# Patient Record
Sex: MALE | Race: WHITE | NOT HISPANIC OR LATINO | Employment: FULL TIME | URBAN - METROPOLITAN AREA
[De-identification: names, ages, dates, MRNs, and addresses within clinical notes are randomized per-mention and may not be internally consistent; named-entity substitution may affect disease eponyms.]

---

## 2017-02-01 ENCOUNTER — OFFICE VISIT (OUTPATIENT)
Dept: FAMILY MEDICINE | Facility: CLINIC | Age: 52
End: 2017-02-01
Payer: COMMERCIAL

## 2017-02-01 VITALS
SYSTOLIC BLOOD PRESSURE: 132 MMHG | HEART RATE: 77 BPM | BODY MASS INDEX: 32.58 KG/M2 | DIASTOLIC BLOOD PRESSURE: 72 MMHG | HEIGHT: 68 IN | TEMPERATURE: 98.8 F | WEIGHT: 215 LBS | OXYGEN SATURATION: 98 %

## 2017-02-01 DIAGNOSIS — R73.03 PREDIABETES: ICD-10-CM

## 2017-02-01 DIAGNOSIS — J45.20 MILD INTERMITTENT ASTHMA WITHOUT COMPLICATION: ICD-10-CM

## 2017-02-01 DIAGNOSIS — I10 HYPERTENSION GOAL BP (BLOOD PRESSURE) < 140/90: ICD-10-CM

## 2017-02-01 DIAGNOSIS — E78.5 HYPERLIPIDEMIA LDL GOAL <70: ICD-10-CM

## 2017-02-01 DIAGNOSIS — Z12.11 SCREEN FOR COLON CANCER: ICD-10-CM

## 2017-02-01 DIAGNOSIS — Z80.42 FAMILY HISTORY OF PROSTATE CANCER: ICD-10-CM

## 2017-02-01 DIAGNOSIS — Z23 NEED FOR PROPHYLACTIC VACCINATION AND INOCULATION AGAINST INFLUENZA: ICD-10-CM

## 2017-02-01 DIAGNOSIS — L98.9 CHANGING SKIN LESION: ICD-10-CM

## 2017-02-01 DIAGNOSIS — N20.0 NEPHROLITHIASIS: ICD-10-CM

## 2017-02-01 DIAGNOSIS — Z82.49 FAMILY HISTORY OF CORONARY ARTERY DISEASE: ICD-10-CM

## 2017-02-01 DIAGNOSIS — Z91.09 ENVIRONMENTAL ALLERGIES: ICD-10-CM

## 2017-02-01 DIAGNOSIS — K22.70 BARRETT'S ESOPHAGUS WITHOUT DYSPLASIA: ICD-10-CM

## 2017-02-01 DIAGNOSIS — G47.9 SLEEP DISORDER: ICD-10-CM

## 2017-02-01 DIAGNOSIS — Z12.5 SCREENING FOR PROSTATE CANCER: ICD-10-CM

## 2017-02-01 DIAGNOSIS — I25.810 CORONARY ARTERY DISEASE INVOLVING CORONARY BYPASS GRAFT OF NATIVE HEART WITHOUT ANGINA PECTORIS: ICD-10-CM

## 2017-02-01 DIAGNOSIS — Z51.81 MEDICATION MONITORING ENCOUNTER: ICD-10-CM

## 2017-02-01 DIAGNOSIS — Z11.59 NEED FOR HEPATITIS C SCREENING TEST: ICD-10-CM

## 2017-02-01 DIAGNOSIS — G89.12 POST-THORACOTOMY PAIN SYNDROME: ICD-10-CM

## 2017-02-01 DIAGNOSIS — F41.9 ANXIETY: ICD-10-CM

## 2017-02-01 DIAGNOSIS — Z00.00 ENCOUNTER FOR ROUTINE ADULT HEALTH EXAMINATION WITHOUT ABNORMAL FINDINGS: Primary | ICD-10-CM

## 2017-02-01 DIAGNOSIS — K21.9 GASTROESOPHAGEAL REFLUX DISEASE WITHOUT ESOPHAGITIS: ICD-10-CM

## 2017-02-01 PROCEDURE — 99396 PREV VISIT EST AGE 40-64: CPT | Performed by: FAMILY MEDICINE

## 2017-02-01 RX ORDER — FENOFIBRATE 160 MG/1
160 TABLET ORAL DAILY
Qty: 90 TABLET | Refills: 3 | Status: SHIPPED | OUTPATIENT
Start: 2017-02-01 | End: 2018-01-30

## 2017-02-01 RX ORDER — AZELASTINE 1 MG/ML
1-2 SPRAY, METERED NASAL 2 TIMES DAILY
Qty: 3 BOTTLE | Refills: 3 | Status: SHIPPED | OUTPATIENT
Start: 2017-02-01 | End: 2018-01-30

## 2017-02-01 RX ORDER — CARVEDILOL 3.12 MG/1
3.12 TABLET ORAL 2 TIMES DAILY WITH MEALS
Qty: 180 TABLET | Refills: 3 | Status: SHIPPED | OUTPATIENT
Start: 2017-02-01 | End: 2018-01-30

## 2017-02-01 RX ORDER — ALBUTEROL SULFATE 90 UG/1
2 AEROSOL, METERED RESPIRATORY (INHALATION) EVERY 4 HOURS PRN
Qty: 3 INHALER | Refills: 3 | Status: SHIPPED | OUTPATIENT
Start: 2017-02-01 | End: 2018-01-30

## 2017-02-01 RX ORDER — TAMSULOSIN HYDROCHLORIDE 0.4 MG/1
0.4 CAPSULE ORAL DAILY
Qty: 30 CAPSULE | Refills: 0 | Status: CANCELLED | OUTPATIENT
Start: 2017-02-01

## 2017-02-01 RX ORDER — CITALOPRAM HYDROBROMIDE 20 MG/1
20 TABLET ORAL DAILY
Qty: 90 TABLET | Refills: 3 | Status: SHIPPED | OUTPATIENT
Start: 2017-02-01 | End: 2018-01-30

## 2017-02-01 RX ORDER — CETIRIZINE HYDROCHLORIDE 10 MG/1
10 TABLET ORAL EVERY EVENING
Qty: 30 TABLET | Refills: 1 | Status: CANCELLED | OUTPATIENT
Start: 2017-02-01

## 2017-02-01 RX ORDER — ATORVASTATIN CALCIUM 80 MG/1
80 TABLET, FILM COATED ORAL DAILY
Qty: 90 TABLET | Refills: 3 | Status: SHIPPED | OUTPATIENT
Start: 2017-02-01 | End: 2018-01-30

## 2017-02-01 RX ORDER — FLUTICASONE PROPIONATE 50 MCG
1-2 SPRAY, SUSPENSION (ML) NASAL DAILY
Qty: 48 G | Refills: 3 | Status: SHIPPED | OUTPATIENT
Start: 2017-02-01 | End: 2018-01-30

## 2017-02-01 RX ORDER — LANSOPRAZOLE 30 MG/1
CAPSULE, DELAYED RELEASE ORAL
Qty: 90 CAPSULE | Refills: 3 | Status: CANCELLED | OUTPATIENT
Start: 2017-02-01

## 2017-02-01 RX ORDER — ZOLPIDEM TARTRATE 5 MG/1
5 TABLET ORAL
Qty: 30 TABLET | Refills: 0 | Status: SHIPPED | OUTPATIENT
Start: 2017-02-01 | End: 2020-03-02

## 2017-02-01 NOTE — PROGRESS NOTES
SUBJECTIVE:     CC: Mian Lozano is an 51 year old male who presents for preventative health visit.     Mian presented to the clinic for a preventative health visit.  He is taking his medications regularly with no side effects.  He has had some pain under his left armpit in the rib area.   His heartburn is well controlled on medication.  He denies having sleep apnea, excessive snoring, daytime drowsiness, chest pain and shortness of breath.       Healthy Habits:    Do you get at least three servings of calcium containing foods daily (dairy, green leafy vegetables, etc.)? no, taking calcium and/or vitamin D supplement: no    Amount of exercise or daily activities, outside of work: 3 hour(s) per week    Problems taking medications regularly No    Medication side effects: No    Have you had an eye exam in the past two years? yes    Do you see a dentist twice per year? yes    Do you have sleep apnea, excessive snoring or daytime drowsiness?no      CAD/LipidsHypertension Follow-up - no current issues      Outpatient blood pressures are not being checked.    Low Salt Diet: not monitoring salt    BP Readings from Last 3 Encounters:   02/01/17 132/72   03/17/16 122/82   11/25/15 132/82        Recent Labs   Lab Test  11/02/16   0725  01/27/16   0709  07/08/15   0757  09/03/14   0745   CHOL  114  140  146  159   HDL  28*  32*  37*  34*   LDL  64  78  79  72   TRIG  111  148  148  267*   CHOLHDLRATIO   --    --   3.9  4.7     Prediabetes    GLUCOSE   Date Value Ref Range Status   11/02/2016 111* 70 - 99 mg/dL Final     A1C      5.5   1/27/2016  A1C      5.5   7/8/2015  A1C      5.4   9/3/2014  A1C      5.2   3/14/2013  A1C      5.1   11/15/2011    Anxiety - stable    Sleep - Stable    Asthma  - Stable    GERD/Sullivan's - stable      Today's PHQ-2 Score:   PHQ-2 ( 1999 Pfizer) 2/1/2017 3/17/2016   Q1: Little interest or pleasure in doing things 0 0   Q2: Feeling down, depressed or hopeless 0 0   PHQ-2 Score 0 0   Little  interest or pleasure in doing things - -   Feeling down, depressed or hopeless - -   PHQ-2 Score - -       Abuse: Current or Past(Physical, Sexual or Emotional)- No  Do you feel safe in your environment - Yes    Social History   Substance Use Topics     Smoking status: Never Smoker      Smokeless tobacco: Never Used     Alcohol Use: 3.6 oz/week      Comment: 6 drinks per week     The patient does not drink >3 drinks per day nor >7 drinks per week.    Last PSA:   PSA   Date Value Ref Range Status   01/27/2016 0.71 0 - 4 ug/L Final       Recent Labs   Lab Test  11/02/16   0725  01/27/16   0709  07/08/15   0757  09/03/14   0745   CHOL  114  140  146  159   HDL  28*  32*  37*  34*   LDL  64  78  79  72   TRIG  111  148  148  267*   CHOLHDLRATIO   --    --   3.9  4.7   NHDL  86  108   --    --        Reviewed orders with patient. Reviewed health maintenance and updated orders accordingly - Yes    All Histories reviewed and updated in [facility].    Health Maintenance     Colonoscopy:  10 years last 8/15 Due 8/25   FIT:  Yearly Not on file-Ordered               PSA: Yearly Not on file-Ordered    DEXA:  N/A    Health Maintenance Due   Topic Date Due     HEPATITIS C SCREENING  02/14/1983     MICROALBUMIN Q1 YEAR( NO INBASKET)  01/27/2017       Current Problem List    Patient Active Problem List   Diagnosis     Other atopic dermatitis and related conditions     Mild intermittent asthma     Family history of coronary artery disease     Disease of lung     Family history of prostate cancer     Prediabetes     Health Care Home     GERD (gastroesophageal reflux disease)     Sullivan's esophagus     Anxiety     Hypertension goal BP (blood pressure) < 140/90     Ulnar neuropathy     Post-thoracotomy pain syndrome     Nephrolithiasis     Coronary artery disease     Hyperlipidemia LDL goal <70     Environmental allergies       Past Medical History    Past Medical History   Diagnosis Date     Seasonal allergies 1980     Allergic rhinitis  Hayfever     Other atopic dermatitis and related conditions 1980     Mild intermittent asthma 7/03     Hypertension goal BP (blood pressure) < 140/90 4/08     FAM HX-CARDIOVAS DIS NEC      dad at 45, cabg, stent     Coronary artery disease 4/08     cabg x 5     Other diseases of lung, not elsewhere classified 4/08     dr steel - benign bx and ct - granuloma - no further w/u needed     Prediabetes 8/08     Family history of prostate cancer      GERD (gastroesophageal reflux disease) 3/11     Hyperlipidemia LDL goal <70 2006     Fowler's esophagus 7/11     Anxiety      Post-thoracotomy pain syndrome 4/11     dr hoover     Nephrolithiasis 6/13, 7/16     calcium oxalate     History of blood transfusion      Environmental allergies      seasonal, hay fever       Past Surgical History    Past Surgical History   Procedure Laterality Date     Surgical history of -   1980     lt patella fx     Surgical history of -   1985     lt thumb neuroma excised     Hc vasectomy unilat/bilat w postop semen  1995     Vasectomy     C stress test - regular (fl)  9/06, 4/11     negative stress thallium - [facility]SD     C cabg, artery-vein, five  4/08     FSD - lung bx benign     Surgical history of -   11/09     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 gastritis, fowler's - due 3 yr     Esophagoscopy, gastroscopy, duodenoscopy (egd), combined  5/10/2013     COMBINED ESOPHAGOSCOPY, GASTROSCOPY, DUODENOSCOPY (EGD), BIOPSY SINGLE OR MULTIPLE;  ESOPHAGOSCOPY, GASTROSCOPY, DUODENOSCOPY (EGD)  with biopsy;  Surgeon: Angus Reynolds MD;  Location: [facility]     Herniorrhaphy umbilical N/A 11/13/2014     Dr Barron     Colonoscopy N/A 8/7/2015     normal - due 10 yrs       Current Medications    Current Outpatient Prescriptions   Medication Sig Dispense Refill     albuterol (ALBUTEROL) 108 (90 BASE) MCG/ACT Inhaler Inhale 2 puffs into the lungs every 4 hours as  needed 3 Inhaler 3     atorvastatin (LIPITOR) 80 MG tablet Take 1 tablet (80 mg) by mouth daily 90 tablet 3     azelastine (ASTELIN) 0.1 % spray Spray 1-2 sprays into both nostrils 2 times daily 3 Bottle 3     carvedilol (COREG) 3.125 MG tablet Take 1 tablet (3.125 mg) by mouth 2 times daily (with meals) 180 tablet 3     citalopram (CELEXA) 20 MG tablet Take 1 tablet (20 mg) by mouth daily 90 tablet 3     fenofibrate 160 MG tablet Take 1 tablet (160 mg) by mouth daily 90 tablet 3     fluticasone (FLONASE) 50 MCG/ACT spray Spray 1-2 sprays into both nostrils daily 48 g 3     zolpidem (AMBIEN) 5 MG tablet Take 1 tablet (5 mg) by mouth nightly as needed for sleep 30 tablet 0     tamsulosin (FLOMAX) 0.4 MG 24 hr capsule Take 1 capsule (0.4 mg) by mouth daily 30 capsule 0     LANsoprazole (PREVACID) 30 MG capsule TAKE ONE CAPSULE BY MOUTH DAILY. TAKE 30 TO 60 MINUTES BEFORE A MEAL. 90 capsule 3     cetirizine (ZYRTEC) 10 MG tablet Take 1 tablet by mouth every evening. 30 tablet 1     Omega-3 Fatty Acids (OMEGA 3 PO) Take  by mouth.       ASPIRIN 81 MG OR TABS ONE DAILY 100 3       Allergies    Allergies   Allergen Reactions     Seasonal Allergies        Immunizations    Immunization History   Administered Date(s) Administered     Influenza (IIV3) 11/26/2012, 11/12/2014, 10/01/2016     Influenza Intranasal Vaccine 4 valent 10/01/2015     Influenza Vaccine IM 3yrs+ 4 Valent IIV4 10/20/2015     TDAP (BOOSTRIX AGES 10-64) 11/25/2015     Tdap (Adacel,Boostrix) 04/28/2006       Family History    Family History   Problem Relation Age of Onset     HEART DISEASE Father      C.A.SKIP. Father 38     Prostate Cancer Father 69     Neurologic Disorder Sister      CVA/anuerysm at age 19     Hypertension Paternal Grandfather      LAUROADONTA. Paternal Grandfather        Social History    Social History     Social History     Marital Status:      Spouse Name: Imelda     Number of Children: 0     Years of Education: 14     Occupational  "History      Unemployed     Social History Main Topics     Smoking status: Never Smoker      Smokeless tobacco: Never Used     Alcohol Use: 3.6 oz/week      Comment: 6 drinks per week     Drug Use: No     Sexual Activity:     Partners: Female     Birth Control/ Protection: None     Other Topics Concern     Caffeine Concern Yes     1 can qd     Exercise Yes     occas     Seat Belt Yes     Parent/Sibling W/ Cabg, Mi Or Angioplasty Before 65f 55m? Yes     Social History Narrative     ROS:  Constitutional, HEENT, cardiovascular, pulmonary, GI, , musculoskeletal, neuro, skin, endocrine and psych systems are negative, except as in HPI or otherwise noted       Problem list, Medication list, Allergies, and Medical/Social/Surgical histories reviewed in [facility] and updated as appropriate.  OBJECTIVE:     /72 mmHg  Pulse 77  Temp(Src) 98.8  F (37.1  C) (Oral)  Ht 5' 8\" (1.727 m)  Wt 215 lb (97.523 kg)  BMI 32.70 kg/m2  SpO2 98%  EXAM:  GENERAL: healthy, alert and no distress Obese   EYES: Eyes grossly normal to inspection, PERRL and conjunctivae and sclerae normal  HENT: ear canals and TM's normal, nose and mouth without ulcers or lesions  NECK: no adenopathy, no asymmetry, masses, or scars and thyroid normal to palpation  RESP: lungs clear to auscultation - no rales, rhonchi or wheezes  CV: regular rate and rhythm, normal S1 S2, no S3 or S4, no murmur, click or rub, no peripheral edema and peripheral pulses strong  ABDOMEN: soft, nontender, no hepatosplenomegaly, no masses and bowel sounds normal  MS: no gross musculoskeletal defects noted, no edema  SKIN: no suspicious lesions or rashes 3mm rough tan lesion to posterior of shoulder.     NEURO: Normal strength and tone, mentation intact and speech normal  PSYCH: mentation appears normal, affect normal/bright  Examination:  : testicles normal without atrophy or masses, no hernias, penis normal without urethral discharge  RECTAL: normal sphincter tone, no rectal " masses, prostate smooth, without masses    ASSESSMENT/PLAN:         ICD-10-CM    1. Encounter for routine adult health examination without abnormal findings Z00.00 Comprehensive metabolic panel     Lipid panel reflex to direct LDL     CK total     CBC with platelets     *UA reflex to Microscopic     Albumin Random Urine Quantitative     TSH with free T4 reflex     Prostate spec antigen screen     Hemoglobin A1c     Hepatitis C Screen Reflex to HCV RNA Quant and Genotype     Fecal colorectal cancer screen (FIT)   2. Coronary artery disease involving coronary bypass graft of native heart without angina pectoris I25.810 Comprehensive metabolic panel     Lipid panel reflex to direct LDL     *UA reflex to Microscopic     Albumin Random Urine Quantitative     CARDIOLOGY EVAL ADULT REFERRAL   3. Hypertension goal BP (blood pressure) < 140/90 I10 Comprehensive metabolic panel     CBC with platelets     *UA reflex to Microscopic     Albumin Random Urine Quantitative     carvedilol (COREG) 3.125 MG tablet     CARDIOLOGY EVAL ADULT REFERRAL   4. Hyperlipidemia LDL goal <70 E78.5 Comprehensive metabolic panel     Lipid panel reflex to direct LDL     CK total     atorvastatin (LIPITOR) 80 MG tablet     fenofibrate 160 MG tablet     CARDIOLOGY EVAL ADULT REFERRAL   5. Post-thoracotomy pain syndrome G89.12    6. Prediabetes R73.03 Comprehensive metabolic panel     *UA reflex to Microscopic     Albumin Random Urine Quantitative     Hemoglobin A1c   7. Anxiety F41.9 TSH with free T4 reflex     citalopram (CELEXA) 20 MG tablet   8. Sleep disorder G47.9 zolpidem (AMBIEN) 5 MG tablet   9. Mild intermittent asthma without complication J45.20 albuterol (ALBUTEROL) 108 (90 BASE) MCG/ACT Inhaler     azelastine (ASTELIN) 0.1 % spray     fluticasone (FLONASE) 50 MCG/ACT spray   10. Sullivan's esophagus without dysplasia K22.70 CBC with platelets     GASTROENTEROLOGY ADULT REF PROCEDURE ONLY   11. Gastroesophageal reflux disease without  esophagitis K21.9 CBC with platelets     GASTROENTEROLOGY ADULT REF PROCEDURE ONLY   12. Nephrolithiasis N20.0 *UA reflex to Microscopic   13. Environmental allergies Z91.09 albuterol (ALBUTEROL) 108 (90 BASE) MCG/ACT Inhaler     azelastine (ASTELIN) 0.1 % spray     fluticasone (FLONASE) 50 MCG/ACT spray   14. Changing skin lesion L98.9    15. Family history of coronary artery disease Z82.49 Lipid panel reflex to direct LDL   16. Family history of prostate cancer Z80.42 Prostate spec antigen screen   17. Screening for prostate cancer Z12.5 Prostate spec antigen screen   18. Screen for colon cancer Z12.11 Fecal colorectal cancer screen (FIT)   19. Medication monitoring encounter Z51.81 Comprehensive metabolic panel     Lipid panel reflex to direct LDL     CK total     CBC with platelets     *UA reflex to Microscopic     Albumin Random Urine Quantitative     TSH with free T4 reflex     Hemoglobin A1c   20. Need for hepatitis C screening test Z11.59 Hepatitis C Screen Reflex to HCV RNA Quant and Genotype   21. Need for prophylactic vaccination and inoculation against influenza Z23        COUNSELING:  Reviewed preventive health counseling, as reflected in patient instructions     Discussed treatment/modality options, including risk and benefits, he desires advised aspirin 81 mg po daily, advised diet, exercise, and weight loss, diabetic education, further diagnostic(s), further health care maintenance, further imaging, medication change(s), medication refill(s), referral(s) and observation. All diagnosis above reviewed and noted above, otherwise stable.  See [facility] orders for further details.  Follow up as needed.    Ordered endoscopy, stress test, refilled/changed meds, fasting labs soon, shave bx soon       Health Maintenance Due   Topic Date Due     HEPATITIS C SCREENING  02/14/1983     MICROALBUMIN Q1 YEAR( NO INBASKET)  01/27/2017        reports that he has never smoked. He has never used smokeless  "tobacco.    Estimated body mass index is 32.7 kg/(m^2) as calculated from the following:    Height as of this encounter: 5' 8\" (1.727 m).    Weight as of this encounter: 215 lb (97.523 kg).   Weight management plan: Discussed healthy diet and exercise guidelines and patient will follow up in 12 months in clinic to re-evaluate.    Counseling Resources:  ATP IV Guidelines  Pooled Cohorts Equation Calculator  FRAX Risk Assessment  ICSI Preventive Guidelines  Dietary Guidelines for Americans, 2010  USDA's MyPlate  ASA Prophylaxis  Lung CA Screening    This document serves as a record of the services and decisions personally performed and made by Fady Adams MD [facility]. It was created on their behalf by John Jensen, a trained medical scribe. The creation of this document is based the provider's statements to the medical scribe.  John Jensen February 1, 2017 4:51 PM             Fady Adams MD, FAAFP    [address]  [phone] Fax    "

## 2017-02-01 NOTE — NURSING NOTE
"Chief Complaint   Patient presents with     Physical       Initial /72 mmHg  Pulse 77  Temp(Src) 98.8  F (37.1  C) (Oral)  Ht 5' 8\" (1.727 m)  Wt 215 lb (97.523 kg)  BMI 32.70 kg/m2  SpO2 98% Estimated body mass index is 32.7 kg/(m^2) as calculated from the following:    Height as of this encounter: 5' 8\" (1.727 m).    Weight as of this encounter: 215 lb (97.523 kg)..  BP completed using cuff size: sara Rodriguez MA  "

## 2017-02-01 NOTE — PATIENT INSTRUCTIONS
2/1/17    Fasting labs-Come in to get done  Med-Refilled-Use as directed   Stress test Ordered   Endoscopy-Ordered       Preventive Health Recommendations  Male Ages 50   64    Yearly exam:             See your health care provider every year in order to  o   Review health changes.   o   Discuss preventive care.    o   Review your medicines if your doctor has prescribed any.     Have a cholesterol test every 5 years, or more frequently if you are at risk for high cholesterol/heart disease.     Have a diabetes test (fasting glucose) every three years. If you are at risk for diabetes, you should have this test more often.     Have a colonoscopy at age 50, or have a yearly FIT test (stool test). These exams will check for colon cancer.      Talk with your health care provider about whether or not a prostate cancer screening test (PSA) is right for you.    You should be tested each year for STDs (sexually transmitted diseases), if you re at risk.     Shots: Get a flu shot each year. Get a tetanus shot every 10 years.     Nutrition:    Eat at least 5 servings of fruits and vegetables daily.     Eat whole-grain bread, whole-wheat pasta and brown rice instead of white grains and rice.     Talk to your provider about Calcium and Vitamin D.     Lifestyle    Exercise for at least 150 minutes a week (30 minutes a day, 5 days a week). This will help you control your weight and prevent disease.     Limit alcohol to one drink per day.     No smoking.     Wear sunscreen to prevent skin cancer.     See your dentist every six months for an exam and cleaning.     See your eye doctor every 1 to 2 years.        [facility]                        To reach your care team during and after hours:   [phone]  To reach our pharmacy:        [phone]    Clinic Hours                        Our clinic hours are:    Monday   7:30 am to 7:00 pm                  Tuesday through Friday 7:30 am to 5:00 pm                              Saturday   8:00 am to 12:00 pm      Sunday   Closed      Pharmacy Hours                        Our pharmacy hours are:    Monday   8:30 am to 7:00 pm       Tuesday to Friday  8:30 am to 6:00 pm                       Saturday    9:00 am to 1:00 pm              Sunday    Closed              There is also information available at our web site:  [email or web address]    If your provider ordered any lab tests and you do not receive the results within 10 business days, please call the clinic.    If you need a medication refill please contact your pharmacy.  Please allow 2-3 business days for your refill to be completed.    Our clinic offers telephone visits and e visits.  Please ask one of your team members to explain more.      Use [employer] (secure email communication and access to your chart) to send your primary care provider a message or make an appointment. Ask someone on your Team how to sign up for [employer].  Immunizations                      Immunization History   Administered Date(s) Administered     Influenza (IIV3) 11/26/2012, 11/12/2014, 10/01/2016     Influenza Intranasal Vaccine 4 valent 10/01/2015     Influenza Vaccine IM 3yrs+ 4 Valent IIV4 10/20/2015     TDAP (BOOSTRIX AGES 10-64) 11/25/2015     Tdap (Adacel,Boostrix) 04/28/2006        Health Maintenance                         Health Maintenance Due   Topic Date Due     Hepatitis C Screening  02/14/1983     Asthma Control Test - every 6 months  05/25/2016     Flu Vaccine - yearly  09/01/2016     Asthma Action Plan - yearly  11/25/2016     Microalbumin Lab - yearly  01/27/2017

## 2017-02-01 NOTE — MR AVS SNAPSHOT
After Visit Summary   2/1/2017    Main Lozano    MRN: [id number]           Patient Information     Date Of Birth          1965        Visit Information        Provider Department      2/1/2017 4:00 PM Fady Adams MD [facility] [place]        Today's Diagnoses     Encounter for routine adult health examination without abnormal findings    -  1     Coronary artery disease involving coronary bypass graft of native heart without angina pectoris         Hypertension goal BP (blood pressure) < 140/90         Hyperlipidemia LDL goal <70         Post-thoracotomy pain syndrome         Prediabetes         Anxiety         Sleep disorder         Mild intermittent asthma without complication         Sullivan's esophagus without dysplasia         Gastroesophageal reflux disease without esophagitis         Nephrolithiasis         Environmental allergies         Changing skin lesion         Family history of coronary artery disease         Family history of prostate cancer         Screening for prostate cancer         Screen for colon cancer         Medication monitoring encounter         Need for hepatitis C screening test         Need for prophylactic vaccination and inoculation against influenza           Care Instructions    2/1/17    Fasting labs-Come in to get done  Med-Refilled-Use as directed   Stress test Ordered   Endoscopy-Ordered       Preventive Health Recommendations  Male Ages 50 - 64    Yearly exam:             See your health care provider every year in order to  o   Review health changes.   o   Discuss preventive care.    o   Review your medicines if your doctor has prescribed any.     Have a cholesterol test every 5 years, or more frequently if you are at risk for high cholesterol/heart disease.     Have a diabetes test (fasting glucose) every three years. If you are at risk for diabetes, you should have this test more often.     Have a colonoscopy at age 50, or have a yearly FIT  test (stool test). These exams will check for colon cancer.      Talk with your health care provider about whether or not a prostate cancer screening test (PSA) is right for you.    You should be tested each year for STDs (sexually transmitted diseases), if you re at risk.     Shots: Get a flu shot each year. Get a tetanus shot every 10 years.     Nutrition:    Eat at least 5 servings of fruits and vegetables daily.     Eat whole-grain bread, whole-wheat pasta and brown rice instead of white grains and rice.     Talk to your provider about Calcium and Vitamin D.     Lifestyle    Exercise for at least 150 minutes a week (30 minutes a day, 5 days a week). This will help you control your weight and prevent disease.     Limit alcohol to one drink per day.     No smoking.     Wear sunscreen to prevent skin cancer.     See your dentist every six months for an exam and cleaning.     See your eye doctor every 1 to 2 years.        [facility]                        To reach your care team during and after hours:   [phone]  To reach our pharmacy:        [phone]    Clinic Hours                        Our clinic hours are:    Monday   7:30 am to 7:00 pm                  Tuesday through Friday 7:30 am to 5:00 pm                             Saturday   8:00 am to 12:00 pm      Sunday   Closed      Pharmacy Hours                        Our pharmacy hours are:    Monday   8:30 am to 7:00 pm       Tuesday to Friday  8:30 am to 6:00 pm                       Saturday    9:00 am to 1:00 pm              Sunday    Closed              There is also information available at our web site:  [email or web address]    If your provider ordered any lab tests and you do not receive the results within 10 business days, please call the clinic.    If you need a medication refill please contact your pharmacy.  Please allow 2-3 business days for your refill to be completed.    Our clinic offers telephone visits and e visits.   Please ask one of your team members to explain more.      Use [employer] (secure email communication and access to your chart) to send your primary care provider a message or make an appointment. Ask someone on your Team how to sign up for [employer].  Immunizations                      Immunization History   Administered Date(s) Administered     Influenza (IIV3) 11/26/2012, 11/12/2014, 10/01/2016     Influenza Intranasal Vaccine 4 valent 10/01/2015     Influenza Vaccine IM 3yrs+ 4 Valent IIV4 10/20/2015     TDAP (BOOSTRIX AGES 10-64) 11/25/2015     Tdap (Adacel,Boostrix) 04/28/2006        Health Maintenance                         Health Maintenance Due   Topic Date Due     Hepatitis C Screening  02/14/1983     Asthma Control Test - every 6 months  05/25/2016     Flu Vaccine - yearly  09/01/2016     Asthma Action Plan - yearly  11/25/2016     Microalbumin Lab - yearly  01/27/2017               Follow-ups after your visit        Additional Services     CARDIOLOGY EVAL ADULT REFERRAL       Your provider has referred you to:  [facility]: [facility] [phone]   [email or web address]    Please be aware that coverage of these services is subject to the terms and limitations of your health insurance plan.  Call member services at your health plan with any benefit or coverage questions.      Type of Referral:  Cardiology Follow Up    Timeframe requested:  Within 1 month, prn    Please bring the following to your appointment:  >>   Any x-rays, CTs or MRIs which have been performed.  Contact the facility where they were done to arrange for  prior to your scheduled appointment.    >>   List of current medications  >>   This referral request   >>   Any documents/labs given to you for this referral            GASTROENTEROLOGY ADULT REF PROCEDURE ONLY       Last Lab Result: CREATININE (mg/dL)       Date                     Value                  07/20/2016               1.54*            ----------  Body mass index is 32.7 kg/(m^2).      Patient will be contacted to schedule procedure.     Please be aware that coverage of these services is subject to the terms and limitations of your health insurance plan.  Call member services at your health plan with any benefit or coverage questions.  Any procedures must be performed at a [facility] facility OR coordinated by your clinic's referral office.    Please bring the following with you to your appointment:    (1) Any X-Rays, CTs or MRIs which have been performed.  Contact the facility where they were done to arrange for  prior to your scheduled appointment.    (2) List of current medications   (3) This referral request   (4) Any documents/labs given to you for this referral                  Future tests that were ordered for you today     Open Future Orders        Priority Expected Expires Ordered    Comprehensive metabolic panel Routine 3/3/2017 4/1/2017 2/1/2017    Lipid panel reflex to direct LDL Routine 3/3/2017 4/1/2017 2/1/2017    CK total Routine 3/3/2017 4/1/2017 2/1/2017    CBC with platelets Routine 3/3/2017 4/1/2017 2/1/2017    *UA reflex to Microscopic Routine 3/3/2017 4/1/2017 2/1/2017    Albumin Random Urine Quantitative Routine  4/1/2017 2/1/2017    TSH with free T4 reflex Routine 3/3/2017 4/1/2017 2/1/2017    Prostate spec antigen screen Routine 3/3/2017 4/1/2017 2/1/2017    Hemoglobin A1c Routine 3/3/2017 4/1/2017 2/1/2017    Hepatitis C Screen Reflex to HCV RNA Quant and Genotype Routine  4/1/2017 2/1/2017    Fecal colorectal cancer screen (FIT) Routine 2/22/2017 4/1/2017 2/1/2017            Who to contact     If you have questions or need follow up information about today's clinic visit or your schedule please contact [facility] directly at [phone].  Normal or non-critical lab and imaging results will be communicated to you by MyChart, letter or phone within 4  "business days after the clinic has received the results. If you do not hear from us within 7 days, please contact the clinic through [employer] or phone. If you have a critical or abnormal lab result, we will notify you by phone as soon as possible.  Submit refill requests through [employer] or call your pharmacy and they will forward the refill request to us. Please allow 3 business days for your refill to be completed.          Additional Information About Your Visit        [employer] Information     [employer] gives you secure access to your electronic health record. If you see a primary care provider, you can also send messages to your care team and make appointments. If you have questions, please call your primary care clinic.  If you do not have a primary care provider, please call [phone] and they will assist you.        Care EveryWhere ID     This is your Care EveryWhere ID. This could be used by other organizations to access your [place] medical records  [id number]        Your Vitals Were     Pulse Temperature Height BMI (Body Mass Index) Pulse Oximetry       77 98.8  F (37.1  C) (Oral) 5' 8\" (1.727 m) 32.70 kg/m2 98%        Blood Pressure from Last 3 Encounters:   02/01/17 132/72   03/17/16 122/82   11/25/15 132/82    Weight from Last 3 Encounters:   02/01/17 215 lb (97.523 kg)   03/17/16 206 lb 9.6 oz (93.713 kg)   11/25/15 203 lb (92.08 kg)              We Performed the Following     Asthma Action Plan   (Please complete E-AAP by signing order and opening link in order details)     CARDIOLOGY EVAL ADULT REFERRAL     GASTROENTEROLOGY ADULT REF PROCEDURE ONLY          Today's Medication Changes          These changes are accurate as of: 2/1/17  5:12 PM.  If you have any questions, ask your nurse or doctor.               These medicines have changed or have updated prescriptions.        Dose/Directions    atorvastatin 80 MG tablet   Commonly known as:  LIPITOR   This may have changed:  See the new instructions. "   Used for:  Hyperlipidemia LDL goal <70   Changed by:  Fady Adams MD        Dose:  80 mg   Take 1 tablet (80 mg) by mouth daily   Quantity:  90 tablet   Refills:  3       carvedilol 3.125 MG tablet   Commonly known as:  COREG   This may have changed:  See the new instructions.   Used for:  Hypertension goal BP (blood pressure) < 140/90   Changed by:  Fady Adams MD        Dose:  3.125 mg   Take 1 tablet (3.125 mg) by mouth 2 times daily (with meals)   Quantity:  180 tablet   Refills:  3       citalopram 20 MG tablet   Commonly known as:  celeXA   This may have changed:  See the new instructions.   Used for:  Anxiety   Changed by:  Fady Adams MD        Dose:  20 mg   Take 1 tablet (20 mg) by mouth daily   Quantity:  90 tablet   Refills:  3       fenofibrate 160 MG tablet   This may have changed:  See the new instructions.   Used for:  Hyperlipidemia LDL goal <70   Changed by:  Fady Adams MD        Dose:  160 mg   Take 1 tablet (160 mg) by mouth daily   Quantity:  90 tablet   Refills:  3            Where to get your medicines      These medications were sent to [facility] [address] [place] [address] AT [facility] [address] [place] [address], [facility] [address]    Hours:  24-hours Phone:  [phone]    - albuterol 108 (90 BASE) MCG/ACT Inhaler  - atorvastatin 80 MG tablet  - azelastine 0.1 % spray  - carvedilol 3.125 MG tablet  - citalopram 20 MG tablet  - fenofibrate 160 MG tablet  - fluticasone 50 MCG/ACT spray      Some of these will need a paper prescription and others can be bought over the counter.  Ask your nurse if you have questions.     Bring a paper prescription for each of these medications    - zolpidem 5 MG tablet             Primary Care Provider Office Phone # Fax #    Fady Adams [phone]       [address] [address]        Thank you!     Thank you for choosing [facility]   for your care. Our goal is always to provide you with excellent care. Hearing back from our patients is one way we can continue to improve our services. Please take a few minutes to complete the written survey that you may receive in the mail after your visit with us. Thank you!             Your Updated Medication List - Protect others around you: Learn how to safely use, store and throw away your medicines at www.disposemymeds.org.          This list is accurate as of: 2/1/17  5:12 PM.  Always use your most recent med list.                   Brand Name Dispense Instructions for use    albuterol 108 (90 BASE) MCG/ACT Inhaler    albuterol    3 Inhaler    Inhale 2 puffs into the lungs every 4 hours as needed       aspirin 81 MG tablet     100    ONE DAILY       atorvastatin 80 MG tablet    LIPITOR    90 tablet    Take 1 tablet (80 mg) by mouth daily       azelastine 0.1 % spray    ASTELIN    3 Bottle    Spray 1-2 sprays into both nostrils 2 times daily       carvedilol 3.125 MG tablet    COREG    180 tablet    Take 1 tablet (3.125 mg) by mouth 2 times daily (with meals)       cetirizine 10 MG tablet    zyrTEC    30 tablet    Take 1 tablet by mouth every evening.       citalopram 20 MG tablet    celeXA    90 tablet    Take 1 tablet (20 mg) by mouth daily       fenofibrate 160 MG tablet     90 tablet    Take 1 tablet (160 mg) by mouth daily       fluticasone 50 MCG/ACT spray    FLONASE    48 g    Spray 1-2 sprays into both nostrils daily       LANsoprazole 30 MG CR capsule    PREVACID    90 capsule    TAKE ONE CAPSULE BY MOUTH DAILY. TAKE 30 TO 60 MINUTES BEFORE A MEAL.       OMEGA 3 PO      Take  by mouth.       tamsulosin 0.4 MG capsule    FLOMAX    30 capsule    Take 1 capsule (0.4 mg) by mouth daily       zolpidem 5 MG tablet    AMBIEN    30 tablet    Take 1 tablet (5 mg) by mouth nightly as needed for sleep

## 2017-02-02 ASSESSMENT — ASTHMA QUESTIONNAIRES: ACT_TOTALSCORE: 25

## 2017-02-14 DIAGNOSIS — Z82.49 FAMILY HISTORY OF CORONARY ARTERY DISEASE: ICD-10-CM

## 2017-02-14 DIAGNOSIS — K21.9 GASTROESOPHAGEAL REFLUX DISEASE WITHOUT ESOPHAGITIS: ICD-10-CM

## 2017-02-14 DIAGNOSIS — N20.0 NEPHROLITHIASIS: ICD-10-CM

## 2017-02-14 DIAGNOSIS — Z51.81 MEDICATION MONITORING ENCOUNTER: ICD-10-CM

## 2017-02-14 DIAGNOSIS — I25.810 CORONARY ARTERY DISEASE INVOLVING CORONARY BYPASS GRAFT OF NATIVE HEART WITHOUT ANGINA PECTORIS: ICD-10-CM

## 2017-02-14 DIAGNOSIS — E78.5 HYPERLIPIDEMIA LDL GOAL <70: ICD-10-CM

## 2017-02-14 DIAGNOSIS — R73.03 PREDIABETES: ICD-10-CM

## 2017-02-14 DIAGNOSIS — K22.70 BARRETT'S ESOPHAGUS WITHOUT DYSPLASIA: ICD-10-CM

## 2017-02-14 DIAGNOSIS — Z80.42 FAMILY HISTORY OF PROSTATE CANCER: ICD-10-CM

## 2017-02-14 DIAGNOSIS — F41.9 ANXIETY: ICD-10-CM

## 2017-02-14 DIAGNOSIS — Z00.00 ENCOUNTER FOR ROUTINE ADULT HEALTH EXAMINATION WITHOUT ABNORMAL FINDINGS: ICD-10-CM

## 2017-02-14 DIAGNOSIS — Z11.59 NEED FOR HEPATITIS C SCREENING TEST: ICD-10-CM

## 2017-02-14 DIAGNOSIS — I10 HYPERTENSION GOAL BP (BLOOD PRESSURE) < 140/90: ICD-10-CM

## 2017-02-14 DIAGNOSIS — Z12.5 SCREENING FOR PROSTATE CANCER: ICD-10-CM

## 2017-02-14 LAB
ALBUMIN SERPL-MCNC: 3.5 G/DL (ref 3.4–5)
ALBUMIN UR-MCNC: NEGATIVE MG/DL
ALP SERPL-CCNC: 63 U/L (ref 40–150)
ALT SERPL W P-5'-P-CCNC: 36 U/L (ref 0–70)
ANION GAP SERPL CALCULATED.3IONS-SCNC: 4 MMOL/L (ref 3–14)
APPEARANCE UR: CLEAR
AST SERPL W P-5'-P-CCNC: 19 U/L (ref 0–45)
BILIRUB SERPL-MCNC: 0.5 MG/DL (ref 0.2–1.3)
BILIRUB UR QL STRIP: NEGATIVE
BUN SERPL-MCNC: 13 MG/DL (ref 7–30)
CALCIUM SERPL-MCNC: 8.7 MG/DL (ref 8.5–10.1)
CHLORIDE SERPL-SCNC: 108 MMOL/L (ref 94–109)
CHOLEST SERPL-MCNC: 141 MG/DL
CK SERPL-CCNC: 57 U/L (ref 30–300)
CO2 SERPL-SCNC: 27 MMOL/L (ref 20–32)
COLOR UR AUTO: YELLOW
CREAT SERPL-MCNC: 0.96 MG/DL (ref 0.66–1.25)
CREAT UR-MCNC: 71 MG/DL
ERYTHROCYTE [DISTWIDTH] IN BLOOD BY AUTOMATED COUNT: 13 % (ref 10–15)
GFR SERPL CREATININE-BSD FRML MDRD: 83 ML/MIN/1.7M2
GLUCOSE SERPL-MCNC: 104 MG/DL (ref 70–99)
GLUCOSE UR STRIP-MCNC: NEGATIVE MG/DL
HBA1C MFR BLD: 5.6 % (ref 4.3–6)
HCT VFR BLD AUTO: 39.5 % (ref 40–53)
HCV AB SERPL QL IA: NORMAL
HDLC SERPL-MCNC: 44 MG/DL
HGB BLD-MCNC: 13.5 G/DL (ref 13.3–17.7)
HGB UR QL STRIP: NEGATIVE
KETONES UR STRIP-MCNC: NEGATIVE MG/DL
LDLC SERPL CALC-MCNC: 64 MG/DL
LEUKOCYTE ESTERASE UR QL STRIP: NEGATIVE
MCH RBC QN AUTO: 30 PG (ref 26.5–33)
MCHC RBC AUTO-ENTMCNC: 34.2 G/DL (ref 31.5–36.5)
MCV RBC AUTO: 88 FL (ref 78–100)
MICROALBUMIN UR-MCNC: <5 MG/L
MICROALBUMIN/CREAT UR: NORMAL MG/G CR (ref 0–17)
NITRATE UR QL: NEGATIVE
NONHDLC SERPL-MCNC: 97 MG/DL
PH UR STRIP: 7 PH (ref 5–7)
PLATELET # BLD AUTO: 220 10E9/L (ref 150–450)
POTASSIUM SERPL-SCNC: 4.1 MMOL/L (ref 3.4–5.3)
PROT SERPL-MCNC: 6.4 G/DL (ref 6.8–8.8)
PSA SERPL-ACNC: 0.77 UG/L (ref 0–4)
RBC # BLD AUTO: 4.5 10E12/L (ref 4.4–5.9)
SODIUM SERPL-SCNC: 139 MMOL/L (ref 133–144)
SP GR UR STRIP: 1.01 (ref 1–1.03)
TRIGL SERPL-MCNC: 163 MG/DL
TSH SERPL DL<=0.005 MIU/L-ACNC: 1.91 MU/L (ref 0.4–4)
URN SPEC COLLECT METH UR: ABNORMAL
UROBILINOGEN UR STRIP-ACNC: 2 EU/DL (ref 0.2–1)
WBC # BLD AUTO: 5 10E9/L (ref 4–11)

## 2017-02-14 PROCEDURE — 85027 COMPLETE CBC AUTOMATED: CPT | Performed by: FAMILY MEDICINE

## 2017-02-14 PROCEDURE — 81003 URINALYSIS AUTO W/O SCOPE: CPT | Performed by: FAMILY MEDICINE

## 2017-02-14 PROCEDURE — 36415 COLL VENOUS BLD VENIPUNCTURE: CPT | Performed by: FAMILY MEDICINE

## 2017-02-14 PROCEDURE — 84443 ASSAY THYROID STIM HORMONE: CPT | Performed by: FAMILY MEDICINE

## 2017-02-14 PROCEDURE — 80053 COMPREHEN METABOLIC PANEL: CPT | Performed by: FAMILY MEDICINE

## 2017-02-14 PROCEDURE — G0103 PSA SCREENING: HCPCS | Performed by: FAMILY MEDICINE

## 2017-02-14 PROCEDURE — 82550 ASSAY OF CK (CPK): CPT | Performed by: FAMILY MEDICINE

## 2017-02-14 PROCEDURE — 80061 LIPID PANEL: CPT | Performed by: FAMILY MEDICINE

## 2017-02-14 PROCEDURE — 82043 UR ALBUMIN QUANTITATIVE: CPT | Performed by: FAMILY MEDICINE

## 2017-02-14 PROCEDURE — 83036 HEMOGLOBIN GLYCOSYLATED A1C: CPT | Performed by: FAMILY MEDICINE

## 2017-02-14 PROCEDURE — 86803 HEPATITIS C AB TEST: CPT | Performed by: FAMILY MEDICINE

## 2017-03-07 ENCOUNTER — OFFICE VISIT (OUTPATIENT)
Dept: FAMILY MEDICINE | Facility: CLINIC | Age: 52
End: 2017-03-07
Payer: COMMERCIAL

## 2017-03-07 VITALS
BODY MASS INDEX: 32.43 KG/M2 | TEMPERATURE: 98.5 F | HEIGHT: 68 IN | DIASTOLIC BLOOD PRESSURE: 74 MMHG | SYSTOLIC BLOOD PRESSURE: 120 MMHG | WEIGHT: 214 LBS | OXYGEN SATURATION: 97 % | HEART RATE: 89 BPM

## 2017-03-07 DIAGNOSIS — M54.6 ACUTE MIDLINE THORACIC BACK PAIN: Primary | ICD-10-CM

## 2017-03-07 PROCEDURE — 99213 OFFICE O/P EST LOW 20 MIN: CPT | Performed by: FAMILY MEDICINE

## 2017-03-07 RX ORDER — NAPROXEN SODIUM 220 MG
440 TABLET ORAL 2 TIMES DAILY WITH MEALS
Qty: 20 TABLET | Refills: 0 | COMMUNITY
Start: 2017-03-07 | End: 2017-03-12

## 2017-03-07 RX ORDER — CYCLOBENZAPRINE HCL 5 MG
5-10 TABLET ORAL 3 TIMES DAILY PRN
Qty: 20 TABLET | Refills: 0 | Status: SHIPPED | OUTPATIENT
Start: 2017-03-07 | End: 2017-03-22

## 2017-03-07 NOTE — PROGRESS NOTES
"  SUBJECTIVE:                                                    Mian Lozano is a 52 year old male who presents to clinic today for the following health issues:    Back Pain      Duration: ***        Specific cause: {.:988962::\"none\"}    Description:   Location of pain: {.:373056}  Character of pain: {.:396783}  Pain radiation:{.:353927::\"none\"}  New numbness or weakness in legs, not attributed to pain:  { :436756}    Intensity: {.:330708::\"Currently ***/10\"}    History:   Pain interferes with job: {.:240561::\"***\"}  History of back problems: {.:053009::\"no prior back problems\"}  Any previous MRI or X-rays: {.:784164::\"None\"}  Sees a specialist for back pain:  { :683163::\"No\"}  Therapies tried without relief: {.:554217}    Alleviating factors:   Improved by: {.:765051}      Precipitating factors:  Worsened by: {.:935720::\"Nothing\"}    Functional and Psychosocial Screen (Julian STarT Back):      {.:054723}    {Rooming staff to stop here}   Accompanying Signs & Symptoms:  Risk of Fracture:  {.:541775::\"None\"}  Risk of Cauda Equina:  {.:714771::\"None\"}  Risk of Infection:  {.:822640::\"None\"}  Risk of Cancer:  {.:004713::\"None\"}  Risk of Ankylosing Spondylitis:  Onset at age <35, male, AND morning back stiffness. { :906354}    {If yes to any of the last 5 items or sudden/progressive weakness, consider imaging and/or surgical referral, if not already done}    {Julian < 3, begin with self management, can add <dot> pilbp in patient instructions; OK to order PT or Chiro if patient prefers    Julian > 3, referral to PT or Chiro recommended}    {When medically safe, First line: medication for acute LBP:  Acetaminophen     Second line: NSAIDS, muscle relaxants, and consider gabapentin for radiculopathy; opioids are usually not needed}         Problem list and histories reviewed & adjusted, as indicated.  Additional history: as documented    ROS:  Constitutional, HEENT, cardiovascular, pulmonary, GI, , musculoskeletal, neuro, " "skin, endocrine and psych systems are negative, except as otherwise noted.    This document serves as a record of the services and decisions personally performed and made by Joaquim La MD. It was created on his behalf by Ana Lynn, a trained medical scribe. The creation of this document is based on the provider's statements to the medical scribe.  Ana Lynn 1:43 PM 3/7/2017  OBJECTIVE:                                                    There were no vitals taken for this visit. There is no height or weight on file to calculate BMI.   GENERAL: healthy, alert, well nourished, well hydrated, no distress  HENT: ear canals- normal; TMs- normal; Nose- normal; Mouth- no ulcers, no lesions  NECK: no tenderness, no adenopathy, no asymmetry, no masses, no stiffness; thyroid- normal to palpation  RESP: lungs clear to auscultation - no rales, no rhonchi, no wheezes  CV: regular rates and rhythm, normal S1 S2, no S3 or S4 and no murmur, no click or rub -  ABDOMEN: soft, no tenderness, no  hepatosplenomegaly, no masses, normal bowel sounds  MS: extremities- no gross deformities noted, no edema  SKIN: no suspicious lesions, no rashes    Diagnostic test results:  {DIAGNOSTIC TEST RESULTS:526412::\"none \"}     ASSESSMENT/PLAN:         There are no diagnoses linked to this encounter.    Risks, benefits and alternatives of treatments discussed. Plan agreed on.      Followup:***    Will call, return to clinic, or go to ED if worsening or symptoms not improving as discussed.    See patient instructions.     {Quality Requirements:913385}    The patient has no Health Maintenance topics of status Overdue, Due On, or Due Soon    Health maintenance reviewed/updated? Yes    The information in this document, created by a scribe for me, accurately reflects the services I personally performed and the decisions made by me. I have reviewed and approved this document for accuracy.      Jah La MD     "

## 2017-03-07 NOTE — MR AVS SNAPSHOT
After Visit Summary   3/7/2017    Mian Lozano    MRN: [id number]           Patient Information     Date Of Birth          1965        Visit Information        Provider Department      3/7/2017 4:00 PM Joaquim La MD [facility]        Today's Diagnoses     Acute midline thoracic back pain    -  1       Follow-ups after your visit        Your next 10 appointments already scheduled     Mar 22, 2017  4:00 PM CDT   MyChart Skin Evaluation with Fady Adams MD, RV PROC RM 1   [facility] ([facility])    [address] [place] [id number] [phone]              Who to contact     If you have questions or need follow up information about today's clinic visit or your schedule please contact [facility] directly at [phone].  Normal or non-critical lab and imaging results will be communicated to you by MyChart, letter or phone within 4 business days after the clinic has received the results. If you do not hear from us within 7 days, please contact the clinic through MyChart or phone. If you have a critical or abnormal lab result, we will notify you by phone as soon as possible.  Submit refill requests through [employer] or call your pharmacy and they will forward the refill request to us. Please allow 3 business days for your refill to be completed.          Additional Information About Your Visit        MyChart Information     [employer] gives you secure access to your electronic health record. If you see a primary care provider, you can also send messages to your care team and make appointments. If you have questions, please call your primary care clinic.  If you do not have a primary care provider, please call [phone] and they will assist you.        Care EveryWhere ID     This is your Care EveryWhere ID. This could be used by other organizations to access your [place] medical records  [id number]       "  Your Vitals Were     Pulse Temperature Height Pulse Oximetry BMI (Body Mass Index)       89 98.5  F (36.9  C) (Oral) 5' 8\" (1.727 m) 97% 32.54 kg/m2        Blood Pressure from Last 3 Encounters:   03/07/17 120/74   02/01/17 132/72   03/17/16 122/82    Weight from Last 3 Encounters:   03/07/17 214 lb (97.1 kg)   02/01/17 215 lb (97.5 kg)   03/17/16 206 lb 9.6 oz (93.7 kg)              Today, you had the following     No orders found for display         Today's Medication Changes          These changes are accurate as of: 3/7/17  4:25 PM.  If you have any questions, ask your nurse or doctor.               Start taking these medicines.        Dose/Directions    cyclobenzaprine 5 MG tablet   Commonly known as:  FLEXERIL   Used for:  Acute midline thoracic back pain   Started by:  Joaquim La MD        Dose:  5-10 mg   Take 1-2 tablets (5-10 mg) by mouth 3 times daily as needed for muscle spasms   Quantity:  20 tablet   Refills:  0       naproxen sodium 220 MG tablet   Commonly known as:  ALEVE   Used for:  Acute midline thoracic back pain   Started by:  Joaquim La MD        Dose:  440 mg   Take 2 tablets (440 mg) by mouth 2 times daily (with meals) for 5 days   Quantity:  20 tablet   Refills:  0            Where to get your medicines      These medications were sent to [facility] [address] [place] [address] AT [facility] [address] [address]    Hours:  24-hours Phone:  [phone]     cyclobenzaprine 5 MG tablet         Some of these will need a paper prescription and others can be bought over the counter.  Ask your nurse if you have questions.     You don't need a prescription for these medications     naproxen sodium 220 MG tablet                Primary Care Provider Office Phone # Fax #    Fady Adams [phone]       [address] [address]        Thank you!     Thank you " for choosing [facility]  for your care. Our goal is always to provide you with excellent care. Hearing back from our patients is one way we can continue to improve our services. Please take a few minutes to complete the written survey that you may receive in the mail after your visit with us. Thank you!             Your Updated Medication List - Protect others around you: Learn how to safely use, store and throw away your medicines at www.disposemymeds.org.          This list is accurate as of: 3/7/17  4:25 PM.  Always use your most recent med list.                   Brand Name Dispense Instructions for use    albuterol 108 (90 BASE) MCG/ACT Inhaler    albuterol    3 Inhaler    Inhale 2 puffs into the lungs every 4 hours as needed       aspirin 81 MG tablet     100    ONE DAILY       atorvastatin 80 MG tablet    LIPITOR    90 tablet    Take 1 tablet (80 mg) by mouth daily       azelastine 0.1 % spray    ASTELIN    3 Bottle    Spray 1-2 sprays into both nostrils 2 times daily       carvedilol 3.125 MG tablet    COREG    180 tablet    Take 1 tablet (3.125 mg) by mouth 2 times daily (with meals)       cetirizine 10 MG tablet    zyrTEC    30 tablet    Take 1 tablet by mouth every evening.       citalopram 20 MG tablet    celeXA    90 tablet    Take 1 tablet (20 mg) by mouth daily       cyclobenzaprine 5 MG tablet    FLEXERIL    20 tablet    Take 1-2 tablets (5-10 mg) by mouth 3 times daily as needed for muscle spasms       fenofibrate 160 MG tablet     90 tablet    Take 1 tablet (160 mg) by mouth daily       fluticasone 50 MCG/ACT spray    FLONASE    48 g    Spray 1-2 sprays into both nostrils daily       LANsoprazole 30 MG CR capsule    PREVACID    90 capsule    TAKE ONE CAPSULE BY MOUTH DAILY. TAKE 30 TO 60 MINUTES BEFORE A MEAL.       naproxen sodium 220 MG tablet    ALEVE    20 tablet    Take 2 tablets (440 mg) by mouth 2 times daily (with meals) for 5 days       OMEGA 3 PO      Take  by mouth.        tamsulosin 0.4 MG capsule    FLOMAX    30 capsule    Take 1 capsule (0.4 mg) by mouth daily       zolpidem 5 MG tablet    AMBIEN    30 tablet    Take 1 tablet (5 mg) by mouth nightly as needed for sleep

## 2017-03-07 NOTE — PROGRESS NOTES
"  SUBJECTIVE:                                                    Mian Lozano is a 52 year old male who presents to clinic today for the following health issues:    Back Pain     Onset: 3-4 weeks    Description:   Location: left lower back - started under ribs  Character: tight    Intensity: mild, moderate    Progression of Symptoms: worse    Accompanying Signs & Symptoms:  Other symptoms:   Spasms on left side of back which radiate to the whole back  Difficulty Sleeping: YES  Numbness: no  Incontinent : no  Fever: no  Chills: no   History:   Previous similar pain: no   Kidney Stones: YES      Precipitating factors:   Trauma or overuse: no     Alleviating factors:  Improved by: rest/inactivity       Therapies Tried and outcome: ice helps some- advil on and off      Problem list and histories reviewed & adjusted, as indicated.  Additional history: as documented      ROS:  Constitutional, HEENT, cardiovascular, pulmonary, GI, , musculoskeletal, neuro, skin, endocrine and psych systems are negative, except as otherwise noted.    This document serves as a record of the services and decisions personally performed and made by Joaquim La MD. It was created on his behalf by Ana Lynn, a trained medical scribe. The creation of this document is based on the provider's statements to the medical scribe.  Ana Lynn 4:13 PM 3/7/2017  OBJECTIVE:                                                    /74  Pulse 89  Temp 98.5  F (36.9  C) (Oral)  Ht 1.727 m (5' 8\")  Wt 97.1 kg (214 lb)  SpO2 97%  BMI 32.54 kg/m2 Body mass index is 32.54 kg/(m^2).   GENERAL: healthy, alert, well nourished, well hydrated, no distress  HENT: ear canals- normal; TMs- normal; Nose- normal; Mouth- no ulcers, no lesions  NECK: normal ROM, no tenderness, no adenopathy, no asymmetry, no masses, no stiffness; thyroid- normal to palpation  RESP: lungs clear to auscultation - no rales, no rhonchi, no wheezes  CV: regular rates and " rhythm, normal S1 S2, no S3 or S4 and no murmur, no click or rub -  ABDOMEN: soft, no tenderness, no  hepatosplenomegaly, no masses, normal bowel sounds  MS: extremities- no gross deformities noted, no edema  SKIN: no suspicious lesions, no rashes  BACK: upper lumbar and lower thoracic pain left greater than right, pain with right straight leg raises, no pain with left straight leg raise, otherwise no CVA tenderness, no paralumbar tenderness    Diagnostic test results:  none      ASSESSMENT/PLAN:       Mian was seen today for back pain.    Diagnoses and all orders for this visit:    Acute midline thoracic back pain - stretching exercises, massage, apply heat to the affected area, naproxen and flexeril as needed for pain  -     cyclobenzaprine (FLEXERIL) 5 MG tablet; Take 1-2 tablets (5-10 mg) by mouth 3 times daily as needed for muscle spasms  -     naproxen sodium (ALEVE) 220 MG tablet; Take 2 tablets (440 mg) by mouth 2 times daily (with meals) for 5 days        Risks, benefits and alternatives of treatments discussed. Plan agreed on.      Followup: As needed    Will call, return to clinic, or go to ED if worsening or symptoms not improving as discussed.    See patient instructions.     The patient has no Health Maintenance topics of status Overdue, Due On, or Due Soon    Health maintenance reviewed/updated? Yes     The information in this document, created by a scribe for me, accurately reflects the services I personally performed and the decisions made by me. I have reviewed and approved this document for accuracy.      Jah La MD

## 2017-03-07 NOTE — NURSING NOTE
"Chief Complaint   Patient presents with     Back Pain       Initial /74  Pulse 89  Temp 98.5  F (36.9  C) (Oral)  Ht 5' 8\" (1.727 m)  Wt 214 lb (97.1 kg)  SpO2 97%  BMI 32.54 kg/m2 Estimated body mass index is 32.54 kg/(m^2) as calculated from the following:    Height as of this encounter: 5' 8\" (1.727 m).    Weight as of this encounter: 214 lb (97.1 kg).  Medication Reconciliation: complete  "

## 2017-03-22 ENCOUNTER — OFFICE VISIT (OUTPATIENT)
Dept: FAMILY MEDICINE | Facility: CLINIC | Age: 52
End: 2017-03-22
Payer: COMMERCIAL

## 2017-03-22 VITALS
OXYGEN SATURATION: 98 % | DIASTOLIC BLOOD PRESSURE: 80 MMHG | WEIGHT: 217 LBS | HEIGHT: 68 IN | SYSTOLIC BLOOD PRESSURE: 126 MMHG | TEMPERATURE: 98.2 F | HEART RATE: 90 BPM | BODY MASS INDEX: 32.89 KG/M2

## 2017-03-22 DIAGNOSIS — I10 HYPERTENSION GOAL BP (BLOOD PRESSURE) < 140/90: ICD-10-CM

## 2017-03-22 DIAGNOSIS — R73.03 PREDIABETES: ICD-10-CM

## 2017-03-22 DIAGNOSIS — L98.9 CHANGING SKIN LESION: Primary | ICD-10-CM

## 2017-03-22 DIAGNOSIS — M54.6 ACUTE MIDLINE THORACIC BACK PAIN: ICD-10-CM

## 2017-03-22 DIAGNOSIS — Z51.81 MEDICATION MONITORING ENCOUNTER: ICD-10-CM

## 2017-03-22 DIAGNOSIS — E78.5 HYPERLIPIDEMIA LDL GOAL <70: ICD-10-CM

## 2017-03-22 DIAGNOSIS — I25.810 CORONARY ARTERY DISEASE INVOLVING CORONARY BYPASS GRAFT OF NATIVE HEART WITHOUT ANGINA PECTORIS: ICD-10-CM

## 2017-03-22 PROCEDURE — 88305 TISSUE EXAM BY PATHOLOGIST: CPT | Performed by: FAMILY MEDICINE

## 2017-03-22 PROCEDURE — 99214 OFFICE O/P EST MOD 30 MIN: CPT | Mod: 25 | Performed by: FAMILY MEDICINE

## 2017-03-22 PROCEDURE — 11300 SHAVE SKIN LESION 0.5 CM/<: CPT | Performed by: FAMILY MEDICINE

## 2017-03-22 RX ORDER — NAPROXEN 500 MG/1
500 TABLET ORAL 2 TIMES DAILY PRN
Qty: 30 TABLET | Refills: 1 | Status: SHIPPED | OUTPATIENT
Start: 2017-03-22 | End: 2018-01-30

## 2017-03-22 RX ORDER — CYCLOBENZAPRINE HCL 5 MG
5-10 TABLET ORAL 3 TIMES DAILY PRN
Qty: 20 TABLET | Refills: 0 | Status: SHIPPED | OUTPATIENT
Start: 2017-03-22 | End: 2018-01-30

## 2017-03-22 NOTE — LETTER
"               [facility]  [address] [place] [address]   [place] [address]                  [phone]   March 27, 2017    Mian Muhammad  [address] [place] [address]      Dear Mian,    Here is a summary of your recent test results:      Staff:  Send a letter     Dear Mian,     Your recent results have been reviewed.     They showed:     Pathology returned benign.     FINAL DIAGNOSIS:   Skin, back of right shoulder, shave biopsy.   - Benign verrucous keratosis.       We advise:     Continue current cares.   Skin care consult, as discussed, for skin check     For additional lab test information, [email or web address] is an excellent reference.            Your test results are enclosed.      Please contact me if you have any questions.    In addition, here is a list of due or overdue Health Maintenance reminders.    There are no preventive care reminders to display for this patient.    Please call us at [phone] (or use [email or web address]) to address the above recommendations.            Thank you very much for trusting [facility]..     Healthy regards,        Fady Adams M.D.        Results for orders placed or performed in visit on 03/22/17   Surgical pathology exam   Result Value Ref Range    Copath Report       Patient Name: MIAN MUHAMMAD  MR#: [id number]  Specimen #: [id number]  Collected: 3/22/2017  Received: 3/23/2017  Reported: 3/24/2017 09:53  Ordering Phy(s): FADY ADAMS    For improved result formatting, select 'View Enhanced Report Format'  under Linked Documents section.    SPECIMEN(S):  Skin, back of right shoulder    FINAL DIAGNOSIS:  Skin, back of right shoulder, shave biopsy.  - Benign verrucous keratosis.    Electronically signed out by:    Duran Lion M.D.    CLINICAL HISTORY:  None provided.    GROSS:  The specimen is received in formalin labeled with the patient's name,  identifying information and \"back of right shoulder\".  It consists of a  0.7 x 0.7 " cm tan skin shave biopsy.  The margin is inked black.  The  skin surface is remarkable for a 0.4 cm white bosselated, slightly  raised lesion.  The specimen is trisected and submitted entirely in 1  block. (Dictated by: Nguyễn Bragg 3/23/2017 01:13 PM)    MICROSCOPIC:  Microscopic evaluation performed.    CPT Codes:  A:  47712-AR9    TESTING LAB LOCATION:  [facility] [address] [place]  [id number] [phone]    COLLECTION SITE:  Client[facility]  [facility]

## 2017-03-22 NOTE — MR AVS SNAPSHOT
After Visit Summary   3/22/2017    Mian Lozano    MRN: [id number]           Patient Information     Date Of Birth          1965        Visit Information        Provider Department      3/22/2017 4:00 PM Fady Adams MD; RV PROC RM [facility]        Today's Diagnoses     Changing skin lesion    -  1    Acute midline thoracic back pain           Follow-ups after your visit        Additional Services     SKIN CARE REFERRAL       Your provider has referred you to: [facility]: [facility] [place] [phone]   [email or web address]     Please be aware that coverage of these services is subject to the terms and limitations of your health insurance plan.  Please check with your insurance prior to the appointment to ensure appropriate coverage for any services considered cosmetic in nature or not medically necessary.    Please bring the following with you to your appointment:    (1) Any X-Rays, CTs or MRIs which have been performed.  Contact the facility where they were done to arrange for  prior to your scheduled appointment.  Any new CT, MRI or other procedures ordered by your specialist must be performed at a [facility] facility or coordinated by your clinic's referral office.  (2) List of current medications  (3) This referral request   (4) Any documents/labs given to you for this referral                  Who to contact     If you have questions or need follow up information about today's clinic visit or your schedule please contact [facility] directly at [phone].  Normal or non-critical lab and imaging results will be communicated to you by MyChart, letter or phone within 4 business days after the clinic has received the results. If you do not hear from us within 7 days, please contact the clinic through MyChart or phone. If you have a critical or abnormal lab result, we will notify you by phone as  "soon as possible.  Submit refill requests through [employer] [facility] or call your pharmacy and they will forward the refill request to us. Please allow 3 business days for your refill to be completed.          Additional Information About Your Visit        [facility] Information     [employer] [facility] gives you secure access to your electronic health record. If you see a primary care provider, you can also send messages to your care team and make appointments. If you have questions, please call your primary care clinic.  If you do not have a primary care provider, please call [phone] and they will assist you.        Care EveryWhere ID     This is your Care EveryWhere ID. This could be used by other organizations to access your [facility] medical records  [id number]        Your Vitals Were     Pulse Temperature Height Pulse Oximetry BMI (Body Mass Index)       90 98.2  F (36.8  C) (Oral) 5' 8\" (1.727 m) 98% 32.99 kg/m2        Blood Pressure from Last 3 Encounters:   03/22/17 126/80   03/07/17 120/74   02/01/17 132/72    Weight from Last 3 Encounters:   03/22/17 217 lb (98.4 kg)   03/07/17 214 lb (97.1 kg)   02/01/17 215 lb (97.5 kg)              We Performed the Following     BIOPSY SKIN/SUBQ/MUC MEM, SINGLE LESION     SKIN CARE REFERRAL          Today's Medication Changes          These changes are accurate as of: 3/22/17  4:55 PM.  If you have any questions, ask your nurse or doctor.               Start taking these medicines.        Dose/Directions    naproxen 500 MG tablet   Commonly known as:  NAPROSYN   Used for:  Acute midline thoracic back pain   Started by:  Fady Adams MD        Dose:  500 mg   Take 1 tablet (500 mg) by mouth 2 times daily as needed for moderate pain   Quantity:  30 tablet   Refills:  1            Where to get your medicines      These medications were sent to [facility] [address] [address] AT [facility] [address] [address], [facility] [address]    Hours:  " 24-hours Phone:  [phone]     cyclobenzaprine 5 MG tablet    naproxen 500 MG tablet                Primary Care Provider Office Phone # Fax #    Fady Adams [phone]       [facility] [address] [facility] [address]        Thank you!     Thank you for choosing [facility]  for your care. Our goal is always to provide you with excellent care. Hearing back from our patients is one way we can continue to improve our services. Please take a few minutes to complete the written survey that you may receive in the mail after your visit with us. Thank you!             Your Updated Medication List - Protect others around you: Learn how to safely use, store and throw away your medicines at www.disposemymeds.org.          This list is accurate as of: 3/22/17  4:55 PM.  Always use your most recent med list.                   Brand Name Dispense Instructions for use    albuterol 108 (90 BASE) MCG/ACT Inhaler    albuterol    3 Inhaler    Inhale 2 puffs into the lungs every 4 hours as needed       aspirin 81 MG tablet     100    ONE DAILY       atorvastatin 80 MG tablet    LIPITOR    90 tablet    Take 1 tablet (80 mg) by mouth daily       azelastine 0.1 % spray    ASTELIN    3 Bottle    Spray 1-2 sprays into both nostrils 2 times daily       carvedilol 3.125 MG tablet    COREG    180 tablet    Take 1 tablet (3.125 mg) by mouth 2 times daily (with meals)       cetirizine 10 MG tablet    zyrTEC    30 tablet    Take 1 tablet by mouth every evening.       citalopram 20 MG tablet    celeXA    90 tablet    Take 1 tablet (20 mg) by mouth daily       cyclobenzaprine 5 MG tablet    FLEXERIL    20 tablet    Take 1-2 tablets (5-10 mg) by mouth 3 times daily as needed for muscle spasms       fenofibrate 160 MG tablet     90 tablet    Take 1 tablet (160 mg) by mouth daily       fluticasone 50 MCG/ACT spray    FLONASE    48 g    Spray 1-2 sprays into both nostrils daily        LANsoprazole 30 MG CR capsule    PREVACID    90 capsule    TAKE ONE CAPSULE BY MOUTH DAILY. TAKE 30 TO 60 MINUTES BEFORE A MEAL.       naproxen 500 MG tablet    NAPROSYN    30 tablet    Take 1 tablet (500 mg) by mouth 2 times daily as needed for moderate pain       OMEGA 3 PO      Take  by mouth.       tamsulosin 0.4 MG capsule    FLOMAX    30 capsule    Take 1 capsule (0.4 mg) by mouth daily       zolpidem 5 MG tablet    AMBIEN    30 tablet    Take 1 tablet (5 mg) by mouth nightly as needed for sleep

## 2017-03-22 NOTE — NURSING NOTE
"Chief Complaint   Patient presents with     Lesion Removal       Initial /80  Pulse 90  Temp 98.2  F (36.8  C) (Oral)  Ht 5' 8\" (1.727 m)  Wt 217 lb (98.4 kg)  SpO2 98%  BMI 32.99 kg/m2 Estimated body mass index is 32.99 kg/(m^2) as calculated from the following:    Height as of this encounter: 5' 8\" (1.727 m).    Weight as of this encounter: 217 lb (98.4 kg)..  BP completed using cuff size: sara Rodriguez MA  "

## 2017-03-22 NOTE — PROGRESS NOTES
SUBJECTIVE:                                                    Mian Lozano is a 52 year old male who presents to clinic today for the following health issues:    Mole on back he'd like removed - right upper posterior shoulder    Recent visit 3/07 slowly improving, midline thoracic back pain, desires muscle relaxer refill, declines PT for now.    CAD/Htn/Lipids/Prediabetes - no issues - recent labs and cpx    Recent Labs   Lab Test  02/14/17   0719  11/02/16   0725   07/08/15   0757  09/03/14   0745   CHOL  141  114   < >  146  159   HDL  44  28*   < >  37*  34*   LDL  64  64   < >  79  72   TRIG  163*  111   < >  148  267*   CHOLHDLRATIO   --    --    --   3.9  4.7    < > = values in this interval not displayed.     Lab Results   Component Value Date    A1C 5.6 02/14/2017    A1C 5.5 01/27/2016    A1C 5.5 07/08/2015    A1C 5.4 09/03/2014    A1C 5.2 03/14/2013     BP Readings from Last 3 Encounters:   03/22/17 126/80   03/07/17 120/74   02/01/17 132/72     Wt Readings from Last 4 Encounters:   03/22/17 217 lb (98.4 kg)   03/07/17 214 lb (97.1 kg)   02/01/17 215 lb (97.5 kg)   03/17/16 206 lb 9.6 oz (93.7 kg)     Problem list and histories reviewed & adjusted, as indicated.  Additional history: as documented    Reviewed and updated as needed this visit by clinical staff  Tobacco  Allergies  Meds  Med Hx  Surg Hx  Fam Hx  Soc Hx      Reviewed and updated as needed this visit by Provider       Health Maintenance    There are no preventive care reminders to display for this patient.    Current Problem List    Patient Active Problem List   Diagnosis     Other atopic dermatitis and related conditions     Mild intermittent asthma     Family history of coronary artery disease     Family history of prostate cancer     Prediabetes     Health Care Home     GERD (gastroesophageal reflux disease)     Sullivan's esophagus     Anxiety     Hypertension goal BP (blood pressure) < 140/90     Ulnar neuropathy      Post-thoracotomy pain syndrome     Nephrolithiasis     Coronary artery disease     Hyperlipidemia LDL goal <70     Environmental allergies       Past Medical History    Past Medical History:   Diagnosis Date     Anxiety      Fowler's esophagus 7/11     Coronary artery disease 4/08    cabg x 5     Environmental allergies     seasonal, hay fever     FAM HX-CARDIOVAS DIS NEC     dad at 45, cabg, stent     Family history of prostate cancer      GERD (gastroesophageal reflux disease) 3/11     History of blood transfusion      Hyperlipidemia LDL goal <70 2006     Hypertension goal BP (blood pressure) < 140/90 4/08     Mild intermittent asthma 7/03     Nephrolithiasis 6/13, 7/16    calcium oxalate     Other atopic dermatitis and related conditions 1980     Other diseases of lung, not elsewhere classified 4/08    dr steel - benign bx and ct - granuloma - no further w/u needed     Post-thoracotomy pain syndrome 4/11    dr hoover     Prediabetes 8/08     Seasonal allergies 1980    Allergic rhinitis Hayfever       Past Surgical History    Past Surgical History:   Procedure Laterality Date     C CABG, ARTERY-VEIN, FIVE  4/08    FSD - lung bx benign     C STRESS TEST - REGULAR (FL)  9/06, 4/11    negative stress thallium - FSD     COLONOSCOPY N/A 8/7/2015    normal - due 10 yrs     ESOPHAGOSCOPY, GASTROSCOPY, DUODENOSCOPY (EGD), COMBINED  6/11, 5/13    [facility] - ? 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       Current Medications    Current Outpatient Prescriptions   Medication Sig Dispense Refill     cyclobenzaprine (FLEXERIL) 5 MG tablet Take 1-2 tablets (5-10 mg) by mouth 3 times daily as needed for muscle spasms 20 tablet 0     naproxen (NAPROSYN) 500 MG tablet Take 1 tablet (500 mg) by mouth 2 times daily as needed for moderate pain 30 tablet 1     albuterol (ALBUTEROL) 108 (90 BASE) MCG/ACT Inhaler Inhale 2 puffs into the lungs every 4 hours as needed 3 Inhaler 3     atorvastatin (LIPITOR) 80 MG tablet Take 1 tablet (80 mg) by mouth daily 90 tablet 3     azelastine (ASTELIN) 0.1 % spray Spray 1-2 sprays into both nostrils 2 times daily 3 Bottle 3     carvedilol (COREG) 3.125 MG tablet Take 1 tablet (3.125 mg) by mouth 2 times daily (with meals) 180 tablet 3     citalopram (CELEXA) 20 MG tablet Take 1 tablet (20 mg) by mouth daily 90 tablet 3     fenofibrate 160 MG tablet Take 1 tablet (160 mg) by mouth daily 90 tablet 3     fluticasone (FLONASE) 50 MCG/ACT spray Spray 1-2 sprays into both nostrils daily 48 g 3     zolpidem (AMBIEN) 5 MG tablet Take 1 tablet (5 mg) by mouth nightly as needed for sleep 30 tablet 0     tamsulosin (FLOMAX) 0.4 MG 24 hr capsule Take 1 capsule (0.4 mg) by mouth daily 30 capsule 0     LANsoprazole (PREVACID) 30 MG capsule TAKE ONE CAPSULE BY MOUTH DAILY. TAKE 30 TO 60 MINUTES BEFORE A MEAL. 90 capsule 3     cetirizine (ZYRTEC) 10 MG tablet Take 1 tablet by mouth every evening. 30 tablet 1     Omega-3 Fatty Acids (OMEGA 3 PO) Take  by mouth.       ASPIRIN 81 MG OR TABS ONE DAILY 100 3       Allergies    Allergies   Allergen Reactions     Seasonal Allergies        Immunizations    Immunization History   Administered Date(s) Administered     Influenza (IIV3) 11/26/2012, 11/12/2014, 10/01/2016     Influenza Intranasal Vaccine 4 valent 10/01/2015     Influenza Vaccine IM 3yrs+ 4 Valent IIV4 10/20/2015     TDAP Vaccine (Boostrix) 11/25/2015  "    Tdap (Adacel,Boostrix) 04/28/2006       Family History    Family History   Problem Relation Age of Onset     Prostate Cancer Father 69     Coronary Artery Disease Father 38     Hypertension Paternal Grandfather      C.A.D. Paternal Grandfather      Neurologic Disorder Sister      CVA/anuerysm at age 19       Social History    Social History     Social History     Marital status:      Spouse name: Imelda     Number of children: 0     Years of education: 14     Occupational History      Unemployed     Social History Main Topics     Smoking status: Never Smoker     Smokeless tobacco: Never Used     Alcohol use 3.6 oz/week      Comment: 6 drinks per week     Drug use: No     Sexual activity: Yes     Partners: Female     Birth control/ protection: None     Other Topics Concern     Caffeine Concern Yes     1 can qd     Exercise Yes     occas     Seat Belt Yes     Parent/Sibling W/ Cabg, Mi Or Angioplasty Before 65f 55m? Yes     Social History Narrative       All above reviewed and updated, all stable unless otherwise noted    Recent labs reviewed    ROS:  C: NEGATIVE for fever, chills, change in weight  I: NEGATIVE for worrisome rashes, moles or lesions  E: NEGATIVE for vision changes or irritation  E/M: NEGATIVE for ear, mouth and throat problems  R: NEGATIVE for significant cough or SOB  B: NEGATIVE for masses, tenderness or discharge  CV: NEGATIVE for chest pain, palpitations or peripheral edema  GI: NEGATIVE for nausea, abdominal pain, heartburn, or change in bowel habits  : NEGATIVE for frequency, dysuria, or hematuria  M: NEGATIVE for significant arthralgias or myalgia  N: NEGATIVE for weakness, dizziness or paresthesias  E: NEGATIVE for temperature intolerance, skin/hair changes  H: NEGATIVE for bleeding problems  P: NEGATIVE for changes in mood or affect    OBJECTIVE:                                                    /80  Pulse 90  Temp 98.2  F (36.8  C) (Oral)  Ht 5' 8\" (1.727 m)  Wt 217 " lb (98.4 kg)  SpO2 98%  BMI 32.99 kg/m2  Body mass index is 32.99 kg/(m^2).  GENERAL: healthy, alert and no distress  EYES: Eyes grossly normal to inspection, extraocular movements - intact, and PERRL  RESP: lungs clear to auscultation - no rales, no rhonchi, no wheezes  CV: regular rates and rhythm, normal S1 S2, no S3 or S4 and no murmur, no click or rub -  MS: extremities- no gross deformities noted, no edema  SKIN: right upper posterior shoulder 3-4 rough raised flesh colored lesion, multiple multi colored brown lesions on trunk ranging from 2-5 mm  NEURO: strength and tone- normal, sensory exam- grossly normal, mentation- intact, speech- normal, reflexes- symmetric  PSYCH: Alert and oriented times 3; speech- coherent , normal rate and volume; able to articulate logical thoughts, able to abstract reason, no tangential thoughts, no hallucinations or delusions, affect- normal  LYMPHATICS: ant. cervical- normal, post. cervical- normal, axillary- normal, supraclavicular- normal, inguinal- normal    DIAGNOSTICS/PROCEDURES:                                                      Options/risks/benefits reviewed    Desires shave, consent signed, hibiclens, 1% lidocaine with epi, shave, surg path pending, aluminum chloride for hemostasis, wound cares reviewed.    Recent labs reviewed     ASSESSMENT/PLAN:                                                        ICD-10-CM    1. Changing skin lesion L98.9 SKIN CARE REFERRAL     BIOPSY SKIN/SUBQ/MUC MEM, SINGLE LESION     Surgical pathology exam   2. Acute midline thoracic back pain M54.6 cyclobenzaprine (FLEXERIL) 5 MG tablet     naproxen (NAPROSYN) 500 MG tablet   3. Coronary artery disease involving coronary bypass graft of native heart without angina pectoris I25.810    4. Prediabetes R73.03    5. Hypertension goal BP (blood pressure) < 140/90 I10    6. Hyperlipidemia LDL goal <70 E78.5    7. Medication monitoring encounter Z51.81        Discussed treatment/modality  options, including risk and benefits, he desires shave bx, surg path pending, wound cares reviewed, skin care consult, med refills. All diagnosis above reviewed and noted above, otherwise stable.  See [facility] orders for further details.  Follow up as needed.    There are no preventive care reminders to display for this patient.    See Patient Instructions           Fady Adams MD [address]  [address] [phone] Fax

## 2017-03-24 LAB — COPATH REPORT: NORMAL

## 2017-04-24 ENCOUNTER — OFFICE VISIT (OUTPATIENT)
Dept: FAMILY MEDICINE | Facility: CLINIC | Age: 52
End: 2017-04-24
Payer: COMMERCIAL

## 2017-04-24 VITALS
TEMPERATURE: 98.6 F | BODY MASS INDEX: 32.58 KG/M2 | SYSTOLIC BLOOD PRESSURE: 130 MMHG | DIASTOLIC BLOOD PRESSURE: 76 MMHG | WEIGHT: 215 LBS | HEIGHT: 68 IN | OXYGEN SATURATION: 96 % | HEART RATE: 92 BPM

## 2017-04-24 DIAGNOSIS — J45.20 MILD INTERMITTENT ASTHMA WITHOUT COMPLICATION: ICD-10-CM

## 2017-04-24 DIAGNOSIS — J06.9 UPPER RESPIRATORY TRACT INFECTION, UNSPECIFIED TYPE: Primary | ICD-10-CM

## 2017-04-24 PROCEDURE — 99213 OFFICE O/P EST LOW 20 MIN: CPT | Performed by: FAMILY MEDICINE

## 2017-04-24 RX ORDER — PREDNISONE 20 MG/1
TABLET ORAL
Qty: 14 TABLET | Refills: 0 | Status: SHIPPED | OUTPATIENT
Start: 2017-04-24 | End: 2018-01-30

## 2017-04-24 NOTE — MR AVS SNAPSHOT
"              After Visit Summary   4/24/2017    Mian Lozano    MRN: [id number]           Patient Information     Date Of Birth          1965        Visit Information        Provider Department      4/24/2017 2:00 PM Joaquim La MD [facility]        Today's Diagnoses     Upper respiratory tract infection, unspecified type    -  1    Mild intermittent asthma without complication           Follow-ups after your visit        Who to contact     If you have questions or need follow up information about today's clinic visit or your schedule please contact [facility] directly at [phone].  Normal or non-critical lab and imaging results will be communicated to you by [employer], letter or phone within 4 business days after the clinic has received the results. If you do not hear from us within 7 days, please contact the clinic through [employer] or phone. If you have a critical or abnormal lab result, we will notify you by phone as soon as possible.  Submit refill requests through [employer] or call your pharmacy and they will forward the refill request to us. Please allow 3 business days for your refill to be completed.          Additional Information About Your Visit        MyChart Information     [employer] gives you secure access to your electronic health record. If you see a primary care provider, you can also send messages to your care team and make appointments. If you have questions, please call your primary care clinic.  If you do not have a primary care provider, please call [phone] and they will assist you.        Care EveryWhere ID     This is your Care EveryWhere ID. This could be used by other organizations to access your [facility] medical records  [id number]        Your Vitals Were     Pulse Temperature Height Pulse Oximetry BMI (Body Mass Index)       92 98.6  F (37  C) (Oral) 5' 8\" (1.727 m) 96% 32.69 kg/m2        Blood Pressure from Last 3 Encounters:   04/24/17 " 130/76   03/22/17 126/80   03/07/17 120/74    Weight from Last 3 Encounters:   04/24/17 215 lb (97.5 kg)   03/22/17 217 lb (98.4 kg)   03/07/17 214 lb (97.1 kg)              Today, you had the following     No orders found for display         Today's Medication Changes          These changes are accurate as of: 4/24/17  2:28 PM.  If you have any questions, ask your nurse or doctor.               Start taking these medicines.        Dose/Directions    predniSONE 20 MG tablet   Commonly known as:  DELTASONE   Used for:  Mild intermittent asthma without complication   Started by:  Joaquim La MD        Take 2 tabs (40 mg) orally daily for 4 days, then Take 1 tab (20 mg) orally daily for 4 days, then Take 1/2 tab (10 mg) orally for 4 days   Quantity:  14 tablet   Refills:  0            Where to get your medicines      These medications were sent to [facility] [address] [place] [address] AT [facility] [address] [address]    Hours:  24-hours Phone:  [phone]     predniSONE 20 MG tablet                Primary Care Provider Office Phone # Fax #    Fady Adams [phone]       [address] [address]        Thank you!     Thank you for choosing [facility]  for your care. Our goal is always to provide you with excellent care. Hearing back from our patients is one way we can continue to improve our services. Please take a few minutes to complete the written survey that you may receive in the mail after your visit with us. Thank you!             Your Updated Medication List - Protect others around you: Learn how to safely use, store and throw away your medicines at www.disposemymeds.org.          This list is accurate as of: 4/24/17  2:28 PM.  Always use your most recent med list.                   Brand Name Dispense Instructions for use    albuterol 108 (90 BASE) MCG/ACT  Inhaler    albuterol    3 Inhaler    Inhale 2 puffs into the lungs every 4 hours as needed       aspirin 81 MG tablet     100    ONE DAILY       atorvastatin 80 MG tablet    LIPITOR    90 tablet    Take 1 tablet (80 mg) by mouth daily       azelastine 0.1 % spray    ASTELIN    3 Bottle    Spray 1-2 sprays into both nostrils 2 times daily       carvedilol 3.125 MG tablet    COREG    180 tablet    Take 1 tablet (3.125 mg) by mouth 2 times daily (with meals)       cetirizine 10 MG tablet    zyrTEC    30 tablet    Take 1 tablet by mouth every evening.       citalopram 20 MG tablet    celeXA    90 tablet    Take 1 tablet (20 mg) by mouth daily       cyclobenzaprine 5 MG tablet    FLEXERIL    20 tablet    Take 1-2 tablets (5-10 mg) by mouth 3 times daily as needed for muscle spasms       fenofibrate 160 MG tablet     90 tablet    Take 1 tablet (160 mg) by mouth daily       fluticasone 50 MCG/ACT spray    FLONASE    48 g    Spray 1-2 sprays into both nostrils daily       LANsoprazole 30 MG CR capsule    PREVACID    90 capsule    TAKE ONE CAPSULE BY MOUTH DAILY. TAKE 30 TO 60 MINUTES BEFORE A MEAL.       naproxen 500 MG tablet    NAPROSYN    30 tablet    Take 1 tablet (500 mg) by mouth 2 times daily as needed for moderate pain       OMEGA 3 PO      Take  by mouth.       predniSONE 20 MG tablet    DELTASONE    14 tablet    Take 2 tabs (40 mg) orally daily for 4 days, then Take 1 tab (20 mg) orally daily for 4 days, then Take 1/2 tab (10 mg) orally for 4 days       tamsulosin 0.4 MG capsule    FLOMAX    30 capsule    Take 1 capsule (0.4 mg) by mouth daily       zolpidem 5 MG tablet    AMBIEN    30 tablet    Take 1 tablet (5 mg) by mouth nightly as needed for sleep

## 2017-04-24 NOTE — PROGRESS NOTES
"  SUBJECTIVE:                                                    Mian Lozano is a 52 year old male who presents to clinic today for the following health issues:    Acute Illness   Acute illness concerns: Cough, fever   Onset: 8 days    Fever: YES    Chills/Sweats: YES- sweats    Headache (location?): YES    Sinus Pressure:YES    Conjunctivitis:  no    Ear Pain: no    Rhinorrhea: YES    Congestion: YES- chest    Sore Throat: from coughing    Progression of symptoms: Same, more tired    Losing voice: YES    Heartburn: no    Asthma Hx: YES   Cough: YES-productive of yellow sputum    Wheeze: no    Decreased Appetite: no    Nausea: no    Vomiting: no    Diarrhea:  no    Dysuria/Freq.: no    Fatigue/Achiness: YES    Sick/Strep Exposure: no     Therapies Tried and outcome: cough med DM    Back: Improving, still occasional spasms    Problem list and histories reviewed & adjusted, as indicated.  Additional history: as documented    ROS:  Constitutional, HEENT, cardiovascular, pulmonary, GI, , musculoskeletal, neuro, skin, endocrine and psych systems are negative, except as otherwise noted.    This document serves as a record of the services and decisions personally performed and made by Joaquim La MD. It was created on his behalf by Floridalma Lester, a trained medical scribe. The creation of this document is based the provider's statements to the medical scribe.  Floridalma Lester   OBJECTIVE:                                                    /76 (BP Location: Right arm, Patient Position: Chair, Cuff Size: Adult Large)  Pulse 92  Temp 98.6  F (37  C) (Oral)  Ht 1.727 m (5' 8\")  Wt 97.5 kg (215 lb)  SpO2 96%  BMI 32.69 kg/m2 Body mass index is 32.69 kg/(m^2).   GENERAL: healthy, alert, well nourished, well hydrated, no distress  EYES: Eyes grossly normal to inspection, extraocular movements - intact  HENT: ear canals- normal; TMs- normal; Nose- normal; Mouth- no ulcers, no lesions  NECK: no tenderness, no " "adenopathy, no asymmetry, no masses, no stiffness; thyroid- normal to palpation  RESP: slight wheezing bilaterally, otherwise, lungs clear to auscultation - no rales, no rhonchi  CV: regular rates and rhythm, normal S1 S2, no S3 or S4 and no murmur, no click or rub -  ABDOMEN: soft, no tenderness, no  hepatosplenomegaly, no masses, normal bowel sounds  MS: extremities- no gross deformities noted, no edema  PSYCH: Alert and oriented times 3; speech- coherent , normal rate and volume; able to articulate logical thoughts, able to abstract reason, no tangential thoughts, no hallucinations or delusions, affect- normal  Diagnostic test results:  No results found for this or any previous visit (from the past 24 hour(s)).     ASSESSMENT/PLAN:         Mian was seen today for cough and fever.    Diagnoses and all orders for this visit:    Upper respiratory tract infection, unspecified type: Advised to push fluids and rest to help alleviate symptoms.     Mild intermittent asthma without complication: Worsened due to current illness. See above  -     predniSONE (DELTASONE) 20 MG tablet; Take 2 tabs (40 mg) orally daily for 4 days, then Take 1 tab (20 mg) orally daily for 4 days, then Take 1/2 tab (10 mg) orally for 4 days    Risks, benefits and alternatives of treatments discussed. Plan agreed on.      Followup: prn    Will call, return to clinic, or go to ED if worsening or symptoms not improving as discussed.    See patient instructions.     BMI:   Estimated body mass index is 32.69 kg/(m^2) as calculated from the following:    Height as of this encounter: 1.727 m (5' 8\").    Weight as of this encounter: 97.5 kg (215 lb).   Weight management plan: Discussed healthy diet and exercise guidelines and patient will follow up in 6 months in clinic to re-evaluate.      The patient has no Health Maintenance topics of status Overdue, Due On, or Due Soon    Health maintenance reviewed/updated? Yes    The information in this document, " created by the medical scribe for me, accurately reflects the services I personally performed and the decisions made by me. I have reviewed and approved this document for accuracy prior to leaving the patient care area.  Joaquim La MD April 24, 2017 2:21 PM        Jah La MD

## 2017-04-24 NOTE — NURSING NOTE
"Chief Complaint   Patient presents with     Cough     Fever       Initial /76 (BP Location: Right arm, Patient Position: Chair, Cuff Size: Adult Large)  Pulse 92  Temp 98.6  F (37  C) (Oral)  Ht 5' 8\" (1.727 m)  Wt 215 lb (97.5 kg)  SpO2 96%  BMI 32.69 kg/m2 Estimated body mass index is 32.69 kg/(m^2) as calculated from the following:    Height as of this encounter: 5' 8\" (1.727 m).    Weight as of this encounter: 215 lb (97.5 kg).  Medication Reconciliation: complete  "

## 2017-04-28 ENCOUNTER — TELEPHONE (OUTPATIENT)
Dept: FAMILY MEDICINE | Facility: CLINIC | Age: 52
End: 2017-04-28

## 2017-04-28 DIAGNOSIS — R50.9 FEVER, UNSPECIFIED: ICD-10-CM

## 2017-04-28 DIAGNOSIS — R05.9 COUGH: Primary | ICD-10-CM

## 2017-04-28 DIAGNOSIS — J34.89 RHINORRHEA: ICD-10-CM

## 2017-04-28 RX ORDER — AZITHROMYCIN 250 MG/1
TABLET, FILM COATED ORAL
Qty: 6 TABLET | Refills: 0 | Status: SHIPPED | OUTPATIENT
Start: 2017-04-28 | End: 2018-01-30

## 2017-04-28 NOTE — TELEPHONE ENCOUNTER
Pt is still coughing up thick and yellow phlegm, lost his voice, running a fever, PND and rhinorrhea. Denies wheezing or SOB.     Pt has been drinking fluids, using humidity, allergy flonase med, inhaler and took the predisone.       OK to order zpack per RL.    Emperatriz Smith RN

## 2017-04-28 NOTE — TELEPHONE ENCOUNTER
Reason for Call:  Other prescription    Detailed comments: Pt was seen on Monday and was told to call back if he did not get better and we could call in abx to [facility] on [address].  Pt still has sxs and is running a fever.  Please advise    Phone Number Patient can be reached at: Other phone number:  [phone]    Best Time:     Can we leave a detailed message on this number? YES    Call taken on 4/28/2017 at 7:55 AM by Jayde Lacy

## 2017-05-26 ENCOUNTER — VIRTUAL VISIT (OUTPATIENT)
Dept: FAMILY MEDICINE | Facility: OTHER | Age: 52
End: 2017-05-26

## 2017-05-26 NOTE — PROGRESS NOTES
"Date:   Clinician: Joel Wegener  Clinician NPI: [id number]  Patient: Mian Lozano  Patient : 1965  Patient Address: [address] [place] [id number]  Patient Phone: [phone]  Visit Protocol: Tick bites  Patient Summary:  Mian is a 52 year old ( : 1965 ) male who initiated a Visit for evaluation of a Deer tick bite/Lyme disease prevention. When asked the question \"Please sign me up to receive news, health information and promotions. \", Mian responded \"No\".    Mian uploaded images of the affected area of his skin.   Mian was bitten by a tick {is tick attached} he believes the tick was attached for more than 36 hours and was removed less than 3 days ago. Location where tick bite occurred as reported by the patient (free-text): [place], [place].  Mian is not sure if the tick looked like an image of a blacklegged deer tick.   The patient claims     The area around the bite is swollen    The area around the bite is red     Mian denies:     New joint pain, redness, or swelling    Having a rash in the shape of a target    The area around the bite is warm to the touch    Having a fever     He weighs approximately 210 lbs. Mian does not smoke or use smokeless tobacco.  MEDICATIONS:  Antacid, carvedilol (Coreg), atorvastatin (Lipitor), and citalopram (Celexa)  , ALLERGIES:   NKDA    Clinician Response:  Dear Mian,  Based on the information you provided, you likely have a Deer tick bite and possible Lyme disease exposure.   I am prescribing Doxycycline. Take two tablets by mouth one time.   If the skin around the bite becomes red or irritated, apply an antibiotic ointment, such as bacitracin. Stop using the ointment if your skin begins to itch or a rash develops. The ointment may cause a skin reaction for some people.   If area of the bite increases in redness, or if you develop a fever, go to a clinic for evaluation for possible infection.  Finally, as your provider, I need you " to know that becoming tobacco-free is the most important thing you can do to protect your current and future health.   Diagnosis: Deer tick bite/possible Lyme exposure - eligible for prophylaxis  Diagnosis ICD: A69.20  Prescription: doxycycline Hyclate (Vibramycin) 100mg oral tablet 2 tablets, 1 days supply. Take two tablets by mouth one time. Refills: 0, Refill as needed: no, Allow substitutions: yes  Pharmacy: [facility] [id number] - [phone] - [address] [facility] [place] [address]

## 2017-07-28 ENCOUNTER — HOSPITAL ENCOUNTER (OUTPATIENT)
Facility: CLINIC | Age: 52
Discharge: HOME | End: 2017-07-28
Attending: INTERNAL MEDICINE | Admitting: INTERNAL MEDICINE
Payer: COMMERCIAL

## 2017-07-28 VITALS
HEART RATE: 78 BPM | RESPIRATION RATE: 14 BRPM | DIASTOLIC BLOOD PRESSURE: 78 MMHG | SYSTOLIC BLOOD PRESSURE: 133 MMHG | OXYGEN SATURATION: 97 %

## 2017-07-28 LAB — UPPER GI ENDOSCOPY: NORMAL

## 2017-07-28 PROCEDURE — 43239 EGD BIOPSY SINGLE/MULTIPLE: CPT | Performed by: INTERNAL MEDICINE

## 2017-07-28 PROCEDURE — 25000128 H RX IP 250 OP 636: Performed by: INTERNAL MEDICINE

## 2017-07-28 PROCEDURE — 25000125 ZZHC RX 250: Performed by: INTERNAL MEDICINE

## 2017-07-28 PROCEDURE — 40000104 ZZH STATISTIC MODERATE SEDATION < 10 MIN: Performed by: INTERNAL MEDICINE

## 2017-07-28 PROCEDURE — 88305 TISSUE EXAM BY PATHOLOGIST: CPT | Mod: 26 | Performed by: INTERNAL MEDICINE

## 2017-07-28 PROCEDURE — 88305 TISSUE EXAM BY PATHOLOGIST: CPT | Performed by: INTERNAL MEDICINE

## 2017-07-28 RX ORDER — ONDANSETRON 4 MG/1
4 TABLET, ORALLY DISINTEGRATING ORAL EVERY 6 HOURS PRN
Status: DISCONTINUED | OUTPATIENT
Start: 2017-07-28 | End: 2017-07-28 | Stop reason: HOSPADM

## 2017-07-28 RX ORDER — NALOXONE HYDROCHLORIDE 0.4 MG/ML
.1-.4 INJECTION, SOLUTION INTRAMUSCULAR; INTRAVENOUS; SUBCUTANEOUS
Status: DISCONTINUED | OUTPATIENT
Start: 2017-07-28 | End: 2017-07-28 | Stop reason: HOSPADM

## 2017-07-28 RX ORDER — LIDOCAINE 40 MG/G
CREAM TOPICAL
Status: DISCONTINUED | OUTPATIENT
Start: 2017-07-28 | End: 2017-07-28 | Stop reason: HOSPADM

## 2017-07-28 RX ORDER — FENTANYL CITRATE 50 UG/ML
INJECTION, SOLUTION INTRAMUSCULAR; INTRAVENOUS PRN
Status: DISCONTINUED | OUTPATIENT
Start: 2017-07-28 | End: 2017-07-28 | Stop reason: HOSPADM

## 2017-07-28 RX ORDER — FLUMAZENIL 0.1 MG/ML
0.2 INJECTION, SOLUTION INTRAVENOUS
Status: DISCONTINUED | OUTPATIENT
Start: 2017-07-28 | End: 2017-07-28 | Stop reason: HOSPADM

## 2017-07-28 RX ORDER — ONDANSETRON 2 MG/ML
4 INJECTION INTRAMUSCULAR; INTRAVENOUS
Status: DISCONTINUED | OUTPATIENT
Start: 2017-07-28 | End: 2017-07-28 | Stop reason: HOSPADM

## 2017-07-28 RX ORDER — ONDANSETRON 2 MG/ML
4 INJECTION INTRAMUSCULAR; INTRAVENOUS EVERY 6 HOURS PRN
Status: DISCONTINUED | OUTPATIENT
Start: 2017-07-28 | End: 2017-07-28 | Stop reason: HOSPADM

## 2017-07-28 NOTE — IP AVS SNAPSHOT
MRN:[id number]                      After Visit Summary   7/28/2017    Mian Lozano    MRN: [id number]           Thank you!     Thank you for choosing [facility] for your care. Our goal is always to provide you with excellent care. Hearing back from our patients is one way we can continue to improve our services. Please take a few minutes to complete the written survey that you may receive in the mail after you visit. If you would like to speak to someone directly about your visit please contact Patient Relations at [phone]. Thank you!          Patient Information     Date Of Birth          1965        About your hospital stay     You were admitted on:  July 28, 2017 You last received care in the:  [facility]    You were discharged on:  July 28, 2017       Who to Call     For medical emergencies, please call 131.  For non-urgent questions about your medical care, please call your primary care provider or clinic, [phone]  For questions related to your surgery, please call your surgery clinic        Attending Provider     Provider Specialty    Angus Reynolds MD Gastroenterology       Primary Care Provider Office Phone # Fax #    Fady Adams [phone]      Pending Results     No orders found from 7/26/2017 to 7/29/2017.            Admission Information     Date & Time Provider Department Dept. Phone    7/28/2017 Angus Reynolds MD [facility] [phone]      Your Vitals Were     Blood Pressure Respirations Pulse Oximetry             133/78 22 93%         MyChart Information     [facility] gives you secure access to your electronic health record. If you see a primary care provider, you can also send messages to your care team and make appointments. If you have questions, please call your primary care clinic.  If you do not have a primary care provider, please call [phone] and they will assist you.        Care  EveryWhere ID     This is your Care EveryWhere ID. This could be used by other organizations to access your [place] medical records  [id number]        Equal Access to Services     MICHELLE VILLALOBOS : Emilee Russell, lydia marrero, lizata kacarlo salinas, cristhian becker lamonicaawa phillip. So [facility] [phone].    ATENCIÓN: Si habla español, tiene a hall disposición servicios gratuitos de asistencia lingüística. Llame al [phone].    We comply with applicable federal civil rights laws and [place] laws. We do not discriminate on the basis of race, color, national origin, age, disability sex, sexual orientation or gender identity.               Review of your medicines      UNREVIEWED medicines. Ask your doctor about these medicines        Dose / Directions    azithromycin 250 MG tablet   Commonly known as:  ZITHROMAX   Used for:  Cough, Rhinorrhea, Fever, unspecified        Two tablets first day, then one tablet daily for four days.   Quantity:  6 tablet   Refills:  0       naproxen 500 MG tablet   Commonly known as:  NAPROSYN   Used for:  Acute midline thoracic back pain        Dose:  500 mg   Take 1 tablet (500 mg) by mouth 2 times daily as needed for moderate pain   Quantity:  30 tablet   Refills:  1       predniSONE 20 MG tablet   Commonly known as:  DELTASONE   Used for:  Mild intermittent asthma without complication        Take 2 tabs (40 mg) orally daily for 4 days, then Take 1 tab (20 mg) orally daily for 4 days, then Take 1/2 tab (10 mg) orally for 4 days   Quantity:  14 tablet   Refills:  0       tamsulosin 0.4 MG capsule   Commonly known as:  FLOMAX   Used for:  Kidney stone        Dose:  0.4 mg   Take 1 capsule (0.4 mg) by mouth daily   Quantity:  30 capsule   Refills:  0         CONTINUE these medicines which have NOT CHANGED        Dose / Directions    albuterol 108 (90 BASE) MCG/ACT Inhaler   Commonly known as:  albuterol   Used for:  Mild intermittent asthma without  complication, Environmental allergies        Dose:  2 puff   Inhale 2 puffs into the lungs every 4 hours as needed   Quantity:  3 Inhaler   Refills:  3       aspirin 81 MG tablet        ONE DAILY   Quantity:  100   Refills:  3       atorvastatin 80 MG tablet   Commonly known as:  LIPITOR   Used for:  Hyperlipidemia LDL goal <70        Dose:  80 mg   Take 1 tablet (80 mg) by mouth daily   Quantity:  90 tablet   Refills:  3       azelastine 0.1 % spray   Commonly known as:  ASTELIN   Used for:  Mild intermittent asthma without complication, Environmental allergies        Dose:  1-2 spray   Spray 1-2 sprays into both nostrils 2 times daily   Quantity:  3 Bottle   Refills:  3       carvedilol 3.125 MG tablet   Commonly known as:  COREG   Used for:  Hypertension goal BP (blood pressure) < 140/90        Dose:  3.125 mg   Take 1 tablet (3.125 mg) by mouth 2 times daily (with meals)   Quantity:  180 tablet   Refills:  3       cetirizine 10 MG tablet   Commonly known as:  zyrTEC        Dose:  10 mg   Take 1 tablet by mouth every evening.   Quantity:  30 tablet   Refills:  1       citalopram 20 MG tablet   Commonly known as:  celeXA   Used for:  Anxiety        Dose:  20 mg   Take 1 tablet (20 mg) by mouth daily   Quantity:  90 tablet   Refills:  3       cyclobenzaprine 5 MG tablet   Commonly known as:  FLEXERIL   Used for:  Acute midline thoracic back pain        Dose:  5-10 mg   Take 1-2 tablets (5-10 mg) by mouth 3 times daily as needed for muscle spasms   Quantity:  20 tablet   Refills:  0       fenofibrate 160 MG tablet   Used for:  Hyperlipidemia LDL goal <70        Dose:  160 mg   Take 1 tablet (160 mg) by mouth daily   Quantity:  90 tablet   Refills:  3       fluticasone 50 MCG/ACT spray   Commonly known as:  FLONASE   Used for:  Mild intermittent asthma without complication, Environmental allergies        Dose:  1-2 spray   Spray 1-2 sprays into both nostrils daily   Quantity:  48 g   Refills:  3       LANsoprazole 30  MG CR capsule   Commonly known as:  PREVACID   Used for:  Gastroesophageal reflux disease without esophagitis        TAKE ONE CAPSULE BY MOUTH DAILY. TAKE 30 TO 60 MINUTES BEFORE A MEAL.   Quantity:  90 capsule   Refills:  3       OMEGA 3 PO        Take  by mouth.   Refills:  0       zolpidem 5 MG tablet   Commonly known as:  AMBIEN   Used for:  Sleep disorder        Dose:  5 mg   Take 1 tablet (5 mg) by mouth nightly as needed for sleep   Quantity:  30 tablet   Refills:  0                Protect others around you: Learn how to safely use, store and throw away your medicines at www.disposemymeds.org.             Medication List: This is a list of all your medications and when to take them. Check marks below indicate your daily home schedule. Keep this list as a reference.      Medications           Morning Afternoon Evening Bedtime As Needed    albuterol 108 (90 BASE) MCG/ACT Inhaler   Commonly known as:  albuterol   Inhale 2 puffs into the lungs every 4 hours as needed                                aspirin 81 MG tablet   ONE DAILY                                atorvastatin 80 MG tablet   Commonly known as:  LIPITOR   Take 1 tablet (80 mg) by mouth daily                                azelastine 0.1 % spray   Commonly known as:  ASTELIN   Spray 1-2 sprays into both nostrils 2 times daily                                azithromycin 250 MG tablet   Commonly known as:  ZITHROMAX   Two tablets first day, then one tablet daily for four days.                                carvedilol 3.125 MG tablet   Commonly known as:  COREG   Take 1 tablet (3.125 mg) by mouth 2 times daily (with meals)                                cetirizine 10 MG tablet   Commonly known as:  zyrTEC   Take 1 tablet by mouth every evening.                                citalopram 20 MG tablet   Commonly known as:  celeXA   Take 1 tablet (20 mg) by mouth daily                                cyclobenzaprine 5 MG tablet   Commonly known as:  FLEXERIL    Take 1-2 tablets (5-10 mg) by mouth 3 times daily as needed for muscle spasms                                fenofibrate 160 MG tablet   Take 1 tablet (160 mg) by mouth daily                                fluticasone 50 MCG/ACT spray   Commonly known as:  FLONASE   Spray 1-2 sprays into both nostrils daily                                LANsoprazole 30 MG CR capsule   Commonly known as:  PREVACID   TAKE ONE CAPSULE BY MOUTH DAILY. TAKE 30 TO 60 MINUTES BEFORE A MEAL.                                naproxen 500 MG tablet   Commonly known as:  NAPROSYN   Take 1 tablet (500 mg) by mouth 2 times daily as needed for moderate pain                                OMEGA 3 PO   Take  by mouth.                                predniSONE 20 MG tablet   Commonly known as:  DELTASONE   Take 2 tabs (40 mg) orally daily for 4 days, then Take 1 tab (20 mg) orally daily for 4 days, then Take 1/2 tab (10 mg) orally for 4 days                                tamsulosin 0.4 MG capsule   Commonly known as:  FLOMAX   Take 1 capsule (0.4 mg) by mouth daily                                zolpidem 5 MG tablet   Commonly known as:  AMBIEN   Take 1 tablet (5 mg) by mouth nightly as needed for sleep                                          More Information        What Is Sullivan Esophagus?          You have Sullivan esophagus. This means that there have been changes to the lining of the esophagus near the stomach. The changes may have been caused by the acid reflux that happens with GERD (gastroesophageal reflux disease). The changed lining is not cancerous, but may increase your chances of developing cancer later on.      When you have GERD  The esophagus is the tube that carries food and liquid from the mouth to the stomach. Your lower esophageal sphincter (LES) is a one-way valve at the top of the stomach. It keeps food and stomach acid from flowing backward. If the LES is weakened, food and stomach acid flow back (reflux) into your  esophagus. If this happens often, the condition is called GERD.  Changes in the lining  The stomach is kept safe from its own acid by a special lining. The esophagus isn t meant to contact stomach acid. With GERD, acid flows back into the esophagus often. This damages the esophagus. In response to the damage, new tissue forms that is not normal. This is Sullivan esophagus. The new tissue may keep changing. This is why it is more likely to become cancer in the future.  Preventing further damage  Your healthcare provider may suggest regular tests to keep track of changes in the esophagus. This usually includes an endoscopy, when a flexible lighted scope is placed through the mouth into the esophagus. Biopsies (tissue samples) can be taken of the abnormal areas. You are usually sedated with an IV medicine for comfort. He or she may also suggest ways for you to control GERD. This includes lifestyle changes, medicine, or even surgery. This should help keep your Sullivan esophagus from getting worse.  Symptoms of GERD  Symptoms include the following:    Heartburn    Sour-tasting fluid backing up into your mouth    Frequent burping or belching    Symptoms that get worse after you eat, bend over, or lie down    Coughing repeatedly to clear your throat    Hoarseness   Date Last Reviewed: 6/1/2016 2000-2017 [employer]. [address] [place] [address], [place] [address]. All rights reserved. This information is not intended as a substitute for professional medical care. Always follow your healthcare professional's instructions.                Understanding Radiofrequency Ablation (RFA) for Sullivan s Esophagus    The esophagus is the tube that joins the mouth to the stomach. In people with GERD (gastroesophageal reflux disease), stomach acid flows back into the esophagus. Over time, stomach acid can hurt the esophagus. Sullivan s esophagus is a condition where cells in the esophagus lining change to a different type of  cell. The changed cells may protect the esophagus from stomach acid. But these cells are more likely than normal lining cells to become cancerous.  Radiofrequency ablation (RFA) is a procedure that destroys the changed cells. This can help prevent or treat cancer of the esophagus.  Why RFA for Sullivan s esophagus is done  This procedure removes the changed lining from the esophagus. It is most often advised for people who show precancerous changes in the lining and very early cancer. RFA destroys the abnormal lining. Normal lining then grows back to replace it.  How RFA for Sullivan s esophagus is done  RFA is most often done on an outpatient basis. That means you go home the same day. During the procedure:    You are given medicine (anesthesia) to prevent pain and make you sleep during the procedure.    The doctor puts a thin, lighted tube (endoscope) into your mouth. He or she passes the tube down your throat into the esophagus. The tube sends live video from inside the esophagus to a computer screen.    A wire with a tiny electrode attached is put through the tube. The doctor moves it down to the area of changed cells. Energy is sent into the electrode. This heats up the esophagus lining, destroying it.    When the procedure is done, the doctor removes the wire and tube.  Risks of RFA for Sullivan s esophagus  These include:    Chest pain    Sore throat or trouble swallowing    Narrowing of the esophagus (stricture)    Irritation of or hole (perforation) in the esophagus    Problems related to the anesthesia  Date Last Reviewed: 5/1/2016 2000-2017 [employer]. [address] [place] [address], [place] [address]. All rights reserved. This information is not intended as a substitute for professional medical care. Always follow your healthcare professional's instructions.

## 2017-07-28 NOTE — H&P
Pre-Endoscopy History and Physical     Mian Lozano MRN# [id number]   YOB: 1965 Age: 52 year old     Date of Procedure: 7/28/2017  Primary care provider: Fady Adams  Type of Endoscopy: Gastroscopy with possible biopsy, possible dilation  Reason for Procedure: Sullivan's  Type of Anesthesia Anticipated: Conscious Sedation    HPI:    Mian is a 52 year old male who will be undergoing the above procedure.      A history and physical has been performed. The patient's medications and allergies have been reviewed. The risks and benefits of the procedure and the sedation options and risks were discussed with the patient.  All questions were answered and informed consent was obtained.      He denies a personal or family history of anesthesia complications or bleeding disorders.     Patient Active Problem List   Diagnosis     Other atopic dermatitis and related conditions     Mild intermittent asthma     Family history of coronary artery disease     Family history of prostate cancer     Prediabetes     Health Care Home     GERD (gastroesophageal reflux disease)     Sullivan's esophagus     Anxiety     Hypertension goal BP (blood pressure) < 140/90     Ulnar neuropathy     Post-thoracotomy pain syndrome     Nephrolithiasis     Coronary artery disease     Hyperlipidemia LDL goal <70     Environmental allergies        Past Medical History:   Diagnosis Date     Anxiety      Sullivan's esophagus 7/11     Coronary artery disease 4/08    cabg x 5     Environmental allergies     seasonal, hay fever     FAM HX-CARDIOVAS DIS NEC     dad at 45, cabg, stent     Family history of prostate cancer      GERD (gastroesophageal reflux disease) 3/11     History of blood transfusion      Hyperlipidemia LDL goal <70 2006     Hypertension goal BP (blood pressure) < 140/90 4/08     Mild intermittent asthma 7/03     Nephrolithiasis 6/13, 7/16    calcium oxalate     Other atopic dermatitis and related conditions 1980     Other  diseases of lung, not elsewhere classified 4/08    dr steel - benign bx and ct - granuloma - no further w/u needed     Post-thoracotomy pain syndrome 4/11    dr hoover     Prediabetes 8/08     Seasonal allergies 1980    Allergic rhinitis Hayfever        Past Surgical History:   Procedure Laterality Date     C CABG, ARTERY-VEIN, FIVE  4/08    [facility]SD - lung bx benign     C STRESS TEST - REGULAR (FL)  9/06, 4/11    negative stress thallium - [facility]     COLONOSCOPY N/A 8/7/2015    normal - due 10 yrs     ESOPHAGOSCOPY, GASTROSCOPY, DUODENOSCOPY (EGD), COMBINED  6/11, 5/13    [facility] - ? 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 Paternal Grandfather      Neurologic Disorder Sister      CVA/anuerysm at age 19     Colon Cancer No family hx of        Prior to Admission medications    Medication Sig Start Date End Date Taking? Authorizing Provider   atorvastatin (LIPITOR) 80 MG tablet Take 1 tablet (80 mg) by mouth daily 2/1/17  Yes Fady Adams MD   azelastine (ASTELIN) 0.1 % spray Spray 1-2 sprays into  both nostrils 2 times daily 2/1/17  Yes Fady Adams MD   carvedilol (COREG) 3.125 MG tablet Take 1 tablet (3.125 mg) by mouth 2 times daily (with meals) 2/1/17  Yes Fady Adams MD   citalopram (CELEXA) 20 MG tablet Take 1 tablet (20 mg) by mouth daily 2/1/17  Yes Fady Adams MD   fenofibrate 160 MG tablet Take 1 tablet (160 mg) by mouth daily 2/1/17  Yes Fady Adams MD   fluticasone (FLONASE) 50 MCG/ACT spray Spray 1-2 sprays into both nostrils daily 2/1/17  Yes Fady Adams MD   LANsoprazole (PREVACID) 30 MG capsule TAKE ONE CAPSULE BY MOUTH DAILY. TAKE 30 TO 60 MINUTES BEFORE A MEAL. 11/25/15  Yes Fady Adams MD   cetirizine (ZYRTEC) 10 MG tablet Take 1 tablet by mouth every evening. 1/19/12  Yes Fady Adams MD   ASPIRIN 81 MG OR TABS ONE DAILY 4/10/09  Yes Tayo Valenzuela PA-C   azithromycin (ZITHROMAX) 250 MG tablet Two tablets first day, then one tablet daily for four days. 4/28/17   Joaquim La MD   predniSONE (DELTASONE) 20 MG tablet Take 2 tabs (40 mg) orally daily for 4 days, then Take 1 tab (20 mg) orally daily for 4 days, then Take 1/2 tab (10 mg) orally for 4 days 4/24/17   Joaquim La MD   cyclobenzaprine (FLEXERIL) 5 MG tablet Take 1-2 tablets (5-10 mg) by mouth 3 times daily as needed for muscle spasms 3/22/17   Fady Adams MD   naproxen (NAPROSYN) 500 MG tablet Take 1 tablet (500 mg) by mouth 2 times daily as needed for moderate pain 3/22/17   Fady Adams MD   albuterol (ALBUTEROL) 108 (90 BASE) MCG/ACT Inhaler Inhale 2 puffs into the lungs every 4 hours as needed 2/1/17   Fady Adams MD   zolpidem (AMBIEN) 5 MG tablet Take 1 tablet (5 mg) by mouth nightly as needed for sleep 2/1/17   Fady Adams MD   tamsulosin (FLOMAX) 0.4 MG 24 hr capsule Take 1 capsule (0.4 mg) by mouth daily 8/1/16   Fady Adams MD   Omega-3 Fatty Acids (OMEGA 3 PO) Take  by mouth.    Reported, Patient       Allergies   Allergen Reactions     Seasonal Allergies         REVIEW OF SYSTEMS:   5 point  "ROS negative except as noted above in HPI, including Gen., Resp., CV, GI &  system review.    PHYSICAL EXAM:   There were no vitals taken for this visit. Estimated body mass index is 32.69 kg/(m^2) as calculated from the following:    Height as of 4/24/17: 1.727 m (5' 8\").    Weight as of 4/24/17: 97.5 kg (215 lb).   GENERAL APPEARANCE: alert, and oriented  MENTAL STATUS: alert  AIRWAY EXAM: Mallampatti Class I (visualization of the soft palate, fauces, uvula, anterior and posterior pillars)  RESP: lungs clear to auscultation - no rales, rhonchi or wheezes  CV: regular rates and rhythm  DIAGNOSTICS:    Not indicated    IMPRESSION   ASA Class 1 - Healthy patient, no medical problems    PLAN:   Plan for Gastroscopy with possible biopsy, possible dilation. We discussed the risks, benefits and alternatives and the patient wished to proceed.    The above has been forwarded to the consulting provider.      Signed Electronically by: Angus Reynolds  July 28, 2017          "

## 2017-07-31 LAB — COPATH REPORT: NORMAL

## 2018-01-30 ENCOUNTER — OFFICE VISIT (OUTPATIENT)
Dept: FAMILY MEDICINE | Facility: CLINIC | Age: 53
End: 2018-01-30
Payer: COMMERCIAL

## 2018-01-30 VITALS
WEIGHT: 213 LBS | TEMPERATURE: 98 F | OXYGEN SATURATION: 96 % | DIASTOLIC BLOOD PRESSURE: 86 MMHG | HEART RATE: 87 BPM | HEIGHT: 68 IN | BODY MASS INDEX: 32.28 KG/M2 | SYSTOLIC BLOOD PRESSURE: 136 MMHG

## 2018-01-30 DIAGNOSIS — Z91.09 ENVIRONMENTAL ALLERGIES: ICD-10-CM

## 2018-01-30 DIAGNOSIS — I10 HYPERTENSION GOAL BP (BLOOD PRESSURE) < 140/90: ICD-10-CM

## 2018-01-30 DIAGNOSIS — K21.9 GASTROESOPHAGEAL REFLUX DISEASE WITHOUT ESOPHAGITIS: ICD-10-CM

## 2018-01-30 DIAGNOSIS — M25.562 ACUTE PAIN OF LEFT KNEE: ICD-10-CM

## 2018-01-30 DIAGNOSIS — I25.810 CORONARY ARTERY DISEASE INVOLVING CORONARY BYPASS GRAFT OF NATIVE HEART WITHOUT ANGINA PECTORIS: ICD-10-CM

## 2018-01-30 DIAGNOSIS — Z12.11 SCREEN FOR COLON CANCER: ICD-10-CM

## 2018-01-30 DIAGNOSIS — Z12.5 SCREENING FOR PROSTATE CANCER: ICD-10-CM

## 2018-01-30 DIAGNOSIS — Z51.81 MEDICATION MONITORING ENCOUNTER: ICD-10-CM

## 2018-01-30 DIAGNOSIS — B35.6 TINEA CRURIS: Primary | ICD-10-CM

## 2018-01-30 DIAGNOSIS — F41.9 ANXIETY: ICD-10-CM

## 2018-01-30 DIAGNOSIS — J45.20 MILD INTERMITTENT ASTHMA WITHOUT COMPLICATION: ICD-10-CM

## 2018-01-30 DIAGNOSIS — G47.9 SLEEP DISORDER: ICD-10-CM

## 2018-01-30 DIAGNOSIS — Z23 NEED FOR PROPHYLACTIC VACCINATION AND INOCULATION AGAINST INFLUENZA: ICD-10-CM

## 2018-01-30 DIAGNOSIS — R73.03 PREDIABETES: ICD-10-CM

## 2018-01-30 DIAGNOSIS — E78.5 HYPERLIPIDEMIA LDL GOAL <70: ICD-10-CM

## 2018-01-30 LAB
ALBUMIN SERPL-MCNC: 3.4 G/DL (ref 3.4–5)
ALBUMIN UR-MCNC: NEGATIVE MG/DL
ALP SERPL-CCNC: 67 U/L (ref 40–150)
ALT SERPL W P-5'-P-CCNC: 28 U/L (ref 0–70)
ANION GAP SERPL CALCULATED.3IONS-SCNC: 6 MMOL/L (ref 3–14)
APPEARANCE UR: CLEAR
AST SERPL W P-5'-P-CCNC: 20 U/L (ref 0–45)
BILIRUB SERPL-MCNC: 0.5 MG/DL (ref 0.2–1.3)
BILIRUB UR QL STRIP: NEGATIVE
BUN SERPL-MCNC: 13 MG/DL (ref 7–30)
CALCIUM SERPL-MCNC: 9.1 MG/DL (ref 8.5–10.1)
CHLORIDE SERPL-SCNC: 107 MMOL/L (ref 94–109)
CHOLEST SERPL-MCNC: 126 MG/DL
CK SERPL-CCNC: 49 U/L (ref 30–300)
CO2 SERPL-SCNC: 26 MMOL/L (ref 20–32)
COLOR UR AUTO: YELLOW
CREAT SERPL-MCNC: 1.01 MG/DL (ref 0.66–1.25)
CREAT UR-MCNC: 191 MG/DL
ERYTHROCYTE [DISTWIDTH] IN BLOOD BY AUTOMATED COUNT: 12.4 % (ref 10–15)
GFR SERPL CREATININE-BSD FRML MDRD: 77 ML/MIN/1.7M2
GLUCOSE SERPL-MCNC: 108 MG/DL (ref 70–99)
GLUCOSE UR STRIP-MCNC: NEGATIVE MG/DL
HBA1C MFR BLD: 5.4 % (ref 4.3–6)
HCT VFR BLD AUTO: 38.9 % (ref 40–53)
HDLC SERPL-MCNC: 33 MG/DL
HGB BLD-MCNC: 13.3 G/DL (ref 13.3–17.7)
HGB UR QL STRIP: NEGATIVE
KETONES UR STRIP-MCNC: NEGATIVE MG/DL
LDLC SERPL CALC-MCNC: 68 MG/DL
LEUKOCYTE ESTERASE UR QL STRIP: NEGATIVE
MCH RBC QN AUTO: 29.8 PG (ref 26.5–33)
MCHC RBC AUTO-ENTMCNC: 34.2 G/DL (ref 31.5–36.5)
MCV RBC AUTO: 87 FL (ref 78–100)
MICROALBUMIN UR-MCNC: 6 MG/L
MICROALBUMIN/CREAT UR: 2.93 MG/G CR (ref 0–17)
NITRATE UR QL: NEGATIVE
NONHDLC SERPL-MCNC: 93 MG/DL
PH UR STRIP: 6 PH (ref 5–7)
PLATELET # BLD AUTO: 286 10E9/L (ref 150–450)
POTASSIUM SERPL-SCNC: 4.3 MMOL/L (ref 3.4–5.3)
PROT SERPL-MCNC: 6.6 G/DL (ref 6.8–8.8)
PSA SERPL-ACNC: 0.75 UG/L (ref 0–4)
RBC # BLD AUTO: 4.47 10E12/L (ref 4.4–5.9)
SODIUM SERPL-SCNC: 139 MMOL/L (ref 133–144)
SOURCE: NORMAL
SP GR UR STRIP: 1.02 (ref 1–1.03)
TRIGL SERPL-MCNC: 127 MG/DL
TSH SERPL DL<=0.005 MIU/L-ACNC: 2.22 MU/L (ref 0.4–4)
UROBILINOGEN UR STRIP-ACNC: 1 EU/DL (ref 0.2–1)
WBC # BLD AUTO: 6 10E9/L (ref 4–11)

## 2018-01-30 PROCEDURE — 80053 COMPREHEN METABOLIC PANEL: CPT | Performed by: FAMILY MEDICINE

## 2018-01-30 PROCEDURE — 85027 COMPLETE CBC AUTOMATED: CPT | Performed by: FAMILY MEDICINE

## 2018-01-30 PROCEDURE — G0103 PSA SCREENING: HCPCS | Performed by: FAMILY MEDICINE

## 2018-01-30 PROCEDURE — 83036 HEMOGLOBIN GLYCOSYLATED A1C: CPT | Performed by: FAMILY MEDICINE

## 2018-01-30 PROCEDURE — 90686 IIV4 VACC NO PRSV 0.5 ML IM: CPT | Performed by: FAMILY MEDICINE

## 2018-01-30 PROCEDURE — 80061 LIPID PANEL: CPT | Performed by: FAMILY MEDICINE

## 2018-01-30 PROCEDURE — 84443 ASSAY THYROID STIM HORMONE: CPT | Performed by: FAMILY MEDICINE

## 2018-01-30 PROCEDURE — 81003 URINALYSIS AUTO W/O SCOPE: CPT | Performed by: FAMILY MEDICINE

## 2018-01-30 PROCEDURE — 90471 IMMUNIZATION ADMIN: CPT | Performed by: FAMILY MEDICINE

## 2018-01-30 PROCEDURE — 36415 COLL VENOUS BLD VENIPUNCTURE: CPT | Performed by: FAMILY MEDICINE

## 2018-01-30 PROCEDURE — 82550 ASSAY OF CK (CPK): CPT | Performed by: FAMILY MEDICINE

## 2018-01-30 PROCEDURE — 82043 UR ALBUMIN QUANTITATIVE: CPT | Performed by: FAMILY MEDICINE

## 2018-01-30 PROCEDURE — 99214 OFFICE O/P EST MOD 30 MIN: CPT | Mod: 25 | Performed by: FAMILY MEDICINE

## 2018-01-30 RX ORDER — TRIAMCINOLONE ACETONIDE 5 MG/G
CREAM TOPICAL
Qty: 90 G | Refills: 1 | Status: SHIPPED | OUTPATIENT
Start: 2018-01-30 | End: 2019-06-05

## 2018-01-30 RX ORDER — ATORVASTATIN CALCIUM 80 MG/1
80 TABLET, FILM COATED ORAL DAILY
Qty: 90 TABLET | Refills: 3 | Status: SHIPPED | OUTPATIENT
Start: 2018-01-30 | End: 2019-01-31

## 2018-01-30 RX ORDER — AZELASTINE 1 MG/ML
1-2 SPRAY, METERED NASAL 2 TIMES DAILY
Qty: 3 BOTTLE | Refills: 3 | Status: SHIPPED | OUTPATIENT
Start: 2018-01-30 | End: 2019-06-05

## 2018-01-30 RX ORDER — FENOFIBRATE 160 MG/1
160 TABLET ORAL DAILY
Qty: 90 TABLET | Refills: 3 | Status: SHIPPED | OUTPATIENT
Start: 2018-01-30 | End: 2018-05-11

## 2018-01-30 RX ORDER — FLUTICASONE PROPIONATE 50 MCG
1-2 SPRAY, SUSPENSION (ML) NASAL DAILY
Qty: 48 G | Refills: 3 | Status: SHIPPED | OUTPATIENT
Start: 2018-01-30 | End: 2019-06-05

## 2018-01-30 RX ORDER — CITALOPRAM HYDROBROMIDE 20 MG/1
20 TABLET ORAL DAILY
Qty: 90 TABLET | Refills: 3 | Status: SHIPPED | OUTPATIENT
Start: 2018-01-30 | End: 2018-05-11

## 2018-01-30 RX ORDER — CARVEDILOL 3.12 MG/1
3.12 TABLET ORAL 2 TIMES DAILY WITH MEALS
Qty: 180 TABLET | Refills: 3 | Status: SHIPPED | OUTPATIENT
Start: 2018-01-30 | End: 2019-02-20

## 2018-01-30 RX ORDER — ALBUTEROL SULFATE 90 UG/1
2 AEROSOL, METERED RESPIRATORY (INHALATION) EVERY 4 HOURS PRN
Qty: 3 INHALER | Refills: 3 | Status: SHIPPED | OUTPATIENT
Start: 2018-01-30 | End: 2019-06-05

## 2018-01-30 RX ORDER — PRENATAL VIT 91/IRON/FOLIC/DHA 28-975-200
COMBINATION PACKAGE (EA) ORAL 2 TIMES DAILY
Qty: 84 G | Refills: 1 | Status: SHIPPED | OUTPATIENT
Start: 2018-01-30 | End: 2019-06-05

## 2018-01-30 NOTE — PROGRESS NOTES
"   SUBJECTIVE:   Mian Lozano is a 52 year old male who presents to clinic today for the following health issues:    Rash  Onset: many months    Description:   Location: groin, penis and underarms  Character: red  Itching (Pruritis): YES    Progression of Symptoms:  same    Accompanying Signs & Symptoms:  Fever: no   Body aches or joint pain: no   Sore throat symptoms: YES- 1 week  Recent cold symptoms: no     History:   Previous similar rash: YES- as teenager has jock itch    Precipitating factors:   Exposure to similar rash: no   New exposures: None   Recent travel: no     Alleviating factors:  none    Therapies Tried and outcome: jock itch ointment - OTC, lotrimin dry spray    Patient states that he has a \"bad case of jock itch\". He has tried an OTC clotrimizole, swabbing with alcohol up to three times a day and lotrimin cream, is unable to keep it dry because of how much it is weeping. The rash is on both his penis, anus and in his armpits. He describes it as having weeping cracks, being very painful, it is red and sometimes scaly. The weeping is clear, and occassionally bleeds. Denies fevers and chills, but he has had a sore throat since last Thursday. Denies any sinus or ear symptoms or cough, but he \"always has a phlegm problem\" in his throat. Denies any allergies. He denies any excessive sweating, but does not some SOB when he exercises. He hasn't been back to see the cardiologist in sometime, his old cardiologist retired, his last stress test was some time ago. No one around him has been sick recently either.     He had a recent injury where he strained the muscle in the back of his left knee a few weeks ago while snowmobiling. He would like some exercises to help him stretch it out. He has tried icing and stretching as best he can, but it is still sore.     He is fasting currently so he can have his annual labs done today.     CAD/Prediabetes/Htn/Lipids    Recent Labs   Lab Test  02/14/17   0719  " 11/02/16   0725   07/08/15   0757  09/03/14   0745   CHOL  141  114   < >  146  159   HDL  44  28*   < >  37*  34*   LDL  64  64   < >  79  72   TRIG  163*  111   < >  148  267*   CHOLHDLRATIO   --    --    --   3.9  4.7    < > = values in this interval not displayed.     Creatinine   Date Value Ref Range Status   02/14/2017 0.96 0.66 - 1.25 mg/dL Final     Glucose   Date Value Ref Range Status   02/14/2017 104 (H) 70 - 99 mg/dL Final     Lab Results   Component Value Date    A1C 5.4 01/30/2018    A1C 5.6 02/14/2017    A1C 5.5 01/27/2016    A1C 5.5 07/08/2015    A1C 5.4 09/03/2014     BP Readings from Last 3 Encounters:   01/30/18 136/86   07/28/17 133/78   04/24/17 130/76     Asthma/Allergies - stable, ACT = 25    Problem list and histories reviewed & adjusted, as indicated.  Additional history: as documented    Wt Readings from Last 4 Encounters:   01/30/18 213 lb (96.6 kg)   04/24/17 215 lb (97.5 kg)   03/22/17 217 lb (98.4 kg)   03/07/17 214 lb (97.1 kg)       Health Maintenance    Health Maintenance Due   Topic Date Due     ASTHMA CONTROL TEST Q6 MOS  08/01/2017     INFLUENZA VACCINE (SYSTEM ASSIGNED)  09/01/2017     ASTHMA ACTION PLAN Q1 YR  02/01/2018     WELLNESS VISIT Q1 YR  02/01/2018     BMP Q1 YR  02/14/2018     LIPID MONITORING Q1 YEAR  02/14/2018     MICROALBUMIN Q1 YEAR  02/14/2018       Current Problem List    Patient Active Problem List   Diagnosis     Other atopic dermatitis and related conditions     Mild intermittent asthma     Family history of coronary artery disease     Family history of prostate cancer     Prediabetes     Health Care Home     GERD (gastroesophageal reflux disease)     Sullivan's esophagus     Anxiety     Hypertension goal BP (blood pressure) < 140/90     Ulnar neuropathy     Post-thoracotomy pain syndrome     Nephrolithiasis     Coronary artery disease     Hyperlipidemia LDL goal <70     Environmental allergies       Past Medical History    Past Medical History:   Diagnosis  Date     Anxiety      Fowler's esophagus 7/11     Coronary artery disease 4/08    cabg x 5     Environmental allergies     seasonal, hay fever     FAM HX-CARDIOVAS DIS NEC     dad at 45, cabg, stent     Family history of prostate cancer      GERD (gastroesophageal reflux disease) 3/11     History of blood transfusion      Hyperlipidemia LDL goal <70 2006     Hypertension goal BP (blood pressure) < 140/90 4/08     Mild intermittent asthma 7/03     Nephrolithiasis 6/13, 7/16    calcium oxalate     Other atopic dermatitis and related conditions 1980     Other diseases of lung, not elsewhere classified 4/08    dr steel - benign bx and ct - granuloma - no further w/u needed     Post-thoracotomy pain syndrome 4/11    dr hoover     Prediabetes 8/08     Seasonal allergies 1980    Allergic rhinitis Hayfever       Past Surgical History    Past Surgical History:   Procedure Laterality Date     C CABG, ARTERY-VEIN, FIVE  4/08    FSD - lung bx benign     C STRESS TEST - REGULAR ([place])  9/06, 4/11    negative stress thallium - FSD     COLONOSCOPY N/A 8/7/2015    normal - due 10 yrs     ESOPHAGOSCOPY, GASTROSCOPY, DUODENOSCOPY (EGD), COMBINED  6/11, 5/13    [facility] - ? gastritis, fowler's - due 3 yr     ESOPHAGOSCOPY, GASTROSCOPY, DUODENOSCOPY (EGD), COMBINED  5/10/2013    COMBINED ESOPHAGOSCOPY, GASTROSCOPY, DUODENOSCOPY (EGD), BIOPSY SINGLE OR MULTIPLE;  ESOPHAGOSCOPY, GASTROSCOPY, DUODENOSCOPY (EGD)  with biopsy;  Surgeon: Angus Reynolds MD;  Location:  GI     ESOPHAGOSCOPY, GASTROSCOPY, DUODENOSCOPY (EGD), COMBINED N/A 7/28/2017    Fowler's - recheck 3 yrs     HC VASECTOMY UNILAT/BILAT W POSTOP SEMEN  1995    Vasectomy     HERNIORRHAPHY UMBILICAL N/A 11/13/2014    Dr Barron     SURGICAL HISTORY OF -   1980    lt patella fx     SURGICAL HISTORY OF -   1985    lt thumb neuroma excised     SURGICAL HISTORY OF -   11/09    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 Clotrimazole) 84 g 1     triamcinolone (KENALOG) 0.5 % cream Apply sparingly to affected area three times daily. 90 g 1     atorvastatin (LIPITOR) 80 MG tablet Take 1 tablet (80 mg) by mouth daily 90 tablet 3     carvedilol (COREG) 3.125 MG tablet Take 1 tablet (3.125 mg) by mouth 2 times daily (with meals) 180 tablet 3     citalopram (CELEXA) 20 MG tablet Take 1 tablet (20 mg) by mouth daily 90 tablet 3     fenofibrate 160 MG tablet Take 1 tablet (160 mg) by mouth daily 90 tablet 3     fluticasone (FLONASE) 50 MCG/ACT spray Spray 1-2 sprays into both nostrils daily 48 g 3     azelastine (ASTELIN) 0.1 % spray Spray 1-2 sprays into both nostrils 2 times daily 3 Bottle 3     albuterol (PROAIR HFA) 108 (90 BASE) MCG/ACT Inhaler Inhale 2 puffs into the lungs every 4 hours as needed 3 Inhaler 3     zolpidem (AMBIEN) 5 MG tablet Take 1 tablet (5 mg) by mouth nightly as needed for sleep 30 tablet 0     LANsoprazole (PREVACID) 30 MG capsule TAKE ONE CAPSULE BY MOUTH DAILY. TAKE 30 TO 60 MINUTES BEFORE A MEAL. 90 capsule 3     cetirizine (ZYRTEC) 10 MG tablet Take 1 tablet by mouth every evening. 30 tablet 1     ASPIRIN 81 MG OR TABS ONE DAILY 100 3     [DISCONTINUED] albuterol (ALBUTEROL) 108 (90 BASE) MCG/ACT Inhaler Inhale 2 puffs into the lungs every 4 hours as needed 3 Inhaler 3     [DISCONTINUED] atorvastatin (LIPITOR) 80 MG tablet Take 1 tablet (80 mg) by mouth daily 90 tablet 3     [DISCONTINUED] carvedilol (COREG) 3.125 MG tablet Take 1 tablet (3.125 mg) by mouth 2 times daily (with meals) 180 tablet 3     [DISCONTINUED] citalopram (CELEXA) 20 MG tablet Take 1 tablet (20 mg) by mouth daily 90 tablet 3     [DISCONTINUED] fenofibrate 160 MG tablet Take 1 tablet (160 mg) by mouth daily 90 tablet 3  "      Allergies    Allergies   Allergen Reactions     Seasonal Allergies        Immunizations    Immunization History   Administered Date(s) Administered     Influenza (IIV3) PF 11/26/2012, 11/12/2014, 10/01/2016     Influenza Intranasal Vaccine 4 valent 10/01/2015     Influenza Vaccine IM 3yrs+ 4 Valent IIV4 10/20/2015, 01/30/2018     TDAP Vaccine (Boostrix) 11/25/2015     Tdap (Adacel,Boostrix) 04/28/2006       Family History    Family History   Problem Relation Age of Onset     Prostate Cancer Father 69     Coronary Artery Disease Father 38     Hypertension Father      Hypertension Paternal Grandfather      C.A.D. Paternal Grandfather      Neurologic Disorder Sister      CVA/anuerysm at age 19     Colon Cancer No family hx of        Social History    Social History     Social History     Marital status:      Spouse name: Imelda     Number of children: 0     Years of education: 14     Occupational History      Unemployed     Social History Main Topics     Smoking status: Never Smoker     Smokeless tobacco: Never Used     Alcohol use 3.6 oz/week      Comment: 6 drinks per week     Drug use: No     Sexual activity: Yes     Partners: Female     Birth control/ protection: Male Surgical     Other Topics Concern     Caffeine Concern Yes     1 can qd     Exercise Yes     occas     Seat Belt Yes     Parent/Sibling W/ Cabg, Mi Or Angioplasty Before 65f 55m? Yes     Social History Narrative       All above reviewed and updated, all stable unless otherwise noted    Recent labs reviewed    ROS:  Constitutional, HEENT, cardiovascular, pulmonary, GI, , musculoskeletal, neuro, skin, endocrine and psych systems are negative, except as in HPI or otherwise noted       OBJECTIVE:                                                    /86  Pulse 87  Temp 98  F (36.7  C) (Oral)  Ht 5' 8\" (1.727 m)  Wt 213 lb (96.6 kg)  SpO2 96%  BMI 32.39 kg/m2  Body mass index is 32.39 kg/(m^2).  GENERAL: healthy, alert and no " distress  EYES: Eyes grossly normal to inspection, extraocular movements - intact, and PERRL  HENT: ear canals- normal; TMs- normal; Nose- normal; Mouth- no ulcers, no lesions  NECK: no tenderness, no adenopathy, no asymmetry, no masses, no stiffness; thyroid- normal to palpation  RESP: lungs clear to auscultation - no rales, no rhonchi, no wheezes  CV: regular rates and rhythm, normal S1 S2, no S3 or S4 and no murmur, no click or rub  MS: extremities- no gross deformities noted, no edema  SKIN: Both armpits have a patchy red rash. In groin crease, red raised rashes, doesn't appear infected, and isn't weeping excessively.   NEURO: strength and tone- normal, sensory exam- grossly normal, mentation- intact, speech- normal, reflexes- symmetric  - male: testicles- normal, no atrophy, no masses;  no inguinal hernias; penis- patchy, red, raised rashes  PSYCH: Alert and oriented times 3; speech- coherent , normal rate and volume; able to articulate logical thoughts, able to abstract reason, no tangential thoughts, no hallucinations or delusions, affect- normal  LYMPHATICS: ant. cervical- normal, post. cervical- normal, axillary- normal, supraclavicular- normal, inguinal- normal        DIAGNOSTICS/PROCEDURES:                                                      Results for orders placed or performed in visit on 01/30/18 (from the past 24 hour(s))   CBC with platelets   Result Value Ref [place]    WBC 6.0 4.0 - 11.0 10e9/L    RBC Count 4.47 4.4 - 5.9 10e12/L    Hemoglobin 13.3 13.3 - 17.7 g/dL    Hematocrit 38.9 (L) 40.0 - 53.0 %    MCV 87 78 - 100 fl    MCH 29.8 26.5 - 33.0 pg    MCHC 34.2 31.5 - 36.5 g/dL    RDW 12.4 10.0 - 15.0 %    Platelet Count 286 150 - 450 10e9/L   Hemoglobin A1c   Result Value Ref Range    Hemoglobin A1C 5.4 4.3 - 6.0 %   *UA reflex to Microscopic and Culture ([place] and [facility] (except [place] and [place])   Result Value Ref Range    Color Urine Yellow     Appearance Urine Clear      Glucose Urine Negative NEG^Negative mg/dL    Bilirubin Urine Negative NEG^Negative    Ketones Urine Negative NEG^Negative mg/dL    Specific Gravity Urine 1.020 1.003 - 1.035    Blood Urine Negative NEG^Negative    pH Urine 6.0 5.0 - 7.0 pH    Protein Albumin Urine Negative NEG^Negative mg/dL    Urobilinogen Urine 1.0 0.2 - 1.0 EU/dL    Nitrite Urine Negative NEG^Negative    Leukocyte Esterase Urine Negative NEG^Negative    Source Midstream Urine         ASSESSMENT/PLAN:                                                        ICD-10-CM    1. Tinea cruris B35.6 terbinafine (LAMISIL AT) 1 % cream     triamcinolone (KENALOG) 0.5 % cream   2. Coronary artery disease involving coronary bypass graft of native heart without angina pectoris I25.810 Comprehensive metabolic panel     Lipid panel reflex to direct LDL Fasting     Albumin Random Urine Quantitative with Creat Ratio     *UA reflex to Microscopic and Culture ([place] and [facility] (except [place] and [place])     CARDIOLOGY EVAL ADULT REFERRAL     atorvastatin (LIPITOR) 80 MG tablet     carvedilol (COREG) 3.125 MG tablet     fenofibrate 160 MG tablet   3. Prediabetes R73.03 Comprehensive metabolic panel     Lipid panel reflex to direct LDL Fasting     Hemoglobin A1c     Albumin Random Urine Quantitative with Creat Ratio     *UA reflex to Microscopic and Culture ([facility] and [facility] (except [place] and [place])     CARDIOLOGY EVAL ADULT REFERRAL   4. Hypertension goal BP (blood pressure) < 140/90 I10 Comprehensive metabolic panel     Albumin Random Urine Quantitative with Creat Ratio     *UA reflex to Microscopic and Culture ([facility] and [facility] (except [place] and [place])     CARDIOLOGY EVAL ADULT REFERRAL     carvedilol (COREG) 3.125 MG tablet   5. Hyperlipidemia LDL goal <70 E78.5 Comprehensive metabolic panel     Lipid panel reflex to direct LDL Fasting     CK total     CARDIOLOGY EVAL ADULT REFERRAL     atorvastatin (LIPITOR) 80 MG  tablet     fenofibrate 160 MG tablet   6. Acute pain of left knee M25.562    7. Mild intermittent asthma without complication J45.20 fluticasone (FLONASE) 50 MCG/ACT spray     azelastine (ASTELIN) 0.1 % spray     albuterol (PROAIR HFA) 108 (90 BASE) MCG/ACT Inhaler   8. Environmental allergies Z91.09 fluticasone (FLONASE) 50 MCG/ACT spray     azelastine (ASTELIN) 0.1 % spray     albuterol (PROAIR HFA) 108 (90 BASE) MCG/ACT Inhaler   9. Anxiety F41.9 citalopram (CELEXA) 20 MG tablet   10. Sleep disorder G47.9    11. Gastroesophageal reflux disease without esophagitis K21.9 CBC with platelets   12. Medication monitoring encounter Z51.81 Comprehensive metabolic panel     Lipid panel reflex to direct LDL Fasting     CK total     CBC with platelets     TSH with free T4 reflex     Hemoglobin A1c     Albumin Random Urine Quantitative with Creat Ratio     *UA reflex to Microscopic and Culture ([place] and [facility] (except [place] and [place])   13. Screening for prostate cancer Z12.5 Prostate spec antigen screen   14. Screen for colon cancer Z12.11 Fecal colorectal cancer screen (FIT)   15. Need for prophylactic vaccination and inoculation against influenza Z23 HC FLU VAC PRESRV FREE QUAD SPLIT VIR 3+YRS IM     ADMIN 1st VACCINE       Discussed treatment/modality options, including risk and benefits, he desires annual lab(s), heat/ice/stretching & exercise, medication change, medication refills, new medications, ACT, Asthma Action Plan, completed and explained, immunizations recommended, and wound care discussed, flu vac. All diagnosis above reviewed and noted above, otherwise stable.  See [facility] orders for further details.  Follow up as needed if condition worsens or fails to improve with treatment.      Health Maintenance Due   Topic Date Due     ASTHMA CONTROL TEST Q6 MOS  08/01/2017     INFLUENZA VACCINE (SYSTEM ASSIGNED)  09/01/2017     ASTHMA ACTION PLAN Q1 YR  02/01/2018     WELLNESS VISIT Q1 YR   02/01/2018     BMP Q1 YR  02/14/2018     LIPID MONITORING Q1 YEAR  02/14/2018     MICROALBUMIN Q1 YEAR  02/14/2018       See Patient Instructions      The information in this document, created by the medical scribe for me, accurately reflects the services I personally performed and the decisions made by me. I have reviewed and approved this document for accuracy.   Fady Adams MD [facility]            Fady Adams MD [address]  [address] [phone] Fax

## 2018-01-30 NOTE — PATIENT INSTRUCTIONS
[facility]                        To reach your care team during and after hours:   [phone]  To reach our pharmacy:        [phone]    Clinic Hours                        Our clinic hours are:    Monday   7:30 am to 7:00 pm                  Tuesday through Friday 7:30 am to 5:00 pm                             Saturday   8:00 am to 12:00 pm      Sunday   Closed      Pharmacy Hours                        Our pharmacy hours are:    Monday   8:30 am to 7:00 pm       Tuesday to Friday  8:30 am to 6:00 pm                       Saturday    9:00 am to 1:00 pm              Sunday    Closed              There is also information available at our web site:  [email or web address]    If your provider ordered any lab tests and you do not receive the results within 10 business days, please call the clinic.    If you need a medication refill please contact your pharmacy.  Please allow 2-3 business days for your refill to be completed.    Our clinic offers telephone visits and e visits.  Please ask one of your team members to explain more.      Use [employer] (secure email communication and access to your chart) to send your primary care provider a message or make an appointment. Ask someone on your Team how to sign up for [employer].  Immunizations                      Immunization History   Administered Date(s) Administered     Influenza (IIV3) PF 11/26/2012, 11/12/2014, 10/01/2016     Influenza Intranasal Vaccine 4 valent 10/01/2015     Influenza Vaccine IM 3yrs+ 4 Valent IIV4 10/20/2015     TDAP Vaccine (Boostrix) 11/25/2015     Tdap (Adacel,Boostrix) 04/28/2006        Health Maintenance                         Health Maintenance Due   Topic Date Due     Asthma Control Test - every 6 months  08/01/2017     Flu Vaccine - yearly  09/01/2017     Asthma Action Plan - yearly  02/01/2018     Wellness Visit with your Primary Provider - yearly  02/01/2018     Basic Metabolic Lab - yearly  02/14/2018     Cholesterol  Lab - yearly  02/14/2018     Microalbumin Lab - yearly  02/14/2018                       Meniscal (Cartilage) Tear         What is a meniscal (cartilage) tear?   The meniscus is a piece of cartilage in the middle of your knee. Cartilage is tough, smooth, rubbery tissue that lines and cushions the surface of the joints. You have a meniscus on the inner side of your knee (the medial meniscus) and a meniscus on the outer side of the knee (the lateral meniscus). Each meniscus attaches to the top of the shinbone (tibia), makes contact with the thighbone (femur), and acts as a shock absorber during weight-bearing activities. If a meniscus tears, it can cause knee pain and can limit motion.   How does it occur?   A meniscal tear can occur when the knee is forcefully twisted or sometimes with minimal or no trauma, such as when you are squatting.   What are the symptoms?   Symptoms may include the following:   You have pain in your knee joint.   You have immediate swelling with fluid in the joint, called an effusion.   You can't fully bend or straighten your leg.   Your knee locks or gets stuck in one place.   You hear a snap or pop at the time of the injury.   A chronic (old) meniscal tear may give you pain on and off during activities, with or without swelling. Your knee may sometimes lock, and you may have stiffness in the knee.   How is it diagnosed?   Your healthcare provider will review your symptoms and how the injury occurred. He or she will ask about your medical history and examine your knee. Your provider will move your knee in several ways that may cause pain along the injured meniscal surface. You may have X-rays to see if the bones in your knee are injured, but a meniscal tear will not show on an X-ray. An MRI scan can help diagnose a meniscal tear.   How is it treated?   To treat this condition:   Put an ice pack, gel pack, or package of frozen vegetables, wrapped in a cloth on the area every 3 to 4 hours, for  up to 20 minutes at a time.   Raise the knee on a pillow when you sit or lie down.   Wrap an elastic bandage around your knee to keep the swelling from getting worse.   Wear a knee immobilizer or other brace as directed by your provider.   Use crutches to prevent further injury while the meniscus heals.   Take an anti-inflammatory medicine such as ibuprofen, or other medicine as directed by your provider. Nonsteroidal anti-inflammatory medicines (NSAIDs) may cause stomach bleeding and other problems. These risks increase with age. Read the label and take as directed. Unless recommended by your healthcare provider, do not take for more than 10 days.   While you are recovering from your injury, you will need to change your sport or activity to one that does not make your condition worse. For example, you may need to swim instead of run.   Arthroscopic surgery is needed to repair or remove large torn pieces of cartilage. The surgery usually takes about an hour. An arthroscope is a tube with a light on the end that projects an image of the inside of your knee onto a TV screen. By putting tools through the end of the arthroscope, the healthcare provider can usually repair or remove the damaged meniscus. Because the meniscus is a valuable shock absorber, the provider will leave as much of the healthy portion of the meniscus as possible during surgery.   You will go home the day of the surgery. You should keep your leg elevated. Take it easy for at least the next 2 to 3 days.   Do not take part in strenuous activities until your healthcare provider advises that you are ready.   How long will the effects last?   If you have a small tear that has not been repaired or removed, you may still be able to function well and be active. However, your knee may sometimes swell, lock, be stiff, or hurt during activities.   If you have surgery, you will need to spend time rehabilitating your knee. Everyone recovers at a different rate,  depending on the severity of the injury and their general health. Many people return to their previous level of activity within a month or so after surgery.   When can I return to my normal activities?   Everyone recovers from an injury at a different rate. Return to your activities depends on how soon your knee recovers, not by how many days or weeks it has been since your injury has occurred. The goal is to return you to your normal activities as soon as is safely possible. If you return too soon you may worsen your injury.   You may safely return to your normal activities when, starting from the top of the list and progressing to the end, each of the following is true:   your injured knee can be fully straightened and bent without pain   your knee and leg have regained normal strength compared to the uninjured knee and leg   your knee is not swollen   you are able to bend, squat, or walk without pain   How can a meniscal tear be prevented?   To help prevent knee cartilage injuries, it helps to have strong thigh and hamstring muscles, and to stretch your legs before and after exercising. In activities such as skiing, be sure your ski bindings are set correctly by a trained professional so that your skis will release when you fall.               Meniscal (Cartilage) Tear Rehabilitation Exercises                You may do the first 5 exercises right away. You may do the rest of the exercises when the pain in your knee has decreased.   Passive knee extension: Do this exercise if you are unable to fully extend your knee. While lying on your back, place a rolled-up towel underneath the heel of your injured leg so the heel is about 6 inches off the ground. Relax your leg muscles and let gravity slowly straighten your knee. You may feel some discomfort while doing this exercise. Try to hold this position for 2 minutes. Repeat 3 times. Do this exercise several times per day. This exercise can also be done while sitting in  a chair with your heel on another chair or stool.   Heel slide: Sit on a firm surface with your legs straight in front of you. Slowly slide the heel of your injured leg toward your buttock by pulling your knee toward your chest as you slide the heel. Return to the starting position. Do 3 sets of 10.   Standing calf stretch: Stand facing a wall with your hands on the wall at about eye level. Keep your injured leg back with your heel on the floor. Keep the other leg forward with the knee bent. Turn your back foot slightly inward (as if you were pigeon-toed). Slowly lean into the wall until you feel a stretch in the back of your calf. Hold the stretch for 15 to 30 seconds. Return to the starting position. Repeat 3 times. Do this exercise several times each day.   Hamstring stretch on wall: Lie on your back with your buttocks close to a doorway. Stretch your uninjured leg straight out in front of you on the floor through the doorway. Raise your injured leg and rest it against the wall next to the door frame. Keep your leg as straight as possible. You should feel a stretch in the back of your thigh. Hold this position for 15 to 30 seconds. Repeat 3 times.   Straight leg raise: Lie on your back with your legs straight out in front of you. Bend the knee on your uninjured side and place the foot flat on the floor. Tighten the thigh muscle on your injured side and lift your leg about 8 inches off the floor. Keep your leg straight and your thigh muscle tight. Slowly lower your leg back down to the floor. Do 3 sets of 10.   Wall squat with a ball: Stand with your back, shoulders, and head against a wall. Look straight ahead. Keep your shoulders relaxed and your feet 3 feet from the wall and shoulder's width apart. Place a soccer or basketball-sized ball behind your back. Keeping your back against the wall, slowly squat down to a 45-degree angle. Your thighs will not yet be parallel to the floor. Hold this position for 10  seconds and then slowly slide back up the wall. Repeat 10 times. Build up to 3 sets of 10.   Step-up: Stand with the foot of your injured leg on a support 3 to 5 inches high (like a small step or block of wood). Keep your other foot flat on the floor. Shift your weight onto the injured leg on the support. Straighten your injured leg as the other leg comes off the floor. Return to the starting position by bending your injured leg and slowly lowering your uninjured leg back to the floor. Do 3 sets of 10.   Knee stabilization: Wrap a piece of elastic tubing around the ankle of the uninjured leg. Tie a knot in the other end of the tubing and close it in a door.   0. Stand facing the door on the leg without tubing and bend your knee slightly, keeping your thigh muscles tight. While maintaining this position, move the leg with the tubing straight back behind you. Do 3 sets of 10.   0. Turn 90 degrees so the leg without tubing is closest to the door. Move the leg with tubing away from your body. Do 3 sets of 10.   0. Turn 90 degrees again so your back is to the door. Move the leg with tubing straight out in front of you. Do 3 sets of 10.   0. Turn your body 90 degrees again so the leg with tubing is closest to the door. Move the leg with tubing across your body. Do 3 sets of 10.   Hold onto a chair if you need help balancing. This exercise can be made even more challenging by standing on a pillow while you move the leg with tubing.   Resisted terminal knee extension: Make a loop from a piece of elastic tubing by tying a knot in both ends. Close both knots in a door. Step into the loop so the tubing is around the back of your injured leg. Lift the other foot off the ground. Hold onto a chair for balance, if needed. Bend the knee on the leg with tubing about 45 degrees. Slowly straighten your leg, keeping your thigh muscle tight as you do this. Do this 10 times. Do 3 sets. An easier way to do this is to stand on both legs  for better support while you do the exercise.   Wobble board exercises:   0. Stand on a wobble board with your feet shoulder width apart. Rock the board forwards and backwards 30 times, then side to side 30 times. Hold on to a chair if you need support.   0. Rotate the wobble board around so that the edge of the board is in contact with the floor at all times. Do this 30 times in a clockwise and then a counterclockwise direction.   0. Balance on the wobble board for as long as you can without letting the edges touch the floor. Try to do this for 2 minutes without touching the floor.   0. Rotate the wobble board in clockwise and counterclockwise circles, but do not allow the edge of the board to touch the floor.   0. When you have mastered exercises A through D, try repeating them while standing on just your injured leg. Once you can do these exercises on one leg, try to do them with your eyes closed. Make sure you have something nearby to support you in case you lose your balance.         Published by [employer].  This content is reviewed periodically and is subject to change as new health information becomes available. The information is intended to inform and educate and is not a replacement for medical evaluation, advice, diagnosis or treatment by a healthcare professional.   Written by Becki Ybarra MS, PT, and Anna High PT, [facility], [facility], for [employer].   ? 2010 [facility] and/or its affiliates. All Rights Reserved.   Copyright   [employer] 2011

## 2018-01-30 NOTE — LETTER
[facility]  [address] [place] [address]   [place] [address]                  [phone]   February 5, 2018    Mian Lozano  [address] [place] [address]      Dear Mian,    Here is a summary of your recent test results:    Labs are overall quite good.     Glucose is mildly elevated (prediabetes).   HDL (good cholesterol) is a little low.     We advise:     Continue current cares.   Balanced low cholesterol diet.   Regular exercise.       For additional lab test information, [email or web address] is an excellent reference.     Your test results are enclosed.      Please contact me if you have any questions.    In addition, here is a list of due or overdue Health Maintenance reminders.    Health Maintenance Due   Topic Date Due     Asthma Action Plan - yearly  02/01/2018     Wellness Visit with your Primary Provider - yearly  02/01/2018       Please call us at [phone] (or use [employer] to address the above recommendations.            Thank you very much for trusting [facility]..     Healthy regards,        Fady Adams M.D.        Results for orders placed or performed in visit on 01/30/18   Comprehensive metabolic panel   Result Value Ref Range    Sodium 139 133 - 144 mmol/L    Potassium 4.3 3.4 - 5.3 mmol/L    Chloride 107 94 - 109 mmol/L    Carbon Dioxide 26 20 - 32 mmol/L    Anion Gap 6 3 - 14 mmol/L    Glucose 108 (H) 70 - 99 mg/dL    Urea Nitrogen 13 7 - 30 mg/dL    Creatinine 1.01 0.66 - 1.25 mg/dL    GFR Estimate 77 >60 mL/min/1.7m2    GFR Estimate If Black >90 >60 mL/min/1.7m2    Calcium 9.1 8.5 - 10.1 mg/dL    Bilirubin Total 0.5 0.2 - 1.3 mg/dL    Albumin 3.4 3.4 - 5.0 g/dL    Protein Total 6.6 (L) 6.8 - 8.8 g/dL    Alkaline Phosphatase 67 40 - 150 U/L    ALT 28 0 - 70 U/L    AST 20 0 - 45 U/L   Lipid panel reflex to direct LDL Fasting   Result Value Ref Range    Cholesterol 126 <200 mg/dL    Triglycerides 127 <150 mg/dL    HDL Cholesterol 33 (L)  >39 mg/dL    LDL Cholesterol Calculated 68 <100 mg/dL    Non HDL Cholesterol 93 <130 mg/dL   CK total   Result Value Ref Range    CK Total 49 30 - 300 U/L   CBC with platelets   Result Value Ref Range    WBC 6.0 4.0 - 11.0 10e9/L    RBC Count 4.47 4.4 - 5.9 10e12/L    Hemoglobin 13.3 13.3 - 17.7 g/dL    Hematocrit 38.9 (L) 40.0 - 53.0 %    MCV 87 78 - 100 fl    MCH 29.8 26.5 - 33.0 pg    MCHC 34.2 31.5 - 36.5 g/dL    RDW 12.4 10.0 - 15.0 %    Platelet Count 286 150 - 450 10e9/L   TSH with free T4 reflex   Result Value Ref Range    TSH 2.22 0.40 - 4.00 mU/L   Prostate spec antigen screen   Result Value Ref Range    PSA 0.75 0 - 4 ug/L   Hemoglobin A1c   Result Value Ref Range    Hemoglobin A1C 5.4 4.3 - 6.0 %   Albumin Random Urine Quantitative with Creat Ratio   Result Value Ref Range    Creatinine Urine 191 mg/dL    Albumin Urine mg/L 6 mg/L    Albumin Urine mg/g Cr 2.93 0 - 17 mg/g Cr   *UA reflex to Microscopic and Culture ([place] and [facility] (except [place] and [place])   Result Value Ref Range    Color Urine Yellow     Appearance Urine Clear     Glucose Urine Negative NEG^Negative mg/dL    Bilirubin Urine Negative NEG^Negative    Ketones Urine Negative NEG^Negative mg/dL    Specific Gravity Urine 1.020 1.003 - 1.035    Blood Urine Negative NEG^Negative    pH Urine 6.0 5.0 - 7.0 pH    Protein Albumin Urine Negative NEG^Negative mg/dL    Urobilinogen Urine 1.0 0.2 - 1.0 EU/dL    Nitrite Urine Negative NEG^Negative    Leukocyte Esterase Urine Negative NEG^Negative    Source Midstream Urine

## 2018-01-30 NOTE — NURSING NOTE
"Chief Complaint   Patient presents with     Derm Problem       Initial /86  Pulse 87  Temp 98  F (36.7  C) (Oral)  Ht 5' 8\" (1.727 m)  Wt 213 lb (96.6 kg)  SpO2 96%  BMI 32.39 kg/m2 Estimated body mass index is 32.39 kg/(m^2) as calculated from the following:    Height as of this encounter: 5' 8\" (1.727 m).    Weight as of this encounter: 213 lb (96.6 kg)..  BP completed using cuff size: sara Rodriguez MA  "

## 2018-01-30 NOTE — MR AVS SNAPSHOT
After Visit Summary   1/30/2018    Mian Lozano    MRN: [id number]           Patient Information     Date Of Birth          1965        Visit Information        Provider Department      1/30/2018 7:40 AM Fady Adams MD [facility]        Today's Diagnoses     Tinea cruris    -  1    Coronary artery disease involving coronary bypass graft of native heart without angina pectoris        Prediabetes        Hypertension goal BP (blood pressure) < 140/90        Hyperlipidemia LDL goal <70        Acute pain of left knee        Mild intermittent asthma without complication        Environmental allergies        Anxiety        Sleep disorder        Gastroesophageal reflux disease without esophagitis        Medication monitoring encounter        Screening for prostate cancer        Screen for colon cancer        Need for prophylactic vaccination and inoculation against influenza          Care Instructions        [facility]                        To reach your care team during and after hours:   [phone]  To reach our pharmacy:        [phone]    Clinic Hours                        Our clinic hours are:    Monday   7:30 am to 7:00 pm                  Tuesday through Friday 7:30 am to 5:00 pm                             Saturday   8:00 am to 12:00 pm      Sunday   Closed      Pharmacy Hours                        Our pharmacy hours are:    Monday   8:30 am to 7:00 pm       Tuesday to Friday  8:30 am to 6:00 pm                       Saturday    9:00 am to 1:00 pm              Sunday    Closed              There is also information available at our web site:  [email or web address]    If your provider ordered any lab tests and you do not receive the results within 10 business days, please call the clinic.    If you need a medication refill please contact your pharmacy.  Please allow 2-3 business days for your refill to be completed.    Our clinic offers telephone visits  and e visits.  Please ask one of your team members to explain more.      Use [employer] (secure email communication and access to your chart) to send your primary care provider a message or make an appointment. Ask someone on your Team how to sign up for [employer].  Immunizations                      Immunization History   Administered Date(s) Administered     Influenza (IIV3) PF 11/26/2012, 11/12/2014, 10/01/2016     Influenza Intranasal Vaccine 4 valent 10/01/2015     Influenza Vaccine IM 3yrs+ 4 Valent IIV4 10/20/2015     TDAP Vaccine (Boostrix) 11/25/2015     Tdap (Adacel,Boostrix) 04/28/2006        Health Maintenance                         Health Maintenance Due   Topic Date Due     Asthma Control Test - every 6 months  08/01/2017     Flu Vaccine - yearly  09/01/2017     Asthma Action Plan - yearly  02/01/2018     Wellness Visit with your Primary Provider - yearly  02/01/2018     Basic Metabolic Lab - yearly  02/14/2018     Cholesterol Lab - yearly  02/14/2018     Microalbumin Lab - yearly  02/14/2018                       Meniscal (Cartilage) Tear         What is a meniscal (cartilage) tear?   The meniscus is a piece of cartilage in the middle of your knee. Cartilage is tough, smooth, rubbery tissue that lines and cushions the surface of the joints. You have a meniscus on the inner side of your knee (the medial meniscus) and a meniscus on the outer side of the knee (the lateral meniscus). Each meniscus attaches to the top of the shinbone (tibia), makes contact with the thighbone (femur), and acts as a shock absorber during weight-bearing activities. If a meniscus tears, it can cause knee pain and can limit motion.   How does it occur?   A meniscal tear can occur when the knee is forcefully twisted or sometimes with minimal or no trauma, such as when you are squatting.   What are the symptoms?   Symptoms may include the following:   You have pain in your knee joint.   You have immediate swelling with fluid in the  joint, called an effusion.   You can't fully bend or straighten your leg.   Your knee locks or gets stuck in one place.   You hear a snap or pop at the time of the injury.   A chronic (old) meniscal tear may give you pain on and off during activities, with or without swelling. Your knee may sometimes lock, and you may have stiffness in the knee.   How is it diagnosed?   Your healthcare provider will review your symptoms and how the injury occurred. He or she will ask about your medical history and examine your knee. Your provider will move your knee in several ways that may cause pain along the injured meniscal surface. You may have X-rays to see if the bones in your knee are injured, but a meniscal tear will not show on an X-ray. An MRI scan can help diagnose a meniscal tear.   How is it treated?   To treat this condition:   Put an ice pack, gel pack, or package of frozen vegetables, wrapped in a cloth on the area every 3 to 4 hours, for up to 20 minutes at a time.   Raise the knee on a pillow when you sit or lie down.   Wrap an elastic bandage around your knee to keep the swelling from getting worse.   Wear a knee immobilizer or other brace as directed by your provider.   Use crutches to prevent further injury while the meniscus heals.   Take an anti-inflammatory medicine such as ibuprofen, or other medicine as directed by your provider. Nonsteroidal anti-inflammatory medicines (NSAIDs) may cause stomach bleeding and other problems. These risks increase with age. Read the label and take as directed. Unless recommended by your healthcare provider, do not take for more than 10 days.   While you are recovering from your injury, you will need to change your sport or activity to one that does not make your condition worse. For example, you may need to swim instead of run.   Arthroscopic surgery is needed to repair or remove large torn pieces of cartilage. The surgery usually takes about an hour. An arthroscope is a  tube with a light on the end that projects an image of the inside of your knee onto a TV screen. By putting tools through the end of the arthroscope, the healthcare provider can usually repair or remove the damaged meniscus. Because the meniscus is a valuable shock absorber, the provider will leave as much of the healthy portion of the meniscus as possible during surgery.   You will go home the day of the surgery. You should keep your leg elevated. Take it easy for at least the next 2 to 3 days.   Do not take part in strenuous activities until your healthcare provider advises that you are ready.   How long will the effects last?   If you have a small tear that has not been repaired or removed, you may still be able to function well and be active. However, your knee may sometimes swell, lock, be stiff, or hurt during activities.   If you have surgery, you will need to spend time rehabilitating your knee. Everyone recovers at a different rate, depending on the severity of the injury and their general health. Many people return to their previous level of activity within a month or so after surgery.   When can I return to my normal activities?   Everyone recovers from an injury at a different rate. Return to your activities depends on how soon your knee recovers, not by how many days or weeks it has been since your injury has occurred. The goal is to return you to your normal activities as soon as is safely possible. If you return too soon you may worsen your injury.   You may safely return to your normal activities when, starting from the top of the list and progressing to the end, each of the following is true:   your injured knee can be fully straightened and bent without pain   your knee and leg have regained normal strength compared to the uninjured knee and leg   your knee is not swollen   you are able to bend, squat, or walk without pain   How can a meniscal tear be prevented?   To help prevent knee cartilage  injuries, it helps to have strong thigh and hamstring muscles, and to stretch your legs before and after exercising. In activities such as skiing, be sure your ski bindings are set correctly by a trained professional so that your skis will release when you fall.               Meniscal (Cartilage) Tear Rehabilitation Exercises                You may do the first 5 exercises right away. You may do the rest of the exercises when the pain in your knee has decreased.   Passive knee extension: Do this exercise if you are unable to fully extend your knee. While lying on your back, place a rolled-up towel underneath the heel of your injured leg so the heel is about 6 inches off the ground. Relax your leg muscles and let gravity slowly straighten your knee. You may feel some discomfort while doing this exercise. Try to hold this position for 2 minutes. Repeat 3 times. Do this exercise several times per day. This exercise can also be done while sitting in a chair with your heel on another chair or stool.   Heel slide: Sit on a firm surface with your legs straight in front of you. Slowly slide the heel of your injured leg toward your buttock by pulling your knee toward your chest as you slide the heel. Return to the starting position. Do 3 sets of 10.   Standing calf stretch: Stand facing a wall with your hands on the wall at about eye level. Keep your injured leg back with your heel on the floor. Keep the other leg forward with the knee bent. Turn your back foot slightly inward (as if you were pigeon-toed). Slowly lean into the wall until you feel a stretch in the back of your calf. Hold the stretch for 15 to 30 seconds. Return to the starting position. Repeat 3 times. Do this exercise several times each day.   Hamstring stretch on wall: Lie on your back with your buttocks close to a doorway. Stretch your uninjured leg straight out in front of you on the floor through the doorway. Raise your injured leg and rest it against  the wall next to the door frame. Keep your leg as straight as possible. You should feel a stretch in the back of your thigh. Hold this position for 15 to 30 seconds. Repeat 3 times.   Straight leg raise: Lie on your back with your legs straight out in front of you. Bend the knee on your uninjured side and place the foot flat on the floor. Tighten the thigh muscle on your injured side and lift your leg about 8 inches off the floor. Keep your leg straight and your thigh muscle tight. Slowly lower your leg back down to the floor. Do 3 sets of 10.   Wall squat with a ball: Stand with your back, shoulders, and head against a wall. Look straight ahead. Keep your shoulders relaxed and your feet 3 feet from the wall and shoulder's width apart. Place a soccer or basketball-sized ball behind your back. Keeping your back against the wall, slowly squat down to a 45-degree angle. Your thighs will not yet be parallel to the floor. Hold this position for 10 seconds and then slowly slide back up the wall. Repeat 10 times. Build up to 3 sets of 10.   Step-up: Stand with the foot of your injured leg on a support 3 to 5 inches high (like a small step or block of wood). Keep your other foot flat on the floor. Shift your weight onto the injured leg on the support. Straighten your injured leg as the other leg comes off the floor. Return to the starting position by bending your injured leg and slowly lowering your uninjured leg back to the floor. Do 3 sets of 10.   Knee stabilization: Wrap a piece of elastic tubing around the ankle of the uninjured leg. Tie a knot in the other end of the tubing and close it in a door.   0. Stand facing the door on the leg without tubing and bend your knee slightly, keeping your thigh muscles tight. While maintaining this position, move the leg with the tubing straight back behind you. Do 3 sets of 10.   0. Turn 90 degrees so the leg without tubing is closest to the door. Move the leg with tubing away from  your body. Do 3 sets of 10.   0. Turn 90 degrees again so your back is to the door. Move the leg with tubing straight out in front of you. Do 3 sets of 10.   0. Turn your body 90 degrees again so the leg with tubing is closest to the door. Move the leg with tubing across your body. Do 3 sets of 10.   Hold onto a chair if you need help balancing. This exercise can be made even more challenging by standing on a pillow while you move the leg with tubing.   Resisted terminal knee extension: Make a loop from a piece of elastic tubing by tying a knot in both ends. Close both knots in a door. Step into the loop so the tubing is around the back of your injured leg. Lift the other foot off the ground. Hold onto a chair for balance, if needed. Bend the knee on the leg with tubing about 45 degrees. Slowly straighten your leg, keeping your thigh muscle tight as you do this. Do this 10 times. Do 3 sets. An easier way to do this is to stand on both legs for better support while you do the exercise.   Wobble board exercises:   0. Stand on a wobble board with your feet shoulder width apart. Rock the board forwards and backwards 30 times, then side to side 30 times. Hold on to a chair if you need support.   0. Rotate the wobble board around so that the edge of the board is in contact with the floor at all times. Do this 30 times in a clockwise and then a counterclockwise direction.   0. Balance on the wobble board for as long as you can without letting the edges touch the floor. Try to do this for 2 minutes without touching the floor.   0. Rotate the wobble board in clockwise and counterclockwise circles, but do not allow the edge of the board to touch the floor.   0. When you have mastered exercises A through D, try repeating them while standing on just your injured leg. Once you can do these exercises on one leg, try to do them with your eyes closed. Make sure you have something nearby to support you in case you lose your balance.          Published by [employer].  This content is reviewed periodically and is subject to change as new health information becomes available. The information is intended to inform and educate and is not a replacement for medical evaluation, advice, diagnosis or treatment by a healthcare professional.   Written by Becki Ybarra, MS, PT, and Anna High, PT, [facility], [facility], for [employer].   ? 2010 [facility] and/or its affiliates. All Rights Reserved.   Copyright   [employer] 2011                Follow-ups after your visit        Additional Services     CARDIOLOGY EVAL ADULT REFERRAL       Your provider has referred you to:  [facility]: [facility] [phone]   [email or web address]    Please be aware that coverage of these services is subject to the terms and limitations of your health insurance plan.  Call member services at your health plan with any benefit or coverage questions.      Type of Referral:  Cardiology Follow Up    Timeframe requested:  routine    Please bring the following to your appointment:  >>   Any x-rays, CTs or MRIs which have been performed.  Contact the facility where they were done to arrange for  prior to your scheduled appointment.    >>   List of current medications  >>   This referral request   >>   Any documents/labs given to you for this referral                  Future tests that were ordered for you today     Open Future Orders        Priority Expected Expires Ordered    Fecal colorectal cancer screen (FIT) Routine 2/20/2018 4/24/2018 1/30/2018            Who to contact     If you have questions or need follow up information about today's clinic visit or your schedule please contact [facility] directly at [phone].  Normal or non-critical lab and imaging results will be communicated to you by MyChart, letter or phone within 4 business days after the clinic  "has received the results. If you do not hear from us within 7 days, please contact the clinic through [employer] or phone. If you have a critical or abnormal lab result, we will notify you by phone as soon as possible.  Submit refill requests through [employer] or call your pharmacy and they will forward the refill request to us. Please allow 3 business days for your refill to be completed.          Additional Information About Your Visit        [employer] Information     [employer] gives you secure access to your electronic health record. If you see a primary care provider, you can also send messages to your care team and make appointments. If you have questions, please call your primary care clinic.  If you do not have a primary care provider, please call [phone] and they will assist you.        Care EveryWhere ID     This is your Care EveryWhere ID. This could be used by other organizations to access your [facility] medical records  [id number]        Your Vitals Were     Pulse Temperature Height Pulse Oximetry BMI (Body Mass Index)       87 98  F (36.7  C) (Oral) 5' 8\" (1.727 m) 96% 32.39 kg/m2        Blood Pressure from Last 3 Encounters:   01/30/18 136/86   07/28/17 133/78   04/24/17 130/76    Weight from Last 3 Encounters:   01/30/18 213 lb (96.6 kg)   04/24/17 215 lb (97.5 kg)   03/22/17 217 lb (98.4 kg)              We Performed the Following     *UA reflex to Microscopic and Culture ([place] and [facility] (except [place] and [place])     Albumin Random Urine Quantitative with Creat Ratio     CARDIOLOGY EVAL ADULT REFERRAL     CBC with platelets     CK total     Comprehensive metabolic panel     HC FLU VAC PRESRV FREE QUAD SPLIT VIR 3+YRS IM     Hemoglobin A1c     Lipid panel reflex to direct LDL Fasting     Prostate spec antigen screen     TSH with free T4 reflex          Today's Medication Changes          These changes are accurate as of 1/30/18  8:22 AM.  If you have any questions, ask your nurse or " doctor.               Start taking these medicines.        Dose/Directions    terbinafine 1 % cream   Commonly known as:  lamISIL AT   Used for:  Tinea cruris   Started by:  Fady Adams MD        Apply topically 2 times daily For fungal infection not resolved with other antifungals (e.g. Clotrimazole)   Quantity:  84 g   Refills:  1       triamcinolone 0.5 % cream   Commonly known as:  KENALOG   Used for:  Tinea cruris   Started by:  Fady Adams MD        Apply sparingly to affected area three times daily.   Quantity:  90 g   Refills:  1            Where to get your medicines      These medications were sent to [facility] [address] [address] AT [facility] [address] [address], [facility] [address]    Hours:  24-hours Phone:  [phone]     albuterol 108 (90 BASE) MCG/ACT Inhaler    atorvastatin 80 MG tablet    azelastine 0.1 % spray    carvedilol 3.125 MG tablet    citalopram 20 MG tablet    fenofibrate 160 MG tablet    fluticasone 50 MCG/ACT spray    terbinafine 1 % cream    triamcinolone 0.5 % cream                Primary Care Provider Office Phone # Fax #    Fady Adams [phone]       [address] [place] [id number]        Equal Access to Services     [facility] WILFREDO AH: Hadii aad ku hadasho Soomaali, waaxda luqadaha, qaybta kaalmada adeegyada, waxay jaylinin haymalini phillip. So [facility] [phone].    ATENCIÓN: Si habla español, tiene a hall disposición servicios gratuitos de asistencia lingüística. Llame al [phone].    We comply with applicable federal civil rights laws and [place] laws. We do not discriminate on the basis of race, color, national origin, age, disability, sex, sexual orientation, or gender identity.            Thank you!     Thank you for choosing [facility]  for your care. Our goal is always to provide you with excellent care. Hearing back from our patients is one way we can  continue to improve our services. Please take a few minutes to complete the written survey that you may receive in the mail after your visit with us. Thank you!             Your Updated Medication List - Protect others around you: Learn how to safely use, store and throw away your medicines at www.disposemymeds.org.          This list is accurate as of 1/30/18  8:22 AM.  Always use your most recent med list.                   Brand Name Dispense Instructions for use Diagnosis    albuterol 108 (90 BASE) MCG/ACT Inhaler    PROAIR HFA    3 Inhaler    Inhale 2 puffs into the lungs every 4 hours as needed    Mild intermittent asthma without complication, Environmental allergies       aspirin 81 MG tablet     100    ONE DAILY        atorvastatin 80 MG tablet    LIPITOR    90 tablet    Take 1 tablet (80 mg) by mouth daily    Hyperlipidemia LDL goal <70, Coronary artery disease involving coronary bypass graft of native heart without angina pectoris       azelastine 0.1 % spray    ASTELIN    3 Bottle    Spray 1-2 sprays into both nostrils 2 times daily    Mild intermittent asthma without complication, Environmental allergies       carvedilol 3.125 MG tablet    COREG    180 tablet    Take 1 tablet (3.125 mg) by mouth 2 times daily (with meals)    Hypertension goal BP (blood pressure) < 140/90, Coronary artery disease involving coronary bypass graft of native heart without angina pectoris       cetirizine 10 MG tablet    zyrTEC    30 tablet    Take 1 tablet by mouth every evening.        citalopram 20 MG tablet    celeXA    90 tablet    Take 1 tablet (20 mg) by mouth daily    Anxiety       fenofibrate 160 MG tablet     90 tablet    Take 1 tablet (160 mg) by mouth daily    Hyperlipidemia LDL goal <70, Coronary artery disease involving coronary bypass graft of native heart without angina pectoris       fluticasone 50 MCG/ACT spray    FLONASE    48 g    Spray 1-2 sprays into both nostrils daily    Mild intermittent asthma without  complication, Environmental allergies       LANsoprazole 30 MG CR capsule    PREVACID    90 capsule    TAKE ONE CAPSULE BY MOUTH DAILY. TAKE 30 TO 60 MINUTES BEFORE A MEAL.    Gastroesophageal reflux disease without esophagitis       terbinafine 1 % cream    lamISIL AT    84 g    Apply topically 2 times daily For fungal infection not resolved with other antifungals (e.g. Clotrimazole)    Tinea cruris       triamcinolone 0.5 % cream    KENALOG    90 g    Apply sparingly to affected area three times daily.    Tinea cruris       zolpidem 5 MG tablet    AMBIEN    30 tablet    Take 1 tablet (5 mg) by mouth nightly as needed for sleep    Sleep disorder

## 2018-01-31 ASSESSMENT — ASTHMA QUESTIONNAIRES: ACT_TOTALSCORE: 25

## 2018-03-26 ENCOUNTER — OFFICE VISIT (OUTPATIENT)
Dept: CARDIOLOGY | Facility: CLINIC | Age: 53
End: 2018-03-26
Attending: FAMILY MEDICINE
Payer: COMMERCIAL

## 2018-03-26 VITALS
HEIGHT: 68 IN | HEART RATE: 72 BPM | BODY MASS INDEX: 32.07 KG/M2 | WEIGHT: 211.6 LBS | SYSTOLIC BLOOD PRESSURE: 128 MMHG | DIASTOLIC BLOOD PRESSURE: 80 MMHG

## 2018-03-26 DIAGNOSIS — I25.10 CORONARY ARTERY DISEASE INVOLVING NATIVE CORONARY ARTERY OF NATIVE HEART WITHOUT ANGINA PECTORIS: Primary | ICD-10-CM

## 2018-03-26 DIAGNOSIS — I10 HYPERTENSION GOAL BP (BLOOD PRESSURE) < 140/90: ICD-10-CM

## 2018-03-26 DIAGNOSIS — E78.5 HYPERLIPIDEMIA LDL GOAL <70: ICD-10-CM

## 2018-03-26 DIAGNOSIS — Z95.1 H/O CORONARY ARTERY BYPASS SURGERY: ICD-10-CM

## 2018-03-26 PROCEDURE — 99214 OFFICE O/P EST MOD 30 MIN: CPT | Performed by: INTERNAL MEDICINE

## 2018-03-26 PROCEDURE — 93000 ELECTROCARDIOGRAM COMPLETE: CPT | Performed by: INTERNAL MEDICINE

## 2018-03-26 NOTE — LETTER
3/26/2018      Fady Adams MD  [address] [facility] [address]      RE: Mian Lozano       Dear Colleague,    I had the pleasure of seeing Mian Lozano in the [facility].    Service Date: 03/26/2018      PRIMARY CARE PHYSICIAN:  Fady Adams MD      HISTORY OF PRESENT ILLNESS:  I had the pleasure of seeing your patient, Mian Lozano, at [facility] for evaluation of known coronary artery disease.  This patient was previously followed by my associate, Dr. Jean Carlos Gannon.  The patient presented with unstable angina and was found to have diffuse 3-vessel coronary artery disease.  On 04/29/2008, he underwent a 5-vessel coronary artery bypass surgery off-pump with endoscopic vein harvest to the diagonal and circumflex as a Y graft, saphenous vein graft for the PDA and SILVIA sequentially and the left internal mammary artery to the LAD.  A wedge biopsy of a left upper lobe lung nodule was also performed.  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  Unfortunately, the patient developed recurrent left pleural effusion and required pleurodesis.  He has not had any problems since then.  He has not been seen by Dr. Gannon since 07/2011.  The patient has a history of mixed hyperlipidemia and hypertension.  He has borderline diabetes.  He denies PND, orthopnea, peripheral edema, syncope, presyncope or palpitations.  He denies any hospitalizations or surgery since 2011 other than the pleurodesis.  The patient has significant pain in his left knee, preventing him from exercising aerobically.  He did participate in outpatient cardiac rehabilitation after his bypass surgery.      The patient is a  and has a relatively sedentary job.  He is a nonsmoker.  He drinks 6 beers each day of the weekend.  His last fasting lipid profile on 01/30/2018 showed total cholesterol 126, HDL 33, LDL 68 and triglycerides 127.  His potassium 4.3, BUN 13, creatinine 1.01  on the same date.  The patient denies any exertional angina.  He does note some dyspnea on exertion, especially with heavy lifting.  He is not always on a low-fat diet and notes that he likes to eat red meat.      PHYSICAL EXAMINATION:  As listed below.      EKG performed today and personally interpreted demonstrates normal sinus rhythm.  There is prominent R-wave in V1 and V2, possibly due to counterclockwise rotation.  Mild J point elevation is seen in the inferolateral leads.  This is otherwise a normal EKG.      ASSESSMENT:   1.  Mian Lozano is a delightful 53-year-old male with known coronary artery disease, status post 5-vessel coronary artery bypass surgery on 04/29/2008.  An adenosine nuclear stress test in 2011 was normal.  We will now repeat this test to assess his shortness of breath.  The patient does have some pleuritic chest discomfort in the left chest related to his pleurodesis.  If his nuclear stress test is normal, we will follow him on an annual basis.   2.  Mixed hyperlipidemia.  The patient's HDL cholesterol is low.  His triglycerides do not appear to be significantly elevated on fenofibrate and atorvastatin.  It is clear from recent studies that fenofibrate does not reduce the risk of heart attack or stroke.  It should only be used for patients with hypertriglyceridemia greater than 400-500 to prevent pancreatitis.  I would suggest that Dr. Adams stop this patient's fenofibrate for 2-3 months and then repeat his lipids to see if it is absolutely necessary.  I have suggested this patient begin an exercise program to try to increase his HDL cholesterol.  I understand he has limitations based on his left knee pain.  He is going to seek medical attention for his left knee after his nuclear stress test is performed.   3.  Hypertension under excellent control.      It is my pleasure to assist in the care of Mian Lozano.  All his questions were answered to his satisfaction.  It is my intention to  see him again in 1 year if his nuclear stress test is normal.  I appreciate the opportunity to assist in this patient's care.      Joaquim Jones MD      cc:   Fady Adams MD   [address] [address]         JOAQUIM JONES MD, [facility]             D: 2018   T: 2018   MT: al      Name:     KEILY MUHAMMAD   MRN:      [id number]        Account:      [id number]   :      1965           Service Date: 2018      Document: [id number]         Outpatient Encounter Prescriptions as of 3/26/2018   Medication Sig Dispense Refill     terbinafine (LAMISIL AT) 1 % cream Apply topically 2 times daily For fungal infection not resolved with other antifungals (e.g. Clotrimazole) 84 g 1     triamcinolone (KENALOG) 0.5 % cream Apply sparingly to affected area three times daily. 90 g 1     atorvastatin (LIPITOR) 80 MG tablet Take 1 tablet (80 mg) by mouth daily 90 tablet 3     carvedilol (COREG) 3.125 MG tablet Take 1 tablet (3.125 mg) by mouth 2 times daily (with meals) 180 tablet 3     citalopram (CELEXA) 20 MG tablet Take 1 tablet (20 mg) by mouth daily 90 tablet 3     fenofibrate 160 MG tablet Take 1 tablet (160 mg) by mouth daily 90 tablet 3     fluticasone (FLONASE) 50 MCG/ACT spray Spray 1-2 sprays into both nostrils daily 48 g 3     azelastine (ASTELIN) 0.1 % spray Spray 1-2 sprays into both nostrils 2 times daily 3 Bottle 3     albuterol (PROAIR HFA) 108 (90 BASE) MCG/ACT Inhaler Inhale 2 puffs into the lungs every 4 hours as needed 3 Inhaler 3     zolpidem (AMBIEN) 5 MG tablet Take 1 tablet (5 mg) by mouth nightly as needed for sleep 30 tablet 0     LANsoprazole (PREVACID) 30 MG capsule TAKE ONE CAPSULE BY MOUTH DAILY. TAKE 30 TO 60 MINUTES BEFORE A MEAL. 90 capsule 3     cetirizine (ZYRTEC) 10 MG tablet Take 1 tablet by mouth every evening. 30 tablet 1     ASPIRIN 81 MG OR TABS ONE DAILY 100 3     No facility-administered encounter medications on  file as of 3/26/2018.        Again, thank you for allowing me to participate in the care of your patient.      Sincerely,    Joaquim Jones MD     [facility]    cc:   Fady Adams MD  [address] [place] [address]

## 2018-03-26 NOTE — LETTER
3/26/2018    Fady Adams MD  [address] [facility] [address]    RE: Mian Lozano       Dear Colleague,    I had the pleasure of seeing Mian Lozano in the [facility].    HPI and Plan:   See dictation:[id number]    Orders Placed This Encounter   Procedures     NM Lexiscan stress test (nuc card)     Follow-Up with Cardiologist     EKG 12-lead complete w/read - Clinics (performed today)       No orders of the defined types were placed in this encounter.      There are no discontinued medications.      Encounter Diagnoses   Name Primary?     Coronary artery disease involving native coronary artery of native heart without angina pectoris Yes     H/O coronary artery bypass surgery      Hypertension goal BP (blood pressure) < 140/90      Hyperlipidemia LDL goal <70        CURRENT MEDICATIONS:  Current Outpatient Prescriptions   Medication Sig Dispense Refill     terbinafine (LAMISIL AT) 1 % cream Apply topically 2 times daily For fungal infection not resolved with other antifungals (e.g. Clotrimazole) 84 g 1     triamcinolone (KENALOG) 0.5 % cream Apply sparingly to affected area three times daily. 90 g 1     atorvastatin (LIPITOR) 80 MG tablet Take 1 tablet (80 mg) by mouth daily 90 tablet 3     carvedilol (COREG) 3.125 MG tablet Take 1 tablet (3.125 mg) by mouth 2 times daily (with meals) 180 tablet 3     citalopram (CELEXA) 20 MG tablet Take 1 tablet (20 mg) by mouth daily 90 tablet 3     fenofibrate 160 MG tablet Take 1 tablet (160 mg) by mouth daily 90 tablet 3     fluticasone (FLONASE) 50 MCG/ACT spray Spray 1-2 sprays into both nostrils daily 48 g 3     azelastine (ASTELIN) 0.1 % spray Spray 1-2 sprays into both nostrils 2 times daily 3 Bottle 3     albuterol (PROAIR HFA) 108 (90 BASE) MCG/ACT Inhaler Inhale 2 puffs into the lungs every 4 hours as needed 3 Inhaler 3     zolpidem (AMBIEN) 5 MG tablet Take 1 tablet (5 mg) by mouth nightly as needed for sleep 30 tablet 0      LANsoprazole (PREVACID) 30 MG capsule TAKE ONE CAPSULE BY MOUTH DAILY. TAKE 30 TO 60 MINUTES BEFORE A MEAL. 90 capsule 3     cetirizine (ZYRTEC) 10 MG tablet Take 1 tablet by mouth every evening. 30 tablet 1     ASPIRIN 81 MG OR TABS ONE DAILY 100 3       ALLERGIES     Allergies   Allergen Reactions     Seasonal Allergies        PAST MEDICAL HISTORY:  Past Medical History:   Diagnosis Date     Anxiety      Fowler's esophagus 7/11     Coronary artery disease 04/2008    cabg x 5     Environmental allergies     seasonal, hay fever     FAM HX-CARDIOVAS DIS NEC     dad at 45, cabg, stent     Family history of prostate cancer      GERD (gastroesophageal reflux disease) 3/11     History of blood transfusion      Hyperlipidemia LDL goal <70 2006     Hypertension goal BP (blood pressure) < 140/90 4/08     Mild intermittent asthma 7/03     Nephrolithiasis 6/13, 7/16    calcium oxalate     Other atopic dermatitis and related conditions 1980     Other diseases of lung, not elsewhere classified 4/08    dr steel - benign bx and ct - granuloma - no further w/u needed     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 gastritis, fowler's - due 3 yr     ESOPHAGOSCOPY, GASTROSCOPY, DUODENOSCOPY (EGD), COMBINED  5/10/2013    COMBINED ESOPHAGOSCOPY, GASTROSCOPY, DUODENOSCOPY (EGD), BIOPSY SINGLE OR MULTIPLE;  ESOPHAGOSCOPY, GASTROSCOPY, DUODENOSCOPY (EGD)  with biopsy;  Surgeon: Angus Reynolds MD;  Location: [facility]     ESOPHAGOSCOPY, GASTROSCOPY, DUODENOSCOPY (EGD), COMBINED N/A 7/28/2017     [facility] - recheck 3 yrs     HC VASECTOMY UNILAT/BILAT W POSTOP SEMEN  1995    Vasectomy     HERNIORRHAPHY UMBILICAL N/A 11/13/2014    Dr Barron     SURGICAL HISTORY OF -   1980    lt patella fx     SURGICAL HISTORY OF -   1985    lt thumb neuroma excised     SURGICAL HISTORY OF -   11/09    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 Paternal Grandfather      Neurologic Disorder Sister      CVA/anuerysm at age 19     Colon Cancer No family hx of        SOCIAL HISTORY:  Social History     Social History     Marital status:      Spouse name: Imelda     Number of children: 0     Years of education: 14     Occupational History      Unemployed     Social History Main Topics     Smoking status: Never Smoker     Smokeless tobacco: Never Used     Alcohol use 3.6 oz/week      Comment: 6 drinks per week     Drug use: No     Sexual activity: Yes     Partners: Female     Birth control/ protection: Male Surgical     Other Topics Concern     Caffeine Concern Yes     1 can qd     Exercise Yes     occas     Seat Belt Yes     Parent/Sibling W/ Cabg, Mi Or Angioplasty Before 65f 55m? Yes     Social History Narrative       Review of Systems:  Skin:  Positive for rash contact dermatiitis   Eyes:  Positive for glasses    ENT:  Negative      Respiratory:  Positive for shortness of breath;dyspnea on exertion     Cardiovascular:  Negative      Gastroenterology: Negative      Genitourinary:  Negative      Musculoskeletal:  Positive for joint pain    Neurologic:  Negative      Psychiatric:  Negative      Heme/Lymph/Imm:  Negative      Endocrine:  Negative        Physical Exam:  Vitals: /80 (BP Location: Right arm, Patient Position: Sitting, Cuff Size: Adult Large)  Pulse 72  Ht  "1.727 m (5' 8\")  Wt 96 kg (211 lb 9.6 oz)  BMI 32.17 kg/m2    Constitutional:  cooperative, alert and oriented, well developed, well nourished, in no acute distress        Skin:  warm and dry to the touch, no apparent skin lesions or masses noted          Head:  normocephalic, no masses or lesions        Eyes:  pupils equal and round;conjunctivae and lids unremarkable;sclera white;no xanthalasma        Lymph:      ENT:  no pallor or cyanosis, dentition good        Neck:  carotid pulses are full and equal bilaterally, JVP normal, no carotid bruit        Respiratory:  normal breath sounds, clear to auscultation, normal A-P diameter, normal symmetry, normal respiratory excursion, no use of accessory muscles;healed median sternotomy scar         Cardiac: regular rhythm, normal S1/S2, no S3 or S4, apical impulse not displaced, no murmurs, gallops or rubs                pulses full and equal, no bruits auscultated                                        GI:  abdomen soft, non-tender, BS normoactive, no mass, no HSM, no bruits        Extremities and Muscular Skeletal:  no deformities, clubbing, cyanosis, erythema observed;no edema              Neurological:  no gross motor deficits;affect appropriate        Psych:  Alert and Oriented x 3        CC  Fady Adams MD  [address] [place] [address]                Thank you for allowing me to participate in the care of your patient.      Sincerely,     Joaquim Jones MD     [facility]    cc:   Fady Adams MD  [address] [place] [id number]        "

## 2018-03-26 NOTE — MR AVS SNAPSHOT
After Visit Summary   3/26/2018    Mian Lozano    MRN: [id number]           Patient Information     Date Of Birth          1965        Visit Information        Provider Department      3/26/2018 9:00 AM Joaquim Jones MD [facility]        Today's Diagnoses     Coronary artery disease involving native coronary artery of native heart without angina pectoris    -  1    H/O coronary artery bypass surgery        Hypertension goal BP (blood pressure) < 140/90        Hyperlipidemia LDL goal <70           Follow-ups after your visit        Additional Services     Follow-Up with Cardiologist                 Your next 10 appointments already scheduled     Mar 29, 2018  1:00 PM CDT   NM SH CV MPI WITH BLANE 1 DAY with SCINM1   [facility] CV Nuclear Medicine (Cardiovascular Imaging at [facility])    [address]  [address]  [facility] [address] [phone]           For a ONE day exam: Allow 3-4 hours for test. For a TWO day exam: Allow 2 hours PER day for test.  You may need to stop some medicines before the test. Follow your doctor s orders. - If you take a beta blocker: Follow your doctor s specific instructions on taking it prior to and on the day of your exam. - If you take Aggrenox or dipyridamole (Persantine, Permole), stop taking it 48 hours before your test. - If you take Viagra, Cialis or Levitra, stop taking it 48 hours before your test. - If you take theophylline or aminophylline, stop taking it 12 hours before your test.  For patients with diabetes: - If you take insulin, call your diabetes care team. Ask if you should take a 1/2 dose the morning of your test. - If you take diabetes medicine by mouth, don t take it on the morning of your test. Bring it with you to take after the test. (If you have questions, call your diabetes care team.)  Do not take nitrates on the day of your test. Do not wear your  Nitro-Patch.  Stop all caffeine 12 hours before the test. This includes coffee, tea, soda pop, chocolate and certain medicines (such as Anacin, Excedrin and NoDoz). Also avoid decaf coffee and tea, as these contain small amounts of caffeine.  No alcohol, smoking or other tobacco for 12 hours before the test.  Stop eating 3 hours before the test. You may drink water.  Please wear a loose two-piece outfit. If you will have an exercise test, bring rubber-soled walking shoes.  When you arrive, please tell us if you: - Have diabetes - Are breastfeeding - May be pregnant - Have a pacemaker of ICD (implantable defibrillator).  Please call your Imaging Department at your exam site with any questions.              Future tests that were ordered for you today     Open Future Orders        Priority Expected Expires Ordered    Follow-Up with Cardiologist Routine 3/26/2019 3/27/2019 3/26/2018    NM Lexiscan stress test (nuc card) Routine 4/2/2018 3/26/2019 3/26/2018            Who to contact     If you have questions or need follow up information about today's clinic visit or your schedule please contact [facility] directly at [phone].  Normal or non-critical lab and imaging results will be communicated to you by [employer], letter or phone within 4 business days after the clinic has received the results. If you do not hear from us within 7 days, please contact the clinic through [employer] or phone. If you have a critical or abnormal lab result, we will notify you by phone as soon as possible.  Submit refill requests through [employer] or call your pharmacy and they will forward the refill request to us. Please allow 3 business days for your refill to be completed.          Additional Information About Your Visit        [employer] Information     [employer] gives you secure access to your electronic health record. If you see a primary care provider, you can also send messages to your care team  "and make appointments. If you have questions, please call your primary care clinic.  If you do not have a primary care provider, please call [phone] and they will assist you.        Care EveryWhere ID     This is your Care EveryWhere ID. This could be used by other organizations to access your [place] medical records  [id number]        Your Vitals Were     Pulse Height BMI (Body Mass Index)             72 1.727 m (5' 8\") 32.17 kg/m2          Blood Pressure from Last 3 Encounters:   03/26/18 128/80   01/30/18 136/86   07/28/17 133/78    Weight from Last 3 Encounters:   03/26/18 96 kg (211 lb 9.6 oz)   01/30/18 96.6 kg (213 lb)   04/24/17 97.5 kg (215 lb)              We Performed the Following     EKG 12-lead complete w/read - Clinics (performed today)        Primary Care Provider Office Phone # Fax #    Fady Adams [phone]       [facility] [facility] [address]        Equal Access to Services     [facility]: Hadii aad ku hadasho Sojessee, waaxda luqadaha, qaybta kaalmada rita, cristhian smyth . So [facility] [phone].    ATENCIÓN: Si habla español, tiene a hall disposición servicios gratuitos de asistencia lingüística. [facility] [phone].    We comply with applicable federal civil rights laws and [place] laws. We do not discriminate on the basis of race, color, national origin, age, disability, sex, sexual orientation, or gender identity.            Thank you!     Thank you for choosing [facility]  for your care. Our goal is always to provide you with excellent care. Hearing back from our patients is one way we can continue to improve our services. Please take a few minutes to complete the written survey that you may receive in the mail after your visit with us. Thank you!             Your Updated Medication List - Protect others around you: Learn how to safely use, store and throw away your medicines " at www.disposemymeds.org.          This list is accurate as of 3/26/18  9:50 AM.  Always use your most recent med list.                   Brand Name Dispense Instructions for use Diagnosis    albuterol 108 (90 BASE) MCG/ACT Inhaler    PROAIR HFA    3 Inhaler    Inhale 2 puffs into the lungs every 4 hours as needed    Mild intermittent asthma without complication, Environmental allergies       aspirin 81 MG tablet     100    ONE DAILY        atorvastatin 80 MG tablet    LIPITOR    90 tablet    Take 1 tablet (80 mg) by mouth daily    Hyperlipidemia LDL goal <70, Coronary artery disease involving coronary bypass graft of native heart without angina pectoris       azelastine 0.1 % spray    ASTELIN    3 Bottle    Spray 1-2 sprays into both nostrils 2 times daily    Mild intermittent asthma without complication, Environmental allergies       carvedilol 3.125 MG tablet    COREG    180 tablet    Take 1 tablet (3.125 mg) by mouth 2 times daily (with meals)    Hypertension goal BP (blood pressure) < 140/90, Coronary artery disease involving coronary bypass graft of native heart without angina pectoris       cetirizine 10 MG tablet    zyrTEC    30 tablet    Take 1 tablet by mouth every evening.        citalopram 20 MG tablet    celeXA    90 tablet    Take 1 tablet (20 mg) by mouth daily    Anxiety       fenofibrate 160 MG tablet     90 tablet    Take 1 tablet (160 mg) by mouth daily    Hyperlipidemia LDL goal <70, Coronary artery disease involving coronary bypass graft of native heart without angina pectoris       fluticasone 50 MCG/ACT spray    FLONASE    48 g    Spray 1-2 sprays into both nostrils daily    Mild intermittent asthma without complication, Environmental allergies       LANsoprazole 30 MG CR capsule    PREVACID    90 capsule    TAKE ONE CAPSULE BY MOUTH DAILY. TAKE 30 TO 60 MINUTES BEFORE A MEAL.    Gastroesophageal reflux disease without esophagitis       terbinafine 1 % cream    lamISIL AT    84 g    Apply  topically 2 times daily For fungal infection not resolved with other antifungals (e.g. Clotrimazole)    Tinea cruris       triamcinolone 0.5 % cream    KENALOG    90 g    Apply sparingly to affected area three times daily.    Tinea cruris       zolpidem 5 MG tablet    AMBIEN    30 tablet    Take 1 tablet (5 mg) by mouth nightly as needed for sleep    Sleep disorder

## 2018-03-26 NOTE — PROGRESS NOTES
HPI and Plan:   See dictation:[id number]    Orders Placed This Encounter   Procedures     NM Lexiscan stress test (nuc card)     Follow-Up with Cardiologist     EKG 12-lead complete w/read - Clinics (performed today)       No orders of the defined types were placed in this encounter.      There are no discontinued medications.      Encounter Diagnoses   Name Primary?     Coronary artery disease involving native coronary artery of native heart without angina pectoris Yes     H/O coronary artery bypass surgery      Hypertension goal BP (blood pressure) < 140/90      Hyperlipidemia LDL goal <70        CURRENT MEDICATIONS:  Current Outpatient Prescriptions   Medication Sig Dispense Refill     terbinafine (LAMISIL AT) 1 % cream Apply topically 2 times daily For fungal infection not resolved with other antifungals (e.g. Clotrimazole) 84 g 1     triamcinolone (KENALOG) 0.5 % cream Apply sparingly to affected area three times daily. 90 g 1     atorvastatin (LIPITOR) 80 MG tablet Take 1 tablet (80 mg) by mouth daily 90 tablet 3     carvedilol (COREG) 3.125 MG tablet Take 1 tablet (3.125 mg) by mouth 2 times daily (with meals) 180 tablet 3     citalopram (CELEXA) 20 MG tablet Take 1 tablet (20 mg) by mouth daily 90 tablet 3     fenofibrate 160 MG tablet Take 1 tablet (160 mg) by mouth daily 90 tablet 3     fluticasone (FLONASE) 50 MCG/ACT spray Spray 1-2 sprays into both nostrils daily 48 g 3     azelastine (ASTELIN) 0.1 % spray Spray 1-2 sprays into both nostrils 2 times daily 3 Bottle 3     albuterol (PROAIR HFA) 108 (90 BASE) MCG/ACT Inhaler Inhale 2 puffs into the lungs every 4 hours as needed 3 Inhaler 3     zolpidem (AMBIEN) 5 MG tablet Take 1 tablet (5 mg) by mouth nightly as needed for sleep 30 tablet 0     LANsoprazole (PREVACID) 30 MG capsule TAKE ONE CAPSULE BY MOUTH DAILY. TAKE 30 TO 60 MINUTES BEFORE A MEAL. 90 capsule 3     cetirizine (ZYRTEC) 10 MG tablet Take 1 tablet by mouth every evening. 30 tablet 1      ASPIRIN 81 MG OR TABS ONE DAILY 100 3       ALLERGIES     Allergies   Allergen Reactions     Seasonal Allergies        PAST MEDICAL HISTORY:  Past Medical History:   Diagnosis Date     Anxiety      Fowler's esophagus 7/11     Coronary artery disease 04/2008    cabg x 5     Environmental allergies     seasonal, hay fever     FAM HX-CARDIOVAS DIS NEC     dad at 45, cabg, stent     Family history of prostate cancer      GERD (gastroesophageal reflux disease) 3/11     History of blood transfusion      Hyperlipidemia LDL goal <70 2006     Hypertension goal BP (blood pressure) < 140/90 4/08     Mild intermittent asthma 7/03     Nephrolithiasis 6/13, 7/16    calcium oxalate     Other atopic dermatitis and related conditions 1980     Other diseases of lung, not elsewhere classified 4/08    dr steel - benign bx and ct - granuloma - no further w/u needed     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 gastritis, fowler's - due 3 yr     ESOPHAGOSCOPY, GASTROSCOPY, DUODENOSCOPY (EGD), COMBINED  5/10/2013    COMBINED ESOPHAGOSCOPY, GASTROSCOPY, DUODENOSCOPY (EGD), BIOPSY SINGLE OR MULTIPLE;  ESOPHAGOSCOPY, GASTROSCOPY, DUODENOSCOPY (EGD)  with biopsy;  Surgeon: Angus Reynolds MD;  Location:  GI     ESOPHAGOSCOPY, GASTROSCOPY, DUODENOSCOPY (EGD), COMBINED N/A 7/28/2017    Fowler's - recheck 3 yrs     HC VASECTOMY UNILAT/BILAT W POSTOP SEMEN  1995    Vasectomy     HERNIORRHAPHY UMBILICAL N/A 11/13/2014    Dr Barron     SURGICAL HISTORY OF -   1980    lt patella fx     SURGICAL HISTORY OF -  "  1985    lt thumb neuroma excised     SURGICAL HISTORY OF -   11/09    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 Paternal Grandfather      Neurologic Disorder Sister      CVA/anuerysm at age 19     Colon Cancer No family hx of        SOCIAL HISTORY:  Social History     Social History     Marital status:      Spouse name: Imelda     Number of children: 0     Years of education: 14     Occupational History      Unemployed     Social History Main Topics     Smoking status: Never Smoker     Smokeless tobacco: Never Used     Alcohol use 3.6 oz/week      Comment: 6 drinks per week     Drug use: No     Sexual activity: Yes     Partners: Female     Birth control/ protection: Male Surgical     Other Topics Concern     Caffeine Concern Yes     1 can qd     Exercise Yes     occas     Seat Belt Yes     Parent/Sibling W/ Cabg, Mi Or Angioplasty Before 65f 55m? Yes     Social History Narrative       Review of Systems:  Skin:  Positive for rash contact dermatiitis   Eyes:  Positive for glasses    ENT:  Negative      Respiratory:  Positive for shortness of breath;dyspnea on exertion     Cardiovascular:  Negative      Gastroenterology: Negative      Genitourinary:  Negative      Musculoskeletal:  Positive for joint pain    Neurologic:  Negative      Psychiatric:  Negative      Heme/Lymph/Imm:  Negative      Endocrine:  Negative        Physical Exam:  Vitals: /80 (BP Location: Right arm, Patient Position: Sitting, Cuff Size: Adult Large)  Pulse 72  Ht 1.727 m (5' 8\")  Wt 96 kg (211 lb 9.6 oz)  BMI 32.17 kg/m2    Constitutional:  cooperative, alert and oriented, well developed, well nourished, in no acute distress        Skin:  warm and dry to the touch, no apparent skin " lesions or masses noted          Head:  normocephalic, no masses or lesions        Eyes:  pupils equal and round;conjunctivae and lids unremarkable;sclera white;no xanthalasma        Lymph:      ENT:  no pallor or cyanosis, dentition good        Neck:  carotid pulses are full and equal bilaterally, JVP normal, no carotid bruit        Respiratory:  normal breath sounds, clear to auscultation, normal A-P diameter, normal symmetry, normal respiratory excursion, no use of accessory muscles;healed median sternotomy scar         Cardiac: regular rhythm, normal S1/S2, no S3 or S4, apical impulse not displaced, no murmurs, gallops or rubs                pulses full and equal, no bruits auscultated                                        GI:  abdomen soft, non-tender, BS normoactive, no mass, no HSM, no bruits        Extremities and Muscular Skeletal:  no deformities, clubbing, cyanosis, erythema observed;no edema              Neurological:  no gross motor deficits;affect appropriate        Psych:  Alert and Oriented x 3        CC  Fady Adams MD  [address] [place] [address]

## 2018-03-26 NOTE — PROGRESS NOTES
Service Date: 03/26/2018      PRIMARY CARE PHYSICIAN:  Fady Adams MD      HISTORY OF PRESENT ILLNESS:  I had the pleasure of seeing your patient, Mian Lozano, at [facility] for evaluation of known coronary artery disease.  This patient was previously followed by my associate, Dr. Jean Carlos Gannon.  The patient presented with unstable angina and was found to have diffuse 3-vessel coronary artery disease.  On 04/29/2008, he underwent a 5-vessel coronary artery bypass surgery off-pump with endoscopic vein harvest to the diagonal and circumflex as a Y graft, saphenous vein graft for the PDA and SILVIA sequentially and the left internal mammary artery to the LAD.  A wedge biopsy of a left upper lobe lung nodule was also performed.  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  Unfortunately, the patient developed recurrent left pleural effusion and required pleurodesis.  He has not had any problems since then.  He has not been seen by Dr. Gannon since 07/2011.  The patient has a history of mixed hyperlipidemia and hypertension.  He has borderline diabetes.  He denies PND, orthopnea, peripheral edema, syncope, presyncope or palpitations.  He denies any hospitalizations or surgery since 2011 other than the pleurodesis.  The patient has significant pain in his left knee, preventing him from exercising aerobically.  He did participate in outpatient cardiac rehabilitation after his bypass surgery.      The patient is a  and has a relatively sedentary job.  He is a nonsmoker.  He drinks 6 beers each day of the weekend.  His last fasting lipid profile on 01/30/2018 showed total cholesterol 126, HDL 33, LDL 68 and triglycerides 127.  His potassium 4.3, BUN 13, creatinine 1.01 on the same date.  The patient denies any exertional angina.  He does note some dyspnea on exertion, especially with heavy lifting.  He is not always on a low-fat diet and notes that he likes to eat red meat.       PHYSICAL EXAMINATION:  As listed below.      EKG performed today and personally interpreted demonstrates normal sinus rhythm.  There is prominent R-wave in V1 and V2, possibly due to counterclockwise rotation.  Mild J point elevation is seen in the inferolateral leads.  This is otherwise a normal EKG.      ASSESSMENT:   1.  Mian Lozano is a delightful 53-year-old male with known coronary artery disease, status post 5-vessel coronary artery bypass surgery on 04/29/2008.  An adenosine nuclear stress test in 2011 was normal.  We will now repeat this test to assess his shortness of breath.  The patient does have some pleuritic chest discomfort in the left chest related to his pleurodesis.  If his nuclear stress test is normal, we will follow him on an annual basis.   2.  Mixed hyperlipidemia.  The patient's HDL cholesterol is low.  His triglycerides do not appear to be significantly elevated on fenofibrate and atorvastatin.  It is clear from recent studies that fenofibrate does not reduce the risk of heart attack or stroke.  It should only be used for patients with hypertriglyceridemia greater than 400-500 to prevent pancreatitis.  I would suggest that Dr. Adams stop this patient's fenofibrate for 2-3 months and then repeat his lipids to see if it is absolutely necessary.  I have suggested this patient begin an exercise program to try to increase his HDL cholesterol.  I understand he has limitations based on his left knee pain.  He is going to seek medical attention for his left knee after his nuclear stress test is performed.   3.  Hypertension under excellent control.      It is my pleasure to assist in the care of Mian Lozano.  All his questions were answered to his satisfaction.  It is my intention to see him again in 1 year if his nuclear stress test is normal.  I appreciate the opportunity to assist in this patient's care.      Joaquim Jones MD      cc:   Fady Adams MD   [facility]   [address]  [place] [address]         BELINDA CASTILLO MD, [facility]             D: 2018   T: 2018   MT: al      Name:     KEILY MUHAMMAD   MRN:      [id number]        Account:      [id number]   :      1965           Service Date: 2018      Document: [id number]

## 2018-03-29 ENCOUNTER — HOSPITAL ENCOUNTER (OUTPATIENT)
Dept: CARDIOLOGY | Facility: CLINIC | Age: 53
Discharge: HOME | End: 2018-03-29
Attending: INTERNAL MEDICINE | Admitting: INTERNAL MEDICINE
Payer: COMMERCIAL

## 2018-03-29 DIAGNOSIS — I25.10 CORONARY ARTERY DISEASE INVOLVING NATIVE CORONARY ARTERY OF NATIVE HEART WITHOUT ANGINA PECTORIS: ICD-10-CM

## 2018-03-29 DIAGNOSIS — Z95.1 H/O CORONARY ARTERY BYPASS SURGERY: ICD-10-CM

## 2018-03-29 PROCEDURE — 78452 HT MUSCLE IMAGE SPECT MULT: CPT | Mod: 26 | Performed by: INTERNAL MEDICINE

## 2018-03-29 PROCEDURE — 93018 CV STRESS TEST I&R ONLY: CPT | Performed by: INTERNAL MEDICINE

## 2018-03-29 PROCEDURE — 93017 CV STRESS TEST TRACING ONLY: CPT

## 2018-03-29 PROCEDURE — A9502 TC99M TETROFOSMIN: HCPCS | Performed by: INTERNAL MEDICINE

## 2018-03-29 PROCEDURE — 93016 CV STRESS TEST SUPVJ ONLY: CPT | Performed by: INTERNAL MEDICINE

## 2018-03-29 PROCEDURE — 34300033 ZZH RX 343: Performed by: INTERNAL MEDICINE

## 2018-03-29 PROCEDURE — 25000128 H RX IP 250 OP 636: Performed by: INTERNAL MEDICINE

## 2018-03-29 RX ORDER — REGADENOSON 0.08 MG/ML
0.4 INJECTION, SOLUTION INTRAVENOUS ONCE
Status: COMPLETED | OUTPATIENT
Start: 2018-03-29 | End: 2018-03-29

## 2018-03-29 RX ORDER — AMINOPHYLLINE 25 MG/ML
50-100 INJECTION, SOLUTION INTRAVENOUS
Status: DISCONTINUED | OUTPATIENT
Start: 2018-03-29 | End: 2018-03-30 | Stop reason: HOSPADM

## 2018-03-29 RX ORDER — ACYCLOVIR 200 MG/1
0-1 CAPSULE ORAL
Status: DISCONTINUED | OUTPATIENT
Start: 2018-03-29 | End: 2018-03-30 | Stop reason: HOSPADM

## 2018-03-29 RX ORDER — ALBUTEROL SULFATE 90 UG/1
2 AEROSOL, METERED RESPIRATORY (INHALATION) EVERY 5 MIN PRN
Status: DISCONTINUED | OUTPATIENT
Start: 2018-03-29 | End: 2018-03-30 | Stop reason: HOSPADM

## 2018-03-29 RX ADMIN — TETROFOSMIN 4.1 MCI.: 1.38 INJECTION, POWDER, LYOPHILIZED, FOR SOLUTION INTRAVENOUS at 12:54

## 2018-03-29 RX ADMIN — REGADENOSON 0.4 MG: 0.08 INJECTION, SOLUTION INTRAVENOUS at 14:49

## 2018-03-29 RX ADMIN — TETROFOSMIN 12.69 MCI.: 1.38 INJECTION, POWDER, LYOPHILIZED, FOR SOLUTION INTRAVENOUS at 14:51

## 2018-05-07 ENCOUNTER — RADIANT APPOINTMENT (OUTPATIENT)
Dept: GENERAL RADIOLOGY | Facility: CLINIC | Age: 53
End: 2018-05-07
Attending: INTERNAL MEDICINE
Payer: COMMERCIAL

## 2018-05-07 ENCOUNTER — TELEPHONE (OUTPATIENT)
Dept: FAMILY MEDICINE | Facility: CLINIC | Age: 53
End: 2018-05-07

## 2018-05-07 ENCOUNTER — OFFICE VISIT (OUTPATIENT)
Dept: FAMILY MEDICINE | Facility: CLINIC | Age: 53
End: 2018-05-07
Payer: COMMERCIAL

## 2018-05-07 VITALS
WEIGHT: 213 LBS | HEART RATE: 69 BPM | SYSTOLIC BLOOD PRESSURE: 136 MMHG | DIASTOLIC BLOOD PRESSURE: 84 MMHG | BODY MASS INDEX: 32.39 KG/M2 | TEMPERATURE: 97.3 F

## 2018-05-07 DIAGNOSIS — M79.645 PAIN OF FINGER OF LEFT HAND: ICD-10-CM

## 2018-05-07 DIAGNOSIS — M79.645 PAIN OF FINGER OF LEFT HAND: Primary | ICD-10-CM

## 2018-05-07 PROCEDURE — 99213 OFFICE O/P EST LOW 20 MIN: CPT | Performed by: INTERNAL MEDICINE

## 2018-05-07 PROCEDURE — 73130 X-RAY EXAM OF HAND: CPT | Mod: LT
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
## 2018-05-07 NOTE — TELEPHONE ENCOUNTER
Central Prior Authorization Team   Phone: [phone]      PA Initiation    Medication: Diclofenac  Insurance Company: [employer] - Phone [phone] Fax [phone]  Pharmacy Filling the Rx: [facility] [address] [facility] [address] [facility] [address]  Filling Pharmacy Phone: [phone]  Filling Pharmacy Fax: [phone]  Start Date: 5/7/2018

## 2018-05-07 NOTE — MR AVS SNAPSHOT
After Visit Summary   5/7/2018    Mian Lozano    MRN: [id number]           Patient Information     Date Of Birth          1965        Visit Information        Provider Department      5/7/2018 9:00 AM Maryann Montanez MD [facility]        Today's Diagnoses     Pain of finger of left hand    -  1       Follow-ups after your visit        Follow-up notes from your care team     Return in about 4 days (around 5/11/2018), or if symptoms worsen or fail to improve with orthopedist , for with Dr. Adams.      Your next 10 appointments already scheduled     May 11, 2018  1:40 PM CDT   MyChart Physical Adult with Fady Adams MD   [facility]    [address] [place] [id number] [phone]              Who to contact     If you have questions or need follow up information about today's clinic visit or your schedule please contact [facility] [place] directly at [phone].  Normal or non-critical lab and imaging results will be communicated to you by MyChart, letter or phone within 4 business days after the clinic has received the results. If you do not hear from us within 7 days, please contact the clinic through [facility] or phone. If you have a critical or abnormal lab result, we will notify you by phone as soon as possible.  Submit refill requests through [facility] or call your pharmacy and they will forward the refill request to us. Please allow 3 business days for your refill to be completed.          Additional Information About Your Visit        MyChart Information     [facility] gives you secure access to your electronic health record. If you see a primary care provider, you can also send messages to your care team and make appointments. If you have questions, please call your primary care clinic.  If you do not have a primary care provider, please call [phone] and they will assist you.         Care EveryWhere ID     This is your Care EveryWhere ID. This could be used by other organizations to access your [facility] medical records  [id number]        Your Vitals Were     Pulse Temperature BMI (Body Mass Index)             69 97.3  F (36.3  C) (Tympanic) 32.39 kg/m2          Blood Pressure from Last 3 Encounters:   05/07/18 136/84   03/26/18 128/80   01/30/18 136/86    Weight from Last 3 Encounters:   05/07/18 213 lb (96.6 kg)   03/26/18 211 lb 9.6 oz (96 kg)   01/30/18 213 lb (96.6 kg)                 Today's Medication Changes          These changes are accurate as of 5/7/18  9:36 AM.  If you have any questions, ask your nurse or doctor.               Start taking these medicines.        Dose/Directions    diclofenac 1 % Gel topical gel   Commonly known as:  VOLTAREN   Used for:  Pain of finger of left hand   Started by:  Maryann Montanez MD        Apply 4 grams to knees or 2 grams to hands four times daily using enclosed dosing card.   Quantity:  100 g   Refills:  1            Where to get your medicines      Call your pharmacy to confirm that your medication is ready for pickup. It may take up to 24 hours for them to receive the prescription. If the prescription is not ready within 3 business days, please contact your clinic or your provider.     We will let you know when these medications are ready. If you don't hear back within 3 business days, please contact us.     diclofenac 1 % Gel topical gel                Primary Care Provider Office Phone # Fax #    Fady Adams [phone]       [address] [place] [address]        Equal Access to Services     [facility]: Hadii praful Russell, waaxda luqadaha, qaybta kaalcristhian hicks. So [facility] [phone].    ATENCIÓN: Si habla español, tiene a hall disposición servicios gratuitos de asistencia lingüística. Llame al [phone].    We comply with applicable federal  civil rights laws and [place] laws. We do not discriminate on the basis of race, color, national origin, age, disability, sex, sexual orientation, or gender identity.            Thank you!     Thank you for choosing [facility] [place]  for your care. Our goal is always to provide you with excellent care. Hearing back from our patients is one way we can continue to improve our services. Please take a few minutes to complete the written survey that you may receive in the mail after your visit with us. Thank you!             Your Updated Medication List - Protect others around you: Learn how to safely use, store and throw away your medicines at www.disposemymeds.org.          This list is accurate as of 5/7/18  9:36 AM.  Always use your most recent med list.                   Brand Name Dispense Instructions for use Diagnosis    albuterol 108 (90 Base) MCG/ACT Inhaler    PROAIR HFA    3 Inhaler    Inhale 2 puffs into the lungs every 4 hours as needed    Mild intermittent asthma without complication, Environmental allergies       aspirin 81 MG tablet     100    ONE DAILY        atorvastatin 80 MG tablet    LIPITOR    90 tablet    Take 1 tablet (80 mg) by mouth daily    Hyperlipidemia LDL goal <70, Coronary artery disease involving coronary bypass graft of native heart without angina pectoris       azelastine 0.1 % spray    ASTELIN    3 Bottle    Spray 1-2 sprays into both nostrils 2 times daily    Mild intermittent asthma without complication, Environmental allergies       carvedilol 3.125 MG tablet    COREG    180 tablet    Take 1 tablet (3.125 mg) by mouth 2 times daily (with meals)    Hypertension goal BP (blood pressure) < 140/90, Coronary artery disease involving coronary bypass graft of native heart without angina pectoris       cetirizine 10 MG tablet    zyrTEC    30 tablet    Take 1 tablet by mouth every evening.        citalopram 20 MG tablet    celeXA    90 tablet    Take 1 tablet (20 mg) by mouth  daily    Anxiety       diclofenac 1 % Gel topical gel    VOLTAREN    100 g    Apply 4 grams to knees or 2 grams to hands four times daily using enclosed dosing card.    Pain of finger of left hand       fenofibrate 160 MG tablet     90 tablet    Take 1 tablet (160 mg) by mouth daily    Hyperlipidemia LDL goal <70, Coronary artery disease involving coronary bypass graft of native heart without angina pectoris       fluticasone 50 MCG/ACT spray    FLONASE    48 g    Spray 1-2 sprays into both nostrils daily    Mild intermittent asthma without complication, Environmental allergies       LANsoprazole 30 MG CR capsule    PREVACID    90 capsule    TAKE ONE CAPSULE BY MOUTH DAILY. TAKE 30 TO 60 MINUTES BEFORE A MEAL.    Gastroesophageal reflux disease without esophagitis       terbinafine 1 % cream    lamISIL AT    84 g    Apply topically 2 times daily For fungal infection not resolved with other antifungals (e.g. Clotrimazole)    Tinea cruris       triamcinolone 0.5 % cream    KENALOG    90 g    Apply sparingly to affected area three times daily.    Tinea cruris       zolpidem 5 MG tablet    AMBIEN    30 tablet    Take 1 tablet (5 mg) by mouth nightly as needed for sleep    Sleep disorder

## 2018-05-08 NOTE — TELEPHONE ENCOUNTER
No need to do prior auth, thought we could try voltaren gel if it was covered, but looks like it isn't.     Maryann Montanez MD

## 2018-05-08 NOTE — TELEPHONE ENCOUNTER
PRIOR AUTHORIZATION DENIED    Medication: Diclofenac - Denied     Denial Date: 5/7/2018    Denial Rational:  Must have diagnoses of Osteoarthritis pain in joints     Appeal Information:

## 2018-05-11 ENCOUNTER — RADIANT APPOINTMENT (OUTPATIENT)
Dept: GENERAL RADIOLOGY | Facility: CLINIC | Age: 53
End: 2018-05-11
Attending: FAMILY MEDICINE
Payer: COMMERCIAL

## 2018-05-11 ENCOUNTER — OFFICE VISIT (OUTPATIENT)
Dept: FAMILY MEDICINE | Facility: CLINIC | Age: 53
End: 2018-05-11
Payer: COMMERCIAL

## 2018-05-11 VITALS
TEMPERATURE: 97.8 F | HEART RATE: 86 BPM | BODY MASS INDEX: 32.43 KG/M2 | DIASTOLIC BLOOD PRESSURE: 72 MMHG | HEIGHT: 68 IN | WEIGHT: 214 LBS | OXYGEN SATURATION: 99 % | SYSTOLIC BLOOD PRESSURE: 122 MMHG

## 2018-05-11 DIAGNOSIS — Z80.42 FAMILY HISTORY OF PROSTATE CANCER: ICD-10-CM

## 2018-05-11 DIAGNOSIS — Z12.5 SCREENING FOR PROSTATE CANCER: ICD-10-CM

## 2018-05-11 DIAGNOSIS — G89.29 CHRONIC PAIN OF LEFT KNEE: ICD-10-CM

## 2018-05-11 DIAGNOSIS — Z51.81 MEDICATION MONITORING ENCOUNTER: ICD-10-CM

## 2018-05-11 DIAGNOSIS — K22.70 BARRETT'S ESOPHAGUS WITHOUT DYSPLASIA: ICD-10-CM

## 2018-05-11 DIAGNOSIS — K21.9 GASTROESOPHAGEAL REFLUX DISEASE WITHOUT ESOPHAGITIS: ICD-10-CM

## 2018-05-11 DIAGNOSIS — G89.12 POST-THORACOTOMY PAIN SYNDROME: ICD-10-CM

## 2018-05-11 DIAGNOSIS — Z00.00 ENCOUNTER FOR ROUTINE ADULT HEALTH EXAMINATION WITHOUT ABNORMAL FINDINGS: Primary | ICD-10-CM

## 2018-05-11 DIAGNOSIS — E78.5 HYPERLIPIDEMIA LDL GOAL <70: ICD-10-CM

## 2018-05-11 DIAGNOSIS — F41.9 ANXIETY: ICD-10-CM

## 2018-05-11 DIAGNOSIS — N20.0 NEPHROLITHIASIS: ICD-10-CM

## 2018-05-11 DIAGNOSIS — Z82.49 FAMILY HISTORY OF CORONARY ARTERY DISEASE: ICD-10-CM

## 2018-05-11 DIAGNOSIS — M79.645 PAIN OF FINGER OF LEFT HAND: ICD-10-CM

## 2018-05-11 DIAGNOSIS — J45.20 MILD INTERMITTENT ASTHMA WITHOUT COMPLICATION: ICD-10-CM

## 2018-05-11 DIAGNOSIS — M25.562 CHRONIC PAIN OF LEFT KNEE: ICD-10-CM

## 2018-05-11 DIAGNOSIS — R73.03 PREDIABETES: ICD-10-CM

## 2018-05-11 DIAGNOSIS — I25.810 CORONARY ARTERY DISEASE INVOLVING CORONARY BYPASS GRAFT OF NATIVE HEART WITHOUT ANGINA PECTORIS: ICD-10-CM

## 2018-05-11 DIAGNOSIS — Z91.09 ENVIRONMENTAL ALLERGIES: ICD-10-CM

## 2018-05-11 DIAGNOSIS — Z12.11 SCREEN FOR COLON CANCER: ICD-10-CM

## 2018-05-11 DIAGNOSIS — I10 HYPERTENSION GOAL BP (BLOOD PRESSURE) < 140/90: ICD-10-CM

## 2018-05-11 DIAGNOSIS — Z95.1 H/O CORONARY ARTERY BYPASS SURGERY: ICD-10-CM

## 2018-05-11 PROCEDURE — 73562 X-RAY EXAM OF KNEE 3: CPT | Mod: LT

## 2018-05-11 PROCEDURE — 99396 PREV VISIT EST AGE 40-64: CPT | Performed by: FAMILY MEDICINE

## 2018-05-11 ASSESSMENT — ANXIETY QUESTIONNAIRES
7. FEELING AFRAID AS IF SOMETHING AWFUL MIGHT HAPPEN: NOT AT ALL
2. NOT BEING ABLE TO STOP OR CONTROL WORRYING: NOT AT ALL
IF YOU CHECKED OFF ANY PROBLEMS ON THIS QUESTIONNAIRE, HOW DIFFICULT HAVE THESE PROBLEMS MADE IT FOR YOU TO DO YOUR WORK, TAKE CARE OF THINGS AT HOME, OR GET ALONG WITH OTHER PEOPLE: NOT DIFFICULT AT ALL
3. WORRYING TOO MUCH ABOUT DIFFERENT THINGS: NOT AT ALL
6. BECOMING EASILY ANNOYED OR IRRITABLE: NOT AT ALL
1. FEELING NERVOUS, ANXIOUS, OR ON EDGE: NOT AT ALL
5. BEING SO RESTLESS THAT IT IS HARD TO SIT STILL: NOT AT ALL
GAD7 TOTAL SCORE: 0

## 2018-05-11 ASSESSMENT — PATIENT HEALTH QUESTIONNAIRE - PHQ9: 5. POOR APPETITE OR OVEREATING: NOT AT ALL

## 2018-05-11 NOTE — MR AVS SNAPSHOT
After Visit Summary   5/11/2018    Mian Lozano    MRN: [id number]           Patient Information     Date Of Birth          1965        Visit Information        Provider Department      5/11/2018 1:40 PM Fady Adams MD [facility]        Today's Diagnoses     Encounter for routine adult health examination without abnormal findings    -  1    Coronary artery disease involving coronary bypass graft of native heart without angina pectoris        H/O coronary artery bypass surgery        Prediabetes        Hypertension goal BP (blood pressure) < 140/90        Hyperlipidemia LDL goal <70        Mild intermittent asthma without complication        Anxiety        Gastroesophageal reflux disease without esophagitis        Sullivan's esophagus without dysplasia        Nephrolithiasis        Environmental allergies        Pain of finger of left hand        Chronic pain of left knee        Family history of coronary artery disease        Family history of prostate cancer        Screening for prostate cancer        Screen for colon cancer        Medication monitoring encounter          Care Instructions    Inquire with your insurance company about Shingrix (shingles vaccine), series of 2 injections    Follow up for MRI of left knee, as discussed    Follow up for fasting labs      [facility]                        To reach your care team during and after hours:   [phone]  To reach our pharmacy:        [phone]    Clinic Hours                        Our clinic hours are:    Monday   7:30 am to 7:00 pm                  Tuesday through Friday 7:30 am to 5:00 pm                             Saturday   8:00 am to 12:00 pm      Sunday   Closed      Pharmacy Hours                        Our pharmacy hours are:    Monday   8:30 am to 7:00 pm       Tuesday to Friday  8:30 am to 6:00 pm                       Saturday    9:00 am to 1:00 pm              Sunday    Closed               There is also information available at our web site:  [email or web address]    If your provider ordered any lab tests and you do not receive the results within 10 business days, please call the clinic.    If you need a medication refill please contact your pharmacy.  Please allow 2-3 business days for your refill to be completed.    Our clinic offers telephone visits and e visits.  Please ask one of your team members to explain more.      Use [employer] (secure email communication and access to your chart) to send your primary care provider a message or make an appointment. Ask someone on your Team how to sign up for [employer].  Immunizations                      Immunization History   Administered Date(s) Administered     Influenza (IIV3) PF 11/26/2012, 11/12/2014, 10/01/2016     Influenza Intranasal Vaccine 4 valent 10/01/2015     Influenza Vaccine IM 3yrs+ 4 Valent IIV4 10/20/2015, 01/30/2018     TDAP Vaccine (Boostrix) 11/25/2015     Tdap (Adacel,Boostrix) 04/28/2006        Health Maintenance                         Health Maintenance Due   Topic Date Due     HIV SCREEN (SYSTEM ASSIGNED)  02/14/1983     Asthma Action Plan - yearly  02/01/2018     Wellness Visit with your Primary Provider - yearly  02/01/2018         Preventive Health Recommendations  Male Ages 50 - 64    Yearly exam:             See your health care provider every year in order to  o   Review health changes.   o   Discuss preventive care.    o   Review your medicines if your doctor has prescribed any.     Have a cholesterol test every 5 years, or more frequently if you are at risk for high cholesterol/heart disease.     Have a diabetes test (fasting glucose) every three years. If you are at risk for diabetes, you should have this test more often.     Have a colonoscopy at age 50, or have a yearly FIT test (stool test). These exams will check for colon cancer.      Talk with your health care provider about whether or not a prostate cancer screening  test (PSA) is right for you.    You should be tested each year for STDs (sexually transmitted diseases), if you re at risk.     Shots: Get a flu shot each year. Get a tetanus shot every 10 years.     Nutrition:    Eat at least 5 servings of fruits and vegetables daily.     Eat whole-grain bread, whole-wheat pasta and brown rice instead of white grains and rice.     Talk to your provider about Calcium and Vitamin D.     Lifestyle    Exercise for at least 150 minutes a week (30 minutes a day, 5 days a week). This will help you control your weight and prevent disease.     Limit alcohol to one drink per day.     No smoking.     Wear sunscreen to prevent skin cancer.     See your dentist every six months for an exam and cleaning.     See your eye doctor every 1 to 2 years.            Follow-ups after your visit        Follow-up notes from your care team     Return in about 6 months (around 11/11/2018), or if symptoms worsen or fail to improve, for Routine Visit, Lab Work.      Your next 10 appointments already scheduled     May 16, 2018  8:45 AM CDT   LAB with [facility] LAB   [facility] ([address] [id number] [phone]           OUTSIDE LABS: Please include name of facility and Physician that is requesting outside labs be drawn.  Please indicate if labs are fasting or non-fasting on appt notes.  Be as specific as you can on which labs are being drawn.              Future tests that were ordered for you today     Open Future Orders        Priority Expected Expires Ordered    MR Knee Left w/o Contrast Routine  5/11/2019 5/11/2018    Comprehensive metabolic panel Routine  5/11/2019 5/11/2018    Lipid panel reflex to direct LDL Fasting Routine  5/11/2019 5/11/2018    CK total Routine  5/11/2019 5/11/2018    CBC with platelets Routine  5/11/2019 5/11/2018    TSH with free T4 reflex Routine  5/11/2019 5/11/2018    Prostate spec antigen screen  "Routine  5/11/2019 5/11/2018    Hemoglobin A1c Routine  5/11/2019 5/11/2018    Albumin Random Urine Quantitative with Creat Ratio Routine  5/11/2019 5/11/2018    *UA reflex to Microscopic and Culture ([place] and [facility] (except [place] and [place]) Routine  5/11/2019 5/11/2018    Fecal colorectal cancer screen (FIT) Routine 6/1/2018 5/11/2019 5/11/2018            Who to contact     If you have questions or need follow up information about today's clinic visit or your schedule please contact [facility] directly at [phone].  Normal or non-critical lab and imaging results will be communicated to you by MyChart, letter or phone within 4 business days after the clinic has received the results. If you do not hear from us within 7 days, please contact the clinic through [employer] or phone. If you have a critical or abnormal lab result, we will notify you by phone as soon as possible.  Submit refill requests through [email or web address] or call your pharmacy and they will forward the refill request to us. Please allow 3 business days for your refill to be completed.          Additional Information About Your Visit        [facility] Information     [email or web address] gives you secure access to your electronic health record. If you see a primary care provider, you can also send messages to your care team and make appointments. If you have questions, please call your primary care clinic.  If you do not have a primary care provider, please call [phone] and they will assist you.        Care EveryWhere ID     This is your Care EveryWhere ID. This could be used by other organizations to access your [place] medical records  [id number]        Your Vitals Were     Pulse Temperature Height Pulse Oximetry BMI (Body Mass Index)       86 97.8  F (36.6  C) (Oral) 5' 8\" (1.727 m) 99% 32.54 kg/m2        Blood Pressure from Last 3 Encounters:   05/11/18 122/72   05/07/18 136/84   03/26/18 128/80    Weight from Last 3 Encounters: "   05/11/18 214 lb (97.1 kg)   05/07/18 213 lb (96.6 kg)   03/26/18 211 lb 9.6 oz (96 kg)                 Today's Medication Changes          These changes are accurate as of 5/11/18  2:47 PM.  If you have any questions, ask your nurse or doctor.               Stop taking these medicines if you haven't already. Please contact your care team if you have questions.     citalopram 20 MG tablet   Commonly known as:  celeXA   Stopped by:  Fady Adams MD           fenofibrate 160 MG tablet   Stopped by:  Fady Adams MD                Where to get your medicines      These medications were sent to [facility] [id number] [place] [address] AT [facility] [address] [address]    Hours:  24-hours Phone:  [phone]     diclofenac 1 % Gel topical gel                Primary Care Provider Office Phone # Fax #    Fady Adams [phone]       [address] [place] [id number]        Equal Access to Services     [facility]: Hadii aad ku hadasho Soomaali, waaxda luqadaha, qaybta kaalmada adeegyada, cristhian phillip. So [facility] [phone].    ATENCIÓN: Si habla español, tiene a hall disposición servicios gratuitos de asistencia lingüística. [facility] [phone].    We comply with applicable federal civil rights laws and [place] laws. We do not discriminate on the basis of race, color, national origin, age, disability, sex, sexual orientation, or gender identity.            Thank you!     Thank you for choosing [facility]  for your care. Our goal is always to provide you with excellent care. Hearing back from our patients is one way we can continue to improve our services. Please take a few minutes to complete the written survey that you may receive in the mail after your visit with us. Thank you!             Your Updated Medication List - Protect others around you: Learn how to safely use, store  and throw away your medicines at www.disposemymeds.org.          This list is accurate as of 5/11/18  2:47 PM.  Always use your most recent med list.                   Brand Name Dispense Instructions for use Diagnosis    albuterol 108 (90 Base) MCG/ACT Inhaler    PROAIR HFA    3 Inhaler    Inhale 2 puffs into the lungs every 4 hours as needed    Mild intermittent asthma without complication, Environmental allergies       aspirin 81 MG tablet     100    ONE DAILY        atorvastatin 80 MG tablet    LIPITOR    90 tablet    Take 1 tablet (80 mg) by mouth daily    Hyperlipidemia LDL goal <70, Coronary artery disease involving coronary bypass graft of native heart without angina pectoris       azelastine 0.1 % spray    ASTELIN    3 Bottle    Spray 1-2 sprays into both nostrils 2 times daily    Mild intermittent asthma without complication, Environmental allergies       carvedilol 3.125 MG tablet    COREG    180 tablet    Take 1 tablet (3.125 mg) by mouth 2 times daily (with meals)    Hypertension goal BP (blood pressure) < 140/90, Coronary artery disease involving coronary bypass graft of native heart without angina pectoris       cetirizine 10 MG tablet    zyrTEC    30 tablet    Take 1 tablet by mouth every evening.        diclofenac 1 % Gel topical gel    VOLTAREN    100 g    Apply 4 grams to knees or 2 grams to hands four times daily using enclosed dosing card.    Pain of finger of left hand, Chronic pain of left knee       fluticasone 50 MCG/ACT spray    FLONASE    48 g    Spray 1-2 sprays into both nostrils daily    Mild intermittent asthma without complication, Environmental allergies       LANsoprazole 30 MG CR capsule    PREVACID    90 capsule    TAKE ONE CAPSULE BY MOUTH DAILY. TAKE 30 TO 60 MINUTES BEFORE A MEAL.    Gastroesophageal reflux disease without esophagitis       terbinafine 1 % cream    lamISIL AT    84 g    Apply topically 2 times daily For fungal infection not resolved with other antifungals (e.g.  Clotrimazole)    Tinea cruris       triamcinolone 0.5 % cream    KENALOG    90 g    Apply sparingly to affected area three times daily.    Tinea cruris       zolpidem 5 MG tablet    AMBIEN    30 tablet    Take 1 tablet (5 mg) by mouth nightly as needed for sleep    Sleep disorder

## 2018-05-11 NOTE — PATIENT INSTRUCTIONS
Inquire with your insurance company about Shingrix (shingles vaccine), series of 2 injections    Follow up for MRI of left knee, as discussed    Follow up for fasting labs      [facility]                        To reach your care team during and after hours:   [phone]  To reach our pharmacy:        [phone]    Clinic Hours                        Our clinic hours are:    Monday   7:30 am to 7:00 pm                  Tuesday through Friday 7:30 am to 5:00 pm                             Saturday   8:00 am to 12:00 pm      Sunday   Closed      Pharmacy Hours                        Our pharmacy hours are:    Monday   8:30 am to 7:00 pm       Tuesday to Friday  8:30 am to 6:00 pm                       Saturday    9:00 am to 1:00 pm              Sunday    Closed              There is also information available at our web site:  [email or web address]    If your provider ordered any lab tests and you do not receive the results within 10 business days, please call the clinic.    If you need a medication refill please contact your pharmacy.  Please allow 2-3 business days for your refill to be completed.    Our clinic offers telephone visits and e visits.  Please ask one of your team members to explain more.      Use [employer] (secure email communication and access to your chart) to send your primary care provider a message or make an appointment. Ask someone on your Team how to sign up for [employer].  Immunizations                      Immunization History   Administered Date(s) Administered     Influenza (IIV3) PF 11/26/2012, 11/12/2014, 10/01/2016     Influenza Intranasal Vaccine 4 valent 10/01/2015     Influenza Vaccine IM 3yrs+ 4 Valent IIV4 10/20/2015, 01/30/2018     TDAP Vaccine (Boostrix) 11/25/2015     Tdap (Adacel,Boostrix) 04/28/2006        Health Maintenance                         Health Maintenance Due   Topic Date Due     HIV SCREEN (SYSTEM ASSIGNED)  02/14/1983     Asthma Action Plan - yearly   02/01/2018     Wellness Visit with your Primary Provider - yearly  02/01/2018         Preventive Health Recommendations  Male Ages 50   64    Yearly exam:             See your health care provider every year in order to  o   Review health changes.   o   Discuss preventive care.    o   Review your medicines if your doctor has prescribed any.     Have a cholesterol test every 5 years, or more frequently if you are at risk for high cholesterol/heart disease.     Have a diabetes test (fasting glucose) every three years. If you are at risk for diabetes, you should have this test more often.     Have a colonoscopy at age 50, or have a yearly FIT test (stool test). These exams will check for colon cancer.      Talk with your health care provider about whether or not a prostate cancer screening test (PSA) is right for you.    You should be tested each year for STDs (sexually transmitted diseases), if you re at risk.     Shots: Get a flu shot each year. Get a tetanus shot every 10 years.     Nutrition:    Eat at least 5 servings of fruits and vegetables daily.     Eat whole-grain bread, whole-wheat pasta and brown rice instead of white grains and rice.     Talk to your provider about Calcium and Vitamin D.     Lifestyle    Exercise for at least 150 minutes a week (30 minutes a day, 5 days a week). This will help you control your weight and prevent disease.     Limit alcohol to one drink per day.     No smoking.     Wear sunscreen to prevent skin cancer.     See your dentist every six months for an exam and cleaning.     See your eye doctor every 1 to 2 years.

## 2018-05-11 NOTE — NURSING NOTE
"Chief Complaint   Patient presents with     Physical       Initial /72  Pulse 86  Temp 97.8  F (36.6  C) (Oral)  Ht 5' 8\" (1.727 m)  Wt 214 lb (97.1 kg)  SpO2 99%  BMI 32.54 kg/m2 Estimated body mass index is 32.54 kg/(m^2) as calculated from the following:    Height as of this encounter: 5' 8\" (1.727 m).    Weight as of this encounter: 214 lb (97.1 kg)..  BP completed using cuff size: sara Rodriguez MA  "

## 2018-05-11 NOTE — PROGRESS NOTES
"  SUBJECTIVE:   CC: Mian Lozano is an 53 year old male who presents for preventative health visit.     Healthy Habits:    Do you get at least three servings of calcium containing foods daily (dairy, green leafy vegetables, etc.)? no, taking calcium and/or vitamin D supplement: no    Amount of exercise or daily activities, outside of work: not much    Problems taking medications regularly No    Medication side effects: No    Have you had an eye exam in the past two years? yes    Do you see a dentist twice per year? yes    Do you have sleep apnea, excessive snoring or daytime drowsiness?no     Asthma/Allergies -- ACT score of 25 today. Proair prn. Astelin prn, Zyrtec 10 mg daily, Flonase 2 puffs daily to each nostril.     GERD -- Prevacid 30 mg daily.     Left Knee Pain -- Mian reported ongoing pain and instability issues - hx of kneecap fracture at age 15. He reported that he has instances where his knee clicks, and he feels immediate relief.     Kidney Stones -- Last in 7/16.     CAD -- Hx of bypass in 4/08. Coreg 3.125 mg twice daily.     Post-thoracotomy pain sx -- Mian stated that he \"continues to have pain\".     Anxiety/Depression/Insomnia -- D/c'd use of Celexa recently - he feels \"foggy\". Ambien 5 mg prn for sleep.     PHQ-9: 1  LONNY-7: 0    Prediabetes --   Lab Results   Component Value Date    A1C 5.4 01/30/2018    A1C 5.6 02/14/2017    A1C 5.5 01/27/2016    A1C 5.5 07/08/2015    A1C 5.4 09/03/2014     Glucose   Date Value Ref Range Status   01/30/2018 108 (H) 70 - 99 mg/dL Final     Comment:     Fasting specimen     HTN --   BP Readings from Last 3 Encounters:   05/11/18 122/72   05/07/18 136/84   03/26/18 128/80     Lipids -- Lipitor 80 mg daily, discontinued use of fenofibrate.     Recent Labs   Lab Test  01/30/18   0826  02/14/17   0719   07/08/15   0757  09/03/14   0745   CHOL  126  141   < >  146  159   HDL  33*  44   < >  37*  34*   LDL  68  64   < >  79  72   TRIG  127  163*   < >  148  267* "   CHOLHDLRATIO   --    --    --   3.9  4.7    < > = values in this interval not displayed.       Past/recent records reviewed and discussed for -- family hx, social hx, surgical hx, medications, immunizations, allergies.      Today's PHQ-2 Score:   PHQ-2 ( 1999 Pfizer) 5/11/2018 3/22/2017   Q1: Little interest or pleasure in doing things 0 0   Q2: Feeling down, depressed or hopeless 0 0   PHQ-2 Score 0 0   Q1: Little interest or pleasure in doing things - -   Q2: Feeling down, depressed or hopeless - -   PHQ-2 Score - -     Abuse: Current or Past(Physical, Sexual or Emotional)- No  Do you feel safe in your environment - Yes    Social History   Substance Use Topics     Smoking status: Never Smoker     Smokeless tobacco: Never Used     Alcohol use 3.6 oz/week     6 Standard drinks or equivalent per week      Comment: 6 drinks per week      If you drink alcohol do you typically have >3 drinks per day or >7 drinks per week? No                      Last PSA:   PSA   Date Value Ref Range Status   01/30/2018 0.75 0 - 4 ug/L Final     Comment:     Assay Method:  Chemiluminescence using Siemens Vista analyzer       Reviewed orders with patient. Reviewed health maintenance and updated orders accordingly - Yes    Reviewed and updated as needed this visit by clinical staff  Tobacco  Allergies  Meds  Med Hx  Surg Hx  Fam Hx  Soc Hx        Reviewed and updated as needed this visit by Provider  Allergies        Health Maintenance     Colonoscopy:  10 years, due 8/25 (last 8/15)   FIT:  Yearly, due (none on file)              PSA:  Yearly, due 1/19 (last 1/18)   DEXA:  n/a    Health Maintenance Due   Topic Date Due     FIT Q1 YR  02/14/1975     HIV SCREEN (SYSTEM ASSIGNED)  02/14/1983     ASTHMA ACTION PLAN Q1 YR  02/01/2018       Current Problem List    Patient Active Problem List   Diagnosis     Other atopic dermatitis and related conditions     Mild intermittent asthma     Family history of coronary artery disease      Family history of prostate cancer     Prediabetes     Health Care Home     GERD (gastroesophageal reflux disease)     Fowler's esophagus     Anxiety     Hypertension goal BP (blood pressure) < 140/90     Post-thoracotomy pain syndrome     Nephrolithiasis     Coronary artery disease     Hyperlipidemia LDL goal <70     Environmental allergies     H/O coronary artery bypass surgery       Past Medical History    Past Medical History:   Diagnosis Date     Anxiety      Fowler's esophagus 7/11     Coronary artery disease 04/2008    cabg x 5     Environmental allergies     seasonal, hay fever     FAM HX-CARDIOVAS DIS NEC     dad at 45, cabg, stent     Family history of prostate cancer      GERD (gastroesophageal reflux disease) 3/11     History of blood transfusion      Hyperlipidemia LDL goal <70 2006     Hypertension goal BP (blood pressure) < 140/90 4/08     Mild intermittent asthma 7/03     Nephrolithiasis 6/13, 7/16    calcium oxalate     Other atopic dermatitis and related conditions 1980     Other diseases of lung, not elsewhere classified 4/08    dr steel - benign bx and ct - granuloma - no further w/u needed     Post-thoracotomy pain syndrome 4/11    dr hoover     Prediabetes 8/08     Seasonal allergies 1980    Allergic rhinitis Hayfever       Past Surgical History    Past Surgical History:   Procedure Laterality Date     C CABG, ARTERY-VEIN, FIVE  4/08    FSD - lung bx benign     C STRESS TEST - REGULAR (FL)  9/06, 4/11    negative stress thallium - FSD     COLONOSCOPY N/A 8/7/2015    normal - due 10 yrs     ESOPHAGOSCOPY, GASTROSCOPY, DUODENOSCOPY (EGD), COMBINED  6/11, 5/13    [facility] - ? gastritis, fowler's - due 3 yr     ESOPHAGOSCOPY, GASTROSCOPY, DUODENOSCOPY (EGD), COMBINED  5/10/2013    COMBINED ESOPHAGOSCOPY, GASTROSCOPY, DUODENOSCOPY (EGD), BIOPSY SINGLE OR MULTIPLE;  ESOPHAGOSCOPY, GASTROSCOPY, DUODENOSCOPY (EGD)  with biopsy;  Surgeon: Angus Reynolds MD;  Location:  GI     ESOPHAGOSCOPY,  GASTROSCOPY, DUODENOSCOPY (EGD), COMBINED N/A 7/28/2017    Sullivan's - recheck 3 yrs     HC VASECTOMY UNILAT/BILAT W POSTOP SEMEN  1995    Vasectomy     HERNIORRHAPHY UMBILICAL N/A 11/13/2014    Dr Barron     SURGICAL HISTORY OF -   1980    lt patella fx     SURGICAL HISTORY OF -   1985    lt thumb neuroma excised     SURGICAL HISTORY OF -   11/09    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       Current Medications    Current Outpatient Prescriptions   Medication Sig Dispense Refill     albuterol (PROAIR HFA) 108 (90 BASE) MCG/ACT Inhaler Inhale 2 puffs into the lungs every 4 hours as needed 3 Inhaler 3     ASPIRIN 81 MG OR TABS ONE DAILY 100 3     atorvastatin (LIPITOR) 80 MG tablet Take 1 tablet (80 mg) by mouth daily 90 tablet 3     azelastine (ASTELIN) 0.1 % spray Spray 1-2 sprays into both nostrils 2 times daily 3 Bottle 3     carvedilol (COREG) 3.125 MG tablet Take 1 tablet (3.125 mg) by mouth 2 times daily (with meals) 180 tablet 3     cetirizine (ZYRTEC) 10 MG tablet Take 1 tablet by mouth every evening. 30 tablet 1     diclofenac (VOLTAREN) 1 % GEL topical gel Apply 4 grams to knees or 2 grams to hands four times daily using enclosed dosing card. 100 g 1     fluticasone (FLONASE) 50 MCG/ACT spray Spray 1-2 sprays into both nostrils daily 48 g 3     LANsoprazole (PREVACID) 30 MG capsule TAKE ONE CAPSULE BY MOUTH DAILY. TAKE 30 TO 60 MINUTES BEFORE A MEAL. 90 capsule 3     terbinafine (LAMISIL AT) 1 % cream Apply topically 2 times daily For fungal infection not resolved with other antifungals (e.g. Clotrimazole) 84 g 1     triamcinolone (KENALOG) 0.5 % cream Apply sparingly to affected area three times daily. 90 g 1     zolpidem (AMBIEN) 5 MG tablet Take 1 tablet (5 mg) by mouth nightly as needed for sleep 30 tablet 0       Allergies    Allergies   Allergen Reactions     Seasonal Allergies        Immunizations    Immunization History   Administered Date(s) Administered     Influenza (IIV3) PF  "11/26/2012, 11/12/2014, 10/01/2016     Influenza Intranasal Vaccine 4 valent 10/01/2015     Influenza Vaccine IM 3yrs+ 4 Valent IIV4 10/20/2015, 01/30/2018     TDAP Vaccine (Boostrix) 11/25/2015     Tdap (Adacel,Boostrix) 04/28/2006       Family History    Family History   Problem Relation Age of Onset     Prostate Cancer Father 69     Coronary Artery Disease Father 38     Hypertension Father      Hypertension Paternal Grandfather      C.A.D. Paternal Grandfather      Neurologic Disorder Sister      CVA/anuerysm at age 19     Colon Cancer No family hx of        Social History    Social History     Social History     Marital status:      Spouse name: Imelda     Number of children: 0     Years of education: 14     Occupational History           [employer]     Social History Main Topics     Smoking status: Never Smoker     Smokeless tobacco: Never Used     Alcohol use 3.6 oz/week     6 Standard drinks or equivalent per week      Comment: 6 drinks per week     Drug use: No     Sexual activity: Yes     Partners: Female     Birth control/ protection: Male Surgical     Other Topics Concern     Caffeine Concern Yes     1 can qd     Exercise Yes     occas     Seat Belt Yes     Parent/Sibling W/ Cabg, Mi Or Angioplasty Before 65f 55m? Yes     Social History Narrative       ROS:  Constitutional, HEENT, cardiovascular, pulmonary, GI, , musculoskeletal, neuro, skin, endocrine and psych systems are negative, except as in HPI or otherwise noted     This document serves as a record of the services and decisions personally performed and made by Fady Adams MD [facility]. It was created on their behalf by John Zamarripa, a trained medical scribe. The creation of this document is based the provider's statements to the medical scribe.  John Zamarripa May 11, 2018 2:00 PM      OBJECTIVE:   /72  Pulse 86  Temp 97.8  F (36.6  C) (Oral)  Ht 5' 8\" (1.727 m)  Wt 214 lb (97.1 kg)  SpO2 99%  BMI 32.54 kg/m2  EXAM:  GENERAL: healthy, " alert and no distress, obese   HENT: ear canals and TM's normal upon viewing with otoscope, nose and mouth without ulcers or lesions upon viewing with otoscope  NECK: no adenopathy, no asymmetry, masses, or scars and thyroid normal to palpation  RESP: lungs clear to auscultation - no rales, rhonchi or wheezes  CV: regular rate and rhythm, normal S1 S2, no S3 or S4, no murmur, click or rub, no peripheral edema and peripheral pulses strong  ABDOMEN: soft, nontender, no hepatosplenomegaly, no masses and bowel sounds normal   (male): testicles normal without atrophy or masses, no hernias and penis normal without urethral discharge  RECTAL: normal sphincter tone, no rectal masses and prostate trace enlarged, smooth, nontender without masses/nodules  MS: left knee with positive McMurrays testing, normal laxity, minor crepitus, near full ROM, no edema  SKIN: no suspicious lesions or rashes to visible skin  NEURO: mentation intact and speech normal  PSYCH: mentation appears normal, affect normal    XR Left Knee -- Visualized radiographs of left knee obtained today, and reviewed the images with the patient.  Impression: Bilateral Das, bilateral sunrise, and left lateral views of the knee demonstrate surgical clips/wires present to patella area, minor bone spurring. Joint spaces appear mildly decreased.    ASSESSMENT/PLAN:       ICD-10-CM    1. Encounter for routine adult health examination without abnormal findings Z00.00 Comprehensive metabolic panel     Lipid panel reflex to direct LDL Fasting     CK total     CBC with platelets     TSH with free T4 reflex     Prostate spec antigen screen     Hemoglobin A1c     Albumin Random Urine Quantitative with Creat Ratio     *UA reflex to Microscopic and Culture ([facility] (except [place] and [place])     Fecal colorectal cancer screen (FIT)   2. Coronary artery disease involving coronary bypass graft of native heart without angina pectoris I25.810  Comprehensive metabolic panel     Lipid panel reflex to direct LDL Fasting     Albumin Random Urine Quantitative with Creat Ratio     *UA reflex to Microscopic and Culture ([facility] (except [place] and [place])   3. H/O coronary artery bypass surgery Z95.1 Comprehensive metabolic panel     Lipid panel reflex to direct LDL Fasting     Albumin Random Urine Quantitative with Creat Ratio     *UA reflex to Microscopic and Culture ([facility] (except [place] and [place])   4. Prediabetes R73.03 Comprehensive metabolic panel     Hemoglobin A1c     Albumin Random Urine Quantitative with Creat Ratio     *UA reflex to Microscopic and Culture ([facility] (except [place] and [place])   5. Hypertension goal BP (blood pressure) < 140/90 I10 Comprehensive metabolic panel     Albumin Random Urine Quantitative with Creat Ratio     *UA reflex to Microscopic and Culture ([facility] (except [place] and [place])   6. Hyperlipidemia LDL goal <70 E78.5 Comprehensive metabolic panel     Lipid panel reflex to direct LDL Fasting     CK total   7. Mild intermittent asthma without complication J45.20    8. Anxiety F41.9 TSH with free T4 reflex   9. Gastroesophageal reflux disease without esophagitis K21.9 CBC with platelets   10. Sullivan's esophagus without dysplasia K22.70 CBC with platelets   11. Nephrolithiasis N20.0 Albumin Random Urine Quantitative with Creat Ratio     *UA reflex to Microscopic and Culture ([facility] (except [place] and [place])   12. Environmental allergies Z91.09    13. Post-thoracotomy pain syndrome G89.12    14. Pain of finger of left hand M79.645 diclofenac (VOLTAREN) 1 % GEL topical gel   15. Chronic pain of left knee M25.562 diclofenac (VOLTAREN) 1 % GEL topical gel    G89.29 XR Knee Left 3 Views     MR Knee Left w/o Contrast   16. Family history of coronary artery disease Z82.49 Lipid panel reflex to direct LDL  "Fasting   17. Family history of prostate cancer Z80.42 Prostate spec antigen screen   18. Screening for prostate cancer Z12.5 Prostate spec antigen screen   19. Screen for colon cancer Z12.11 Fecal colorectal cancer screen (FIT)   20. Medication monitoring encounter Z51.81 Comprehensive metabolic panel     Lipid panel reflex to direct LDL Fasting     CK total     CBC with platelets     TSH with free T4 reflex     Hemoglobin A1c     Albumin Random Urine Quantitative with Creat Ratio     *UA reflex to Microscopic and Culture ([facility] (except [place] and [place])     Discussed treatment/modality options, including risk and benefits, he desires advised alcohol consumption 1oz per day or less, advised aspirin 81 mg po daily, advised 1 multivitamin per day, advised calcium 4889-2353 mg/d and Vitamin D 800-1200 IU/d, advised dentist every 6 months, advised diet, exercise, and weight loss, advised opthalmologist every 1-2 years, advised self testicular exam q month, diet discussed, further diagnostic(s), further health care maintenance, further lab(s), medication refill(s), OTC meds, LONNY 7, completed and reviewed, PHQ-9, Depression Action Plan, completed and reviewed and observation. All diagnosis above reviewed and noted above, otherwise stable.  See [facility] orders for further details.  Follow up in 6 month(s) and as needed.    Health Maintenance Due   Topic Date Due     FIT Q1 YR  02/14/1975     HIV SCREEN (SYSTEM ASSIGNED)  02/14/1983     ASTHMA ACTION PLAN Q1 YR  02/01/2018       COUNSELING:  Reviewed preventive health counseling, as reflected in patient instructions     reports that he has never smoked. He has never used smokeless tobacco.    Estimated body mass index is 32.54 kg/(m^2) as calculated from the following:    Height as of this encounter: 5' 8\" (1.727 m).    Weight as of this encounter: 214 lb (97.1 kg).   Weight management plan: Discussed healthy diet and exercise guidelines and " patient will follow up in 12 months in clinic to re-evaluate.    Counseling Resources:  ATP IV Guidelines  Pooled Cohorts Equation Calculator  FRAX Risk Assessment  ICSI Preventive Guidelines  Dietary Guidelines for Americans, 2010  USDA's MyPlate  ASA Prophylaxis  Lung CA Screening    The information in this document, created by the medical scribe for me, accurately reflects the services I personally performed and the decisions made by me. I have reviewed and approved this document for accuracy.   Fady Adams MD FAAFP            Fady Adams MD, FAA[address]  [id number] [phone] Fax

## 2018-05-12 ASSESSMENT — ASTHMA QUESTIONNAIRES: ACT_TOTALSCORE: 25

## 2018-05-12 ASSESSMENT — ANXIETY QUESTIONNAIRES: GAD7 TOTAL SCORE: 0

## 2018-05-12 ASSESSMENT — PATIENT HEALTH QUESTIONNAIRE - PHQ9: SUM OF ALL RESPONSES TO PHQ QUESTIONS 1-9: 1

## 2018-05-16 ENCOUNTER — HOSPITAL ENCOUNTER (OUTPATIENT)
Dept: MRI IMAGING | Facility: CLINIC | Age: 53
Discharge: HOME | End: 2018-05-16
Attending: FAMILY MEDICINE | Admitting: FAMILY MEDICINE
Payer: COMMERCIAL

## 2018-05-16 DIAGNOSIS — F41.9 ANXIETY: ICD-10-CM

## 2018-05-16 DIAGNOSIS — Z12.5 SCREENING FOR PROSTATE CANCER: ICD-10-CM

## 2018-05-16 DIAGNOSIS — K21.9 GASTROESOPHAGEAL REFLUX DISEASE WITHOUT ESOPHAGITIS: ICD-10-CM

## 2018-05-16 DIAGNOSIS — I10 HYPERTENSION GOAL BP (BLOOD PRESSURE) < 140/90: ICD-10-CM

## 2018-05-16 DIAGNOSIS — Z95.1 H/O CORONARY ARTERY BYPASS SURGERY: ICD-10-CM

## 2018-05-16 DIAGNOSIS — Z82.49 FAMILY HISTORY OF CORONARY ARTERY DISEASE: ICD-10-CM

## 2018-05-16 DIAGNOSIS — Z80.42 FAMILY HISTORY OF PROSTATE CANCER: ICD-10-CM

## 2018-05-16 DIAGNOSIS — K22.70 BARRETT'S ESOPHAGUS WITHOUT DYSPLASIA: ICD-10-CM

## 2018-05-16 DIAGNOSIS — G89.29 CHRONIC PAIN OF LEFT KNEE: ICD-10-CM

## 2018-05-16 DIAGNOSIS — M25.562 CHRONIC PAIN OF LEFT KNEE: ICD-10-CM

## 2018-05-16 DIAGNOSIS — Z51.81 MEDICATION MONITORING ENCOUNTER: ICD-10-CM

## 2018-05-16 DIAGNOSIS — E78.5 HYPERLIPIDEMIA LDL GOAL <70: ICD-10-CM

## 2018-05-16 DIAGNOSIS — N20.0 NEPHROLITHIASIS: ICD-10-CM

## 2018-05-16 DIAGNOSIS — I25.810 CORONARY ARTERY DISEASE INVOLVING CORONARY BYPASS GRAFT OF NATIVE HEART WITHOUT ANGINA PECTORIS: ICD-10-CM

## 2018-05-16 DIAGNOSIS — R73.03 PREDIABETES: ICD-10-CM

## 2018-05-16 DIAGNOSIS — Z00.00 ENCOUNTER FOR ROUTINE ADULT HEALTH EXAMINATION WITHOUT ABNORMAL FINDINGS: ICD-10-CM

## 2018-05-16 LAB
ALBUMIN UR-MCNC: NEGATIVE MG/DL
APPEARANCE UR: CLEAR
BILIRUB UR QL STRIP: NEGATIVE
COLOR UR AUTO: YELLOW
ERYTHROCYTE [DISTWIDTH] IN BLOOD BY AUTOMATED COUNT: 13.1 % (ref 10–15)
GLUCOSE UR STRIP-MCNC: NEGATIVE MG/DL
HBA1C MFR BLD: 5.6 % (ref 0–5.6)
HCT VFR BLD AUTO: 38.9 % (ref 40–53)
HGB BLD-MCNC: 12.7 G/DL (ref 13.3–17.7)
HGB UR QL STRIP: NEGATIVE
KETONES UR STRIP-MCNC: NEGATIVE MG/DL
LEUKOCYTE ESTERASE UR QL STRIP: NEGATIVE
MCH RBC QN AUTO: 28.3 PG (ref 26.5–33)
MCHC RBC AUTO-ENTMCNC: 32.6 G/DL (ref 31.5–36.5)
MCV RBC AUTO: 87 FL (ref 78–100)
NITRATE UR QL: NEGATIVE
PH UR STRIP: 7.5 PH (ref 5–7)
PLATELET # BLD AUTO: 293 10E9/L (ref 150–450)
RBC # BLD AUTO: 4.49 10E12/L (ref 4.4–5.9)
SOURCE: ABNORMAL
SP GR UR STRIP: 1.01 (ref 1–1.03)
UROBILINOGEN UR STRIP-ACNC: 0.2 EU/DL (ref 0.2–1)
WBC # BLD AUTO: 7.3 10E9/L (ref 4–11)

## 2018-05-16 PROCEDURE — 83036 HEMOGLOBIN GLYCOSYLATED A1C: CPT | Performed by: FAMILY MEDICINE

## 2018-05-16 PROCEDURE — 80061 LIPID PANEL: CPT | Performed by: FAMILY MEDICINE

## 2018-05-16 PROCEDURE — 73721 MRI JNT OF LWR EXTRE W/O DYE: CPT | Mod: LT

## 2018-05-16 PROCEDURE — 84443 ASSAY THYROID STIM HORMONE: CPT | Performed by: FAMILY MEDICINE

## 2018-05-16 PROCEDURE — 85027 COMPLETE CBC AUTOMATED: CPT | Performed by: FAMILY MEDICINE

## 2018-05-16 PROCEDURE — 81003 URINALYSIS AUTO W/O SCOPE: CPT | Performed by: FAMILY MEDICINE

## 2018-05-16 PROCEDURE — 82550 ASSAY OF CK (CPK): CPT | Performed by: FAMILY MEDICINE

## 2018-05-16 PROCEDURE — G0103 PSA SCREENING: HCPCS | Performed by: FAMILY MEDICINE

## 2018-05-16 PROCEDURE — 80053 COMPREHEN METABOLIC PANEL: CPT | Performed by: FAMILY MEDICINE

## 2018-05-16 PROCEDURE — 82043 UR ALBUMIN QUANTITATIVE: CPT | Performed by: FAMILY MEDICINE

## 2018-05-16 PROCEDURE — 36415 COLL VENOUS BLD VENIPUNCTURE: CPT | Performed by: FAMILY MEDICINE

## 2018-05-17 LAB
ALBUMIN SERPL-MCNC: 3.2 G/DL (ref 3.4–5)
ALP SERPL-CCNC: 87 U/L (ref 40–150)
ALT SERPL W P-5'-P-CCNC: 28 U/L (ref 0–70)
ANION GAP SERPL CALCULATED.3IONS-SCNC: 9 MMOL/L (ref 3–14)
AST SERPL W P-5'-P-CCNC: 18 U/L (ref 0–45)
BILIRUB SERPL-MCNC: 0.4 MG/DL (ref 0.2–1.3)
BUN SERPL-MCNC: 13 MG/DL (ref 7–30)
CALCIUM SERPL-MCNC: 9.2 MG/DL (ref 8.5–10.1)
CHLORIDE SERPL-SCNC: 108 MMOL/L (ref 94–109)
CHOLEST SERPL-MCNC: 156 MG/DL
CK SERPL-CCNC: 42 U/L (ref 30–300)
CO2 SERPL-SCNC: 25 MMOL/L (ref 20–32)
CREAT SERPL-MCNC: 0.85 MG/DL (ref 0.66–1.25)
CREAT UR-MCNC: 59 MG/DL
GFR SERPL CREATININE-BSD FRML MDRD: >90 ML/MIN/1.7M2
GLUCOSE SERPL-MCNC: 104 MG/DL (ref 70–99)
HDLC SERPL-MCNC: 32 MG/DL
LDLC SERPL CALC-MCNC: 92 MG/DL
MICROALBUMIN UR-MCNC: <5 MG/L
MICROALBUMIN/CREAT UR: NORMAL MG/G CR (ref 0–17)
NONHDLC SERPL-MCNC: 124 MG/DL
POTASSIUM SERPL-SCNC: 4.4 MMOL/L (ref 3.4–5.3)
PROT SERPL-MCNC: 7.2 G/DL (ref 6.8–8.8)
PSA SERPL-ACNC: 1.14 UG/L (ref 0–4)
SODIUM SERPL-SCNC: 142 MMOL/L (ref 133–144)
TRIGL SERPL-MCNC: 161 MG/DL
TSH SERPL DL<=0.005 MIU/L-ACNC: 1.77 MU/L (ref 0.4–4)

## 2018-05-17 NOTE — PROGRESS NOTES
Triage: please call pt and advise of results & recommendations.  Also, place ortho  referral for Dr. Doyle specifically.    Mian  Here are your recent results.  Your MRI does not show any significant abnormalities.  You do have a small bone spur that appears to be chronic (there for quite some time) and you do have a small effusion (fluid accumulation).  Some of your cartilage is also frayed.  I would recommend that you see Dr. Spenser Doyle at [facility] orthopedics unless you still have a relationship with your surgeon from the 1980s.  I will have our triage staff call you to discuss and place the referral.     If you have any questions please do not hesitate to contact our office via phone ([phone]) or [facility].    Verona Johnson, MS, PA-C  [facility]

## 2018-05-18 ENCOUNTER — TELEPHONE (OUTPATIENT)
Dept: FAMILY MEDICINE | Facility: CLINIC | Age: 53
End: 2018-05-18

## 2018-05-18 NOTE — TELEPHONE ENCOUNTER
Prior Authorization Retail Medication Request    Medication/Dose:   ICD code (if different than what is on RX):    Previously Tried and Failed:    Rationale:      Insurance Name:    Insurance ID:  [id number]      Pharmacy Information (if different than what is on RX)  Name:  [facility]  Phone:  [phone]

## 2018-05-21 NOTE — TELEPHONE ENCOUNTER
Please see telephone encounter from 05/07/2018, this medication was already denied and there is a note from MD from that encounter:      No need to do prior auth, thought we could try voltaren gel if it was covered, but looks like it isn't.       Maryann Montanez MD      If the provider would like to appeal please provide a letter of medical necessity and route back to the team. Otherwise you can close the encounter.  Thank you,  [facility] Team

## 2018-06-20 ENCOUNTER — TELEPHONE (OUTPATIENT)
Dept: FAMILY MEDICINE | Facility: CLINIC | Age: 53
End: 2018-06-20

## 2018-06-20 NOTE — TELEPHONE ENCOUNTER
Reason for Call:  Other Images     Detailed comments: Patient would like a disc of his hand x-rays from 5/7/18    Phone Number Patient can be reached at: Home number on file [phone] (home)    Best Time: anytime     Can we leave a detailed message on this number? YES    Call taken on 6/20/2018 at 11:29 AM by Rosie Maldonado

## 2018-06-21 ENCOUNTER — TRANSFERRED RECORDS (OUTPATIENT)
Dept: HEALTH INFORMATION MANAGEMENT | Facility: CLINIC | Age: 53
End: 2018-06-21

## 2018-07-06 ENCOUNTER — TELEPHONE (OUTPATIENT)
Dept: FAMILY MEDICINE | Facility: CLINIC | Age: 53
End: 2018-07-06

## 2018-07-06 NOTE — TELEPHONE ENCOUNTER
Reason for Call:  Other appointment    Detailed comments: Pt would like an appt next week for a rash all over.   Pt has had for 3 weeks    Phone Number Patient can be reached at: Home number on file [phone] (home)    Best Time: anytime    Can we leave a detailed message on this number? YES    Call taken on 7/6/2018 at 11:05 AM by Nolvia Vaca

## 2018-07-12 ENCOUNTER — TRANSFERRED RECORDS (OUTPATIENT)
Dept: HEALTH INFORMATION MANAGEMENT | Facility: CLINIC | Age: 53
End: 2018-07-12

## 2018-07-13 ENCOUNTER — MYC MEDICAL ADVICE (OUTPATIENT)
Dept: FAMILY MEDICINE | Facility: CLINIC | Age: 53
End: 2018-07-13

## 2018-07-13 DIAGNOSIS — M19.90 ARTHRITIS: Primary | ICD-10-CM

## 2018-07-13 NOTE — TELEPHONE ENCOUNTER
Dr Adams please see [employer] message below. Pending referral. Thanks    Mary Cordon  Referral Coordinator

## 2018-08-16 ENCOUNTER — TRANSFERRED RECORDS (OUTPATIENT)
Dept: HEALTH INFORMATION MANAGEMENT | Facility: CLINIC | Age: 53
End: 2018-08-16

## 2018-09-06 ENCOUNTER — TRANSFERRED RECORDS (OUTPATIENT)
Dept: HEALTH INFORMATION MANAGEMENT | Facility: CLINIC | Age: 53
End: 2018-09-06

## 2018-10-18 ENCOUNTER — TRANSFERRED RECORDS (OUTPATIENT)
Dept: HEALTH INFORMATION MANAGEMENT | Facility: CLINIC | Age: 53
End: 2018-10-18

## 2018-11-01 DIAGNOSIS — J45.20 MILD INTERMITTENT ASTHMA WITHOUT COMPLICATION: ICD-10-CM

## 2018-11-01 DIAGNOSIS — Z91.09 ENVIRONMENTAL ALLERGIES: ICD-10-CM

## 2018-11-01 NOTE — TELEPHONE ENCOUNTER
"Requested Prescriptions   Pending Prescriptions Disp Refills     fluticasone (FLONASE) 50 MCG/ACT spray [Pharmacy Med Name: FLUTICASONE 50MCG DAX SP (120SP) RX] 48 mL 0        Last Written Prescription Date:  1.30.18  Last Fill Quantity: 48 g,  # refills: 3   Last Office Visit: 5/11/2018   Future Office Visit:     ACT Total Scores 2/1/2017 1/30/2018 5/11/2018   ACT TOTAL SCORE - - -   ASTHMA ER VISITS - - -   ASTHMA HOSPITALIZATIONS - - -   ACT TOTAL SCORE (Goal Greater than or Equal to 20) 25 25 25   In the past 12 months, how many times did you visit the emergency room for your asthma without being admitted to the hospital? 0 0 0   In the past 12 months, how many times were you hospitalized overnight because of your asthma? 0 0 0      Sig: INHALE 1 TO 2 SPRAYS INTO BOTH NOSTRILS EVERY DAY    Inhaled Steroids Protocol Passed    11/1/2018 12:58 PM       Passed - Patient is age 12 or older       Passed - Asthma control assessment score within normal limits in last 6 months    Please review ACT score.          Passed - Recent (6 mo) or future (30 days) visit within the authorizing provider's specialty    Patient had office visit in the last 6 months or has a visit in the next 30 days with authorizing provider or within the authorizing provider's specialty.  See \"Patient Info\" tab in inbasket, or \"Choose Columns\" in Meds & Orders section of the refill encounter.              "

## 2018-11-02 RX ORDER — FLUTICASONE PROPIONATE 50 MCG
SPRAY, SUSPENSION (ML) NASAL
Qty: 48 ML | Refills: 0 | Status: SHIPPED | OUTPATIENT
Start: 2018-11-02 | End: 2019-06-05

## 2018-11-02 NOTE — TELEPHONE ENCOUNTER
Prescription approved per [facility] Refill Protocol.  Emperatriz Smith RN  [facility]

## 2018-11-29 ENCOUNTER — TRANSFERRED RECORDS (OUTPATIENT)
Dept: HEALTH INFORMATION MANAGEMENT | Facility: CLINIC | Age: 53
End: 2018-11-29

## 2018-12-03 ENCOUNTER — TELEPHONE (OUTPATIENT)
Dept: FAMILY MEDICINE | Facility: CLINIC | Age: 53
End: 2018-12-03

## 2018-12-03 NOTE — TELEPHONE ENCOUNTER
Reason for Call:  Same Day Appointment, Requested Provider:Bryan     PCP: Fady Adams    Reason for visit:  Pt would like to see Dr Adams as he has laten TB and needs further tx referred by  rhrumatologist.  Please call and you can leave message on his cell phone.    Duration of symptoms: Recently    Have you been treated for this in the past? No    Additional comments: Would like to see Dr Adams.    Can we leave a detailed message on this number? YES    Phone number patient can be reached at: Cell number on file:    Telephone Information:   Mobile [phone]       Best Time: anytime    Call taken on 12/3/2018 at 3:43 PM by Jerrica Cotto

## 2018-12-04 ENCOUNTER — MYC MEDICAL ADVICE (OUTPATIENT)
Dept: FAMILY MEDICINE | Facility: CLINIC | Age: 53
End: 2018-12-04

## 2018-12-04 NOTE — TELEPHONE ENCOUNTER
Offer 4:20 on Wed the 5th - pt needs to wear a mask entire time he is in clinic - please advise patient when scheduling.

## 2018-12-04 NOTE — TELEPHONE ENCOUNTER
Patient scheduled for 4:20 p.m. with Dr. Adams on 12/05/2018. He was told he needs to wear a mask the entire time he is in clinic. Patient agreed.      Melia Whipple  Patient Representative

## 2018-12-05 ENCOUNTER — RADIANT APPOINTMENT (OUTPATIENT)
Dept: GENERAL RADIOLOGY | Facility: CLINIC | Age: 53
End: 2018-12-05
Attending: FAMILY MEDICINE
Payer: COMMERCIAL

## 2018-12-05 ENCOUNTER — OFFICE VISIT (OUTPATIENT)
Dept: FAMILY MEDICINE | Facility: CLINIC | Age: 53
End: 2018-12-05
Payer: COMMERCIAL

## 2018-12-05 VITALS
DIASTOLIC BLOOD PRESSURE: 70 MMHG | TEMPERATURE: 98 F | WEIGHT: 219 LBS | BODY MASS INDEX: 33.19 KG/M2 | HEART RATE: 89 BPM | OXYGEN SATURATION: 97 % | HEIGHT: 68 IN | SYSTOLIC BLOOD PRESSURE: 136 MMHG

## 2018-12-05 DIAGNOSIS — E78.5 HYPERLIPIDEMIA LDL GOAL <70: ICD-10-CM

## 2018-12-05 DIAGNOSIS — Z95.1 H/O CORONARY ARTERY BYPASS SURGERY: ICD-10-CM

## 2018-12-05 DIAGNOSIS — K21.9 GASTROESOPHAGEAL REFLUX DISEASE WITHOUT ESOPHAGITIS: ICD-10-CM

## 2018-12-05 DIAGNOSIS — Z22.7 LATENT TUBERCULOSIS BY BLOOD TEST: Primary | ICD-10-CM

## 2018-12-05 DIAGNOSIS — I25.810 CORONARY ARTERY DISEASE INVOLVING CORONARY BYPASS GRAFT OF NATIVE HEART WITHOUT ANGINA PECTORIS: ICD-10-CM

## 2018-12-05 DIAGNOSIS — Z22.7 LATENT TUBERCULOSIS BY BLOOD TEST: ICD-10-CM

## 2018-12-05 DIAGNOSIS — Z86.19 HISTORY OF HISTOPLASMOSIS: ICD-10-CM

## 2018-12-05 DIAGNOSIS — R73.03 PREDIABETES: ICD-10-CM

## 2018-12-05 DIAGNOSIS — L40.50 PSORIATIC ARTHRITIS (H): ICD-10-CM

## 2018-12-05 DIAGNOSIS — J45.20 MILD INTERMITTENT ASTHMA WITHOUT COMPLICATION: ICD-10-CM

## 2018-12-05 DIAGNOSIS — Z51.81 MEDICATION MONITORING ENCOUNTER: ICD-10-CM

## 2018-12-05 DIAGNOSIS — E66.811 CLASS 1 OBESITY WITH SERIOUS COMORBIDITY AND BODY MASS INDEX (BMI) OF 33.0 TO 33.9 IN ADULT, UNSPECIFIED OBESITY TYPE: ICD-10-CM

## 2018-12-05 DIAGNOSIS — I10 HYPERTENSION GOAL BP (BLOOD PRESSURE) < 140/90: ICD-10-CM

## 2018-12-05 PROCEDURE — 71046 X-RAY EXAM CHEST 2 VIEWS: CPT | Mod: FY

## 2018-12-05 PROCEDURE — 99214 OFFICE O/P EST MOD 30 MIN: CPT | Performed by: FAMILY MEDICINE

## 2018-12-05 RX ORDER — FOLIC ACID 1 MG/1
TABLET ORAL
Refills: 0 | COMMUNITY
Start: 2018-09-06 | End: 2020-06-10

## 2018-12-05 RX ORDER — PREDNISONE 5 MG/1
TABLET ORAL
Refills: 0 | COMMUNITY
Start: 2018-11-19 | End: 2020-03-02

## 2018-12-05 NOTE — PATIENT INSTRUCTIONS
Referred to infectious disease specialist today. Remind infectious disease folks about histoplasmosis in 2008.    [facility]                        To reach your care team during and after hours:   [phone]  To reach our pharmacy:        [phone]    Clinic Hours                        Our clinic hours are:    Monday   7:30 am to 7:00 pm                  Tuesday through Friday 7:30 am to 5:00 pm                             Saturday   8:00 am to 12:00 pm      Sunday   Closed      Pharmacy Hours                        Our pharmacy hours are:    Monday   8:30 am to 7:00 pm       Tuesday to Friday  8:30 am to 6:00 pm                       Saturday    9:00 am to 1:00 pm              Sunday    Closed              There is also information available at our web site:  [email or web address]    If your provider ordered any lab tests and you do not receive the results within 10 business days, please call the clinic.    If you need a medication refill please contact your pharmacy.  Please allow 2-3 business days for your refill to be completed.    Our clinic offers telephone visits and e visits.  Please ask one of your team members to explain more.      Use [employer] (secure email communication and access to your chart) to send your primary care provider a message or make an appointment. Ask someone on your Team how to sign up for [employer].  Immunizations                      Immunization History   Administered Date(s) Administered     Influenza (IIV3) PF 11/26/2012, 11/12/2014, 10/01/2016     Influenza Intranasal Vaccine 4 valent 10/01/2015     Influenza Vaccine IM 3yrs+ 4 Valent IIV4 10/20/2015, 01/30/2018     TDAP Vaccine (Boostrix) 11/25/2015     Tdap (Adacel,Boostrix) 04/28/2006        Health Maintenance                         Health Maintenance Due   Topic Date Due     Colon Cancer Screening - FIT Test - yearly  02/14/1975     HIV SCREEN (SYSTEM ASSIGNED)  02/14/1983     Asthma Action Plan - yearly   02/01/2018     Flu Vaccine (1) 09/01/2018     Asthma Control Test - every 6 months  11/11/2018

## 2018-12-05 NOTE — MR AVS SNAPSHOT
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 Remind infectious disease folks about histoplasmosis in 2008.    [facility]                        To reach your care team during and after hours:   [phone]  To reach our pharmacy:        [phone]    Clinic Hours                        Our clinic hours are:    Monday   7:30 am to 7:00 pm                  Tuesday through Friday 7:30 am to 5:00 pm                             Saturday   8:00 am to 12:00 pm      Sunday   Closed      Pharmacy Hours                        Our pharmacy hours are:    Monday   8:30 am to 7:00 pm       Tuesday to Friday  8:30 am to 6:00 pm                       Saturday    9:00 am to 1:00 pm              Sunday    Closed              There is also information available at our web site:  [email or web address]    If your provider ordered any lab tests and you do not receive the results within 10 business days, please call the clinic.    If you need a medication refill please contact your pharmacy.  Please allow 2-3 business days for your refill to be completed.    Our clinic offers telephone visits and e visits.  Please ask one of your team members to explain more.      Use [employer]t (secure email communication and access to your chart) to send your primary care provider a message or make an appointment. Ask someone on your Team how to sign up for [employer].  Immunizations                      Immunization History   Administered Date(s) Administered     Influenza (IIV3) PF 11/26/2012, 11/12/2014, 10/01/2016     Influenza Intranasal Vaccine 4 valent 10/01/2015      Influenza Vaccine IM 3yrs+ 4 Valent IIV4 10/20/2015, 01/30/2018     TDAP Vaccine (Boostrix) 11/25/2015     Tdap (Adacel,Boostrix) 04/28/2006        Health Maintenance                         Health Maintenance Due   Topic Date Due     Colon Cancer Screening - FIT Test - yearly  02/14/1975     HIV SCREEN (SYSTEM ASSIGNED)  02/14/1983     Asthma Action Plan - yearly  02/01/2018     Flu Vaccine (1) 09/01/2018     Asthma Control Test - every 6 months  11/11/2018               Follow-ups after your visit        Additional Services     INFECTIOUS DISEASE REFERRAL       Your provider has referred you to: [facility]: [email or web address] - [place] [phone]   [email or web address]    Please be aware that coverage of these services is subject to the terms and limitations of your health insurance plan.  Call member services at your health plan with any benefit or coverage questions.      Please bring the following with you to your appointment:    (1) Any X-Rays, CTs or MRIs which have been performed.  Contact the facility where they were done to arrange for  prior to your scheduled appointment.    (2) List of current medications   (3) This referral request   (4) Any documents/labs given to you for this referral                  Who to contact     If you have questions or need follow up information about today's clinic visit or your schedule please contact [facility] directly at [phone].  Normal or non-critical lab and imaging results will be communicated to you by MyChart, letter or phone within 4 business days after the clinic has received the results. If you do not hear from us within 7 days, please contact the clinic through MyChart or phone. If you have a critical or abnormal lab result, we will notify you by phone as soon as possible.  Submit refill requests through [employer] or call your pharmacy and they will forward the refill request to us. Please allow 3 business days for  "your refill to be completed.          Additional Information About Your Visit        MyChart Information     [employer] gives you secure access to your electronic health record. If you see a primary care provider, you can also send messages to your care team and make appointments. If you have questions, please call your primary care clinic.  If you do not have a primary care provider, please call [phone] and they will assist you.        Care EveryWhere ID     This is your Care EveryWhere ID. This could be used by other organizations to access your [facility] medical records  [id number]        Your Vitals Were     Pulse Temperature Height Pulse Oximetry BMI (Body Mass Index)       89 98  F (36.7  C) (Oral) 5' 8\" (1.727 m) 97% 33.3 kg/m2        Blood Pressure from Last 3 Encounters:   12/05/18 136/70   05/11/18 122/72   05/07/18 136/84    Weight from Last 3 Encounters:   12/05/18 219 lb (99.3 kg)   05/11/18 214 lb (97.1 kg)   05/07/18 213 lb (96.6 kg)              We Performed the Following     INFECTIOUS DISEASE REFERRAL        Primary Care Provider Office Phone # Fax #    Fady Adams [phone]       [address] [place] [id number]        Equal Access to Services     MICHELLE VILLALOBOS : Hadii praful ku hadasho Soomaali, waaxda luqadaha, qaybta kaalmada adeegyada, waxay idiin haycarlin lina phillip. So [facility] [phone].    ATENCIÓN: Si habla español, tiene a hall disposición servicios gratuitos de asistencia lingüística. Llame al [phone].    We comply with applicable federal civil rights laws and [place] laws. We do not discriminate on the basis of race, color, national origin, age, disability, sex, sexual orientation, or gender identity.            Thank you!     Thank you for choosing [facility]  for your care. Our goal is always to provide you with excellent care. Hearing back from our patients is one way we can continue to improve our services. Please take a " few minutes to complete the written survey that you may receive in the mail after your visit with us. Thank you!             Your Updated Medication List - Protect others around you: Learn how to safely use, store and throw away your medicines at www.disposemymeds.org.          This list is accurate as of 12/5/18  5:26 PM.  Always use your most recent med list.                   Brand Name Dispense Instructions for use Diagnosis    albuterol 108 (90 Base) MCG/ACT inhaler    PROAIR HFA    3 Inhaler    Inhale 2 puffs into the lungs every 4 hours as needed    Mild intermittent asthma without complication, Environmental allergies       aspirin 81 MG tablet    ASA    100    ONE DAILY        atorvastatin 80 MG tablet    LIPITOR    90 tablet    Take 1 tablet (80 mg) by mouth daily    Hyperlipidemia LDL goal <70, Coronary artery disease involving coronary bypass graft of native heart without angina pectoris       azelastine 0.1 % nasal spray    ASTELIN    3 Bottle    Spray 1-2 sprays into both nostrils 2 times daily    Mild intermittent asthma without complication, Environmental allergies       carvedilol 3.125 MG tablet    COREG    180 tablet    Take 1 tablet (3.125 mg) by mouth 2 times daily (with meals)    Hypertension goal BP (blood pressure) < 140/90, Coronary artery disease involving coronary bypass graft of native heart without angina pectoris       cetirizine 10 MG tablet    zyrTEC    30 tablet    Take 1 tablet by mouth every evening.        diclofenac 1 % topical gel    VOLTAREN    100 g    Apply 4 grams to knees or 2 grams to hands four times daily using enclosed dosing card.    Pain of finger of left hand, Chronic pain of left knee       * fluticasone 50 MCG/ACT nasal spray    FLONASE    48 g    Spray 1-2 sprays into both nostrils daily    Mild intermittent asthma without complication, Environmental allergies       * fluticasone 50 MCG/ACT nasal spray    FLONASE    48 mL    INHALE 1 TO 2 SPRAYS INTO BOTH NOSTRILS  EVERY DAY    Mild intermittent asthma without complication, Environmental allergies       folic acid 1 MG tablet    FOLVITE     TK 1 T PO QAM        LANsoprazole 30 MG DR capsule    PREVACID    90 capsule    TAKE ONE CAPSULE BY MOUTH DAILY. TAKE 30 TO 60 MINUTES BEFORE A MEAL.    Gastroesophageal reflux disease without esophagitis       methotrexate 2.5 MG tablet      TK 5 TS PO Q WK        predniSONE 5 MG tablet    DELTASONE     TK 2 TS PO QAM        terbinafine 1 % external cream    lamISIL AT    84 g    Apply topically 2 times daily For fungal infection not resolved with other antifungals (e.g. Clotrimazole)    Tinea cruris       triamcinolone 0.5 % external cream    KENALOG    90 g    Apply sparingly to affected area three times daily.    Tinea cruris       zolpidem 5 MG tablet    AMBIEN    30 tablet    Take 1 tablet (5 mg) by mouth nightly as needed for sleep    Sleep disorder       * Notice:  This list has 2 medication(s) that are the same as other medications prescribed for you. Read the directions carefully, and ask your doctor or other care provider to review them with you.

## 2018-12-05 NOTE — PROGRESS NOTES
SUBJECTIVE:   Mian Lozano is a 53 year old male who presents to clinic today for the following health issues:    Patient was considering Humira treatment for Psoriatic arthritis, per Dr Mccall, and was tested for TB because of this. Patient was diagnosed with Latent TB upon testing, with positive quantiferon, and told to go see PCP for infectious disease referral to determine if Humira treatment was still appropriate. Patient states he is symptom free and denies fevers, chills, sweats, and coughing specifically.     Patient has Hx of histoplasmosis dating back to 2008.    Asthma: Stable. Patient had an ACT score of 25 today.     CAD/Prediabetes/Htn/Lipids: Stable    No concerns    Lab Results   Component Value Date    A1C 5.6 05/16/2018    A1C 5.4 01/30/2018    A1C 5.6 02/14/2017    A1C 5.5 01/27/2016    A1C 5.5 07/08/2015     BP Readings from Last 3 Encounters:   12/05/18 136/70   05/11/18 122/72   05/07/18 136/84     Creatinine   Date Value Ref Range Status   05/16/2018 0.85 0.66 - 1.25 mg/dL Final     Recent Labs   Lab Test 05/16/18  0831 01/30/18  0826  07/08/15  0757 09/03/14  0745   CHOL 156 126   < > 146 159   HDL 32* 33*   < > 37* 34*   LDL 92 68   < > 79 72   TRIG 161* 127   < > 148 267*   CHOLHDLRATIO  --   --   --  3.9 4.7    < > = values in this interval not displayed.     Problem list and histories reviewed & adjusted, as indicated.  Additional history: as documented    body mass index is 33.3 kg/m .    Wt Readings from Last 4 Encounters:   12/05/18 99.3 kg (219 lb)   05/11/18 97.1 kg (214 lb)   05/07/18 96.6 kg (213 lb)   03/26/18 96 kg (211 lb 9.6 oz)       Health Maintenance    Health Maintenance Due   Topic Date Due     FIT Q1 YR  02/14/1975     HIV SCREEN (SYSTEM ASSIGNED)  02/14/1983     ZOSTER IMMUNIZATION (1 of 2) 02/14/2015     DTAP/TDAP/TD IMMUNIZATION (2 - Td) 12/23/2015     ASTHMA ACTION PLAN Q1 YR  02/01/2018     INFLUENZA VACCINE (1) 09/01/2018       Current Problem List    Patient  Active Problem List   Diagnosis     Other atopic dermatitis and related conditions     Mild intermittent asthma     Family history of coronary artery disease     Family history of prostate cancer     Prediabetes     Health Care Home     GERD (gastroesophageal reflux disease)     Sullivan's esophagus     Anxiety     Hypertension goal BP (blood pressure) < 140/90     Post-thoracotomy pain syndrome     Nephrolithiasis     Coronary artery disease     Hyperlipidemia LDL goal <70     Environmental allergies     H/O coronary artery bypass surgery     Psoriatic arthritis (H)     Class 1 obesity with serious comorbidity and body mass index (BMI) of 33.0 to 33.9 in adult, unspecified obesity type       Past Medical History    Past Medical History:   Diagnosis Date     Anxiety      Sullivan's esophagus 7/11     Coronary artery disease 04/2008    cabg x 5     Environmental allergies     seasonal, hay fever     FAM HX-CARDIOVAS DIS NEC     dad at 45, cabg, stent     Family history of prostate cancer      GERD (gastroesophageal reflux disease) 3/11     History of blood transfusion      Hyperlipidemia LDL goal <70 2006     Hypertension goal BP (blood pressure) < 140/90 4/08     Mild intermittent asthma 7/03     Nephrolithiasis 6/13, 7/16    calcium oxalate     Other atopic dermatitis and related conditions 1980     Other diseases of lung, not elsewhere classified 4/08    dr steel - benign bx and ct - granuloma - no further w/u needed     Post-thoracotomy pain syndrome 4/11    dr hoover     Prediabetes 8/08     Psoriatic arthritis (H)     Dr Mccall     Seasonal allergies 1980    Allergic rhinitis Hayfever       Past Surgical History    Past Surgical History:   Procedure Laterality Date     C CABG, ARTERY-VEIN, FIVE  4/08    FSD - lung bx benign     C STRESS TEST - REGULAR (FL)  9/06, 4/11    negative stress thallium - FSD     COLONOSCOPY N/A 8/7/2015    normal - due 10 yrs     ESOPHAGOSCOPY, GASTROSCOPY, DUODENOSCOPY (EGD), COMBINED   6/11, 5/13    [facility] - ? gastritis, fowler's - due 3 yr     ESOPHAGOSCOPY, GASTROSCOPY, DUODENOSCOPY (EGD), COMBINED  5/10/2013    COMBINED ESOPHAGOSCOPY, GASTROSCOPY, DUODENOSCOPY (EGD), BIOPSY SINGLE OR MULTIPLE;  ESOPHAGOSCOPY, GASTROSCOPY, DUODENOSCOPY (EGD)  with biopsy;  Surgeon: Angus Reynolds MD;  Location:  GI     ESOPHAGOSCOPY, GASTROSCOPY, DUODENOSCOPY (EGD), COMBINED N/A 7/28/2017    Fowler's - recheck 3 yrs     HC VASECTOMY UNILAT/BILAT W POSTOP SEMEN  1995    Vasectomy     HERNIORRHAPHY UMBILICAL N/A 11/13/2014    Dr Barron     SURGICAL HISTORY OF -   1980    lt patella fx     SURGICAL HISTORY OF -   1985    lt thumb neuroma excised     SURGICAL HISTORY OF -   11/09    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       Current Medications    Current Outpatient Medications   Medication Sig Dispense Refill     albuterol (PROAIR HFA) 108 (90 BASE) MCG/ACT Inhaler Inhale 2 puffs into the lungs every 4 hours as needed 3 Inhaler 3     ASPIRIN 81 MG OR TABS ONE DAILY 100 3     atorvastatin (LIPITOR) 80 MG tablet Take 1 tablet (80 mg) by mouth daily 90 tablet 3     azelastine (ASTELIN) 0.1 % spray Spray 1-2 sprays into both nostrils 2 times daily 3 Bottle 3     carvedilol (COREG) 3.125 MG tablet Take 1 tablet (3.125 mg) by mouth 2 times daily (with meals) 180 tablet 3     cetirizine (ZYRTEC) 10 MG tablet Take 1 tablet by mouth every evening. 30 tablet 1     diclofenac (VOLTAREN) 1 % GEL topical gel Apply 4 grams to knees or 2 grams to hands four times daily using enclosed dosing card. 100 g 1     fluticasone (FLONASE) 50 MCG/ACT spray INHALE 1 TO 2 SPRAYS INTO BOTH NOSTRILS EVERY DAY 48 mL 0     fluticasone (FLONASE) 50 MCG/ACT spray Spray 1-2 sprays into both nostrils daily 48 g 3     LANsoprazole (PREVACID) 30 MG capsule TAKE ONE CAPSULE BY MOUTH DAILY. TAKE 30 TO 60 MINUTES BEFORE A MEAL. 90 capsule 3     terbinafine (LAMISIL AT) 1 % cream Apply topically 2 times daily For fungal infection not  resolved with other antifungals (e.g. Clotrimazole) 84 g 1     triamcinolone (KENALOG) 0.5 % cream Apply sparingly to affected area three times daily. 90 g 1     zolpidem (AMBIEN) 5 MG tablet Take 1 tablet (5 mg) by mouth nightly as needed for sleep 30 tablet 0     folic acid (FOLVITE) 1 MG tablet TK 1 T PO QAM  0     methotrexate 2.5 MG tablet TK 5 TS PO Q WK  0     predniSONE (DELTASONE) 5 MG tablet TK 2 TS PO QAM  0       Allergies    Allergies   Allergen Reactions     Seasonal Allergies        Immunizations    Immunization History   Administered Date(s) Administered     Influenza (IIV3) PF 11/26/2012, 11/12/2014, 10/01/2016     Influenza Intranasal Vaccine 4 valent 10/01/2015     Influenza Vaccine IM 3yrs+ 4 Valent IIV4 10/20/2015, 01/30/2018     TDAP Vaccine (Boostrix) 11/25/2015     Tdap (Adacel,Boostrix) 04/28/2006       Family History    Family History   Problem Relation Age of Onset     Prostate Cancer Father 69     Coronary Artery Disease Father 38     Hypertension Father      Hypertension Paternal Grandfather      C.A.D. Paternal Grandfather      Neurologic Disorder Sister         CVA/anuerysm at age 19     Colon Cancer No family hx of        Social History    Social History     Socioeconomic History     Marital status:      Spouse name: Imelda     Number of children: 0     Years of education: 14     Highest education level: Not on file   Social Needs     Financial resource strain: Not on file     Food insecurity - worry: Not on file     Food insecurity - inability: Not on file     Transportation needs - medical: Not on file     Transportation needs - non-medical: Not on file   Occupational History     Comment: [employer]   Tobacco Use     Smoking status: Never Smoker     Smokeless tobacco: Never Used   Substance and Sexual Activity     Alcohol use: Yes     Alcohol/week: 3.6 oz     Comment: 6 drinks per week     Drug use: No     Sexual activity: Yes     Partners: Female     Birth  "control/protection: Male Surgical   Other Topics Concern      Service Not Asked     Blood Transfusions Not Asked     Caffeine Concern Yes     Comment: 1 can qd     Occupational Exposure Not Asked     Hobby Hazards Not Asked     Sleep Concern Not Asked     Stress Concern Not Asked     Weight Concern Not Asked     Special Diet Not Asked     Back Care Not Asked     Exercise Yes     Comment: occas     Bike Helmet Not Asked     Seat Belt Yes     Self-Exams Not Asked     Parent/sibling w/ CABG, MI or angioplasty before 65F 55M? Yes   Social History Narrative     Not on file       All above reviewed and updated, all stable unless otherwise noted    Recent labs reviewed    ROS:  Constitutional, HEENT, cardiovascular, pulmonary, GI, , musculoskeletal, neuro, skin, endocrine and psych systems are negative, except as otherwise noted.    OBJECTIVE:                                                    /70   Pulse 89   Temp 98  F (36.7  C) (Oral)   Ht 1.727 m (5' 8\")   Wt 99.3 kg (219 lb)   SpO2 97%   BMI 33.30 kg/m    Body mass index is 33.3 kg/m .  GENERAL: healthy, alert and no distress  EYES: Eyes grossly normal to inspection  HENT:ear canals and TM's normal upon viewing with otoscope, nose and mouth without ulcers or lesions upon viewing with otoscope  NECK: no tenderness, no adenopathy, no asymmetry, no masses, no stiffness; thyroid- normal to palpation  RESP: lungs clear to auscultation - no rales, no rhonchi, no wheezes  CV: regular rates and rhythm, normal S1 S2, no S3 or S4 and no murmur, no click or rub -  ABDOMEN: soft, no tenderness, no  hepatosplenomegaly, no masses, normal bowel sounds  MS: extremities- no gross deformities noted, no edema  SKIN: no suspicious lesions, no rashes  NEURO: strength and tone- normal, sensory exam- grossly normal, mentation- intact, speech- normal  BACK: no CVA tenderness, no paralumbar tenderness  PSYCH: Alert and oriented times 3; speech- coherent , normal rate and " volume; able to articulate logical thoughts, able to abstract reason, no tangential thoughts, no hallucinations or delusions, affect- normal    DIAGNOSTICS/PROCEDURES:                                                      No results found for this or any previous visit (from the past 24 hour(s)).   Chest Xray: Negative     ASSESSMENT/PLAN:                                                        ICD-10-CM    1. 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 Psoriatic arthritis (H) L40.50    3. Mild intermittent asthma without complication J45.20    4. History of histoplasmosis Z86.19    5. Coronary artery disease involving coronary bypass graft of native heart without angina pectoris I25.810    6. Prediabetes R73.03    7. Hypertension goal BP (blood pressure) < 140/90 I10    8. Hyperlipidemia LDL goal <70 E78.5    9. Gastroesophageal reflux disease without esophagitis K21.9    10. H/O coronary artery bypass surgery Z95.1    11. Class 1 obesity with serious comorbidity and body mass index (BMI) of 33.0 to 33.9 in adult, unspecified obesity type E66.9     Z68.33    12. Medication monitoring encounter Z51.81      Discussed treatment/modality options, including risk and benefits, he desires advised aspirin 81 mg po daily, advised dentist every 6 months, advised diet and exercise and advised opthalmologist every 1-2 years. All diagnosis above reviewed and noted above, otherwise stable.  See [facility] orders for further details.  Follow up as needed.    1) Patient presented today for Chest Xray due to recent diagnosis of Latent TB, no sx, negative CXR, Hx of histoplasmosis. Patient's chest xray was normal. Patient referred to infectious disease specialist today for TB consultation, for definitive treatment, as needed.    2) Follow up with Dr. Jones in March.    3) Follow up in 6 months for annual physical exam.    Health Maintenance Due   Topic Date Due     FIT Q1 YR   02/14/1975     HIV SCREEN (SYSTEM ASSIGNED)  02/14/1983     ZOSTER IMMUNIZATION (1 of 2) 02/14/2015     DTAP/TDAP/TD IMMUNIZATION (2 - Td) 12/23/2015     ASTHMA ACTION PLAN Q1 YR  02/01/2018     INFLUENZA VACCINE (1) 09/01/2018     This document serves as a record of the services and decisions personally performed and made by Fady Adams MD. It was created on his behalf by Neymar Oliver, a trained medical scribe. The creation of this document is based on the provider's statements to the medical scribe.  Neymar Oliver December 5, 2018 5:01 PM     The information in this document, created by the medical scribe for me, accurately reflects the services I personally performed and the decisions made by me. I have reviewed and approved this document for accuracy prior to leaving the patient care area.  December 5, 2018            Fady Adams MD [address]  [id number] [phone] Fax

## 2018-12-06 ENCOUNTER — MYC MEDICAL ADVICE (OUTPATIENT)
Dept: FAMILY MEDICINE | Facility: CLINIC | Age: 53
End: 2018-12-06

## 2018-12-06 NOTE — TELEPHONE ENCOUNTER
Patient has positive quantiferon, please check with [facility] at [facility], to see if he can be seen sooner

## 2018-12-06 NOTE — TELEPHONE ENCOUNTER
Forwarded to TS.  Please review patient's Mychart message and advise.  Catarina Odell, RN, BS, PHN

## 2018-12-06 NOTE — TELEPHONE ENCOUNTER
ID at [facility] will call back on the physician line today to talk about this referral and time frame.    Emperatriz Smith RN  [facility]

## 2018-12-07 ASSESSMENT — ASTHMA QUESTIONNAIRES: ACT_TOTALSCORE: 25

## 2018-12-10 ENCOUNTER — TRANSFERRED RECORDS (OUTPATIENT)
Dept: HEALTH INFORMATION MANAGEMENT | Facility: CLINIC | Age: 53
End: 2018-12-10

## 2018-12-10 NOTE — TELEPHONE ENCOUNTER
Dee called and stated the pt is there for an appt right now and does not need to be worked in. Closing encounter.    Emperatriz Cardoso  Patient

## 2018-12-10 NOTE — TELEPHONE ENCOUNTER
Called ID @ [facility] [phone] - stated they will need a doctor-to-doctor call in order for patient to be seen sooner.   MD RAMIREZ information given - they will have their provider call to discuss with MD RAMIREZ.     [employer] message sent to update patient    Routing to PCP for further review/recommendations/orders.    Leanne Delaney RN  [facility]

## 2018-12-12 PROBLEM — Z22.7 LATENT TUBERCULOSIS: Status: ACTIVE | Noted: 2018-12-01

## 2019-01-07 ENCOUNTER — MYC MEDICAL ADVICE (OUTPATIENT)
Dept: FAMILY MEDICINE | Facility: CLINIC | Age: 54
End: 2019-01-07

## 2019-01-07 DIAGNOSIS — Z22.7 LATENT TUBERCULOSIS: Primary | ICD-10-CM

## 2019-01-07 DIAGNOSIS — E78.5 HYPERLIPIDEMIA LDL GOAL <70: ICD-10-CM

## 2019-01-07 NOTE — TELEPHONE ENCOUNTER
Message handled by Nurse Triage with [facility] - provider name: MD RAMIREZ - CMP, CBC standing order x 12.  Please add note to labs to make sure they get faxed to Dr. Wandy Delaney RN  [place] T[facility]

## 2019-01-07 NOTE — TELEPHONE ENCOUNTER
Standing orders placed for CBC and CMP x 12 with note on order to fax results to Dr. Saba at [phone].  Future order placed for repeat Lipid panel per last lipid result note.  Patient notified via [employer].    JANES Herr, RN, N  [facility]) [phone]

## 2019-01-11 DIAGNOSIS — E78.5 HYPERLIPIDEMIA LDL GOAL <70: ICD-10-CM

## 2019-01-11 DIAGNOSIS — M79.89 SWELLING OF LIMB: ICD-10-CM

## 2019-01-11 DIAGNOSIS — M25.50 PAIN IN JOINT: ICD-10-CM

## 2019-01-11 DIAGNOSIS — Z22.7 LATENT TUBERCULOSIS: ICD-10-CM

## 2019-01-11 LAB
CRP SERPL-MCNC: 11 MG/L (ref 0–8)
ERYTHROCYTE [DISTWIDTH] IN BLOOD BY AUTOMATED COUNT: 13.8 % (ref 10–15)
ERYTHROCYTE [SEDIMENTATION RATE] IN BLOOD BY WESTERGREN METHOD: 9 MM/H (ref 0–20)
HCT VFR BLD AUTO: 46.2 % (ref 40–53)
HGB BLD-MCNC: 15.5 G/DL (ref 13.3–17.7)
MCH RBC QN AUTO: 31.6 PG (ref 26.5–33)
MCHC RBC AUTO-ENTMCNC: 33.5 G/DL (ref 31.5–36.5)
MCV RBC AUTO: 94 FL (ref 78–100)
PLATELET # BLD AUTO: 200 10E9/L (ref 150–450)
RBC # BLD AUTO: 4.9 10E12/L (ref 4.4–5.9)
WBC # BLD AUTO: 7.8 10E9/L (ref 4–11)

## 2019-01-11 PROCEDURE — 85027 COMPLETE CBC AUTOMATED: CPT | Performed by: FAMILY MEDICINE

## 2019-01-11 PROCEDURE — 84550 ASSAY OF BLOOD/URIC ACID: CPT | Performed by: FAMILY MEDICINE

## 2019-01-11 PROCEDURE — 80061 LIPID PANEL: CPT | Performed by: FAMILY MEDICINE

## 2019-01-11 PROCEDURE — 80053 COMPREHEN METABOLIC PANEL: CPT | Performed by: FAMILY MEDICINE

## 2019-01-11 PROCEDURE — 86140 C-REACTIVE PROTEIN: CPT | Performed by: FAMILY MEDICINE

## 2019-01-11 PROCEDURE — 36415 COLL VENOUS BLD VENIPUNCTURE: CPT | Performed by: FAMILY MEDICINE

## 2019-01-11 PROCEDURE — 85652 RBC SED RATE AUTOMATED: CPT | Performed by: FAMILY MEDICINE

## 2019-01-12 LAB
ALBUMIN SERPL-MCNC: 3.7 G/DL (ref 3.4–5)
ALP SERPL-CCNC: 90 U/L (ref 40–150)
ALT SERPL W P-5'-P-CCNC: 45 U/L (ref 0–70)
ANION GAP SERPL CALCULATED.3IONS-SCNC: 9 MMOL/L (ref 3–14)
AST SERPL W P-5'-P-CCNC: 22 U/L (ref 0–45)
BILIRUB SERPL-MCNC: 0.7 MG/DL (ref 0.2–1.3)
BUN SERPL-MCNC: 9 MG/DL (ref 7–30)
CALCIUM SERPL-MCNC: 9.5 MG/DL (ref 8.5–10.1)
CHLORIDE SERPL-SCNC: 108 MMOL/L (ref 94–109)
CHOLEST SERPL-MCNC: 134 MG/DL
CO2 SERPL-SCNC: 23 MMOL/L (ref 20–32)
CREAT SERPL-MCNC: 0.92 MG/DL (ref 0.66–1.25)
GFR SERPL CREATININE-BSD FRML MDRD: >90 ML/MIN/{1.73_M2}
GLUCOSE SERPL-MCNC: 119 MG/DL (ref 70–99)
HDLC SERPL-MCNC: 35 MG/DL
LDLC SERPL CALC-MCNC: 72 MG/DL
NONHDLC SERPL-MCNC: 99 MG/DL
POTASSIUM SERPL-SCNC: 4.4 MMOL/L (ref 3.4–5.3)
PROT SERPL-MCNC: 6.8 G/DL (ref 6.8–8.8)
SODIUM SERPL-SCNC: 140 MMOL/L (ref 133–144)
TRIGL SERPL-MCNC: 136 MG/DL
URATE SERPL-MCNC: 7.5 MG/DL (ref 3.5–7.2)

## 2019-01-31 DIAGNOSIS — I25.810 CORONARY ARTERY DISEASE INVOLVING CORONARY BYPASS GRAFT OF NATIVE HEART WITHOUT ANGINA PECTORIS: ICD-10-CM

## 2019-01-31 DIAGNOSIS — E78.5 HYPERLIPIDEMIA LDL GOAL <70: ICD-10-CM

## 2019-01-31 RX ORDER — ATORVASTATIN CALCIUM 80 MG/1
TABLET, FILM COATED ORAL
Qty: 90 TABLET | Refills: 0 | Status: SHIPPED | OUTPATIENT
Start: 2019-01-31 | End: 2019-05-07

## 2019-01-31 NOTE — TELEPHONE ENCOUNTER
"Requested Prescriptions   Pending Prescriptions Disp Refills     atorvastatin (LIPITOR) 80 MG tablet [Pharmacy Med Name: ATORVASTATIN 80MG TABLETS] 90 tablet 0     Sig: TAKE 1 TABLET(80 MG) BY MOUTH DAILY    Statins Protocol Passed - 1/31/2019  6:52 AM       Passed - LDL on file in past 12 months    Recent Labs   Lab Test 01/11/19  0800   LDL 72            Passed - No abnormal creatine kinase in past 12 months    Recent Labs   Lab Test 05/16/18  0831   CKT 42               Passed - Recent (12 mo) or future (30 days) visit within the authorizing provider's specialty    Patient had office visit in the last 12 months or has a visit in the next 30 days with authorizing provider or within the authorizing provider's specialty.  See \"Patient Info\" tab in inbasket, or \"Choose Columns\" in Meds & Orders section of the refill encounter.             Passed - Medication is active on med list       Passed - Patient is age 18 or older        Prescription approved per [facility] Refill Protocol.  Emperatriz Smith RN  [place] [facility]    "

## 2019-02-20 DIAGNOSIS — I10 HYPERTENSION GOAL BP (BLOOD PRESSURE) < 140/90: ICD-10-CM

## 2019-02-20 DIAGNOSIS — I25.810 CORONARY ARTERY DISEASE INVOLVING CORONARY BYPASS GRAFT OF NATIVE HEART WITHOUT ANGINA PECTORIS: ICD-10-CM

## 2019-02-21 RX ORDER — CARVEDILOL 3.12 MG/1
TABLET ORAL
Qty: 180 TABLET | Refills: 0 | Status: SHIPPED | OUTPATIENT
Start: 2019-02-21 | End: 2019-05-16

## 2019-02-21 NOTE — TELEPHONE ENCOUNTER
"Requested Prescriptions   Pending Prescriptions Disp Refills     carvedilol (COREG) 3.125 MG tablet [Pharmacy Med Name: CARVEDILOL 3.125MG TABLETS] 180 tablet 0     Sig: TAKE 1 TABLET(3.125 MG) BY MOUTH TWICE DAILY WITH MEALS    Beta-Blockers Protocol Passed - 2/20/2019  8:39 PM       Passed - Blood pressure under 140/90 in past 12 months    BP Readings from Last 3 Encounters:   12/05/18 136/70   05/11/18 122/72   05/07/18 136/84                Passed - Patient is age 6 or older       Passed - Recent (12 mo) or future (30 days) visit within the authorizing provider's specialty    Patient had office visit in the last 12 months or has a visit in the next 30 days with authorizing provider or within the authorizing provider's specialty.  See \"Patient Info\" tab in inbasket, or \"Choose Columns\" in Meds & Orders section of the refill encounter.             Passed - Medication is active on med list        Prescription approved per [facility] Refill Protocol.  Emperatriz Smith RN  [place] [facility]    "

## 2019-02-27 ENCOUNTER — TRANSFERRED RECORDS (OUTPATIENT)
Dept: HEALTH INFORMATION MANAGEMENT | Facility: CLINIC | Age: 54
End: 2019-02-27

## 2019-04-05 ENCOUNTER — MYC MEDICAL ADVICE (OUTPATIENT)
Dept: FAMILY MEDICINE | Facility: CLINIC | Age: 54
End: 2019-04-05

## 2019-04-05 NOTE — TELEPHONE ENCOUNTER
Attachment was lab orders from Dr. Mccall - printed and given to lab.     [place] Message sent  Awaiting response    Leanne Delaney RN  [facility]

## 2019-04-11 DIAGNOSIS — Z79.899 NEED FOR PROPHYLACTIC CHEMOTHERAPY: ICD-10-CM

## 2019-04-11 LAB
ALBUMIN SERPL-MCNC: 3.5 G/DL (ref 3.4–5)
ALT SERPL W P-5'-P-CCNC: 36 U/L (ref 0–70)
AST SERPL W P-5'-P-CCNC: 22 U/L (ref 0–45)
CREAT SERPL-MCNC: 0.98 MG/DL (ref 0.66–1.25)
ERYTHROCYTE [DISTWIDTH] IN BLOOD BY AUTOMATED COUNT: 13.8 % (ref 10–15)
GFR SERPL CREATININE-BSD FRML MDRD: 87 ML/MIN/{1.73_M2}
HCT VFR BLD AUTO: 45.8 % (ref 40–53)
HGB BLD-MCNC: 15.6 G/DL (ref 13.3–17.7)
MCH RBC QN AUTO: 31.2 PG (ref 26.5–33)
MCHC RBC AUTO-ENTMCNC: 34.1 G/DL (ref 31.5–36.5)
MCV RBC AUTO: 92 FL (ref 78–100)
PLATELET # BLD AUTO: 174 10E9/L (ref 150–450)
RBC # BLD AUTO: 5 10E12/L (ref 4.4–5.9)
WBC # BLD AUTO: 7.1 10E9/L (ref 4–11)

## 2019-04-11 PROCEDURE — 84450 TRANSFERASE (AST) (SGOT): CPT | Performed by: INTERNAL MEDICINE

## 2019-04-11 PROCEDURE — 84460 ALANINE AMINO (ALT) (SGPT): CPT | Performed by: INTERNAL MEDICINE

## 2019-04-11 PROCEDURE — 36415 COLL VENOUS BLD VENIPUNCTURE: CPT | Performed by: INTERNAL MEDICINE

## 2019-04-11 PROCEDURE — 82040 ASSAY OF SERUM ALBUMIN: CPT | Performed by: INTERNAL MEDICINE

## 2019-04-11 PROCEDURE — 85027 COMPLETE CBC AUTOMATED: CPT | Performed by: INTERNAL MEDICINE

## 2019-04-11 PROCEDURE — 82565 ASSAY OF CREATININE: CPT | Performed by: INTERNAL MEDICINE

## 2019-05-07 DIAGNOSIS — I25.810 CORONARY ARTERY DISEASE INVOLVING CORONARY BYPASS GRAFT OF NATIVE HEART WITHOUT ANGINA PECTORIS: ICD-10-CM

## 2019-05-07 DIAGNOSIS — E78.5 HYPERLIPIDEMIA LDL GOAL <70: ICD-10-CM

## 2019-05-07 NOTE — TELEPHONE ENCOUNTER
"Requested Prescriptions   Pending Prescriptions Disp Refills     atorvastatin (LIPITOR) 80 MG tablet [Pharmacy Med Name: ATORVASTATIN 80MG TABLETS]      Last Written Prescription Date:  1.31.19  Last Fill Quantity: 90 tablet,  # refills: 0   Last office visit: 12/5/2018 with prescribing provider:  Fady Adams MD       Future Office Visit:   Next 5 appointments (look out 90 days)    Jun 05, 2019  2:40 PM CDT  MyChart Physical Adult with Fady Adams MD  [facility] [address] [place] [id number] [phone]            90 tablet 0     Sig: TAKE 1 TABLET(80 MG) BY MOUTH DAILY       Statins Protocol Passed - 5/7/2019  7:29 AM        Passed - LDL on file in past 12 months     Recent Labs   Lab Test 01/11/19  0800   LDL 72             Passed - No abnormal creatine kinase in past 12 months     Recent Labs   Lab Test 05/16/18  0831   CKT 42                Passed - Recent (12 mo) or future (30 days) visit within the authorizing provider's specialty     Patient had office visit in the last 12 months or has a visit in the next 30 days with authorizing provider or within the authorizing provider's specialty.  See \"Patient Info\" tab in inbasket, or \"Choose Columns\" in Meds & Orders section of the refill encounter.              Passed - Medication is active on med list        Passed - Patient is age 18 or older        "

## 2019-05-08 RX ORDER — ATORVASTATIN CALCIUM 80 MG/1
TABLET, FILM COATED ORAL
Qty: 90 TABLET | Refills: 0 | Status: SHIPPED | OUTPATIENT
Start: 2019-05-08 | End: 2019-06-05

## 2019-05-16 DIAGNOSIS — I10 HYPERTENSION GOAL BP (BLOOD PRESSURE) < 140/90: ICD-10-CM

## 2019-05-16 DIAGNOSIS — I25.810 CORONARY ARTERY DISEASE INVOLVING CORONARY BYPASS GRAFT OF NATIVE HEART WITHOUT ANGINA PECTORIS: ICD-10-CM

## 2019-05-16 RX ORDER — CARVEDILOL 3.12 MG/1
TABLET ORAL
Qty: 180 TABLET | Refills: 1 | Status: SHIPPED | OUTPATIENT
Start: 2019-05-16 | End: 2019-06-05

## 2019-05-16 NOTE — TELEPHONE ENCOUNTER
"Requested Prescriptions   Pending Prescriptions Disp Refills     carvedilol (COREG) 3.125 MG tablet [Pharmacy Med Name: CARVEDILO  Last Written Prescription Date:  2/21/19  Last Fill Quantity: 180,  # refills: 0   Last Office Visit: 12/5/2018   Future Office Visit:    Next 5 appointments (look out 90 days)    Jun 05, 2019  2:40 PM CDT  [facility] Adult with F[facility] MD  [facility] [address] [place] [id number] [phone]          3.125MG TABLETS] 180 tablet 0     Sig: TAKE 1 TABLET(3.125 MG) BY MOUTH TWICE DAILY WITH MEALS       Beta-Blockers Protocol Passed - 5/16/2019  6:39 AM        Passed - Blood pressure under 140/90 in past 12 months     BP Readings from Last 3 Encounters:   12/05/18 136/70   05/11/18 122/72   05/07/18 136/84           Passed - Patient is age 6 or older        Passed - Recent (12 mo) or future (30 days) visit within the authorizing provider's specialty     Patient had office visit in the last 12 months or has a visit in the next 30 days with authorizing provider or within the authorizing provider's specialty.  See \"Patient Info\" tab in inbasket, or \"Choose Columns\" in Meds & Orders section of the refill encounter.              Passed - Medication is active on med list          "

## 2019-05-28 ENCOUNTER — TRANSFERRED RECORDS (OUTPATIENT)
Dept: HEALTH INFORMATION MANAGEMENT | Facility: CLINIC | Age: 54
End: 2019-05-28

## 2019-06-05 ENCOUNTER — OFFICE VISIT (OUTPATIENT)
Dept: FAMILY MEDICINE | Facility: CLINIC | Age: 54
End: 2019-06-05
Payer: COMMERCIAL

## 2019-06-05 VITALS
WEIGHT: 218 LBS | HEART RATE: 96 BPM | DIASTOLIC BLOOD PRESSURE: 84 MMHG | SYSTOLIC BLOOD PRESSURE: 148 MMHG | OXYGEN SATURATION: 96 % | BODY MASS INDEX: 33.04 KG/M2 | HEIGHT: 68 IN | TEMPERATURE: 98.8 F

## 2019-06-05 DIAGNOSIS — Z22.7 LATENT TUBERCULOSIS: ICD-10-CM

## 2019-06-05 DIAGNOSIS — I25.810 CORONARY ARTERY DISEASE INVOLVING CORONARY BYPASS GRAFT OF NATIVE HEART WITHOUT ANGINA PECTORIS: ICD-10-CM

## 2019-06-05 DIAGNOSIS — F41.9 ANXIETY: ICD-10-CM

## 2019-06-05 DIAGNOSIS — E66.811 CLASS 1 OBESITY WITH SERIOUS COMORBIDITY AND BODY MASS INDEX (BMI) OF 33.0 TO 33.9 IN ADULT, UNSPECIFIED OBESITY TYPE: ICD-10-CM

## 2019-06-05 DIAGNOSIS — R73.03 PREDIABETES: ICD-10-CM

## 2019-06-05 DIAGNOSIS — I10 HYPERTENSION GOAL BP (BLOOD PRESSURE) < 140/90: ICD-10-CM

## 2019-06-05 DIAGNOSIS — Z51.81 MEDICATION MONITORING ENCOUNTER: ICD-10-CM

## 2019-06-05 DIAGNOSIS — L40.50 PSORIATIC ARTHRITIS (H): ICD-10-CM

## 2019-06-05 DIAGNOSIS — N20.0 NEPHROLITHIASIS: ICD-10-CM

## 2019-06-05 DIAGNOSIS — Z91.09 ENVIRONMENTAL ALLERGIES: ICD-10-CM

## 2019-06-05 DIAGNOSIS — J45.20 MILD INTERMITTENT ASTHMA WITHOUT COMPLICATION: ICD-10-CM

## 2019-06-05 DIAGNOSIS — K22.70 BARRETT'S ESOPHAGUS WITHOUT DYSPLASIA: ICD-10-CM

## 2019-06-05 DIAGNOSIS — Z80.42 FAMILY HISTORY OF PROSTATE CANCER: ICD-10-CM

## 2019-06-05 DIAGNOSIS — Z12.5 SCREENING FOR PROSTATE CANCER: ICD-10-CM

## 2019-06-05 DIAGNOSIS — Z86.19 HISTORY OF HISTOPLASMOSIS: ICD-10-CM

## 2019-06-05 DIAGNOSIS — E78.5 HYPERLIPIDEMIA LDL GOAL <70: ICD-10-CM

## 2019-06-05 DIAGNOSIS — K21.9 GASTROESOPHAGEAL REFLUX DISEASE WITHOUT ESOPHAGITIS: ICD-10-CM

## 2019-06-05 DIAGNOSIS — Z00.00 ENCOUNTER FOR ROUTINE ADULT HEALTH EXAMINATION WITHOUT ABNORMAL FINDINGS: Primary | ICD-10-CM

## 2019-06-05 DIAGNOSIS — Z82.49 FAMILY HISTORY OF CORONARY ARTERY DISEASE: ICD-10-CM

## 2019-06-05 DIAGNOSIS — Z12.11 SCREEN FOR COLON CANCER: ICD-10-CM

## 2019-06-05 PROCEDURE — 99396 PREV VISIT EST AGE 40-64: CPT | Performed by: FAMILY MEDICINE

## 2019-06-05 RX ORDER — PYRIDOXINE HCL (VITAMIN B6) 25 MG
TABLET ORAL
COMMUNITY
Start: 2019-01-07 | End: 2020-06-10

## 2019-06-05 RX ORDER — AZELASTINE 1 MG/ML
1-2 SPRAY, METERED NASAL 2 TIMES DAILY
Qty: 3 BOTTLE | Refills: 3 | Status: SHIPPED | OUTPATIENT
Start: 2019-06-05 | End: 2020-06-10

## 2019-06-05 RX ORDER — ALBUTEROL SULFATE 90 UG/1
2 AEROSOL, METERED RESPIRATORY (INHALATION) EVERY 4 HOURS PRN
Qty: 54 G | Refills: 3 | Status: SHIPPED | OUTPATIENT
Start: 2019-06-05 | End: 2020-06-10

## 2019-06-05 RX ORDER — FLUTICASONE PROPIONATE 50 MCG
2 SPRAY, SUSPENSION (ML) NASAL DAILY
Qty: 48 ML | Refills: 3 | Status: SHIPPED | OUTPATIENT
Start: 2019-06-05 | End: 2020-06-10

## 2019-06-05 RX ORDER — CARVEDILOL 3.12 MG/1
3.12 TABLET ORAL 2 TIMES DAILY WITH MEALS
Qty: 180 TABLET | Refills: 3 | Status: SHIPPED | OUTPATIENT
Start: 2019-06-05 | End: 2020-06-10

## 2019-06-05 RX ORDER — ATORVASTATIN CALCIUM 80 MG/1
80 TABLET, FILM COATED ORAL DAILY
Qty: 90 TABLET | Refills: 3 | Status: SHIPPED | OUTPATIENT
Start: 2019-06-05 | End: 2020-06-10

## 2019-06-05 RX ORDER — ISONIAZID 300 MG/1
TABLET ORAL
Refills: 8 | COMMUNITY
Start: 2019-05-08 | End: 2020-03-02

## 2019-06-05 ASSESSMENT — MIFFLIN-ST. JEOR: SCORE: 1803.34

## 2019-06-05 NOTE — PROGRESS NOTES
SUBJECTIVE:   CC: Mian Lozano is an 54 year old male who presents for preventative health visit.     Healthy Habits:     Getting at least 3 servings of Calcium per day:  Yes    Bi-annual eye exam:  Yes    Dental care twice a year:  Yes    Sleep apnea or symptoms of sleep apnea:  None    Diet:  Regular (no restrictions)    Frequency of exercise:  1 day/week    Duration of exercise:  Less than 15 minutes    Taking medications regularly:  Yes    Medication side effects:  None    PHQ-2 Total Score: 0    Additional concerns today:  No    CAD: Stable. Patient denies CP, SOB, and edema. Patient consults with Dr. Jones of cardiology for further CAD management. Followed by Dr Jones.    Prediabetes: Stable.    Glucose   Date Value Ref Range Status   01/11/2019 119 (H) 70 - 99 mg/dL Final     Comment:     Fasting specimen   05/16/2018 104 (H) 70 - 99 mg/dL Final     Comment:     Fasting specimen   01/30/2018 108 (H) 70 - 99 mg/dL Final     Comment:     Fasting specimen   02/14/2017 104 (H) 70 - 99 mg/dL Final   11/02/2016 111 (H) 70 - 99 mg/dL Final     Lab Results   Component Value Date    A1C 5.6 05/16/2018    A1C 5.4 01/30/2018    A1C 5.6 02/14/2017    A1C 5.5 01/27/2016    A1C 5.5 07/08/2015     Hypertension: Patient presents today as hypertensive(146/82). Patient is currently prescribed 3.125 mg Coreg BID for hypertension management.    BP Readings from Last 5 Encounters:   06/05/19 148/84   12/05/18 136/70   05/11/18 122/72   05/07/18 136/84   03/26/18 128/80     Creatinine   Date Value Ref Range Status   04/11/2019 0.98 0.66 - 1.25 mg/dL Final     Hyperlipidemia: Patient's hyperlipidemia is well controlled. Patient is currently prescribed 80 mg Atorvastatin daily for hyperlipidemia management.    Recent Labs   Lab Test 01/11/19  0800 05/16/18  0831  07/08/15  0757 09/03/14  0745   CHOL 134 156   < > 146 159   HDL 35* 32*   < > 37* 34*   LDL 72 92   < > 79 72   TRIG 136 161*   < > 148 267*   CHOLHDLRATIO  --    --   --  3.9 4.7    < > = values in this interval not displayed.     Psoriatic Arthritis: Stable. Patient is currently prescribed 12.5 mg Methotrexate weekly, 10 mg Prednisone daily, and Humira for psoriatic arthritis management. Patient consults with Dr. Mccall of rheumatology for further psoriatic arthritis management.     Latent TB: Stable. Patient is currently prescribed 300 mg Nydrazid daily for Latent TB treatment. Followed by Dr Saba    Asthma: Stable. Patient's asthma is well controlled. Patient is currently prescribed an albuterol inhaler for asthma management.    GERD/Sullivan's Esophagus: Stable. Patient's GERD/Sullivan's Esophagus is well controlled by 30 mg Prevacid daily.     Nephrolithiasis: Stable. No issues.     Environmental Allergies: Stable. Patient's allergies are well controlled by Flonase use daily, Astelin use daily, and 10 mg Zyrtec daily for allergy symptom management.     Today's PHQ-2 Score:   PHQ-2 ( 1999 Pfizer) 5/30/2019   Q1: Little interest or pleasure in doing things 0   Q2: Feeling down, depressed or hopeless 0   PHQ-2 Score 0   Q1: Little interest or pleasure in doing things Not at all   Q2: Feeling down, depressed or hopeless Not at all   PHQ-2 Score 0       Abuse: Current or Past(Physical, Sexual or Emotional)- No  Do you feel safe in your environment? Yes    Social History     Tobacco Use     Smoking status: Never Smoker     Smokeless tobacco: Never Used   Substance Use Topics     Alcohol use: Yes     Alcohol/week: 3.6 oz     Comment: 6 drinks per week     If you drink alcohol do you typically have >3 drinks per day or >7 drinks per week? No    Alcohol Use 6/5/2019   Prescreen: >3 drinks/day or >7 drinks/week? -   Prescreen: >3 drinks/day or >7 drinks/week? No       Last PSA:   PSA   Date Value Ref Range Status   05/16/2018 1.14 0 - 4 ug/L Final     Comment:     Assay Method:  Chemiluminescence using Siemens Vista analyzer       Reviewed orders with patient. Reviewed health  maintenance and updated orders accordingly - Yes    Reviewed and updated as needed this visit by clinical staff  Tobacco  Allergies  Meds  Med Hx  Surg Hx  Fam Hx  Soc Hx      Reviewed and updated as needed this visit by Provider        Health Maintenance     Colonoscopy:  Last 8/7/2015, Due 8/2025   FIT:  Not on file              PSA:  Last 5/16/2018, Due   DEXA:  N/A    Health Maintenance Due   Topic Date Due     ASTHMA ACTION PLAN  1965     FIT  02/14/1975     HIV SCREENING  02/14/1980     ZOSTER IMMUNIZATION (1 of 2) 02/14/2015     MICROALBUMIN  05/16/2019     PREVENTIVE CARE VISIT  05/11/2019     PSA  05/16/2019     ASTHMA CONTROL TEST  06/05/2019       Current Problem List    Patient Active Problem List   Diagnosis     Other atopic dermatitis and related conditions     Mild intermittent asthma     Family history of coronary artery disease     Family history of prostate cancer     Prediabetes     Health Care Home     GERD (gastroesophageal reflux disease)     Sullivan's esophagus     Anxiety     Hypertension goal BP (blood pressure) < 140/90     Post-thoracotomy pain syndrome     Nephrolithiasis     Coronary artery disease     Hyperlipidemia LDL goal <70     Environmental allergies     H/O coronary artery bypass surgery     Psoriatic arthritis (H)     Class 1 obesity with serious comorbidity and body mass index (BMI) of 33.0 to 33.9 in adult, unspecified obesity type     Latent tuberculosis       Past Medical History    Past Medical History:   Diagnosis Date     Anxiety      Sullivan's esophagus 7/11     Coronary artery disease 04/2008    cabg x 5     Environmental allergies     seasonal, hay fever     FAM HX-CARDIOVAS DIS NEC     dad at 45, cabg, stent     Family history of prostate cancer      GERD (gastroesophageal reflux disease) 3/11     History of blood transfusion      Hyperlipidemia LDL goal <70 2006     Hypertension goal BP (blood pressure) < 140/90 4/08     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      Dittes, Hx histoplasmosis     Mild intermittent asthma 7/03     Nephrolithiasis 6/13, 7/16    calcium oxalate     Other atopic dermatitis and related conditions 1980     Other diseases of lung, not elsewhere classified 4/08    dr steel - benign bx and ct - granuloma - no further w/u needed     Post-thoracotomy pain syndrome 4/11    dr hoover     Prediabetes 8/08     Psoriatic arthritis (H)     Dr Mcclal     Seasonal allergies 1980    Allergic rhinitis Hayfever       Past Surgical History    Past Surgical History:   Procedure Laterality Date     C CABG, ARTERY-VEIN, FIVE  4/08    FSD - lung bx benign     C STRESS TEST - REGULAR (FL)  9/06, 4/11    negative stress thallium - FSD     COLONOSCOPY N/A 8/7/2015    normal - due 10 yrs     ESOPHAGOSCOPY, GASTROSCOPY, DUODENOSCOPY (EGD), COMBINED  6/11, 5/13    [facility] - ? gastritis, fowler's - due 3 yr     ESOPHAGOSCOPY, GASTROSCOPY, DUODENOSCOPY (EGD), COMBINED  5/10/2013    COMBINED ESOPHAGOSCOPY, GASTROSCOPY, DUODENOSCOPY (EGD), BIOPSY SINGLE OR MULTIPLE;  ESOPHAGOSCOPY, GASTROSCOPY, DUODENOSCOPY (EGD)  with biopsy;  Surgeon: Angus Reynolds MD;  Location:  GI     ESOPHAGOSCOPY, GASTROSCOPY, DUODENOSCOPY (EGD), COMBINED N/A 7/28/2017    Fowler's - recheck 3 yrs     HC VASECTOMY UNILAT/BILAT W POSTOP SEMEN  1995    Vasectomy     HERNIORRHAPHY UMBILICAL N/A 11/13/2014    Dr Barron     SURGICAL HISTORY OF -  Left 1980    lt patella fx     SURGICAL HISTORY OF -  Left 1985    lt thumb neuroma excised     SURGICAL HISTORY OF -   11/09    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       Current Medications    Current Outpatient Medications   Medication Sig Dispense Refill     albuterol (PROAIR HFA) 108 (90 Base) MCG/ACT inhaler Inhale 2 puffs into the lungs every 4 hours as needed 54 g 3     ASPIRIN 81 MG OR TABS ONE DAILY 100 3     atorvastatin (LIPITOR) 80 MG tablet Take 1 tablet (80 mg) by mouth daily 90 tablet 3     azelastine (ASTELIN) 0.1 % nasal spray Spray 1-2  sprays into both nostrils 2 times daily 3 Bottle 3     carvedilol (COREG) 3.125 MG tablet Take 1 tablet (3.125 mg) by mouth 2 times daily (with meals) 180 tablet 3     cetirizine (ZYRTEC) 10 MG tablet Take 1 tablet by mouth every evening. 30 tablet 1     fluticasone (FLONASE) 50 MCG/ACT nasal spray Spray 2 sprays into both nostrils daily 48 mL 3     folic acid (FOLVITE) 1 MG tablet TK 1 T PO QAM  0     isoniazid (NYDRAZID) 300 MG tablet TK 1 T PO QD  8     LANsoprazole (PREVACID) 30 MG capsule TAKE ONE CAPSULE BY MOUTH DAILY. TAKE 30 TO 60 MINUTES BEFORE A MEAL. 90 capsule 3     methotrexate 2.5 MG tablet TK 5 TS PO Q WK  0     predniSONE (DELTASONE) 5 MG tablet TK 2 TS PO QAM  0     vitamin B6 (PYRIDOXINE) 25 MG tablet        zolpidem (AMBIEN) 5 MG tablet Take 1 tablet (5 mg) by mouth nightly as needed for sleep 30 tablet 0       Allergies    Allergies   Allergen Reactions     Seasonal Allergies        Immunizations    Immunization History   Administered Date(s) Administered     Influenza (IIV3) PF 11/26/2012, 11/12/2014, 10/01/2016     Influenza Intranasal Vaccine 4 valent 10/01/2015     Influenza Vaccine IM 3yrs+ 4 Valent IIV4 10/20/2015, 01/30/2018, 10/01/2018     TDAP Vaccine (Boostrix) 11/25/2015     Tdap (Adacel,Boostrix) 04/28/2006       Family History    Family History   Problem Relation Age of Onset     Prostate Cancer Father 69     Coronary Artery Disease Father 38     Hypertension Father      Hypertension Paternal Grandfather      C.A.D. Paternal Grandfather      Neurologic Disorder Sister         CVA/anuerysm at age 19     Colon Cancer No family hx of        Social History    Social History     Socioeconomic History     Marital status:      Spouse name: Imelda     Number of children: 0     Years of education: 14     Highest education level: Not on file   Occupational History     Comment: [employer]   Social Needs     Financial resource strain: Not on file     Food insecurity:     Worry: Not on  "file     Inability: Not on file     Transportation needs:     Medical: Not on file     Non-medical: Not on file   Tobacco Use     Smoking status: Never Smoker     Smokeless tobacco: Never Used   Substance and Sexual Activity     Alcohol use: Yes     Alcohol/week: 3.6 oz     Comment: 6 drinks per week     Drug use: No     Sexual activity: Yes     Partners: Female     Birth control/protection: Male Surgical   Lifestyle     Physical activity:     Days per week: Not on file     Minutes per session: Not on file     Stress: Not on file   Relationships     Social connections:     Talks on phone: Not on file     Gets together: Not on file     Attends [employer] service: Not on file     Active member of club or organization: Not on file     Attends meetings of clubs or organizations: Not on file     Relationship status: Not on file     Intimate partner violence:     Fear of current or ex partner: Not on file     Emotionally abused: Not on file     Physically abused: Not on file     Forced sexual activity: Not on file   Other Topics Concern      Service Not Asked     Blood Transfusions Not Asked     Caffeine Concern Yes     Comment: 1 can qd     Occupational Exposure Not Asked     Hobby Hazards Not Asked     Sleep Concern Not Asked     Stress Concern Not Asked     Weight Concern Not Asked     Special Diet Not Asked     Back Care Not Asked     Exercise Yes     Comment: occas     Bike Helmet Not Asked     Seat Belt Yes     Self-Exams Not Asked     Parent/sibling w/ CABG, MI or angioplasty before 65F 55M? Yes   Social History Narrative     Not on file       Review of Systems  Constitutional, HEENT, cardiovascular, pulmonary, GI, , musculoskeletal, neuro, skin, endocrine and psych systems are negative, except as otherwise noted.    OBJECTIVE:   /84   Pulse 96   Temp 98.8  F (37.1  C) (Oral)   Ht 1.727 m (5' 8\")   Wt 98.9 kg (218 lb)   SpO2 96%   BMI 33.15 kg/m      Physical Exam  GENERAL: healthy, alert and " no distress  EYES: Eyes grossly normal to inspection  HENT:ear canals and TM's normal upon viewing with otoscope, nose and mouth without ulcers or lesions upon viewing with otoscope  NECK: no adenopathy, no asymmetry, masses, or scars and thyroid normal to palpation  RESP: lungs clear to auscultation - no rales, rhonchi or wheezes  CV: regular rate and rhythm, normal S1 S2, no S3 or S4, no murmur, click or rub, no peripheral edema and peripheral pulses strong  ABDOMEN: soft, nontender, no hepatosplenomegaly, no masses and bowel sounds normal   (male): normal male genitalia without lesions or urethral discharge, no hernia  RECTAL: normal sphincter tone, no rectal masses, prostate normal size, smooth, nontender without nodules or masses  MS: no gross musculoskeletal defects noted, no edema  SKIN: no suspicious lesions or rashes  NEURO: Normal strength and tone, mentation intact and speech normal  PSYCH: mentation appears normal, affect normal/bright  BACK: no CVA tenderness, no paralumbar tenderness    Diagnostic Test Results:  No results found for this or any previous visit (from the past 24 hour(s)).    ASSESSMENT/PLAN:       ICD-10-CM    1. Encounter for routine adult health examination without abnormal findings Z00.00 Comprehensive metabolic panel     Lipid panel reflex to direct LDL Fasting     CK total     CBC with platelets     TSH with free T4 reflex     UA reflex to Microscopic and Culture     Albumin Random Urine Quantitative with Creat Ratio     Prostate spec antigen screen     Fecal colorectal cancer screen FIT     Hemoglobin A1c   2. Coronary artery disease involving coronary bypass graft of native heart without angina pectoris I25.810 Comprehensive metabolic panel     Lipid panel reflex to direct LDL Fasting     UA reflex to Microscopic and Culture     Albumin Random Urine Quantitative with Creat Ratio     Hemoglobin A1c     atorvastatin (LIPITOR) 80 MG tablet     carvedilol (COREG) 3.125 MG tablet    3. Prediabetes R73.03 Comprehensive metabolic panel     Lipid panel reflex to direct LDL Fasting     UA reflex to Microscopic and Culture     Albumin Random Urine Quantitative with Creat Ratio     Hemoglobin A1c     atorvastatin (LIPITOR) 80 MG tablet   4. Hypertension goal BP (blood pressure) < 140/90 I10 Comprehensive metabolic panel     UA reflex to Microscopic and Culture     Albumin Random Urine Quantitative with Creat Ratio     carvedilol (COREG) 3.125 MG tablet   5. Hyperlipidemia LDL goal <70 E78.5 Comprehensive metabolic panel     Lipid panel reflex to direct LDL Fasting     CK total     atorvastatin (LIPITOR) 80 MG tablet   6. Psoriatic arthritis (H) L40.50 Comprehensive metabolic panel     CBC with platelets   7. Latent tuberculosis R76.11 Comprehensive metabolic panel     CBC with platelets   8. History of histoplasmosis Z86.19 Comprehensive metabolic panel     CBC with platelets   9. Mild intermittent asthma without complication J45.20 albuterol (PROAIR HFA) 108 (90 Base) MCG/ACT inhaler     azelastine (ASTELIN) 0.1 % nasal spray     fluticasone (FLONASE) 50 MCG/ACT nasal spray   10. Environmental allergies Z91.09 albuterol (PROAIR HFA) 108 (90 Base) MCG/ACT inhaler     azelastine (ASTELIN) 0.1 % nasal spray     fluticasone (FLONASE) 50 MCG/ACT nasal spray   11. Anxiety F41.9 TSH with free T4 reflex   12. Gastroesophageal reflux disease without esophagitis K21.9 CBC with platelets   13. Sullivan's esophagus without dysplasia K22.70 CBC with platelets   14. Nephrolithiasis N20.0 UA reflex to Microscopic and Culture     Albumin Random Urine Quantitative with Creat Ratio   15. Class 1 obesity with serious comorbidity and body mass index (BMI) of 33.0 to 33.9 in adult, unspecified obesity type E66.9     Z68.33    16. Family history of prostate cancer Z80.42 Prostate spec antigen screen   17. Family history of coronary artery disease Z82.49 Lipid panel reflex to direct LDL Fasting   18. Screening for  prostate cancer Z12.5 Prostate spec antigen screen   19. Screen for colon cancer Z12.11 Fecal colorectal cancer screen FIT   20. Medication monitoring encounter Z51.81 Comprehensive metabolic panel     Lipid panel reflex to direct LDL Fasting     CK total     CBC with platelets     TSH with free T4 reflex     UA reflex to Microscopic and Culture     Albumin Random Urine Quantitative with Creat Ratio     Hemoglobin A1c     Discussed treatment/modality options, including risk and benefits, he desires advised aspirin 81 mg po daily, advised 1 multivitamin per day, advised dentist every 6 months, advised diet and exercise and advised opthalmologist every 1-2 years. All diagnosis above reviewed and noted above, otherwise stable.  See [facility] orders for further details.      1) Patient consults with Dr. Jones of cardiology for further CAD management. Recommend continued follow up.     2) Patient presents today as hypertensive(146/82). Patient is currently prescribed 3.125 mg Coreg BID for hypertension management. Advised continued use.     3) Patient's hyperlipidemia is well controlled. Patient is currently prescribed 80 mg Atorvastatin daily for hyperlipidemia management. Advised continued use.     4) Patient is currently prescribed 12.5 mg Methotrexate weekly, 10 mg Prednisone daily, and Humira for psoriatic arthritis management. Advised continued use. Patient consults with Dr. Mccall of rheumatology for further psoriatic arthritis management. Recommend continued follow up.    5) Patient is currently prescribed 300 mg Nydrazid daily for Latent TB treatment. Advised continued use.     6) Patient's asthma is well controlled. Patient is currently prescribed an albuterol inhaler for asthma management. Advised continued use.     7) Patient's GERD/Sullivan's Esophagus is well controlled by 30 mg Prevacid daily. Advised continued use.     8) Patient's allergies are well controlled by Flonase use daily, Astelin use daily, and  "10 mg Zyrtec daily for allergy symptom management. Advised continued use.    9) Prescriptions refilled today.    10) Recommend Shingrix vaccine.     11) Follow up in 1 week for fasting labs.    12) Follow up in 1 year for complete physical exam.    Health Maintenance Due   Topic Date Due     ASTHMA ACTION PLAN  1965     FIT  02/14/1975     HIV SCREENING  02/14/1980     ZOSTER IMMUNIZATION (1 of 2) 02/14/2015     MICROALBUMIN  05/16/2019     PREVENTIVE CARE VISIT  05/11/2019     PSA  05/16/2019     ASTHMA CONTROL TEST  06/05/2019       COUNSELING:   Reviewed preventive health counseling, as reflected in patient instructions    Estimated body mass index is 33.15 kg/m  as calculated from the following:    Height as of this encounter: 1.727 m (5' 8\").    Weight as of this encounter: 98.9 kg (218 lb).     Weight management plan: Discussed healthy diet and exercise guidelines     reports that he has never smoked. He has never used smokeless tobacco.    Counseling Resources:  ATP IV Guidelines  Pooled Cohorts Equation Calculator  FRAX Risk Assessment  ICSI Preventive Guidelines  Dietary Guidelines for Americans, 2010  USDA's MyPlate  ASA Prophylaxis  Lung CA Screening    This document serves as a record of the services and decisions personally performed and made by Fady Adams MD. It was created on his behalf by Neymar Oliver, a trained medical scribe. The creation of this document is based on the provider's statements to the medical scribe.  Neymar Oliver June 5, 2019 2:59 PM     The information in this document, created by the medical scribe for me, accurately reflects the services I personally performed and the decisions made by me. I have reviewed and approved this document for accuracy prior to leaving the patient care area.  June 5, 2019          [facility]ady Adams MD, [facility]    [address]  [address] [phone] Fax  "

## 2019-06-05 NOTE — PATIENT INSTRUCTIONS
Recommend Shingrix vaccine for shingles. Check with insurance to see where the Shingrix vaccine is the cheapest. Follow up 2-6 months after receiving the first shot for the second shot.    [facility]                        To reach your care team during and after hours:   [phone]  To reach our pharmacy:        [phone]    Clinic Hours                        Our clinic hours are:    Monday   7:30 am to 7:00 pm                  Tuesday through Friday 7:30 am to 5:00 pm                             Saturday   8:00 am to 12:00 pm      Sunday   Closed      Pharmacy Hours                        Our pharmacy hours are:    Monday   8:30 am to 7:00 pm       Tuesday to Friday  8:30 am to 6:00 pm                       Saturday    9:00 am to 1:00 pm              Sunday    Closed              There is also information available at our web site:  [email or web address]    If your provider ordered any lab tests and you do not receive the results within 10 business days, please call the clinic.    If you need a medication refill please contact your pharmacy.  Please allow 2-3 business days for your refill to be completed.    Our clinic offers telephone visits and e visits.  Please ask one of your team members to explain more.      Use [employer] (secure email communication and access to your chart) to send your primary care provider a message or make an appointment. Ask someone on your Team how to sign up for [employer].  Immunizations                      Immunization History   Administered Date(s) Administered     Influenza (IIV3) PF 11/26/2012, 11/12/2014, 10/01/2016     Influenza Intranasal Vaccine 4 valent 10/01/2015     Influenza Vaccine IM 3yrs+ 4 Valent IIV4 10/20/2015, 01/30/2018     TDAP Vaccine (Boostrix) 11/25/2015     Tdap (Adacel,Boostrix) 04/28/2006        Health Maintenance                         Health Maintenance Due   Topic Date Due     Asthma Action Plan  1965     FIT Test  02/14/1975      One-time HIV Screening  02/14/1980     Zoster (Shingles) Vaccine (1 of 2) 02/14/2015     Kidney Function Urine Test  05/16/2019     Preventive Care Visit  05/11/2019     Prostate Test  05/16/2019     Asthma Control Test  06/05/2019       Preventive Health Recommendations  Male Ages 50 - 64    Yearly exam:             See your health care provider every year in order to  o   Review health changes.   o   Discuss preventive care.    o   Review your medicines if your doctor has prescribed any.     Have a cholesterol test every 5 years, or more frequently if you are at risk for high cholesterol/heart disease.     Have a diabetes test (fasting glucose) every three years. If you are at risk for diabetes, you should have this test more often.     Have a colonoscopy at age 50, or have a yearly FIT test (stool test). These exams will check for colon cancer.      Talk with your health care provider about whether or not a prostate cancer screening test (PSA) is right for you.    You should be tested each year for STDs (sexually transmitted diseases), if you re at risk.     Shots: Get a flu shot each year. Get a tetanus shot every 10 years.     Nutrition:    Eat at least 5 servings of fruits and vegetables daily.     Eat whole-grain bread, whole-wheat pasta and brown rice instead of white grains and rice.     Get adequate Calcium and Vitamin D.     Lifestyle    Exercise for at least 150 minutes a week (30 minutes a day, 5 days a week). This will help you control your weight and prevent disease.     Limit alcohol to one drink per day.     No smoking.     Wear sunscreen to prevent skin cancer.     See your dentist every six months for an exam and cleaning.     See your eye doctor every 1 to 2 years.

## 2019-06-11 ASSESSMENT — ASTHMA QUESTIONNAIRES: ACT_TOTALSCORE: 25

## 2019-06-12 ENCOUNTER — ALLIED HEALTH/NURSE VISIT (OUTPATIENT)
Dept: NURSING | Facility: CLINIC | Age: 54
End: 2019-06-12
Payer: COMMERCIAL

## 2019-06-12 VITALS — SYSTOLIC BLOOD PRESSURE: 136 MMHG | DIASTOLIC BLOOD PRESSURE: 82 MMHG

## 2019-06-12 DIAGNOSIS — E78.5 HYPERLIPIDEMIA LDL GOAL <70: ICD-10-CM

## 2019-06-12 DIAGNOSIS — I10 HYPERTENSION GOAL BP (BLOOD PRESSURE) < 140/90: ICD-10-CM

## 2019-06-12 DIAGNOSIS — I25.810 CORONARY ARTERY DISEASE INVOLVING CORONARY BYPASS GRAFT OF NATIVE HEART WITHOUT ANGINA PECTORIS: ICD-10-CM

## 2019-06-12 DIAGNOSIS — Z80.42 FAMILY HISTORY OF PROSTATE CANCER: ICD-10-CM

## 2019-06-12 DIAGNOSIS — R73.03 PREDIABETES: ICD-10-CM

## 2019-06-12 DIAGNOSIS — K22.70 BARRETT'S ESOPHAGUS WITHOUT DYSPLASIA: ICD-10-CM

## 2019-06-12 DIAGNOSIS — L40.50 PSORIATIC ARTHRITIS (H): ICD-10-CM

## 2019-06-12 DIAGNOSIS — N20.0 NEPHROLITHIASIS: ICD-10-CM

## 2019-06-12 DIAGNOSIS — Z00.00 ENCOUNTER FOR ROUTINE ADULT HEALTH EXAMINATION WITHOUT ABNORMAL FINDINGS: ICD-10-CM

## 2019-06-12 DIAGNOSIS — Z12.5 SCREENING FOR PROSTATE CANCER: ICD-10-CM

## 2019-06-12 DIAGNOSIS — Z86.19 HISTORY OF HISTOPLASMOSIS: ICD-10-CM

## 2019-06-12 DIAGNOSIS — Z51.81 MEDICATION MONITORING ENCOUNTER: ICD-10-CM

## 2019-06-12 DIAGNOSIS — Z82.49 FAMILY HISTORY OF CORONARY ARTERY DISEASE: ICD-10-CM

## 2019-06-12 DIAGNOSIS — Z22.7 LATENT TUBERCULOSIS: ICD-10-CM

## 2019-06-12 DIAGNOSIS — K21.9 GASTROESOPHAGEAL REFLUX DISEASE WITHOUT ESOPHAGITIS: ICD-10-CM

## 2019-06-12 DIAGNOSIS — F41.9 ANXIETY: ICD-10-CM

## 2019-06-12 DIAGNOSIS — I10 HYPERTENSION GOAL BP (BLOOD PRESSURE) < 140/90: Primary | ICD-10-CM

## 2019-06-12 LAB
ALBUMIN SERPL-MCNC: 3.5 G/DL (ref 3.4–5)
ALBUMIN UR-MCNC: NEGATIVE MG/DL
ALP SERPL-CCNC: 95 U/L (ref 40–150)
ALT SERPL W P-5'-P-CCNC: 36 U/L (ref 0–70)
ANION GAP SERPL CALCULATED.3IONS-SCNC: 10 MMOL/L (ref 3–14)
APPEARANCE UR: NORMAL
AST SERPL W P-5'-P-CCNC: 34 U/L (ref 0–45)
BILIRUB SERPL-MCNC: 0.7 MG/DL (ref 0.2–1.3)
BILIRUB UR QL STRIP: NEGATIVE
BUN SERPL-MCNC: 10 MG/DL (ref 7–30)
CALCIUM SERPL-MCNC: 8.5 MG/DL (ref 8.5–10.1)
CHLORIDE SERPL-SCNC: 109 MMOL/L (ref 94–109)
CHOLEST SERPL-MCNC: 126 MG/DL
CK SERPL-CCNC: 62 U/L (ref 30–300)
CO2 SERPL-SCNC: 24 MMOL/L (ref 20–32)
COLOR UR AUTO: YELLOW
CREAT SERPL-MCNC: 0.97 MG/DL (ref 0.66–1.25)
CREAT UR-MCNC: 147 MG/DL
ERYTHROCYTE [DISTWIDTH] IN BLOOD BY AUTOMATED COUNT: 14.2 % (ref 10–15)
GFR SERPL CREATININE-BSD FRML MDRD: 88 ML/MIN/{1.73_M2}
GLUCOSE SERPL-MCNC: 122 MG/DL (ref 70–99)
GLUCOSE UR STRIP-MCNC: NEGATIVE MG/DL
HBA1C MFR BLD: 5.4 % (ref 0–5.6)
HCT VFR BLD AUTO: 42.6 % (ref 40–53)
HDLC SERPL-MCNC: 35 MG/DL
HGB BLD-MCNC: 14.8 G/DL (ref 13.3–17.7)
HGB UR QL STRIP: NEGATIVE
KETONES UR STRIP-MCNC: NEGATIVE MG/DL
LDLC SERPL CALC-MCNC: 47 MG/DL
LEUKOCYTE ESTERASE UR QL STRIP: NEGATIVE
MCH RBC QN AUTO: 31.4 PG (ref 26.5–33)
MCHC RBC AUTO-ENTMCNC: 34.7 G/DL (ref 31.5–36.5)
MCV RBC AUTO: 90 FL (ref 78–100)
MICROALBUMIN UR-MCNC: 6 MG/L
MICROALBUMIN/CREAT UR: 3.78 MG/G CR (ref 0–17)
NITRATE UR QL: NEGATIVE
NONHDLC SERPL-MCNC: 91 MG/DL
PH UR STRIP: 6 PH (ref 5–7)
PLATELET # BLD AUTO: 182 10E9/L (ref 150–450)
POTASSIUM SERPL-SCNC: 4.2 MMOL/L (ref 3.4–5.3)
PROT SERPL-MCNC: 6.6 G/DL (ref 6.8–8.8)
PSA SERPL-ACNC: 0.81 UG/L (ref 0–4)
RBC # BLD AUTO: 4.72 10E12/L (ref 4.4–5.9)
RBC #/AREA URNS AUTO: NORMAL /HPF
SODIUM SERPL-SCNC: 143 MMOL/L (ref 133–144)
SOURCE: NORMAL
SP GR UR STRIP: 1.02 (ref 1–1.03)
TRIGL SERPL-MCNC: 222 MG/DL
TSH SERPL DL<=0.005 MIU/L-ACNC: 2.02 MU/L (ref 0.4–4)
UROBILINOGEN UR STRIP-ACNC: 0.2 EU/DL (ref 0.2–1)
WBC # BLD AUTO: 5.7 10E9/L (ref 4–11)
WBC #/AREA URNS AUTO: NORMAL /HPF

## 2019-06-12 PROCEDURE — 82550 ASSAY OF CK (CPK): CPT | Performed by: FAMILY MEDICINE

## 2019-06-12 PROCEDURE — 81001 URINALYSIS AUTO W/SCOPE: CPT | Performed by: FAMILY MEDICINE

## 2019-06-12 PROCEDURE — 80061 LIPID PANEL: CPT | Performed by: FAMILY MEDICINE

## 2019-06-12 PROCEDURE — 80053 COMPREHEN METABOLIC PANEL: CPT | Performed by: FAMILY MEDICINE

## 2019-06-12 PROCEDURE — G0103 PSA SCREENING: HCPCS | Performed by: FAMILY MEDICINE

## 2019-06-12 PROCEDURE — 36415 COLL VENOUS BLD VENIPUNCTURE: CPT | Performed by: FAMILY MEDICINE

## 2019-06-12 PROCEDURE — 83036 HEMOGLOBIN GLYCOSYLATED A1C: CPT | Performed by: FAMILY MEDICINE

## 2019-06-12 PROCEDURE — 99207 ZZC NO CHARGE NURSE ONLY: CPT

## 2019-06-12 PROCEDURE — 82043 UR ALBUMIN QUANTITATIVE: CPT | Performed by: FAMILY MEDICINE

## 2019-06-12 PROCEDURE — 85027 COMPLETE CBC AUTOMATED: CPT | Performed by: FAMILY MEDICINE

## 2019-06-12 PROCEDURE — 84443 ASSAY THYROID STIM HORMONE: CPT | Performed by: FAMILY MEDICINE

## 2019-06-12 NOTE — PROGRESS NOTES
Mian Lozano is a 54 year old patient who comes in today for a Blood Pressure check.  Initial BP:  /82 (BP Location: Left arm, Patient Position: Chair, Cuff Size: Adult Large)      Data Unavailable  Disposition: results routed to provider    Patient had blood pressure medication this morning

## 2019-06-25 DIAGNOSIS — R73.03 PREDIABETES: ICD-10-CM

## 2019-06-25 DIAGNOSIS — E66.811 CLASS 1 OBESITY WITH SERIOUS COMORBIDITY AND BODY MASS INDEX (BMI) OF 33.0 TO 33.9 IN ADULT, UNSPECIFIED OBESITY TYPE: Primary | ICD-10-CM

## 2019-08-28 ENCOUNTER — TRANSFERRED RECORDS (OUTPATIENT)
Dept: HEALTH INFORMATION MANAGEMENT | Facility: CLINIC | Age: 54
End: 2019-08-28

## 2019-09-29 ENCOUNTER — HEALTH MAINTENANCE LETTER (OUTPATIENT)
Age: 54
End: 2019-09-29

## 2019-11-21 ENCOUNTER — TRANSFERRED RECORDS (OUTPATIENT)
Dept: HEALTH INFORMATION MANAGEMENT | Facility: CLINIC | Age: 54
End: 2019-11-21

## 2020-03-02 ENCOUNTER — OFFICE VISIT (OUTPATIENT)
Dept: FAMILY MEDICINE | Facility: CLINIC | Age: 55
End: 2020-03-02
Payer: COMMERCIAL

## 2020-03-02 VITALS
OXYGEN SATURATION: 96 % | BODY MASS INDEX: 31.37 KG/M2 | HEART RATE: 81 BPM | HEIGHT: 68 IN | SYSTOLIC BLOOD PRESSURE: 136 MMHG | TEMPERATURE: 97.6 F | WEIGHT: 207 LBS | DIASTOLIC BLOOD PRESSURE: 86 MMHG

## 2020-03-02 DIAGNOSIS — J45.20 MILD INTERMITTENT ASTHMA WITHOUT COMPLICATION: ICD-10-CM

## 2020-03-02 DIAGNOSIS — E78.5 HYPERLIPIDEMIA LDL GOAL <70: ICD-10-CM

## 2020-03-02 DIAGNOSIS — J06.9 ACUTE URI: Primary | ICD-10-CM

## 2020-03-02 DIAGNOSIS — I25.810 CORONARY ARTERY DISEASE INVOLVING CORONARY BYPASS GRAFT OF NATIVE HEART WITHOUT ANGINA PECTORIS: ICD-10-CM

## 2020-03-02 DIAGNOSIS — Z12.5 SCREENING FOR PROSTATE CANCER: ICD-10-CM

## 2020-03-02 DIAGNOSIS — I10 HYPERTENSION GOAL BP (BLOOD PRESSURE) < 140/90: ICD-10-CM

## 2020-03-02 DIAGNOSIS — Z95.1 H/O CORONARY ARTERY BYPASS SURGERY: ICD-10-CM

## 2020-03-02 DIAGNOSIS — K21.9 GASTROESOPHAGEAL REFLUX DISEASE WITHOUT ESOPHAGITIS: ICD-10-CM

## 2020-03-02 DIAGNOSIS — Z51.81 MEDICATION MONITORING ENCOUNTER: ICD-10-CM

## 2020-03-02 DIAGNOSIS — Z12.11 SCREEN FOR COLON CANCER: ICD-10-CM

## 2020-03-02 DIAGNOSIS — R73.03 PREDIABETES: ICD-10-CM

## 2020-03-02 DIAGNOSIS — Z80.42 FAMILY HISTORY OF PROSTATE CANCER: ICD-10-CM

## 2020-03-02 DIAGNOSIS — L40.50 PSORIATIC ARTHRITIS (H): ICD-10-CM

## 2020-03-02 PROCEDURE — 99213 OFFICE O/P EST LOW 20 MIN: CPT | Performed by: FAMILY MEDICINE

## 2020-03-02 RX ORDER — AZITHROMYCIN 250 MG/1
TABLET, FILM COATED ORAL
Qty: 6 TABLET | Refills: 0 | Status: SHIPPED | OUTPATIENT
Start: 2020-03-02 | End: 2020-06-10

## 2020-03-02 ASSESSMENT — MIFFLIN-ST. JEOR: SCORE: 1748.45

## 2020-03-02 NOTE — PROGRESS NOTES
[facility]  [facility]    Mian Lozano is a 55 year old male who presents to clinic today for the following health issues:    Acute Illness   Acute illness concerns: chest congestion  Onset: 1 1/2 weeks      Fever: no    Chills/Sweats: no    Headache (location?): no    Sinus Pressure:no    Conjunctivitis:  no    Ear Pain: no    Rhinorrhea: no    Congestion: YES - mainly chest    Sore Throat: no, chronic PND     Cough: YES - little     Wheeze: no    Decreased Appetite: YES    Nausea: no    Vomiting: no    Diarrhea:  no    Dysuria/Freq.: no    Fatigue/Achiness: YES- fatigue    Sick/Strep Exposure: no     Therapies Tried and outcome: albuterol    CAD/Htn/Lipids - no issues    BP Readings from Last 3 Encounters:   03/02/20 136/86   06/12/19 136/82   06/05/19 148/84     Recent Labs   Lab Test 06/12/19  0746 01/11/19  0800  07/08/15  0757 09/03/14  0745   CHOL 126 134   < > 146 159   HDL 35* 35*   < > 37* 34*   LDL 47 72   < > 79 72   TRIG 222* 136   < > 148 267*   CHOLHDLRATIO  --   --   --  3.9 4.7    < > = values in this interval not displayed.     Creatinine   Date Value Ref Range Status   06/12/2019 0.97 0.66 - 1.25 mg/dL Final     Reviewed and updated as needed this visit by Provider    body mass index is 31.47 kg/m .    Wt Readings from Last 4 Encounters:   03/02/20 93.9 kg (207 lb)   06/05/19 98.9 kg (218 lb)   12/05/18 99.3 kg (219 lb)   05/11/18 97.1 kg (214 lb)       Health Maintenance    Health Maintenance Due   Topic Date Due     FIT  02/14/1975     HIV SCREENING  02/14/1980     ZOSTER IMMUNIZATION (1 of 2) 02/14/2015     ASTHMA ACTION PLAN  02/01/2018     ASTHMA CONTROL TEST  12/05/2019     PHQ-2  01/01/2020       Current Problem List    Patient Active Problem List   Diagnosis     Other atopic dermatitis and related conditions     Mild intermittent asthma     Family history of coronary artery disease     Family history of prostate cancer     Prediabetes     Health Care Home     GERD  (gastroesophageal reflux disease)     Fowler's esophagus     Anxiety     Hypertension goal BP (blood pressure) < 140/90     Post-thoracotomy pain syndrome     Nephrolithiasis     Coronary artery disease     Hyperlipidemia LDL goal <70     Environmental allergies     H/O coronary artery bypass surgery     Psoriatic arthritis (H)     Class 1 obesity with serious comorbidity and body mass index (BMI) of 33.0 to 33.9 in adult, unspecified obesity type     Latent tuberculosis       Past Medical History    Past Medical History:   Diagnosis Date     Anxiety      Fowler's esophagus 7/11     Coronary artery disease 04/2008    cabg x 5     Environmental allergies     seasonal, hay fever     FAM HX-CARDIOVAS DIS NEC     dad at 45, cabg, stent     Family history of prostate cancer      GERD (gastroesophageal reflux disease) 3/11     History of blood transfusion      Hyperlipidemia LDL goal <70 2006     Hypertension goal BP (blood pressure) < 140/90 4/08     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    Dr Saba, Hx histoplasmosis     Mild intermittent asthma 7/03     Nephrolithiasis 6/13, 7/16    calcium oxalate     Other atopic dermatitis and related conditions 1980     Other diseases of lung, not elsewhere classified 4/08    dr steel - benign bx and ct - granuloma - no further w/u needed     Post-thoracotomy pain syndrome 4/11    dr hoover     Prediabetes 8/08     Psoriatic arthritis (H)     Dr Mccall     Seasonal allergies 1980    Allergic rhinitis Hayfever       Past Surgical History    Past Surgical History:   Procedure Laterality Date     C CABG, ARTERY-VEIN, FIVE  4/08    FSD - lung bx benign     C STRESS TEST - REGULAR (FL)  9/06, 4/11    negative stress thallium - FSD     COLONOSCOPY N/A 8/7/2015    normal - due 10 yrs     ESOPHAGOSCOPY, GASTROSCOPY, DUODENOSCOPY (EGD), COMBINED  6/11, 5/13    [facility] - ? gastritis, fowler's - due 3 yr     ESOPHAGOSCOPY, GASTROSCOPY, DUODENOSCOPY (EGD), COMBINED  5/10/2013    COMBINED ESOPHAGOSCOPY,  GASTROSCOPY, DUODENOSCOPY (EGD), BIOPSY SINGLE OR MULTIPLE;  ESOPHAGOSCOPY, GASTROSCOPY, DUODENOSCOPY (EGD)  with biopsy;  Surgeon: Angus Reynolds MD;  Location:  GI     ESOPHAGOSCOPY, GASTROSCOPY, DUODENOSCOPY (EGD), COMBINED N/A 7/28/2017    Sullivan's - recheck 3 yrs     HC VASECTOMY UNILAT/BILAT W POSTOP SEMEN  1995    Vasectomy     HERNIORRHAPHY UMBILICAL N/A 11/13/2014    Dr Barron     SURGICAL HISTORY OF -  Left 1980    lt patella fx     SURGICAL HISTORY OF -  Left 1985    lt thumb neuroma excised     SURGICAL HISTORY OF -   11/09    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       Current Medications    Current Outpatient Medications   Medication Sig Dispense Refill     albuterol (PROAIR HFA) 108 (90 Base) MCG/ACT inhaler Inhale 2 puffs into the lungs every 4 hours as needed 54 g 3     ASPIRIN 81 MG OR TABS ONE DAILY 100 3     atorvastatin (LIPITOR) 80 MG tablet Take 1 tablet (80 mg) by mouth daily 90 tablet 3     azelastine (ASTELIN) 0.1 % nasal spray Spray 1-2 sprays into both nostrils 2 times daily 3 Bottle 3     azithromycin (ZITHROMAX) 250 MG tablet 2 tabs day 1 and the 1 tab daily for 4 more days 6 tablet 0     carvedilol (COREG) 3.125 MG tablet Take 1 tablet (3.125 mg) by mouth 2 times daily (with meals) 180 tablet 3     cetirizine (ZYRTEC) 10 MG tablet Take 1 tablet by mouth every evening. 30 tablet 1     fluticasone (FLONASE) 50 MCG/ACT nasal spray Spray 2 sprays into both nostrils daily 48 mL 3     folic acid (FOLVITE) 1 MG tablet TK 1 T PO QAM  0     LANsoprazole (PREVACID) 30 MG capsule TAKE ONE CAPSULE BY MOUTH DAILY. TAKE 30 TO 60 MINUTES BEFORE A MEAL. 90 capsule 3     methotrexate 2.5 MG tablet TK 5 TS PO Q WK  0     vitamin B6 (PYRIDOXINE) 25 MG tablet          Allergies    Allergies   Allergen Reactions     Seasonal Allergies        Immunizations    Immunization History   Administered Date(s) Administered     Flu, Unspecified 09/24/2015, 09/25/2018, 09/17/2019     Influenza  (IIV3) PF 11/26/2012, 11/12/2014, 10/01/2016     Influenza Intranasal Vaccine 4 valent 10/01/2015     Influenza Vaccine IM > 6 months Valent IIV4 10/20/2015, 01/30/2018, 10/01/2018     TDAP Vaccine (Boostrix) 11/25/2015     Tdap (Adacel,Boostrix) 04/28/2006       Family History    Family History   Problem Relation Age of Onset     Prostate Cancer Father 69     Coronary Artery Disease Father 38     Hypertension Father      Hypertension Paternal Grandfather      C.A.D. Paternal Grandfather      Neurologic Disorder Sister         CVA/anuerysm at age 19     Colon Cancer No family hx of        Social History    Social History     Socioeconomic History     Marital status:      Spouse name: Imelda     Number of children: 0     Years of education: 14     Highest education level: Not on file   Occupational History     Comment: [employer]   Social Needs     Financial resource strain: Not on file     Food insecurity:     Worry: Not on file     Inability: Not on file     Transportation needs:     Medical: Not on file     Non-medical: Not on file   Tobacco Use     Smoking status: Never Smoker     Smokeless tobacco: Never Used   Substance and Sexual Activity     Alcohol use: Yes     Alcohol/week: 6.0 standard drinks     Comment: 6 drinks per week     Drug use: No     Sexual activity: Yes     Partners: Female     Birth control/protection: Male Surgical   Lifestyle     Physical activity:     Days per week: Not on file     Minutes per session: Not on file     Stress: Not on file   Relationships     Social connections:     Talks on phone: Not on file     Gets together: Not on file     Attends Gnosticism service: Not on file     Active member of club or organization: Not on file     Attends meetings of clubs or organizations: Not on file     Relationship status: Not on file     Intimate partner violence:     Fear of current or ex partner: Not on file     Emotionally abused: Not on file     Physically abused: Not on file      "Forced sexual activity: Not on file   Other Topics Concern      Service Not Asked     Blood Transfusions Not Asked     Caffeine Concern Yes     Comment: 1 can qd     Occupational Exposure Not Asked     Hobby Hazards Not Asked     Sleep Concern Not Asked     Stress Concern Not Asked     Weight Concern Not Asked     Special Diet Not Asked     Back Care Not Asked     Exercise Yes     Comment: occas     Bike Helmet Not Asked     Seat Belt Yes     Self-Exams Not Asked     Parent/sibling w/ CABG, MI or angioplasty before 65F 55M? Yes   Social History Narrative     Not on file       All above reviewed and updated, all stable unless otherwise noted    Recent labs reviewed    ROS    CONSTITUTIONAL: NEGATIVE for fever, chills, change in weight  INTEGUMENTARY/SKIN: NEGATIVE for worrisome rashes, moles or lesions  EYES: NEGATIVE for vision changes or irritation  ENT/MOUTH: NEGATIVE for ear, mouth and throat problems  RESP: NEGATIVE for significant cough or SOB  CV: NEGATIVE for chest pain, palpitations or peripheral edema  GI: NEGATIVE for nausea, abdominal pain, heartburn, or change in bowel habits  : NEGATIVE for frequency, dysuria, or hematuria  MUSCULOSKELETAL: NEGATIVE for significant arthralgias or myalgia  NEURO: NEGATIVE for weakness, dizziness or paresthesias  ENDOCRINE: NEGATIVE for temperature intolerance, skin/hair changes  HEME: NEGATIVE for bleeding problems  PSYCHIATRIC: NEGATIVE for changes in mood or affect    OBJECTIVE    /86   Pulse 81   Temp 97.6  F (36.4  C) (Oral)   Ht 1.727 m (5' 8\")   Wt 93.9 kg (207 lb)   SpO2 96%   BMI 31.47 kg/m    Body mass index is 31.47 kg/m .  GENERAL: healthy, alert and no distress  EYES: Eyes grossly normal to inspection, extraocular movements - intact, and PERRL  HENT: ear canals- normal; TMs- normal; Nose- normal; Mouth- no ulcers, no lesions  NECK: no tenderness, no adenopathy, no asymmetry, no masses, no stiffness; thyroid- normal to palpation  RESP: " lungs clear to auscultation - no rales, no rhonchi, no wheezes  CV: regular rates and rhythm, normal S1 S2, no S3 or S4 and no murmur, no click or rub -  ABDOMEN: soft, no tenderness, no  hepatosplenomegaly, no masses, normal bowel sounds  MS: extremities- no gross deformities noted, no edema  SKIN: no suspicious lesions, no rashes  NEURO: strength and tone- normal, sensory exam- grossly normal, mentation- intact, speech- normal, reflexes- symmetric  BACK: no CVA tenderness, no paralumbar tenderness  PSYCH: Alert and oriented times 3; speech- coherent , normal rate and volume; able to articulate logical thoughts, able to abstract reason, no tangential thoughts, no hallucinations or delusions, affect- normal  LYMPHATICS: no cervical adenopathy    DIAGNOSTICS/PROCEDURE    Futures ordered     ASSESSMENT      ICD-10-CM    1. Acute URI J06.9 azithromycin (ZITHROMAX) 250 MG tablet   2. Mild intermittent asthma without complication J45.20    3. Coronary artery disease involving coronary bypass graft of native heart without angina pectoris I25.810 Comprehensive metabolic panel     Lipid panel reflex to direct LDL Fasting     UA reflex to Microscopic and Culture     Albumin Random Urine Quantitative with Creat Ratio   4. Prediabetes R73.03 Comprehensive metabolic panel     Lipid panel reflex to direct LDL Fasting     **A1C FUTURE 6mo   5. Hypertension goal BP (blood pressure) < 140/90 I10 Comprehensive metabolic panel     UA reflex to Microscopic and Culture     Albumin Random Urine Quantitative with Creat Ratio   6. Hyperlipidemia LDL goal <70 E78.5 Comprehensive metabolic panel     Lipid panel reflex to direct LDL Fasting     CK total   7. Gastroesophageal reflux disease without esophagitis K21.9 CBC with platelets   8. Psoriatic arthritis (H) L40.50    9. H/O coronary artery bypass surgery Z95.1 Comprehensive metabolic panel     Lipid panel reflex to direct LDL Fasting     UA reflex to Microscopic and Culture     Albumin  Random Urine Quantitative with Creat Ratio     **A1C FUTURE 6mo   10. Family history of prostate cancer Z80.42 Prostate spec antigen screen   11. Screening for prostate cancer Z12.5 Prostate spec antigen screen   12. Screen for colon cancer Z12.11 Fecal colorectal cancer screen FIT   13. Medication monitoring encounter Z51.81 Comprehensive metabolic panel     Lipid panel reflex to direct LDL Fasting     CK total     CBC with platelets     TSH with free T4 reflex     UA reflex to Microscopic and Culture     Albumin Random Urine Quantitative with Creat Ratio     Prostate spec antigen screen     Fecal colorectal cancer screen FIT     **A1C FUTURE 6mo       PLAN    Discussed treatment/modality options, including risk and benefits he desires:    1) sx cares, mucinex DM    2) zithromax    3) future labs ordered for cpx after 6/5/2020    4) salonpas for post thoracotomy pain prn    All diagnosis above reviewed and noted above, otherwise stable.  See [facility] orders for further details.     Return in about 3 months (around 6/2/2020), or if symptoms worsen or fail to improve, for Complete Physical, Medication Recheck Visit, Follow Up Chronic.    Health Maintenance Due   Topic Date Due     FIT  02/14/1975     HIV SCREENING  02/14/1980     ZOSTER IMMUNIZATION (1 of 2) 02/14/2015     ASTHMA ACTION PLAN  02/01/2018     ASTHMA CONTROL TEST  12/05/2019     PHQ-2  01/01/2020       See Patient Instructions             Fady Adams MD, FAAFP     [facility]  [address] [place] [id number]  [email or web address]   Office: [phone]  Fax: [phone]  Pager: [phone]

## 2020-03-03 ASSESSMENT — ASTHMA QUESTIONNAIRES: ACT_TOTALSCORE: 25

## 2020-03-14 ENCOUNTER — APPOINTMENT (OUTPATIENT)
Dept: CT IMAGING | Facility: CLINIC | Age: 55
End: 2020-03-14
Attending: PHYSICIAN ASSISTANT
Payer: COMMERCIAL

## 2020-03-14 ENCOUNTER — HOSPITAL ENCOUNTER (EMERGENCY)
Facility: CLINIC | Age: 55
Discharge: HOME | End: 2020-03-14
Attending: PHYSICIAN ASSISTANT | Admitting: PHYSICIAN ASSISTANT
Payer: COMMERCIAL

## 2020-03-14 ENCOUNTER — TELEPHONE (OUTPATIENT)
Dept: FAMILY MEDICINE | Facility: CLINIC | Age: 55
End: 2020-03-14

## 2020-03-14 VITALS
OXYGEN SATURATION: 99 % | DIASTOLIC BLOOD PRESSURE: 78 MMHG | WEIGHT: 210.98 LBS | TEMPERATURE: 97.9 F | RESPIRATION RATE: 16 BRPM | BODY MASS INDEX: 32.08 KG/M2 | SYSTOLIC BLOOD PRESSURE: 145 MMHG | HEART RATE: 77 BPM

## 2020-03-14 DIAGNOSIS — I25.10 CORONARY ARTERY CALCIFICATION: ICD-10-CM

## 2020-03-14 DIAGNOSIS — K76.0 FATTY INFILTRATION OF LIVER: ICD-10-CM

## 2020-03-14 DIAGNOSIS — R10.9 LEFT FLANK PAIN: ICD-10-CM

## 2020-03-14 DIAGNOSIS — R91.8 PULMONARY NODULES: ICD-10-CM

## 2020-03-14 DIAGNOSIS — K57.30 SIGMOID DIVERTICULOSIS: ICD-10-CM

## 2020-03-14 DIAGNOSIS — N20.0 CALCULUS OF KIDNEY: ICD-10-CM

## 2020-03-14 LAB
ALBUMIN UR-MCNC: NEGATIVE MG/DL
ANION GAP SERPL CALCULATED.3IONS-SCNC: 3 MMOL/L (ref 3–14)
APPEARANCE UR: CLEAR
BASOPHILS # BLD AUTO: 0.1 10E9/L (ref 0–0.2)
BASOPHILS NFR BLD AUTO: 0.8 %
BILIRUB UR QL STRIP: NEGATIVE
BUN SERPL-MCNC: 11 MG/DL (ref 7–30)
CALCIUM SERPL-MCNC: 9.8 MG/DL (ref 8.5–10.1)
CHLORIDE SERPL-SCNC: 105 MMOL/L (ref 94–109)
CO2 SERPL-SCNC: 30 MMOL/L (ref 20–32)
COLOR UR AUTO: ABNORMAL
CREAT SERPL-MCNC: 0.96 MG/DL (ref 0.66–1.25)
DIFFERENTIAL METHOD BLD: NORMAL
EOSINOPHIL # BLD AUTO: 0.2 10E9/L (ref 0–0.7)
EOSINOPHIL NFR BLD AUTO: 2.6 %
ERYTHROCYTE [DISTWIDTH] IN BLOOD BY AUTOMATED COUNT: 13.1 % (ref 10–15)
GFR SERPL CREATININE-BSD FRML MDRD: 89 ML/MIN/{1.73_M2}
GLUCOSE SERPL-MCNC: 96 MG/DL (ref 70–99)
GLUCOSE UR STRIP-MCNC: NEGATIVE MG/DL
HCT VFR BLD AUTO: 48.5 % (ref 40–53)
HGB BLD-MCNC: 16.7 G/DL (ref 13.3–17.7)
HGB UR QL STRIP: NEGATIVE
IMM GRANULOCYTES # BLD: 0 10E9/L (ref 0–0.4)
IMM GRANULOCYTES NFR BLD: 0.3 %
KETONES UR STRIP-MCNC: NEGATIVE MG/DL
LEUKOCYTE ESTERASE UR QL STRIP: NEGATIVE
LYMPHOCYTES # BLD AUTO: 1.5 10E9/L (ref 0.8–5.3)
LYMPHOCYTES NFR BLD AUTO: 23.3 %
MCH RBC QN AUTO: 32.9 PG (ref 26.5–33)
MCHC RBC AUTO-ENTMCNC: 34.4 G/DL (ref 31.5–36.5)
MCV RBC AUTO: 96 FL (ref 78–100)
MONOCYTES # BLD AUTO: 0.5 10E9/L (ref 0–1.3)
MONOCYTES NFR BLD AUTO: 7 %
MUCOUS THREADS #/AREA URNS LPF: PRESENT /LPF
NEUTROPHILS # BLD AUTO: 4.3 10E9/L (ref 1.6–8.3)
NEUTROPHILS NFR BLD AUTO: 66 %
NITRATE UR QL: NEGATIVE
NRBC # BLD AUTO: 0 10*3/UL
NRBC BLD AUTO-RTO: 0 /100
PH UR STRIP: 6.5 PH (ref 5–7)
PLATELET # BLD AUTO: 191 10E9/L (ref 150–450)
POTASSIUM SERPL-SCNC: 4.2 MMOL/L (ref 3.4–5.3)
RBC # BLD AUTO: 5.08 10E12/L (ref 4.4–5.9)
RBC #/AREA URNS AUTO: <1 /HPF (ref 0–2)
SODIUM SERPL-SCNC: 138 MMOL/L (ref 133–144)
SOURCE: ABNORMAL
SP GR UR STRIP: 1.01 (ref 1–1.03)
UROBILINOGEN UR STRIP-MCNC: NORMAL MG/DL (ref 0–2)
WBC # BLD AUTO: 6.6 10E9/L (ref 4–11)
WBC #/AREA URNS AUTO: 0 /HPF (ref 0–5)

## 2020-03-14 PROCEDURE — 81001 URINALYSIS AUTO W/SCOPE: CPT | Performed by: PHYSICIAN ASSISTANT

## 2020-03-14 PROCEDURE — 96374 THER/PROPH/DIAG INJ IV PUSH: CPT

## 2020-03-14 PROCEDURE — 25000128 H RX IP 250 OP 636: Performed by: PHYSICIAN ASSISTANT

## 2020-03-14 PROCEDURE — 74176 CT ABD & PELVIS W/O CONTRAST: CPT

## 2020-03-14 PROCEDURE — 80048 BASIC METABOLIC PNL TOTAL CA: CPT | Performed by: PHYSICIAN ASSISTANT

## 2020-03-14 PROCEDURE — 96361 HYDRATE IV INFUSION ADD-ON: CPT

## 2020-03-14 PROCEDURE — 99285 EMERGENCY DEPT VISIT HI MDM: CPT | Mod: 25

## 2020-03-14 PROCEDURE — 85025 COMPLETE CBC W/AUTO DIFF WBC: CPT | Performed by: PHYSICIAN ASSISTANT

## 2020-03-14 PROCEDURE — 25800030 ZZH RX IP 258 OP 636: Performed by: PHYSICIAN ASSISTANT

## 2020-03-14 RX ORDER — KETOROLAC TROMETHAMINE 15 MG/ML
15 INJECTION, SOLUTION INTRAMUSCULAR; INTRAVENOUS ONCE
Status: COMPLETED | OUTPATIENT
Start: 2020-03-14 | End: 2020-03-14

## 2020-03-14 RX ADMIN — KETOROLAC TROMETHAMINE 15 MG: 15 INJECTION, SOLUTION INTRAMUSCULAR; INTRAVENOUS at 12:15

## 2020-03-14 RX ADMIN — SODIUM CHLORIDE 1000 ML: 9 INJECTION, SOLUTION INTRAVENOUS at 12:15

## 2020-03-14 ASSESSMENT — ENCOUNTER SYMPTOMS
FEVER: 0
HEMATURIA: 0
NAUSEA: 0
DYSURIA: 0
VOMITING: 0
BACK PAIN: 1
FLANK PAIN: 1

## 2020-03-14 NOTE — TELEPHONE ENCOUNTER
Patient calling with left abdomen and back pain this am became intense-  Pelvic pain since Monday  Urine clear yellow- no difficulty urinating  4-10 pain scale  Hx of kidney stones x 2  Advised patient that he needs to go to the ER for evaluation- may need an U?s and we do not have that capability  patient agrees.    Teena MAYRN BSN  [facility]  [phone]

## 2020-03-14 NOTE — ED PROVIDER NOTES
History     Chief Complaint:  Flank Pain    HPI   Mian Lozano is a 55 year old male, with a history of kidney stones, who presents with his wife to the ED for evaluation of left flank pain. The patient reports he developed waxing and waning flank pain radiating to his back this morning. He rates the pain as fluctuating between a 3-4/10 and currently as a 1/10. The pain is similar to his past 2 episodes of kidney stones, which he passed on his own both times. His last episode was 3-4 years ago. The patient denies any fever, nausea, vomiting, dysuria, hematuria, or testicular pain.     Allergies:  No known drug allergies    Medications:    Albuterol inhaler  Aspirin 81mg  Atorvastatin  Coreg  Zyrtec  Folvite  Prevacid  Methotrexate  Vitamin B6    Past Medical History:    Anxiety  Sullivan's esophagus  CAD  GERD  Blood transfusion  HTN  HLD  Latent TB  Asthma  Kidney stones   Psoriatic arthritis    Past Surgical History:    Left patella surgery  Left thumb neuroma excision  Vasectomy  CABG  Umbilical herniorrhaphy  Thoracocentesis    Family History:    CAD, prostate cancer, HTN: father   CVA/aneurysm: sister     Social History:  Smoking status: Never smoker    Substance use: No  Alcohol use: Yes  Presents to ED with wife    Marital Status:   [2]     Review of Systems   Constitutional: Negative for fever.   Gastrointestinal: Negative for nausea and vomiting.   Genitourinary: Positive for flank pain. Negative for dysuria, hematuria and testicular pain.   Musculoskeletal: Positive for back pain.   All other systems reviewed and are negative.    Physical Exam     Patient Vitals for the past 24 hrs:   BP Temp Temp src Heart Rate Resp SpO2 Weight   03/14/20 1215 (!) 139/91 -- -- -- -- -- --   03/14/20 1118 (!) 153/89 -- -- -- -- -- --   03/14/20 1117 -- 97.9  F (36.6  C) Oral 80 16 100 % 95.7 kg (210 lb 15.7 oz)     Physical Exam  General: Alert, interactive. Resting comfortably.   Head:  Scalp is  atraumatic.  Eyes:  EOM intact. The pupils are equal, round, and reactive to light. No scleral icterus.   ENT:                                      Ears:  The external ears are normal. TM's non-erythematous. External canals normal.    Nose:  The external nose is normal.  Throat:  The oropharynx is normal. Mucus membranes are moist.                 Neck:  Normal range of motion. There is no rigidity.   CV:  Regular rate and rhythm. No murmur. 2+ radial pulses  Resp:  Breath sounds are clear bilaterally. Non-labored, no retractions or accessory muscle use.  GI:  Abdomen is soft, no distension. No CVA tenderness.   MS:  Normal range of motion.   Skin:  Warm and dry. No rash to left lower back.  Neuro:  Strength and sensation grossly intact.   Psych:  Awake. Alert.  Appropriate interactions.     Emergency Department Course     Imaging:  Radiographic findings were communicated with the patient and family who voiced understanding of the findings.    Abd/Pelvis CT No Contrast-Stone Protocol  IMPRESSION:   1. No ureteral calculi or evidence for urinary obstruction.   2. There are at least 3 tiny nonobstructing stones in each kidney, measuring 0.2 cm or smaller.   3. Cholelithiasis.   4. Diffuse fatty infiltration of the liver.   5. Sigmoid diverticulosis, without convincing evidence for diverticulitis.   As read by Radiology.     Laboratory:  Laboratory findings were communicated with the patient and family who voiced understanding of the findings.    CBC: o/w WNL (WBC 6.6, HGB 16.7, )  BMP: o/w WNL (Creatinine 0.96)     UA: Mucous present, o/w Negative     Interventions:  1215: NS 1L Bolus IV  1215: Toradol 15mg IV    Emergency Department Course:  Past medical records, nursing notes, and vitals reviewed.    1144: IV was inserted and blood was drawn for laboratory testing, results above. The patient provided a urine sample here in the emergency department. This was sent for laboratory testing, findings  above.    1147: I performed an exam of the patient as documented above.     The patient was sent for a abdomen pelvis CT while in the emergency department, results above.     1259: I rechecked the patient and discussed the results of his workup thus far. Patient reports his pain is a 1/10.     Findings and plan explained to the patient and spouse. Patient discharged home with instructions regarding supportive care, medications, and reasons to return. The importance of close follow-up was reviewed.     I personally reviewed the laboratory results with the patient and spouse and answered all related questions prior to discharge.     Impression & Plan      Medical Decision Making:  Mian Loazno is a 55 year old male presents emergency department with intermittent left flank pain onset this morning.  Elevated BP, otherwise vitals normal. Patient has a history of kidney stones and reports pain feels similar to past.  Here, pain is improved and he has no CVA tenderness.  He is resting comfortably.  Work-up in the emergency department is largely unremarkable.  There is no evidence of acute kidney injury.  Urine with no sign of infection.  CT without evidence of ureter calculi, I discussed finding of nonobstructing stones in each kidney.  Also discussed incidental findings on CT including fatty liver, pulmonary nodules, coronary artery calcification, and sigmoid diverticulosis.  I discussed he should follow-up with his primary care provider to discuss these findings.  Unclear source of the patient's pain at this time though I suspect he passed a kidney stone prior to arrival.  No sign of herpes zoster on examination. On recheck, patient is resting comfortably.  I discussed the findings and importance of strict return precautions for fevers, increasing pain, urinary symptoms, or any other concerning symptoms.  Patient and wife agree with this plan and all questions and concerns addressed prior to discharge  home.    Diagnosis:    ICD-10-CM   1. Left flank pain  R10.9   2. Calculus of kidney  N20.0   3. Fatty infiltration of liver  K76.0   4. Pulmonary nodules  R91.8   5. Coronary artery calcification  I25.10    I25.84   6. Sigmoid diverticulosis  K57.30     Disposition: Patient discharged to home with wife     Meghan Shane  3/14/2020   [facility] EMERGENCY DEPARTMENT    Scribe Disclosure:  I, Meghan Svitlana, am serving as a scribe at 11:47 AM on 3/14/2020 to document services personally performed by Reshma Olivo PA-C based on my observations and the provider's statements to me.        Reshma Olivo PA-C  03/14/20 4056

## 2020-03-14 NOTE — ED AVS SNAPSHOT
[facility] Emergency Department  [address]  [facility] [address]  Phone:  [phone]  Fax:  [phone]                                    Mian Lozano   MRN: [id number]    Department:  [facility] Emergency Department   Date of Visit:  3/14/2020           After Visit Summary Signature Page    I have received my discharge instructions, and my questions have been answered. I have discussed any challenges I see with this plan with the nurse or doctor.    ..........................................................................................................................................  Patient/Patient Representative Signature      ..........................................................................................................................................  Patient Representative Print Name and Relationship to Patient    ..................................................               ................................................  Date                                   Time    ..........................................................................................................................................  Reviewed by Signature/Title    ...................................................              ..............................................  Date                                               Time          22EPIC Rev 08/18

## 2020-03-14 NOTE — DISCHARGE INSTRUCTIONS
*Follow-up with primary care provider in 2-3 days for ongoing symptoms.  *Return to the emergency department for fevers, urinary symptoms, increasing pain, or any other new/concerning symptoms.    Discharge Instructions  Kidney Stones    Kidney stones are a common problem that can cause a lot of pain but fortunately are usually not dangerous. Kidney stones form in the kidney and then can cause a blockage (obstruction) of the flow of urine from the kidney which leads to pain. Most patients can manage kidney stones at home (without a hospital stay).  However, sometimes your condition may be worse than it seemed at first, or may get worse with time. Most kidney stones will pass on their own, but occasionally stones may need to be removed by an urologist.    Generally, every Emergency Department visit should have a follow-up clinic visit with either a primary or a specialty clinic/provider. Please follow-up as instructed by your emergency provider today.      Return to the Emergency Department if:  Your pain is not controlled despite the medications provided or recommended.  You are vomiting (throwing up) and cannot keep fluids or medications down.  You develop a fever (>100.4 F).  You feel much more ill or develop new symptoms.  What can I do to help myself?  Be sure to drink plenty of fluids.  If instructed to do so, strain your urine (pee) with the urine strainer you were provided with today. Your stone may look like a grain of sand or a small pebble. Collect any stones in the cup provided and bring to your follow-up appointment.  Staying active is good, and may help the stone to pass. You may do whatever you feel up to doing without restrictions.   Treatment:  Non-steroidal anti-inflammatory drugs (NSAIDs). This includes prescription medicines like Toradol  (ketorolac) and non-prescription medicines like Advil  (ibuprofen) and Nuprin  (ibuprofen) and Naproxen. These pain relievers are very effective for kidney  stones.  Nausea (sick to your stomach) medication.  Nausea and vomiting are common with kidney stones, so your provider may send you home with medicine for this.   Flomax  (tamsulosin). This medicine is sometimes used for men with prostate problems, but also can help kidney stones to pass. Its effectiveness is controversial or questionable so it is prescribed in certain situations. This medicine can lower blood pressure, and you may feel faint/lightheaded, especially when you first stand up. Be sure to get up gradually, sit down if you feel faint, and avoid activity where feeling faint would be dangerous, such as climbing ladders.  If you were given a prescription for medicine here today, be sure to read all of the information (including the package insert) that comes with your prescription.  This will include important information about the medicine, its side effects, and any warnings that you need to know about.  The pharmacist who fills the prescription can provide more information and answer questions you may have about the medicine.  If you have questions or concerns that the pharmacist cannot address, please call or return to the Emergency Department.   Remember that you can always come back to the Emergency Department if you are not able to see your regular provider in the amount of time listed above, if you get any new symptoms, or if there is anything that worries you.

## 2020-03-24 ENCOUNTER — TRANSFERRED RECORDS (OUTPATIENT)
Dept: HEALTH INFORMATION MANAGEMENT | Facility: CLINIC | Age: 55
End: 2020-03-24

## 2020-03-24 LAB
ALT SERPL-CCNC: 47 IU/L (ref 5–40)
AST SERPL-CCNC: 39 U/L (ref 5–34)
CREAT SERPL-MCNC: 0.96 MG/DL (ref 0.5–1.3)

## 2020-06-05 DIAGNOSIS — Z80.42 FAMILY HISTORY OF PROSTATE CANCER: ICD-10-CM

## 2020-06-05 DIAGNOSIS — K21.9 GASTROESOPHAGEAL REFLUX DISEASE WITHOUT ESOPHAGITIS: ICD-10-CM

## 2020-06-05 DIAGNOSIS — I25.810 CORONARY ARTERY DISEASE INVOLVING CORONARY BYPASS GRAFT OF NATIVE HEART WITHOUT ANGINA PECTORIS: ICD-10-CM

## 2020-06-05 DIAGNOSIS — I10 HYPERTENSION GOAL BP (BLOOD PRESSURE) < 140/90: ICD-10-CM

## 2020-06-05 DIAGNOSIS — R73.03 PREDIABETES: ICD-10-CM

## 2020-06-05 DIAGNOSIS — Z12.5 SCREENING FOR PROSTATE CANCER: ICD-10-CM

## 2020-06-05 DIAGNOSIS — Z51.81 MEDICATION MONITORING ENCOUNTER: ICD-10-CM

## 2020-06-05 DIAGNOSIS — E78.5 HYPERLIPIDEMIA LDL GOAL <70: ICD-10-CM

## 2020-06-05 DIAGNOSIS — Z95.1 H/O CORONARY ARTERY BYPASS SURGERY: ICD-10-CM

## 2020-06-05 LAB
ALBUMIN SERPL-MCNC: 3.2 G/DL (ref 3.4–5)
ALBUMIN UR-MCNC: NEGATIVE MG/DL
ALP SERPL-CCNC: 109 U/L (ref 40–150)
ALT SERPL W P-5'-P-CCNC: 54 U/L (ref 0–70)
ANION GAP SERPL CALCULATED.3IONS-SCNC: 5 MMOL/L (ref 3–14)
APPEARANCE UR: CLEAR
AST SERPL W P-5'-P-CCNC: 40 U/L (ref 0–45)
BILIRUB SERPL-MCNC: 0.6 MG/DL (ref 0.2–1.3)
BILIRUB UR QL STRIP: NEGATIVE
BUN SERPL-MCNC: 10 MG/DL (ref 7–30)
CALCIUM SERPL-MCNC: 9 MG/DL (ref 8.5–10.1)
CHLORIDE SERPL-SCNC: 106 MMOL/L (ref 94–109)
CHOLEST SERPL-MCNC: 112 MG/DL
CK SERPL-CCNC: 45 U/L (ref 30–300)
CO2 SERPL-SCNC: 29 MMOL/L (ref 20–32)
COLOR UR AUTO: YELLOW
CREAT SERPL-MCNC: 1 MG/DL (ref 0.66–1.25)
CREAT UR-MCNC: 74 MG/DL
ERYTHROCYTE [DISTWIDTH] IN BLOOD BY AUTOMATED COUNT: 12.8 % (ref 10–15)
GFR SERPL CREATININE-BSD FRML MDRD: 84 ML/MIN/{1.73_M2}
GLUCOSE SERPL-MCNC: 110 MG/DL (ref 70–99)
GLUCOSE UR STRIP-MCNC: NEGATIVE MG/DL
HBA1C MFR BLD: 5.4 % (ref 0–5.6)
HCT VFR BLD AUTO: 43.3 % (ref 40–53)
HDLC SERPL-MCNC: 30 MG/DL
HGB BLD-MCNC: 14.6 G/DL (ref 13.3–17.7)
HGB UR QL STRIP: NEGATIVE
KETONES UR STRIP-MCNC: NEGATIVE MG/DL
LDLC SERPL CALC-MCNC: 57 MG/DL
LEUKOCYTE ESTERASE UR QL STRIP: NEGATIVE
MCH RBC QN AUTO: 31.6 PG (ref 26.5–33)
MCHC RBC AUTO-ENTMCNC: 33.7 G/DL (ref 31.5–36.5)
MCV RBC AUTO: 94 FL (ref 78–100)
MICROALBUMIN UR-MCNC: 6 MG/L
MICROALBUMIN/CREAT UR: 7.58 MG/G CR (ref 0–17)
NITRATE UR QL: NEGATIVE
NONHDLC SERPL-MCNC: 82 MG/DL
PH UR STRIP: 7 PH (ref 5–7)
PLATELET # BLD AUTO: 259 10E9/L (ref 150–450)
POTASSIUM SERPL-SCNC: 4.6 MMOL/L (ref 3.4–5.3)
PROT SERPL-MCNC: 7.2 G/DL (ref 6.8–8.8)
PSA SERPL-ACNC: 15.8 UG/L (ref 0–4)
RBC # BLD AUTO: 4.62 10E12/L (ref 4.4–5.9)
SODIUM SERPL-SCNC: 140 MMOL/L (ref 133–144)
SOURCE: NORMAL
SP GR UR STRIP: 1.02 (ref 1–1.03)
TRIGL SERPL-MCNC: 125 MG/DL
TSH SERPL DL<=0.005 MIU/L-ACNC: 2.09 MU/L (ref 0.4–4)
UROBILINOGEN UR STRIP-ACNC: 0.2 EU/DL (ref 0.2–1)
WBC # BLD AUTO: 8.6 10E9/L (ref 4–11)

## 2020-06-05 PROCEDURE — G0103 PSA SCREENING: HCPCS | Performed by: FAMILY MEDICINE

## 2020-06-05 PROCEDURE — 82550 ASSAY OF CK (CPK): CPT | Performed by: FAMILY MEDICINE

## 2020-06-05 PROCEDURE — 85027 COMPLETE CBC AUTOMATED: CPT | Performed by: FAMILY MEDICINE

## 2020-06-05 PROCEDURE — 80053 COMPREHEN METABOLIC PANEL: CPT | Performed by: FAMILY MEDICINE

## 2020-06-05 PROCEDURE — 83036 HEMOGLOBIN GLYCOSYLATED A1C: CPT | Performed by: FAMILY MEDICINE

## 2020-06-05 PROCEDURE — 81003 URINALYSIS AUTO W/O SCOPE: CPT | Performed by: FAMILY MEDICINE

## 2020-06-05 PROCEDURE — 80061 LIPID PANEL: CPT | Performed by: FAMILY MEDICINE

## 2020-06-05 PROCEDURE — 84443 ASSAY THYROID STIM HORMONE: CPT | Performed by: FAMILY MEDICINE

## 2020-06-05 PROCEDURE — 36415 COLL VENOUS BLD VENIPUNCTURE: CPT | Performed by: FAMILY MEDICINE

## 2020-06-05 PROCEDURE — 82043 UR ALBUMIN QUANTITATIVE: CPT | Performed by: FAMILY MEDICINE

## 2020-06-09 DIAGNOSIS — R97.20 ELEVATED PROSTATE SPECIFIC ANTIGEN (PSA): Primary | ICD-10-CM

## 2020-06-10 ENCOUNTER — OFFICE VISIT (OUTPATIENT)
Dept: FAMILY MEDICINE | Facility: CLINIC | Age: 55
End: 2020-06-10
Payer: COMMERCIAL

## 2020-06-10 VITALS
BODY MASS INDEX: 31.22 KG/M2 | WEIGHT: 206 LBS | HEIGHT: 68 IN | HEART RATE: 90 BPM | DIASTOLIC BLOOD PRESSURE: 78 MMHG | OXYGEN SATURATION: 96 % | TEMPERATURE: 98.4 F | SYSTOLIC BLOOD PRESSURE: 139 MMHG

## 2020-06-10 DIAGNOSIS — K76.0 FATTY LIVER: ICD-10-CM

## 2020-06-10 DIAGNOSIS — J45.20 MILD INTERMITTENT ASTHMA WITHOUT COMPLICATION: ICD-10-CM

## 2020-06-10 DIAGNOSIS — Z91.09 ENVIRONMENTAL ALLERGIES: ICD-10-CM

## 2020-06-10 DIAGNOSIS — Z12.5 SCREENING FOR PROSTATE CANCER: ICD-10-CM

## 2020-06-10 DIAGNOSIS — N20.0 NEPHROLITHIASIS: ICD-10-CM

## 2020-06-10 DIAGNOSIS — I25.810 CORONARY ARTERY DISEASE INVOLVING CORONARY BYPASS GRAFT OF NATIVE HEART WITHOUT ANGINA PECTORIS: ICD-10-CM

## 2020-06-10 DIAGNOSIS — Z12.11 SCREEN FOR COLON CANCER: ICD-10-CM

## 2020-06-10 DIAGNOSIS — Z22.7 LATENT TUBERCULOSIS: ICD-10-CM

## 2020-06-10 DIAGNOSIS — L40.50 PSORIATIC ARTHRITIS (H): ICD-10-CM

## 2020-06-10 DIAGNOSIS — R97.20 ELEVATED PROSTATE SPECIFIC ANTIGEN (PSA): ICD-10-CM

## 2020-06-10 DIAGNOSIS — Z00.00 ROUTINE GENERAL MEDICAL EXAMINATION AT A HEALTH CARE FACILITY: Primary | ICD-10-CM

## 2020-06-10 DIAGNOSIS — F41.9 ANXIETY: ICD-10-CM

## 2020-06-10 DIAGNOSIS — Z80.42 FAMILY HISTORY OF PROSTATE CANCER: ICD-10-CM

## 2020-06-10 DIAGNOSIS — I10 HYPERTENSION GOAL BP (BLOOD PRESSURE) < 140/90: ICD-10-CM

## 2020-06-10 DIAGNOSIS — Z82.49 FAMILY HISTORY OF CORONARY ARTERY DISEASE: ICD-10-CM

## 2020-06-10 DIAGNOSIS — K21.9 GASTROESOPHAGEAL REFLUX DISEASE WITHOUT ESOPHAGITIS: ICD-10-CM

## 2020-06-10 DIAGNOSIS — Z51.81 MEDICATION MONITORING ENCOUNTER: ICD-10-CM

## 2020-06-10 DIAGNOSIS — R73.03 PREDIABETES: ICD-10-CM

## 2020-06-10 DIAGNOSIS — E78.5 HYPERLIPIDEMIA LDL GOAL <70: ICD-10-CM

## 2020-06-10 PROCEDURE — 99396 PREV VISIT EST AGE 40-64: CPT | Performed by: FAMILY MEDICINE

## 2020-06-10 RX ORDER — LANSOPRAZOLE 30 MG/1
CAPSULE, DELAYED RELEASE ORAL
Qty: 90 CAPSULE | Refills: 3 | Status: SHIPPED | OUTPATIENT
Start: 2020-06-10 | End: 2023-06-22

## 2020-06-10 RX ORDER — ALBUTEROL SULFATE 90 UG/1
2 AEROSOL, METERED RESPIRATORY (INHALATION) EVERY 4 HOURS PRN
Qty: 54 G | Refills: 3 | Status: SHIPPED | OUTPATIENT
Start: 2020-06-10 | End: 2021-07-29

## 2020-06-10 RX ORDER — ATORVASTATIN CALCIUM 80 MG/1
80 TABLET, FILM COATED ORAL DAILY
Qty: 90 TABLET | Refills: 3 | Status: SHIPPED | OUTPATIENT
Start: 2020-06-10 | End: 2021-06-24

## 2020-06-10 RX ORDER — AZELASTINE 1 MG/ML
1-2 SPRAY, METERED NASAL 2 TIMES DAILY
Qty: 3 BOTTLE | Refills: 3 | Status: SHIPPED | OUTPATIENT
Start: 2020-06-10 | End: 2021-07-06

## 2020-06-10 RX ORDER — CARVEDILOL 3.12 MG/1
3.12 TABLET ORAL 2 TIMES DAILY WITH MEALS
Qty: 180 TABLET | Refills: 3 | Status: SHIPPED | OUTPATIENT
Start: 2020-06-10 | End: 2021-03-24

## 2020-06-10 RX ORDER — FOLIC ACID 1 MG/1
1 TABLET ORAL DAILY
Qty: 90 TABLET | Refills: 3 | Status: SHIPPED | OUTPATIENT
Start: 2020-06-10 | End: 2021-07-29

## 2020-06-10 RX ORDER — FLUTICASONE PROPIONATE 50 MCG
2 SPRAY, SUSPENSION (ML) NASAL DAILY
Qty: 54.6 G | Refills: 3 | Status: SHIPPED | OUTPATIENT
Start: 2020-06-10 | End: 2021-07-06

## 2020-06-10 RX ORDER — CETIRIZINE HYDROCHLORIDE 10 MG/1
10 TABLET ORAL EVERY EVENING
Qty: 90 TABLET | Refills: 3 | Status: SHIPPED | OUTPATIENT
Start: 2020-06-10 | End: 2021-07-29

## 2020-06-10 ASSESSMENT — MIFFLIN-ST. JEOR: SCORE: 1743.91

## 2020-06-10 NOTE — PROGRESS NOTES
SUBJECTIVE:   CC: Mian Lozano is an 55 year old male who presents for preventative health visit.     Healthy Habits:     Getting at least 3 servings of Calcium per day:  Yes    Bi-annual eye exam:  NO    Dental care twice a year:  Yes    Sleep apnea or symptoms of sleep apnea:  None    Diet:  Regular (no restrictions)    Frequency of exercise:  1 day/week    Duration of exercise:  Less than 15 minutes    Taking medications regularly:  Yes    Medication side effects:  None    PHQ-2 Total Score: 0    Additional concerns today:  No    End of May - F/cough/difficulty urinating/increased/increased freq/burning/no STD issues/chils/sweats - normal bowels - loss of appetite    CAD/Prediabetes/Htn/Lipids    No cp - no sob - no edema    Lab Results   Component Value Date    A1C 5.4 06/05/2020    A1C 5.4 06/12/2019    A1C 5.6 05/16/2018    A1C 5.4 01/30/2018    A1C 5.6 02/14/2017     Glucose   Date Value Ref Range Status   06/05/2020 110 (H) 70 - 99 mg/dL Final   03/14/2020 96 70 - 99 mg/dL Final   06/12/2019 122 (H) 70 - 99 mg/dL Final     Comment:     Fasting specimen   01/11/2019 119 (H) 70 - 99 mg/dL Final     Comment:     Fasting specimen   05/16/2018 104 (H) 70 - 99 mg/dL Final     Comment:     Fasting specimen     BP Readings from Last 3 Encounters:   06/10/20 139/78   03/14/20 (!) 145/78   03/02/20 136/86     Creatinine   Date Value Ref Range Status   06/05/2020 1.00 0.66 - 1.25 mg/dL Final     Recent Labs   Lab Test 06/05/20  0903 06/12/19  0746  07/08/15  0757 09/03/14  0745   CHOL 112 126   < > 146 159   HDL 30* 35*   < > 37* 34*   LDL 57 47   < > 79 72   TRIG 125 222*   < > 148 267*   CHOLHDLRATIO  --   --   --  3.9 4.7    < > = values in this interval not displayed.     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    Nephrolithiasis - no known issues - small stones present    Elevated PSA - no prior  history - appt 6/16    PSA   Date Value Ref Range Status   06/05/2020 15.80 (H) 0 - 4 ug/L Final     Comment:     Assay Method:  Chemiluminescence using Siemens Vista analyzer     Today's PHQ-2 Score:   PHQ-2 ( 1999 Pfizer) 6/10/2020   Q1: Little interest or pleasure in doing things 0   Q2: Feeling down, depressed or hopeless 0   PHQ-2 Score 0   Q1: Little interest or pleasure in doing things Not at all   Q2: Feeling down, depressed or hopeless Not at all   PHQ-2 Score 0       Abuse: Current or Past(Physical, Sexual or Emotional)- No  Do you feel safe in your environment? Yes        Social History     Tobacco Use     Smoking status: Never Smoker     Smokeless tobacco: Never Used   Substance Use Topics     Alcohol use: Yes     Alcohol/week: 6.0 standard drinks     Comment: 6 drinks per week     If you drink alcohol do you typically have >3 drinks per day or >7 drinks per week? No    Alcohol Use 6/10/2020   Prescreen: >3 drinks/day or >7 drinks/week? No   Prescreen: >3 drinks/day or >7 drinks/week? -       Last PSA:   PSA   Date Value Ref Range Status   06/05/2020 15.80 (H) 0 - 4 ug/L Final     Comment:     Assay Method:  Chemiluminescence using Siemens Vista analyzer       Reviewed orders with patient. Reviewed health maintenance and updated orders accordingly - Yes    Reviewed and updated as needed this visit by clinical staff  Tobacco  Allergies  Meds  Med Hx  Surg Hx  Fam Hx  Soc Hx        Reviewed and updated as needed this visit by Provider        Health Maintenance     Colonoscopy:  Due 8/2025, due for EGD, fowler's   FIT:  given              PSA:  elevated   DEXA:  NA    Health Maintenance Due   Topic Date Due     HIV SCREENING  02/14/1980     PNEUMOCOCCAL IMMUNIZATION 19-64 MEDIUM RISK (1 of 1 - PPSV23) 02/14/1984     ZOSTER IMMUNIZATION (1 of 2) 02/14/2015     COLORECTAL CANCER SCREENING  08/08/2015     ASTHMA ACTION PLAN  02/01/2018       Current Problem List    Patient Active Problem List    Diagnosis     Other atopic dermatitis and related conditions     Mild intermittent asthma     Family history of coronary artery disease     Family history of prostate cancer     Prediabetes     Health Care Home     GERD (gastroesophageal reflux disease)     Sullivan's esophagus     Anxiety     Hypertension goal BP (blood pressure) < 140/90     Post-thoracotomy pain syndrome     Nephrolithiasis     Coronary artery disease     Hyperlipidemia LDL goal <70     Environmental allergies     H/O coronary artery bypass surgery     Psoriatic arthritis (H)     Class 1 obesity with serious comorbidity and body mass index (BMI) of 33.0 to 33.9 in adult, unspecified obesity type     Latent tuberculosis     Pulmonary nodules     Fatty liver     Gallstone       Past Medical History    Past Medical History:   Diagnosis Date     Anxiety      Sullivan's esophagus 7/11     Coronary artery disease 04/2008    cabg x 5     Environmental allergies     seasonal, hay fever     FAM HX-CARDIOVAS DIS NEC     dad at 45, cabg, stent     Family history of prostate cancer      Fatty liver      Gallstone      GERD (gastroesophageal reflux disease) 3/11     History of blood transfusion      Hyperlipidemia LDL goal <70 2006     Hypertension goal BP (blood pressure) < 140/90 4/08     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    Dr Saba, Hx histoplasmosis     Mild intermittent asthma 7/03     Nephrolithiasis 6/13, 7/16    calcium oxalate     Other atopic dermatitis and related conditions 1980     Other diseases of lung, not elsewhere classified 4/08    dr steel - benign bx and ct - granuloma - no further w/u needed     Post-thoracotomy pain syndrome 4/11    dr hoover     Prediabetes 8/08     Psoriatic arthritis (H)     Dr Mccall     Pulmonary nodules      Seasonal allergies 1980    Allergic rhinitis Hayfever       Past Surgical History    Past Surgical History:   Procedure Laterality Date     C CABG, ARTERY-VEIN, FIVE  4/08    FSD - lung bx benign     C STRESS TEST  - REGULAR (FL)  9/06, 4/11    negative stress thallium - FSD     COLONOSCOPY N/A 8/7/2015    normal - due 10 yrs     ESOPHAGOSCOPY, GASTROSCOPY, DUODENOSCOPY (EGD), COMBINED  6/11, 5/13    [facility] - ? gastritis, fowler's - due 3 yr     ESOPHAGOSCOPY, GASTROSCOPY, DUODENOSCOPY (EGD), COMBINED  5/10/2013    COMBINED ESOPHAGOSCOPY, GASTROSCOPY, DUODENOSCOPY (EGD), BIOPSY SINGLE OR MULTIPLE;  ESOPHAGOSCOPY, GASTROSCOPY, DUODENOSCOPY (EGD)  with biopsy;  Surgeon: Angus Reynolds MD;  Location:  GI     ESOPHAGOSCOPY, GASTROSCOPY, DUODENOSCOPY (EGD), COMBINED N/A 7/28/2017    Fowler's - recheck 3 yrs     HC VASECTOMY UNILAT/BILAT W POSTOP SEMEN  1995    Vasectomy     HERNIORRHAPHY UMBILICAL N/A 11/13/2014    Dr Barron     SURGICAL HISTORY OF -  Left 1980    lt patella fx     SURGICAL HISTORY OF -  Left 1985    lt thumb neuroma excised     SURGICAL HISTORY OF -   11/09    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       Current Medications    Current Outpatient Medications   Medication Sig Dispense Refill     albuterol (PROAIR HFA) 108 (90 Base) MCG/ACT inhaler Inhale 2 puffs into the lungs every 4 hours as needed 54 g 3     ASPIRIN 81 MG OR TABS ONE DAILY 100 3     atorvastatin (LIPITOR) 80 MG tablet Take 1 tablet (80 mg) by mouth daily 90 tablet 3     azelastine (ASTELIN) 0.1 % nasal spray Spray 1-2 sprays into both nostrils 2 times daily 3 Bottle 3     carvedilol (COREG) 3.125 MG tablet Take 1 tablet (3.125 mg) by mouth 2 times daily (with meals) 180 tablet 3     cetirizine (ZYRTEC) 10 MG tablet Take 1 tablet (10 mg) by mouth every evening 90 tablet 3     fluticasone (FLONASE) 50 MCG/ACT nasal spray Spray 2 sprays into both nostrils daily 54.6 g 3     folic acid (FOLVITE) 1 MG tablet Take 1 tablet (1 mg) by mouth daily 90 tablet 3     LANsoprazole (PREVACID) 30 MG DR capsule TAKE ONE CAPSULE BY MOUTH DAILY. TAKE 30 TO 60 MINUTES BEFORE A MEAL. 90 capsule 3     methotrexate 2.5 MG tablet TK 5 TS PO Q WK  0      etanercept (ENBREL) 50 MG/ML injection Inject 0.98 mLs (50 mg) Subcutaneous once a week         Allergies    Allergies   Allergen Reactions     Seasonal Allergies        Immunizations    Immunization History   Administered Date(s) Administered     Flu, Unspecified 09/24/2015, 09/25/2018, 09/17/2019     Influenza (IIV3) PF 11/26/2012, 11/12/2014, 10/01/2016     Influenza Intranasal Vaccine 4 valent 10/01/2015     Influenza Vaccine IM > 6 months Valent IIV4 10/20/2015, 01/30/2018, 10/01/2018     TDAP Vaccine (Boostrix) 11/25/2015     Tdap (Adacel,Boostrix) 04/28/2006       Family History    Family History   Problem Relation Age of Onset     Prostate Cancer Father 69     Coronary Artery Disease Father 38     Hypertension Father      Hypertension Paternal Grandfather      C.A.D. Paternal Grandfather         CHF     Neurologic Disorder Sister         CVA/anuerysm at age 19     Colon Cancer No family hx of        Social History    Social History     Socioeconomic History     Marital status:      Spouse name: Imelda     Number of children: 0     Years of education: 14     Highest education level: Not on file   Occupational History     Comment: [employer]   Social Needs     Financial resource strain: Not on file     Food insecurity     Worry: Not on file     Inability: Not on file     Transportation needs     Medical: Not on file     Non-medical: Not on file   Tobacco Use     Smoking status: Never Smoker     Smokeless tobacco: Never Used   Substance and Sexual Activity     Alcohol use: Yes     Alcohol/week: 6.0 standard drinks     Comment: 6 drinks per week     Drug use: No     Sexual activity: Yes     Partners: Female     Birth control/protection: Male Surgical   Lifestyle     Physical activity     Days per week: Not on file     Minutes per session: Not on file     Stress: Not on file   Relationships     Social connections     Talks on phone: Not on file     Gets together: Not on file     Attends Bahai  "service: Not on file     Active member of club or organization: Not on file     Attends meetings of clubs or organizations: Not on file     Relationship status: Not on file     Intimate partner violence     Fear of current or ex partner: Not on file     Emotionally abused: Not on file     Physically abused: Not on file     Forced sexual activity: Not on file   Other Topics Concern      Service Not Asked     Blood Transfusions Not Asked     Caffeine Concern Yes     Comment: 1 can qd     Occupational Exposure Not Asked     Hobby Hazards Not Asked     Sleep Concern Not Asked     Stress Concern Not Asked     Weight Concern Not Asked     Special Diet Not Asked     Back Care Not Asked     Exercise Yes     Comment: occas     Bike Helmet Not Asked     Seat Belt Yes     Self-Exams Not Asked     Parent/sibling w/ CABG, MI or angioplasty before 65F 55M? Yes   Social History Narrative     Not on file         Review of Systems  CONSTITUTIONAL: NEGATIVE for fever, chills, change in weight  INTEGUMENTARY/SKIN: NEGATIVE for worrisome rashes, moles or lesions  EYES: NEGATIVE for vision changes or irritation  ENT: NEGATIVE for ear, mouth and throat problems  RESP: NEGATIVE for significant cough or SOB  CV: NEGATIVE for chest pain, palpitations or peripheral edema  GI: NEGATIVE for nausea, abdominal pain, heartburn, or change in bowel habits   male: negative for dysuria, hematuria, decreased urinary stream, erectile dysfunction, urethral discharge  MUSCULOSKELETAL: NEGATIVE for significant arthralgias or myalgia  NEURO: NEGATIVE for weakness, dizziness or paresthesias  ENDOCRINE: NEGATIVE for temperature intolerance, skin/hair changes  HEME/ALLERGY/IMMUNE: NEGATIVE for bleeding problems  PSYCHIATRIC: NEGATIVE for changes in mood or affect    OBJECTIVE:   /78   Pulse 90   Temp 98.4  F (36.9  C) (Oral)   Ht 1.727 m (5' 8\")   Wt 93.4 kg (206 lb)   SpO2 96%   BMI 31.32 kg/m      Physical Exam  GENERAL: healthy, alert " and no distress  EYES: Eyes grossly normal to inspection, PERRL and conjunctivae and sclerae normal  HENT: ear canals and TM's normal, nose and mouth without ulcers or lesions  NECK: no adenopathy, no asymmetry, masses, or scars and thyroid normal to palpation  RESP: lungs clear to auscultation - no rales, rhonchi or wheezes  CV: regular rate and rhythm, normal S1 S2, no S3 or S4, no murmur, click or rub, no peripheral edema and peripheral pulses strong  ABDOMEN: soft, nontender, no hepatosplenomegaly, no masses and bowel sounds normal   (male): normal male genitalia without lesions or urethral discharge, no hernia  RECTAL: normal sphincter tone, no rectal masses, prostate normal size, smooth, nontender without  - left base nodule 2-3 mm??  MS: no gross musculoskeletal defects noted, no edema  SKIN: no suspicious lesions or rashes  NEURO: Normal strength and tone, mentation intact and speech normal  PSYCH: mentation appears normal, affect normal/bright  LYMPH: no cervical adenopathy    Diagnostic Test Results:  Labs reviewed in Epic    ASSESSMENT/PLAN:       ICD-10-CM    1. Routine general medical examination at a health care facility  Z00.00    2. Coronary artery disease involving coronary bypass graft of native heart without angina pectoris  I25.810 atorvastatin (LIPITOR) 80 MG tablet     carvedilol (COREG) 3.125 MG tablet   3. Hypertension goal BP (blood pressure) < 140/90  I10 carvedilol (COREG) 3.125 MG tablet   4. Hyperlipidemia LDL goal <70  E78.5 atorvastatin (LIPITOR) 80 MG tablet   5. Prediabetes  R73.03 atorvastatin (LIPITOR) 80 MG tablet   6. Psoriatic arthritis (H)  L40.50    7. Fatty liver  K76.0    8. Mild intermittent asthma without complication  J45.20 albuterol (PROAIR HFA) 108 (90 Base) MCG/ACT inhaler     azelastine (ASTELIN) 0.1 % nasal spray     fluticasone (FLONASE) 50 MCG/ACT nasal spray   9. Anxiety  F41.9    10. Gastroesophageal reflux disease without esophagitis  K21.9 GASTROENTEROLOGY  ADULT REF PROCEDURE ONLY     LANsoprazole (PREVACID) 30 MG DR capsule   11. Latent tuberculosis  Z22.7    12. Environmental allergies  Z91.09 albuterol (PROAIR HFA) 108 (90 Base) MCG/ACT inhaler     azelastine (ASTELIN) 0.1 % nasal spray     fluticasone (FLONASE) 50 MCG/ACT nasal spray     cetirizine (ZYRTEC) 10 MG tablet   13. Nephrolithiasis  N20.0    14. Family history of coronary artery disease  Z82.49    15. Family history of prostate cancer  Z80.42    16. Elevated prostate specific antigen (PSA)  R97.20    17. Screening for prostate cancer  Z12.5    18. Screen for colon cancer  Z12.11    19. Medication monitoring encounter  Z51.81 folic acid (FOLVITE) 1 MG tablet     Discussed treatment/modality options, including risk and benefits, he desires:    advised alcohol consumption 1oz per day or less, advised aspirin 81 mg po daily, advised 1 multivitamin per day, advised calcium 5457-4354 mg/d and Vitamin D 800-1200 IU/d, advised dentist every 6 months, advised diet, exercise, and weight loss, advised opthalmologist every 1 years, advised self testicular exam q month, further health care maintenance, further lab(s), medication refill(s) and Urology consult    All diagnosis above reviewed and noted above, otherwise stable.      See [facility] orders for further details.      Health Maintenance Due   Topic Date Due     HIV SCREENING  02/14/1980     PNEUMOCOCCAL IMMUNIZATION 19-64 MEDIUM RISK (1 of 1 - PPSV23) 02/14/1984     ZOSTER IMMUNIZATION (1 of 2) 02/14/2015     COLORECTAL CANCER SCREENING  08/08/2015     ASTHMA ACTION PLAN  02/01/2018       COUNSELING:   Reviewed preventive health counseling, as reflected in patient instructions       Regular exercise       Healthy diet/nutrition       Vision screening       Hearing screening       Colon cancer screening       Prostate cancer screening       immunizations reviewed    Estimated body mass index is 31.32 kg/m  as calculated from the following:    Height as of this  "encounter: 1.727 m (5' 8\").    Weight as of this encounter: 93.4 kg (206 lb).     Weight management plan: diet and exercise     reports that he has never smoked. He has never used smokeless tobacco.      Counseling Resources:  ATP IV Guidelines  Pooled Cohorts Equation Calculator  FRAX Risk Assessment  ICSI Preventive Guidelines  Dietary Guidelines for Americans, 2010  USDA's MyPlate  ASA Prophylaxis  Lung CA Screening    Fady Adams MD  [facility]  "

## 2020-06-11 ENCOUNTER — MYC MEDICAL ADVICE (OUTPATIENT)
Dept: FAMILY MEDICINE | Facility: CLINIC | Age: 55
End: 2020-06-11

## 2020-06-11 ENCOUNTER — TELEPHONE (OUTPATIENT)
Dept: FAMILY MEDICINE | Facility: CLINIC | Age: 55
End: 2020-06-11

## 2020-06-11 DIAGNOSIS — L40.50 PSORIATIC ARTHRITIS (H): Primary | ICD-10-CM

## 2020-06-11 ASSESSMENT — ASTHMA QUESTIONNAIRES: ACT_TOTALSCORE: 24

## 2020-06-11 NOTE — TELEPHONE ENCOUNTER
Received fax from [employer] stating plan does not cover Lansoprazole 30mg DR capsules.  Does not give suggested alternatives.    Would you like to change medication or start PA?      To start PA, call [phone], id #[id number]      Holly Ashley

## 2020-06-11 NOTE — TELEPHONE ENCOUNTER
Called patient @ [phone] -     Advised of options below - patient stated he gets lansoprazole OTC and would like to continue to do so.     Leanne Delaney RN  [facility]

## 2020-06-11 NOTE — TELEPHONE ENCOUNTER
medlist updated.   [employer] message sent    Audi Menendez RN   [facility]

## 2020-06-15 ENCOUNTER — TELEPHONE (OUTPATIENT)
Dept: UROLOGY | Facility: CLINIC | Age: 55
End: 2020-06-15

## 2020-06-15 ASSESSMENT — ENCOUNTER SYMPTOMS
HEMATURIA: 0
BLOOD IN STOOL: 0
DIFFICULTY URINATING: 1
JAUNDICE: 0
ABDOMINAL PAIN: 0
COUGH: 1
SHORTNESS OF BREATH: 0
NIGHT SWEATS: 1
DYSPNEA ON EXERTION: 0
SPUTUM PRODUCTION: 0
HEMOPTYSIS: 0
WEIGHT LOSS: 0
DYSURIA: 1
SNORES LOUDLY: 0
DECREASED APPETITE: 1
BLOATING: 0
WHEEZING: 0
POLYPHAGIA: 0
COUGH DISTURBING SLEEP: 0
BOWEL INCONTINENCE: 0
ALTERED TEMPERATURE REGULATION: 0
RECTAL PAIN: 0
POLYDIPSIA: 0
FLANK PAIN: 0
CHILLS: 1
FEVER: 1
VOMITING: 0
WEIGHT GAIN: 0
FATIGUE: 1
NAUSEA: 1
CONSTIPATION: 0
HALLUCINATIONS: 0
HEARTBURN: 0
INCREASED ENERGY: 0
DIARRHEA: 0
POSTURAL DYSPNEA: 0

## 2020-06-15 NOTE — TELEPHONE ENCOUNTER
[facility] Call Center    Phone Message    May a detailed message be left on voicemail: yes     Reason for Call: Other: Pt was told this was a video appt.  He cannot attend in person and still would like to do video.  Please make this change for him.      Action Taken: Message routed to:  [facility] ([facility] urology    Travel Screening: Not Applicable

## 2020-06-16 ENCOUNTER — VIRTUAL VISIT (OUTPATIENT)
Dept: UROLOGY | Facility: CLINIC | Age: 55
End: 2020-06-16
Attending: FAMILY MEDICINE
Payer: COMMERCIAL

## 2020-06-16 VITALS — BODY MASS INDEX: 31.22 KG/M2 | WEIGHT: 206 LBS | HEIGHT: 68 IN

## 2020-06-16 DIAGNOSIS — R97.20 ELEVATED PROSTATE SPECIFIC ANTIGEN (PSA): Primary | ICD-10-CM

## 2020-06-16 PROCEDURE — 99203 OFFICE O/P NEW LOW 30 MIN: CPT | Mod: 95 | Performed by: UROLOGY

## 2020-06-16 ASSESSMENT — PAIN SCALES - GENERAL: PAINLEVEL: NO PAIN (0)

## 2020-06-16 ASSESSMENT — MIFFLIN-ST. JEOR: SCORE: 1743.91

## 2020-06-16 NOTE — NURSING NOTE
"Mian Lozano is a 55 year old male who is being evaluated via a billable video visit.        PLEASE TEXT [phone]    The patient has been notified of following:     \"This video visit will be conducted via a call between you and your physician/provider.  .  If a prescription is necessary we can send it directly to your pharmacy.  If lab work is needed we can place an order for that and you can then stop by our lab to have the test done at a later time.        Chief Complaint   Patient presents with     Elevated PSA         ABELARDO oFy    "

## 2020-06-16 NOTE — LETTER
"6/16/2020       RE: Mian Lozano  [address] [place] [address]     Dear Colleague,    Thank you for referring your patient, Mian Lozano, to the [facility] [place] at [facility]. Please see a copy of my visit note below.    Mian Lozano is a 55 year old male who is being evaluated via a billable video visit.        PLEASE TEXT [phone]    The patient has been notified of following:     \"This video visit will be conducted via a call between you and your physician/provider. We have found that certain health care needs can be provided without the need for an in-person physical exam.  This service lets us provide the care you need with a video conversation.  If a prescription is necessary we can send it directly to your pharmacy.  If lab work is needed we can place an order for that and you can then stop by our lab to have the test done at a later time.    Video visits are billed at different rates depending on your insurance coverage.  Please reach out to your insurance provider with any questions.    If during the course of the call the physician/provider feels a video visit is not appropriate, you will not be charged for this service.\"    [phone]    Patient has given verbal consent for Video visit? No    Will anyone else be joining your video visit? No      [facility]  Main Office: [address]  [address]  [place] [address]       CHIEF COMPLAINT:  Elevated PSA    HISTORY:   I was asked by Dr Fady Adams to see this 55-year-old gentleman for an elevated PSA.  His annual PSAs have always been normal, less than 1.  This year the PSA saw a significant jump up to 15.8. He reports that 1 week prior to his PSA test, he had fevers and difficulty urinating for an entire weekend. The symptoms resolved a few days before he had the test and they remain resolved. No symptoms at this time. His father was treated with " brachytherapy for prostate cancer. 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    Dr Saba, Hx histoplasmosis     Mild intermittent asthma 7/03     Nephrolithiasis 6/13, 7/16    calcium oxalate     Other atopic dermatitis and related conditions 1980     Other diseases of lung, not elsewhere classified 4/08    dr steel - benign bx and ct - granuloma - no further w/u needed     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 gastritis, fowler's - due 3 yr     ESOPHAGOSCOPY, GASTROSCOPY, DUODENOSCOPY (EGD), COMBINED  5/10/2013    COMBINED ESOPHAGOSCOPY, GASTROSCOPY, DUODENOSCOPY (EGD), BIOPSY SINGLE OR MULTIPLE;  ESOPHAGOSCOPY, GASTROSCOPY, DUODENOSCOPY (EGD)  with biopsy;  Surgeon: Angus Reynolds MD;  Location:  GI     ESOPHAGOSCOPY, GASTROSCOPY, DUODENOSCOPY (EGD), COMBINED N/A 7/28/2017    Fowler's - recheck 3 yrs     HC VASECTOMY UNILAT/BILAT W POSTOP SEMEN  1995    Vasectomy      HERNIORRHAPHY UMBILICAL N/A 11/13/2014    Dr Barron     SURGICAL HISTORY OF -  Left 1980    lt patella fx     SURGICAL HISTORY OF -  Left 1985    lt thumb neuroma excised     SURGICAL HISTORY OF -   11/09    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 Paternal Grandfather         CHF     Neurologic Disorder Sister         CVA/anuerysm at age 19     Colon Cancer No family hx of        SOCIAL HISTORY:   Social History     Tobacco Use     Smoking status: Never Smoker     Smokeless tobacco: Never Used   Substance Use Topics     Alcohol use: Yes     Alcohol/week: 6.0 standard drinks     Comment: 6 drinks per week          Allergies   Allergen Reactions     Seasonal Allergies          Current Outpatient Medications:      albuterol (PROAIR HFA) 108 (90 Base) MCG/ACT inhaler, Inhale 2 puffs into the lungs every 4 hours as needed, Disp: 54 g, Rfl: 3     ASPIRIN 81 MG OR TABS, ONE DAILY, Disp: 100, Rfl: 3     atorvastatin (LIPITOR) 80 MG tablet, Take 1 tablet (80 mg) by mouth daily, Disp: 90 tablet, Rfl: 3     azelastine (ASTELIN) 0.1 % nasal spray, Spray 1-2 sprays into both nostrils 2 times daily, Disp: 3 Bottle, Rfl: 3     carvedilol (COREG) 3.125 MG tablet, Take 1 tablet (3.125 mg) by mouth 2 times daily (with meals), Disp: 180 tablet, Rfl: 3     cetirizine (ZYRTEC) 10 MG tablet, Take 1 tablet (10 mg) by mouth every evening, Disp: 90 tablet, Rfl: 3     etanercept (ENBREL) 50 MG/ML injection, Inject 0.98 mLs (50 mg) Subcutaneous once a week, Disp:  , Rfl:      fluticasone (FLONASE) 50 MCG/ACT nasal spray, Spray 2 sprays into both nostrils daily, Disp: 54.6 g, Rfl: 3     folic acid (FOLVITE) 1 MG tablet, Take 1 tablet (1 mg) by mouth daily, Disp: 90 tablet, Rfl: 3     LANsoprazole  (PREVACID) 30 MG DR capsule, TAKE ONE CAPSULE BY MOUTH DAILY. TAKE 30 TO 60 MINUTES BEFORE A MEAL., Disp: 90 capsule, Rfl: 3     methotrexate 2.5 MG tablet, TK 5 TS PO Q WK, Disp: , Rfl: 0    Review Of Systems:  Skin: No rash, pruritis, or skin pigmentation  Eyes: No changes in vision  Ears/Nose/Throat: No changes in hearing, no nosebleeds  Respiratory: No shortness of breath, dyspnea on exertion, cough, or hemoptysis  Cardiovascular: No chest pain or palpitations  Gastrointestinal: No diarrhea or constipation. No abdominal pain. No hematochezia  Genitourinary: see HPI  Musculoskeletal: No pain or swelling of joints, normal range of motion  Neurologic: No weakness or tremors  Psychiatric: No recent changes in memory or mood  Hematologic/Lymphatic/Immunologic: No easy bruising or enlarged lymph nodes  Endocrine: No weight gain or loss      PHYSICAL EXAM:  General: Alert and oriented to time, place, and self. In NAD   HEENT: Head AT/NC, EOMI, CN Grossly intact   Lungs: no respiratory distress, or pursed lip breathing   Heart: No obvious jugular venous distension present   Musculoskeltal: Normal movements. Normal appearing musculature  Skin: no suspicious lesions or rashes   Neuro: Alert, oriented, speech and mentation normal; moving all 4 extremities equally.   Psych: affect and mood normal      PSA: 15.8    UA RESULTS:  Recent Labs   Lab Test 06/05/20  0910 03/14/20  1144   COLOR Yellow Straw   APPEARANCE Clear Clear   URINEGLC Negative Negative   URINEBILI Negative Negative   URINEKETONE Negative Negative   SG 1.020 1.006   UBLD Negative Negative   URINEPH 7.0 6.5   PROTEIN Negative Negative   UROBILINOGEN 0.2  --    NITRITE Negative Negative   LEUKEST Negative Negative   RBCU  --  <1   WBCU  --  0     Bladder Scan:     Other Labs:      Imaging Studies: None    CLINICAL IMPRESSION:   Elevated PSA    PLAN:   He has an elevated PSA. He may have been ill just prior to PSA check. I recommended we recheck another PSA  shortly. If the PSA remains this elevated he will need to undergo an MRI prostate and prostate biopsy, and he was advised of this.    He will have another PSA checked next week and go over then results via virtual visit.    Robb Rivera MD      Video-Visit Details    Type of service:  Video Visit    Video Start Time: 11:18 AM  Video End Time: 11:26 AM    Originating Location (pt. Location): Home    Distant Location (provider location):  [facility] [place]     Platform used for Video Visit: Fresh !    Robb Rivera MD        Answers for HPI/ROS submitted by the patient on 6/15/2020   General Symptoms: Yes  Skin Symptoms: No  HENT Symptoms: No  EYE SYMPTOMS: No  HEART SYMPTOMS: No  LUNG SYMPTOMS: Yes  INTESTINAL SYMPTOMS: Yes  URINARY SYMPTOMS: Yes  REPRODUCTIVE SYMPTOMS: No  SKELETAL SYMPTOMS: No  BLOOD SYMPTOMS: No  NERVOUS SYSTEM SYMPTOMS: No  MENTAL HEALTH SYMPTOMS: No  Fever: Yes  Loss of appetite: Yes  Weight loss: No  Weight gain: No  Fatigue: Yes  Night sweats: Yes  Chills: Yes  Increased stress: No  Excessive hunger: No  Excessive thirst: No  Feeling hot or cold when others believe the temperature is normal: No  Loss of height: No  Post-operative complications: No  Surgical site pain: No  Hallucinations: No  Change in or Loss of Energy: No  Hyperactivity: No  Confusion: No  Cough: Yes  Sputum or phlegm: No  Coughing up blood: No  Difficulty breating or shortness of breath: No  Snoring: No  Wheezing: No  Difficulty breathing on exertion: No  Nighttime Cough: No  Difficulty breathing when lying flat: No  Heart burn or indigestion: No  Nausea: Yes  Vomiting: No  Abdominal pain: No  Bloating: No  Constipation: No  Diarrhea: No  Blood in stool: No  Black stools: No  Rectal or Anal pain: No  Fecal incontinence: No  Yellowing of skin or eyes: No  Vomit with blood: No  Change in stools: No  Trouble holding urine or incontinence: No  Pain or burning: Yes  Trouble  starting or stopping: Yes  Increased frequency of urination: No  Blood in urine: No  Decreased frequency of urination: No  Frequent nighttime urination: No  Flank pain: No  Difficulty emptying bladder: Yes      Again, thank you for allowing me to participate in the care of your patient.      Sincerely,    Robb Rivera MD

## 2020-06-16 NOTE — PROGRESS NOTES
"Mina Lozano is a 55 year old male who is being evaluated via a billable video visit.        PLEASE TEXT [phone]    The patient has been notified of following:     \"This video visit will be conducted via a call between you and your physician/provider. We have found that certain health care needs can be provided without the need for an in-person physical exam.  This service lets us provide the care you need with a video conversation.  If a prescription is necessary we can send it directly to your pharmacy.  If lab work is needed we can place an order for that and you can then stop by our lab to have the test done at a later time.    Video visits are billed at different rates depending on your insurance coverage.  Please reach out to your insurance provider with any questions.    If during the course of the call the physician/provider feels a video visit is not appropriate, you will not be charged for this service.\"    [phone]    Patient has given verbal consent for Video visit? No    Will anyone else be joining your video visit? [facility]  Main Office: [address] [place] [address]  [address] [place] [address]       CHIEF COMPLAINT:  Elevated PSA    HISTORY:   I was asked by Dr Fady Adams to see this 55-year-old gentleman for an elevated PSA.  His annual PSAs have always been normal, less than 1.  This year the PSA saw a significant jump up to 15.8. He reports that 1 week prior to his PSA test, he had fevers and difficulty urinating for an entire weekend. The symptoms resolved a few days before he had the test and they remain resolved. No symptoms at this time. His father was treated with brachytherapy for prostate cancer. He has no hx of gross hematuria or infections      PAST MEDICAL HISTORY:   Past Medical History:   Diagnosis Date     Anxiety      Sullivan's esophagus 7/11     Coronary artery disease 04/2008    cabg x 5     Environmental allergies     seasonal, hay fever     FAM HX-CARDIOVAS " DIS NEC     dad at 45, cabg, stent     Family history of prostate cancer      Fatty liver      Gallstone      GERD (gastroesophageal reflux disease) 3/11     History of blood transfusion      Hyperlipidemia LDL goal <70 2006     Hypertension goal BP (blood pressure) < 140/90 4/08     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    Dr Saba, Hx histoplasmosis     Mild intermittent asthma 7/03     Nephrolithiasis 6/13, 7/16    calcium oxalate     Other atopic dermatitis and related conditions 1980     Other diseases of lung, not elsewhere classified 4/08    dr steel - benign bx and ct - granuloma - no further w/u needed     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 gastritis, fowler's - due 3 yr     ESOPHAGOSCOPY, GASTROSCOPY, DUODENOSCOPY (EGD), COMBINED  5/10/2013    COMBINED ESOPHAGOSCOPY, GASTROSCOPY, DUODENOSCOPY (EGD), BIOPSY SINGLE OR MULTIPLE;  ESOPHAGOSCOPY, GASTROSCOPY, DUODENOSCOPY (EGD)  with biopsy;  Surgeon: Angus Reynolds MD;  Location:  GI     ESOPHAGOSCOPY, GASTROSCOPY, DUODENOSCOPY (EGD), COMBINED N/A 7/28/2017    Fowler's - recheck 3 yrs     HC VASECTOMY UNILAT/BILAT W POSTOP SEMEN  1995    Vasectomy     HERNIORRHAPHY UMBILICAL N/A 11/13/2014    Dr Barron     SURGICAL HISTORY OF -  Left 1980    lt patella fx     SURGICAL HISTORY OF -  Left 1985    lt thumb neuroma excised     SURGICAL HISTORY OF -   11/09    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 Paternal Grandfather         CHF     Neurologic Disorder Sister         CVA/anuerysm at age 19     Colon Cancer No family hx of        SOCIAL HISTORY:   Social History     Tobacco Use     Smoking status: Never Smoker     Smokeless tobacco: Never Used   Substance Use Topics     Alcohol use: Yes     Alcohol/week: 6.0 standard drinks     Comment: 6 drinks per week          Allergies   Allergen Reactions     Seasonal Allergies          Current Outpatient Medications:      albuterol (PROAIR HFA) 108 (90 Base) MCG/ACT inhaler, Inhale 2 puffs into the lungs every 4 hours as needed, Disp: 54 g, Rfl: 3     ASPIRIN 81 MG OR TABS, ONE DAILY, Disp: 100, Rfl: 3     atorvastatin (LIPITOR) 80 MG tablet, Take 1 tablet (80 mg) by mouth daily, Disp: 90 tablet, Rfl: 3     azelastine (ASTELIN) 0.1 % nasal spray, Spray 1-2 sprays into both nostrils 2 times daily, Disp: 3 Bottle, Rfl: 3     carvedilol (COREG) 3.125 MG tablet, Take 1 tablet (3.125 mg) by mouth 2 times daily (with meals), Disp: 180 tablet, Rfl: 3     cetirizine (ZYRTEC) 10 MG tablet, Take 1 tablet (10 mg) by mouth every evening, Disp: 90 tablet, Rfl: 3     etanercept (ENBREL) 50 MG/ML injection, Inject 0.98 mLs (50 mg) Subcutaneous once a week, Disp:  , Rfl:      fluticasone (FLONASE) 50 MCG/ACT nasal spray, Spray 2 sprays into both nostrils daily, Disp: 54.6 g, Rfl: 3     folic acid (FOLVITE) 1 MG tablet, Take 1 tablet (1 mg) by mouth daily, Disp: 90 tablet, Rfl: 3     LANsoprazole (PREVACID) 30 MG DR capsule, TAKE ONE CAPSULE BY MOUTH DAILY. TAKE 30 TO 60 MINUTES BEFORE A MEAL., Disp: 90 capsule, Rfl: 3     methotrexate 2.5 MG tablet, TK 5 TS PO Q WK, Disp: , Rfl: 0    Review Of Systems:  Skin: No rash, pruritis, or skin pigmentation  Eyes: No changes in vision  Ears/Nose/Throat: No changes  in hearing, no nosebleeds  Respiratory: No shortness of breath, dyspnea on exertion, cough, or hemoptysis  Cardiovascular: No chest pain or palpitations  Gastrointestinal: No diarrhea or constipation. No abdominal pain. No hematochezia  Genitourinary: see HPI  Musculoskeletal: No pain or swelling of joints, normal range of motion  Neurologic: No weakness or tremors  Psychiatric: No recent changes in memory or mood  Hematologic/Lymphatic/Immunologic: No easy bruising or enlarged lymph nodes  Endocrine: No weight gain or loss      PHYSICAL EXAM:    General: Alert and oriented to time, place, and self. In NAD   HEENT: Head AT/NC, EOMI, CN Grossly intact   Lungs: no respiratory distress, or pursed lip breathing   Heart: No obvious jugular venous distension present   Musculoskeltal: Normal movements. Normal appearing musculature  Skin: no suspicious lesions or rashes   Neuro: Alert, oriented, speech and mentation normal; moving all 4 extremities equally.   Psych: affect and mood normal      PSA: 15.8    UA RESULTS:  Recent Labs   Lab Test 06/05/20  0910 03/14/20  1144   COLOR Yellow Straw   APPEARANCE Clear Clear   URINEGLC Negative Negative   URINEBILI Negative Negative   URINEKETONE Negative Negative   SG 1.020 1.006   UBLD Negative Negative   URINEPH 7.0 6.5   PROTEIN Negative Negative   UROBILINOGEN 0.2  --    NITRITE Negative Negative   LEUKEST Negative Negative   RBCU  --  <1   WBCU  --  0       Bladder Scan:     Other Labs:      Imaging Studies: None      CLINICAL IMPRESSION:   Elevated PSA    PLAN:   He has an elevated PSA. He may have been ill just prior to PSA check. I recommended we recheck another PSA shortly. If the PSA remains this elevated he will need to undergo an MRI prostate and prostate biopsy, and he was advised of this.    He will have another PSA checked next week and go over then results via virtual visit.      Robb Rivera MD      Video-Visit Details    Type of service:  Video  Visit    Video Start Time: 11:18 AM  Video End Time: 11:26 AM    Originating Location (pt. Location): Home    Distant Location (provider location):  [facility] [place]     Platform used for Video Visit: LiveProfile[employer]    Robb Rivera MD        Answers for HPI/ROS submitted by the patient on 6/15/2020   General Symptoms: Yes  Skin Symptoms: No  HENT Symptoms: No  EYE SYMPTOMS: No  HEART SYMPTOMS: No  LUNG SYMPTOMS: Yes  INTESTINAL SYMPTOMS: Yes  URINARY SYMPTOMS: Yes  REPRODUCTIVE SYMPTOMS: No  SKELETAL SYMPTOMS: No  BLOOD SYMPTOMS: No  NERVOUS SYSTEM SYMPTOMS: No  MENTAL HEALTH SYMPTOMS: No  Fever: Yes  Loss of appetite: Yes  Weight loss: No  Weight gain: No  Fatigue: Yes  Night sweats: Yes  Chills: Yes  Increased stress: No  Excessive hunger: No  Excessive thirst: No  Feeling hot or cold when others believe the temperature is normal: No  Loss of height: No  Post-operative complications: No  Surgical site pain: No  Hallucinations: No  Change in or Loss of Energy: No  Hyperactivity: No  Confusion: No  Cough: Yes  Sputum or phlegm: No  Coughing up blood: No  Difficulty breating or shortness of breath: No  Snoring: No  Wheezing: No  Difficulty breathing on exertion: No  Nighttime Cough: No  Difficulty breathing when lying flat: No  Heart burn or indigestion: No  Nausea: Yes  Vomiting: No  Abdominal pain: No  Bloating: No  Constipation: No  Diarrhea: No  Blood in stool: No  Black stools: No  Rectal or Anal pain: No  Fecal incontinence: No  Yellowing of skin or eyes: No  Vomit with blood: No  Change in stools: No  Trouble holding urine or incontinence: No  Pain or burning: Yes  Trouble starting or stopping: Yes  Increased frequency of urination: No  Blood in urine: No  Decreased frequency of urination: No  Frequent nighttime urination: No  Flank pain: No  Difficulty emptying bladder: Yes

## 2020-06-17 ENCOUNTER — TELEPHONE (OUTPATIENT)
Dept: FAMILY MEDICINE | Facility: CLINIC | Age: 55
End: 2020-06-17

## 2020-06-17 NOTE — TELEPHONE ENCOUNTER
Central Prior Authorization Team   Phone: [phone]      PA PLAN EXCLUSION FOR PATIENT'S AGE    Medication: Lansoprazole 30mg Dr Capsules-PLAN EXCLUSION  Insurance Company: [employer] - Phone [phone] Fax [phone]  Pharmacy Filling the Rx: [id number] - [facility] [place] [address] AT [place] [address] & [address]  Filling Pharmacy Phone: [phone]  Filling Pharmacy Fax:    Start Date: 6/17/2020    Started PA on CMM and a response of Product or Service is not covered for patient age.Please contact members pharmacy help desk.  Per insurance, medication is covered for patient's age 12 and younger.  Patient's over the age of 13, it is considered a plan exclusion.

## 2020-06-17 NOTE — TELEPHONE ENCOUNTER
Prior Authorization Retail Medication Request    Medication/Dose: Lansoprazole 30mg Dr Capsules  ICD code (if different than what is on RX):    Previously Tried and Failed:    Rationale:      Insurance Name:  [phone]  Insurance ID: [id number]        Pharmacy Information (if different than what is on RX)  Name:  Micha Jorge  Phone:  [phone]

## 2020-06-24 DIAGNOSIS — R97.20 ELEVATED PROSTATE SPECIFIC ANTIGEN (PSA): ICD-10-CM

## 2020-06-24 PROCEDURE — 36415 COLL VENOUS BLD VENIPUNCTURE: CPT | Performed by: UROLOGY

## 2020-06-24 PROCEDURE — 84153 ASSAY OF PSA TOTAL: CPT | Performed by: UROLOGY

## 2020-06-25 LAB — PSA SERPL-MCNC: 6.48 UG/L (ref 0–4)

## 2020-07-01 ENCOUNTER — VIRTUAL VISIT (OUTPATIENT)
Dept: UROLOGY | Facility: CLINIC | Age: 55
End: 2020-07-01
Payer: COMMERCIAL

## 2020-07-01 VITALS — HEIGHT: 68 IN | BODY MASS INDEX: 31.07 KG/M2 | WEIGHT: 205 LBS

## 2020-07-01 DIAGNOSIS — R97.20 ELEVATED PROSTATE SPECIFIC ANTIGEN (PSA): Primary | ICD-10-CM

## 2020-07-01 PROCEDURE — 99213 OFFICE O/P EST LOW 20 MIN: CPT | Mod: 95 | Performed by: UROLOGY

## 2020-07-01 ASSESSMENT — MIFFLIN-ST. JEOR: SCORE: 1739.37

## 2020-07-01 ASSESSMENT — PAIN SCALES - GENERAL: PAINLEVEL: NO PAIN (0)

## 2020-07-01 NOTE — PROGRESS NOTES
"Mian Lozano is a 55 year old male who is being evaluated via a billable video visit.      The patient has been notified of following:     \"This video visit will be conducted via a call between you and your physician/provider. We have found that certain health care needs can be provided without the need for an in-person physical exam.  This service lets us provide the care you need with a video conversation.  If a prescription is necessary we can send it directly to your pharmacy.  If lab work is needed we can place an order for that and you can then stop by our lab to have the test done at a later time.    Video visits are billed at different rates depending on your insurance coverage.  Please reach out to your insurance provider with any questions.    If during the course of the call the physician/provider feels a video visit is not appropriate, you will not be charged for this service.\"    Patient has given verbal consent for Video visit? Yes    How would you like to obtain your AVS? [employer]     Patient would like the video invitation sent by: Text to cell phone: [phone]     Will anyone else be joining your video visit? No      Office Visit Note  [facility]  [phone]    UROLOGIC DIAGNOSES:   Elevated PSA    CURRENT INTERVENTIONS:       HISTORY:   Mian is scheduled for virtual visit today to go over his recent PSA.  He has felt well since my last visit with him.  His PSA has improved dramatically, currently at 6.48.  He reminded me that he had been feeling ill just prior to his previous elevated PSA checked on June 5.      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    Dr Saba, Hx histoplasmosis     Mild intermittent asthma 7/03     Nephrolithiasis 6/13, 7/16    calcium oxalate     Other atopic dermatitis and related conditions 1980     Other diseases of lung, not elsewhere classified 4/08    dr steel - benign bx and ct - granuloma - no further w/u needed     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 gastritis, fowler's - due 3 yr     ESOPHAGOSCOPY, GASTROSCOPY, DUODENOSCOPY (EGD), COMBINED  5/10/2013    COMBINED ESOPHAGOSCOPY, GASTROSCOPY, DUODENOSCOPY (EGD), BIOPSY SINGLE OR MULTIPLE;  ESOPHAGOSCOPY, GASTROSCOPY, DUODENOSCOPY (EGD)  with biopsy;  Surgeon: Angus Reynolds MD;  Location:  GI     ESOPHAGOSCOPY, GASTROSCOPY, DUODENOSCOPY (EGD), COMBINED N/A 7/28/2017    Fowler's - recheck 3 yrs     HC VASECTOMY UNILAT/BILAT W POSTOP SEMEN  1995    Vasectomy     HERNIORRHAPHY UMBILICAL N/A 11/13/2014    Dr Barron     SURGICAL HISTORY OF -  Left 1980    lt patella fx     SURGICAL HISTORY OF -  Left 1985    lt thumb neuroma excised     SURGICAL HISTORY OF -   11/09    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 Paternal Grandfather         CHF     Neurologic Disorder Sister         CVA/anuerysm at age 19     Colon Cancer No family hx of        SOCIAL HISTORY:   Social History     Tobacco Use     Smoking status: Never Smoker     Smokeless tobacco: Never Used   Substance Use Topics     Alcohol use: Yes     Alcohol/week: 6.0 standard drinks     Comment: 6 drinks per week       REVIEW OF SYSTEMS:  Skin: No rash, pruritis, or skin pigmentation  Eyes: No changes in vision  Ears/Nose/Throat: No changes in hearing, no nosebleeds  Respiratory: No shortness of breath, dyspnea on exertion, cough, or hemoptysis  Cardiovascular: No chest pain or palpitations  Gastrointestinal: No diarrhea or constipation. No abdominal pain. No hematochezia  Genitourinary: see HPI  Musculoskeletal: No pain or swelling of joints, normal range of motion  Neurologic: No weakness or tremors  Psychiatric: No recent changes in memory or mood  Hematologic/Lymphatic/Immunologic: No easy bruising or enlarged lymph nodes  Endocrine: No weight gain or loss      PHYSICAL EXAM:    General: Alert and oriented to time, place, and self. In NAD   HEENT: Head AT/NC, EOMI, CN Grossly intact   Lungs: no respiratory distress, or pursed lip breathing   Heart: No obvious jugular venous distension present   Musculoskeltal: Normal movements. Normal appearing musculature  Skin: no suspicious lesions or rashes   Neuro: Alert, oriented, speech and mentation normal; moving all 4 extremities equally.   Psych: affect and mood normal    Imaging: None    Urinalysis: UA RESULTS:  Recent Labs   Lab Test 06/05/20  0910 03/14/20  1144   COLOR Yellow Straw   APPEARANCE Clear Clear   URINEGLC Negative Negative   URINEBILI Negative Negative   URINEKETONE Negative Negative   SG 1.020 1.006   UBLD Negative Negative   URINEPH 7.0 6.5   PROTEIN Negative Negative   UROBILINOGEN 0.2  --    NITRITE Negative Negative   LEUKEST Negative Negative   RBCU  --   <1   WBCU  --  0       PSA: 6.48    Post Void Residual:     Other labs: None today      IMPRESSION:  Elevated PSA    PLAN:  His PSA shows a significant downward trajectory.  It was cut and more than half in just 2 weeks.  This suggests that the previously elevated PSA was a false positive due to his illness.  I recommended that we continue to follow PSA closely to make certain that it continues to go down and that it goes down into the normal range.  I will follow-up with him again in 1 month for another PSA check.      Robb Rivera M.D.              Video-Visit Details    Type of service:  Video Visit    Video Start Time: 8:33 AM  Video End Time: 8:37 AM    Originating Location (pt. Location): Home    Distant Location (provider location):  [facility] [place]     Platform used for Video Visit: [employer]    Robb Rivera MD

## 2020-07-01 NOTE — LETTER
"7/1/2020       RE: Mian Lozano  [address] [place] [address]     Dear Colleague,    Thank you for referring your patient, Mian Lozano, to the [facility] [place] at [facility]. Please see a copy of my visit note below.    Mian Lozano is a 55 year old male who is being evaluated via a billable video visit.      The patient has been notified of following:     \"This video visit will be conducted via a call between you and your physician/provider. We have found that certain health care needs can be provided without the need for an in-person physical exam.  This service lets us provide the care you need with a video conversation.  If a prescription is necessary we can send it directly to your pharmacy.  If lab work is needed we can place an order for that and you can then stop by our lab to have the test done at a later time.    Video visits are billed at different rates depending on your insurance coverage.  Please reach out to your insurance provider with any questions.    If during the course of the call the physician/provider feels a video visit is not appropriate, you will not be charged for this service.\"    Patient has given verbal consent for Video visit? Yes    How would you like to obtain your AVS? [facility]     Patient would like the video invitation sent by: Text to cell phone: [phone]     Will anyone else be joining your video visit? No      Office Visit Note  [facility]  [phone]    UROLOGIC DIAGNOSES:   Elevated PSA    CURRENT INTERVENTIONS:       HISTORY:   Mian is scheduled for virtual visit today to go over his recent PSA.  He has felt well since my last visit with him.  His PSA has improved dramatically, currently at 6.48.  He reminded me that he had been feeling ill just prior to his previous elevated PSA checked on June 5.      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    Dr Saba, Hx histoplasmosis     Mild intermittent asthma 7/03     Nephrolithiasis 6/13, 7/16    calcium oxalate     Other atopic dermatitis and related conditions 1980     Other diseases of lung, not elsewhere classified 4/08    dr steel - benign bx and ct - granuloma - no further w/u needed     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 gastritis, fowler's - due 3 yr     ESOPHAGOSCOPY, GASTROSCOPY, DUODENOSCOPY (EGD), COMBINED  5/10/2013    COMBINED ESOPHAGOSCOPY, GASTROSCOPY, DUODENOSCOPY (EGD), BIOPSY SINGLE OR MULTIPLE;  ESOPHAGOSCOPY, GASTROSCOPY, DUODENOSCOPY (EGD)  with biopsy;  Surgeon: Angus Reynolds MD;  Location:  GI     ESOPHAGOSCOPY, GASTROSCOPY, DUODENOSCOPY (EGD), COMBINED N/A 7/28/2017    Fowler's - recheck 3 yrs     HC VASECTOMY UNILAT/BILAT W POSTOP SEMEN  1995    Vasectomy     HERNIORRHAPHY UMBILICAL N/A 11/13/2014    Dr Barron     SURGICAL HISTORY OF -  Left 1980    lt patella fx     SURGICAL HISTORY OF -  Left 1985     lt thumb neuroma excised     SURGICAL HISTORY OF -   11/09    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 Paternal Grandfather         CHF     Neurologic Disorder Sister         CVA/anuerysm at age 19     Colon Cancer No family hx of        SOCIAL HISTORY:   Social History     Tobacco Use     Smoking status: Never Smoker     Smokeless tobacco: Never Used   Substance Use Topics     Alcohol use: Yes     Alcohol/week: 6.0 standard drinks     Comment: 6 drinks per week       REVIEW OF SYSTEMS:  Skin: No rash, pruritis, or skin pigmentation  Eyes: No changes in vision  Ears/Nose/Throat: No changes in hearing, no nosebleeds  Respiratory: No shortness of breath, dyspnea on exertion, cough, or hemoptysis  Cardiovascular: No chest pain or palpitations  Gastrointestinal: No diarrhea or constipation. No abdominal pain. No hematochezia  Genitourinary: see HPI  Musculoskeletal: No pain or swelling of joints, normal range of motion  Neurologic: No weakness or tremors  Psychiatric: No recent changes in memory or mood  Hematologic/Lymphatic/Immunologic: No easy bruising or enlarged lymph nodes  Endocrine: No weight gain or loss      PHYSICAL EXAM:    General: Alert and oriented to time, place, and self. In NAD   HEENT: Head AT/NC, EOMI, CN Grossly intact   Lungs: no respiratory distress, or pursed lip breathing   Heart: No obvious jugular venous distension present   Musculoskeltal: Normal movements. Normal appearing musculature  Skin: no suspicious lesions or rashes   Neuro: Alert, oriented, speech and mentation normal; moving all 4 extremities equally.   Psych: affect and mood normal    Imaging: None    Urinalysis: UA RESULTS:  Recent Labs   Lab Test 06/05/20  0910 03/14/20  1144   COLOR Yellow  Straw   APPEARANCE Clear Clear   URINEGLC Negative Negative   URINEBILI Negative Negative   URINEKETONE Negative Negative   SG 1.020 1.006   UBLD Negative Negative   URINEPH 7.0 6.5   PROTEIN Negative Negative   UROBILINOGEN 0.2  --    NITRITE Negative Negative   LEUKEST Negative Negative   RBCU  --  <1   WBCU  --  0       PSA: 6.48    Post Void Residual:     Other labs: None today      IMPRESSION:  Elevated PSA    PLAN:  His PSA shows a significant downward trajectory.  It was cut and more than half in just 2 weeks.  This suggests that the previously elevated PSA was a false positive due to his illness.  I recommended that we continue to follow PSA closely to make certain that it continues to go down and that it goes down into the normal range.  I will follow-up with him again in 1 month for another PSA check.      Robb Rivera M.D.              Video-Visit Details    Type of service:  Video Visit    Video Start Time: 8:33 AM  Video End Time: 8:37 AM    Originating Location (pt. Location): Home    Distant Location (provider location):  [facility] [place]     Platform used for Video Visit: [employer]    Robb Rivera MD

## 2020-07-01 NOTE — NURSING NOTE
Chief Complaint   Patient presents with     Elevated PSA     Review PSA results     Giovanna Mas EMT

## 2020-07-21 ENCOUNTER — TRANSFERRED RECORDS (OUTPATIENT)
Dept: HEALTH INFORMATION MANAGEMENT | Facility: CLINIC | Age: 55
End: 2020-07-21

## 2020-07-21 LAB
ALT SERPL-CCNC: 48 IU/L (ref 5–40)
AST SERPL-CCNC: 47 U/L (ref 5–34)
CREAT SERPL-MCNC: 0.9 MG/DL (ref 0.5–1.3)

## 2020-08-24 NOTE — LETTER
July 12, 2017      Mian Lozano  [address] [address]              Dear Mian Lozano,    Thank you for choosing [facility]. You are scheduled for the following service.   Date:   7/28/2017 Friday      Procedure: UPPER ENDOSCOPY-EGD  Doctor: Dr. Angus Reynolds           Arrival Time:  8:30 AM    *check in at Emergency/Endoscopy desk*  Procedure Time: 9:00 AM     Location:   [facility]        Endoscopy Department, First Floor (Enter through ER Doors) *         [address] [place], [place] [address]      [phone] or [phone] ([facility]) to reschedule        PRE-PROCEDURE CHECKLIST    If you have diabetes, ask your regular doctor for diet and medication restrictions.  If you take any antiplatelet or anticoagulant medications (such as Coumadin, Lovenox, Plavix, etc.) and have not already discussed this, please call your primary physician for advice on holding this medication.  If you take Aspirin, you may continue to do so.  If you are or may be pregnant, please discuss the risks and benefits of this procedure with your doctor.  You must arrange for a ride for the day of your exam. If you fail to arrange transportation with a responsible adult, your procedure will need to be cancelled and rescheduled. Taxi, bus and medical transport are not acceptable unless you have a responsible adult that you know & trust with you. Please arrange for this  to be able to pick you up in our department, approximately one hour after your scheduled procedure, if they are not able to stay with you.      Canceling or rescheduling   If you must cancel or reschedule your appointment, please call [phone] as soon as possible.      Upper Endoscopy or Esophagogastroduodenoscopy (EGD) is a test performed to evaluate symptoms of persistent abdominal pain, nausea, vomiting and difficulty swallowing. It may also be used to treat various conditions of the  upper gastrointestinal tract, such as bleeding, narrowing or abnormal growths.     What happens during an upper endoscopy?  On the day of your procedure, plan to spend up to one and a half hour after your arrival at the endoscopy center. The exam itself takes about 5 to 10 minutes.    Before the exam:  - You will change into a gown.   - Your medical history and medication list will be reviewed with you, unless it has already been done over the phone.   - A nurse will insert an intravenous (IV) line into your hand or arm.  - The doctor will talk to you and give you a consent form to sign.    During the exam:  - Medicine will be given through the IV line to help you relax and feel comfortable.   - Your heart rate and oxygen levels will be monitored. If your blood pressure is low, you may be given fluids through the IV line.   - The doctor will insert a flexible, hollow tube, called an endoscope, into your mouth and will advance it slowly through the esophagus, stomach and duodenum (the first part of your small intestine).   - You may have a feeling of pressure or fullness.   - If you have difficulty swallowing, and the doctor finds a narrowing in your esophagus, it may be possible for the area to be expanded-dilated during the exam.   - If abnormal tissue is found, the doctor may remove it through the endoscope (biopsy it) for closer examination. The tissue removal is painless.    After the exam:  - Any tissue samples removed during the exam will be sent to a lab for evaluation. It may take 5 to 7 working days for you to be notified of the results  - The doctor will prepare a full report for the physician who referred you for the upper endoscopy.   - The doctor will talk with you about the initial results of your exam.   - You may feel bloated after the procedure. That is normal and should not last long.   - Your throat may feel sore for a short time.   - Following the exam, you may resume your normal diet. Avoid  alcohol until the next day.   - You may resume your regular activities the day after the procedure.   - Medication given during the exam will prohibit you from driving for the rest of the day.  - A nurse will provide you with complete discharge instructions before you leave the endoscopy center. Be sure to ask the nurse for specific instructions if you take blood thinners such as Aspirin , Coumadin , Lovenox , Plavix , etc.       PREPARATION    To ensure a successful exam, please follow all instructions carefully.      The night before your exam:    STOP eating solid foods at 11:45 pm.     Clear liquids are okay to drink (examples: Gatorade , apple juice, clear broth, etc.).     DO NOT drink red liquids or alcoholic beverages.    The day of your exam:    STOP drinking clear liquids 4 hours before your exam.     You may take your usual medications with 4 oz. of water, but it needs to be at least 4 hours prior to your procedure.    When you leave for the procedure:    Bring a list of all of your current medications, including any allergy or over-the-counter medications, unless you have already reviewed that with an Endoscopy RN over the phone.     Bring a photo ID as well as up-to-date insurance information, such as your insurance card and any referral forms that might be required by your payer.       DIRECTIONS TO THE ENDOSCOPY DEPARTMENT    From the north ([facility])  Take [address]W south, exit on [address]. Get into the left hand john, turn left (east), go one-half mile to [address] and turn left. Go north to the first stoplight, take a right on [address] and follow it to the Emergency entrance.  From the south ([facility])  Take 35N to the 35E split and exit on [address]. On [address] , turn left (west) to [address]. Turn right (north) on [address]. Go north to the first stoplight, take a right on [address] and follow it to the Emergency  No entrance.  From the east via 35E ([facility])  Take [address] south to [address]  exit. Turn right on [address]. Go west to [address]. Turn right (north) on [address]. Go to the first stoplight, take a right and follow on [address] to the Emergency entrance.  From the east via [address] ([facility])  Take [address] to [address]. Turn left (south) on [address] to [address]. Turn left (east) on [address] and follow it to the Emergency entrance.  From the west via [address] ([place])  Take [address] east to [address]. Turn right (south) on [address] to [address]. Turn left (east) on [address] and follow it to the Emergency entrance.

## 2020-10-08 DIAGNOSIS — R97.20 ELEVATED PROSTATE SPECIFIC ANTIGEN (PSA): ICD-10-CM

## 2020-10-08 PROCEDURE — 84153 ASSAY OF PSA TOTAL: CPT | Performed by: UROLOGY

## 2020-10-09 LAB — PSA SERPL-MCNC: 1.59 UG/L (ref 0–4)

## 2020-11-09 ENCOUNTER — TRANSFERRED RECORDS (OUTPATIENT)
Dept: HEALTH INFORMATION MANAGEMENT | Facility: CLINIC | Age: 55
End: 2020-11-09

## 2021-01-16 ENCOUNTER — APPOINTMENT (OUTPATIENT)
Dept: GENERAL RADIOLOGY | Facility: CLINIC | Age: 56
End: 2021-01-16
Attending: PHYSICIAN ASSISTANT
Payer: COMMERCIAL

## 2021-01-16 ENCOUNTER — HOSPITAL ENCOUNTER (EMERGENCY)
Facility: CLINIC | Age: 56
Discharge: HOME | End: 2021-01-16
Attending: PHYSICIAN ASSISTANT | Admitting: PHYSICIAN ASSISTANT
Payer: COMMERCIAL

## 2021-01-16 VITALS
HEART RATE: 89 BPM | DIASTOLIC BLOOD PRESSURE: 78 MMHG | SYSTOLIC BLOOD PRESSURE: 146 MMHG | RESPIRATION RATE: 18 BRPM | OXYGEN SATURATION: 95 % | TEMPERATURE: 98.3 F

## 2021-01-16 DIAGNOSIS — R07.9 CHEST PAIN, UNSPECIFIED TYPE: ICD-10-CM

## 2021-01-16 DIAGNOSIS — F41.9 ANXIETY: ICD-10-CM

## 2021-01-16 LAB
ANION GAP SERPL CALCULATED.3IONS-SCNC: 2 MMOL/L (ref 3–14)
BASOPHILS # BLD AUTO: 0 10E9/L (ref 0–0.2)
BASOPHILS NFR BLD AUTO: 0.5 %
BUN SERPL-MCNC: 10 MG/DL (ref 7–30)
CALCIUM SERPL-MCNC: 8.8 MG/DL (ref 8.5–10.1)
CHLORIDE SERPL-SCNC: 110 MMOL/L (ref 94–109)
CO2 SERPL-SCNC: 29 MMOL/L (ref 20–32)
CREAT SERPL-MCNC: 0.94 MG/DL (ref 0.66–1.25)
DIFFERENTIAL METHOD BLD: NORMAL
EOSINOPHIL # BLD AUTO: 0.1 10E9/L (ref 0–0.7)
EOSINOPHIL NFR BLD AUTO: 1.6 %
ERYTHROCYTE [DISTWIDTH] IN BLOOD BY AUTOMATED COUNT: 12.9 % (ref 10–15)
GFR SERPL CREATININE-BSD FRML MDRD: >90 ML/MIN/{1.73_M2}
GLUCOSE SERPL-MCNC: 107 MG/DL (ref 70–99)
HCT VFR BLD AUTO: 45.7 % (ref 40–53)
HGB BLD-MCNC: 15.7 G/DL (ref 13.3–17.7)
IMM GRANULOCYTES # BLD: 0 10E9/L (ref 0–0.4)
IMM GRANULOCYTES NFR BLD: 0.4 %
LYMPHOCYTES # BLD AUTO: 1.4 10E9/L (ref 0.8–5.3)
LYMPHOCYTES NFR BLD AUTO: 19.4 %
MCH RBC QN AUTO: 32.4 PG (ref 26.5–33)
MCHC RBC AUTO-ENTMCNC: 34.4 G/DL (ref 31.5–36.5)
MCV RBC AUTO: 94 FL (ref 78–100)
MONOCYTES # BLD AUTO: 0.6 10E9/L (ref 0–1.3)
MONOCYTES NFR BLD AUTO: 8.4 %
NEUTROPHILS # BLD AUTO: 5.2 10E9/L (ref 1.6–8.3)
NEUTROPHILS NFR BLD AUTO: 69.7 %
NRBC # BLD AUTO: 0 10*3/UL
NRBC BLD AUTO-RTO: 0 /100
PLATELET # BLD AUTO: 196 10E9/L (ref 150–450)
POTASSIUM SERPL-SCNC: 4 MMOL/L (ref 3.4–5.3)
RBC # BLD AUTO: 4.84 10E12/L (ref 4.4–5.9)
SODIUM SERPL-SCNC: 141 MMOL/L (ref 133–144)
TROPONIN I SERPL-MCNC: <0.015 UG/L (ref 0–0.04)
TROPONIN I SERPL-MCNC: <0.015 UG/L (ref 0–0.04)
WBC # BLD AUTO: 7.4 10E9/L (ref 4–11)

## 2021-01-16 PROCEDURE — 93005 ELECTROCARDIOGRAM TRACING: CPT

## 2021-01-16 PROCEDURE — 96374 THER/PROPH/DIAG INJ IV PUSH: CPT

## 2021-01-16 PROCEDURE — 71046 X-RAY EXAM CHEST 2 VIEWS: CPT

## 2021-01-16 PROCEDURE — 84484 ASSAY OF TROPONIN QUANT: CPT | Performed by: PHYSICIAN ASSISTANT

## 2021-01-16 PROCEDURE — 250N000011 HC RX IP 250 OP 636: Performed by: PHYSICIAN ASSISTANT

## 2021-01-16 PROCEDURE — 85025 COMPLETE CBC W/AUTO DIFF WBC: CPT | Performed by: PHYSICIAN ASSISTANT

## 2021-01-16 PROCEDURE — 84484 ASSAY OF TROPONIN QUANT: CPT | Mod: 91 | Performed by: PHYSICIAN ASSISTANT

## 2021-01-16 PROCEDURE — 99285 EMERGENCY DEPT VISIT HI MDM: CPT | Mod: 25

## 2021-01-16 PROCEDURE — 80048 BASIC METABOLIC PNL TOTAL CA: CPT | Performed by: PHYSICIAN ASSISTANT

## 2021-01-16 RX ORDER — HYDROXYZINE HYDROCHLORIDE 25 MG/1
25-50 TABLET, FILM COATED ORAL EVERY 6 HOURS PRN
Qty: 12 TABLET | Refills: 0 | Status: SHIPPED | OUTPATIENT
Start: 2021-01-16 | End: 2021-01-19

## 2021-01-16 RX ORDER — KETOROLAC TROMETHAMINE 15 MG/ML
15 INJECTION, SOLUTION INTRAMUSCULAR; INTRAVENOUS ONCE
Status: COMPLETED | OUTPATIENT
Start: 2021-01-16 | End: 2021-01-16

## 2021-01-16 RX ADMIN — KETOROLAC TROMETHAMINE 15 MG: 15 INJECTION, SOLUTION INTRAMUSCULAR; INTRAVENOUS at 20:02

## 2021-01-16 ASSESSMENT — ENCOUNTER SYMPTOMS
DIAPHORESIS: 0
CHILLS: 0

## 2021-01-16 NOTE — ED AVS SNAPSHOT
[facility] Emergency [facility]  [address]  [facility] [address]  Phone: [phone]  Fax: [phone]                                    Mian Lozano   MRN: [id number]    Department: [facility] Emergency [facility]   Date of Visit: 1/16/2021           After Visit Summary Signature Page    I have received my discharge instructions, and my questions have been answered. I have discussed any challenges I see with this plan with the nurse or doctor.    ..........................................................................................................................................  Patient/Patient Representative Signature      ..........................................................................................................................................  Patient Representative Print Name and Relationship to Patient    ..................................................               ................................................  Date                                   Time    ..........................................................................................................................................  Reviewed by Signature/Title    ...................................................              ..............................................  Date                                               Time          22EPIC Rev 08/18

## 2021-01-17 ASSESSMENT — ENCOUNTER SYMPTOMS
ABDOMINAL PAIN: 0
SHORTNESS OF BREATH: 0
FEVER: 0

## 2021-01-17 NOTE — ED PROVIDER NOTES
"  History   Chief Complaint:  Chest Pain       The history is provided by the patient.      Mian Lozano is a 55 year old male with a history of anxiety, GERD, hypertension, and Sullivan's esophagus who presents with left-sided chest pain. The patient reports that he has had intermittent chest pain for several years though has been constant as of Monday 1/11.  He presents to ED as this feels different than his typical chronic chest pain. His pain is accompanied by occasional hot flashes that resolve within a few seconds. He has been using ibuprofen for his pain, last taken this morning. He denies any chills or sweating. Denies fever/chills, cough, and shortness of breath. Denies alleviating and exacerbating symptoms. The patient believes he is established with a Cardiologist through [place] but thinks it has been some time since he has seen them.    Review of Systems   Constitutional: Negative for chills, diaphoresis (brief \"hot flashes\" but denies sweats) and fever.   Respiratory: Negative for shortness of breath.    Cardiovascular: Positive for chest pain (L sided).   Gastrointestinal: Negative for abdominal pain.   All other systems reviewed and are negative.      Allergies:  No Known Drug Allergies    Medications:  Albuterol   Aspirin 81 mg  Atorvastatin  Carvedilol  Lansoprazole    Past Medical History:    Anxiety  Sullivan's esophagus  CAD  GERD  Hyperlipidemia  Hypertension  Latent tuberculosis  Asthma  Nephrolithiasis  Prediabetes    Past Surgical History:    CABG  Lung biopsy  Colonoscopy  EGD x3  Vasectomy  Umbilical herniorrhaphy  Left patella fracture repair  Excision of left thumb neuroma  Thoracentesis    Family History:    Father - prostate cancer, CAD, hypertension  Sister - CVA/aneurysm    Social History:  The patient was not accompanied to the ED.  PCP: Fady Adams    Physical Exam     Patient Vitals for the past 24 hrs:   BP Temp Temp src Pulse Resp SpO2   01/16/21 2230 (!) 146/78 -- -- 89 -- 95 % "   01/16/21 2136 (!) 141/83 -- -- 84 -- 96 %   01/16/21 2130 (!) 141/83 -- -- 80 -- 94 %   01/16/21 2045 -- -- -- 85 -- 95 %   01/16/21 2030 -- -- -- -- -- 95 %   01/16/21 2015 -- -- -- -- -- 95 %   01/16/21 2000 (!) 150/90 -- -- 83 -- 95 %   01/16/21 1945 -- -- -- 103 -- 96 %   01/16/21 1917 (!) 191/114 98.3  F (36.8  C) Temporal 103 18 98 %       Physical Exam  General: No distress.   Head:  Scalp is atraumatic.  Eyes:  The pupils are equal, round, and reactive to light. Normal conjunctiva.   ENT:                                      Ears:  The external ears are normal.   Nose:  The external nose is normal.  Throat:  The oropharynx is normal. Mucus membranes are moist.                 Neck:  Normal range of motion.   CV:  Normal rate. No murmur. 2+ radial pulses.  Resp:  Breath sounds are clear bilaterally. Non-labored, no retractions or accessory muscle use.  GI:  Abdomen is soft, no distension, no tenderness.   MS:  Normal range of motion. No acute deformities.   Skin:  Warm and dry. No rash.   Neuro:  Alert. Strength and sensation grossly intact.   Psych:  Awake. Alert.  Appropriate interactions.     Emergency Department Course     ECG:  Indication: Chest pain  Completed at 1946.  Read at 1949 by Dr. Marley.   Normal sinus rhythm   Rate 90 bpm. ND interval 178. QRS duration 80. QT/QTc 352/430. P-R-T axes 43 34 48.  Agree with computer interpretation.     Imaging:  Chest XR, PA & LAT  No change. Previous CABG. Heart size normal. Stable mild pleural thickening along left lateral chest wall. No new infiltrate.  Report per radiology.    Laboratory:  CBC: WBC: 7.4, HGB: 15.7, PLT: 196  BMP: Chloride 110 (H), Anion gap 2 (L), Glucose 107 (H), o/w WNL (Creatinine: 0.94)    Troponin (1934): <0.015  Troponin (2135): <0.015    Emergency Department Course:    Reviewed:  I reviewed the patient's nursing notes, vitals, past medical history and care everywhere.     Assessments:  1937 I performed an exam of the patient in  room 21 as documented above.  2103 Patient rechecked and updated on radiographic findings.    2224 Patient reassessed after repeat troponin.    Interventions:  2002 Toradol 15 mg IV    Disposition:  The patient was discharged to home.     Impression & Plan     Medical Decision Making:  Mian Lozano is a 55 year old male with medical history including CAD s/p CABG in 2008 presents to the emergency department with ongoing, non-exertional, chest pain for the last 5 days.  He denies alleviating or exacerbating factors.  Denies associated symptoms including fevers, cough, shortness of breath.  He notes a history of chronic chest pain after pleurodesis and has followed with the pain clinic in the past.  Work-up in the emergency department is largely unremarkable.  Initial blood pressure elevated, this trended down throughout his stay.  EKG reveals normal sinus rhythm without acute ST changes.  Initial and delta troponin negative.  No evidence of acute coronary syndrome. CXR with no acute findings- it does reveal stable mild pleural thickening along the left lateral chest wall that is unchanged.  There is no pneumonia, pneumothorax, widened mediastinum, or other acute findings.  No pleuritic chest pain or hypoxia to suggest pulmonary embolism.  Remainder blood work reassuring.      I discussed work-up in the emergency department and emphasized importance of close follow-up with his primary care provider.  Patient reports a history of anxiety and increased anxiety recently- prescription for hydroxyzine given.   Strict return precautions discussed including worsening pain, shortness of breath, or any other concerning symptoms develop.  Patient agrees with this plan all questions and concerns addressed prior to discharge home.      Diagnosis:    ICD-10-CM    1. Chest pain, unspecified type  R07.9    2. Anxiety  F41.9        Discharge Medications:   Discharge Medication List as of 1/16/2021 10:33 PM      START taking these  medications    Details   hydrOXYzine (ATARAX) 25 MG tablet Take 1-2 tablets (25-50 mg) by mouth every 6 hours as needed for anxiety, Disp-12 tablet, R-0, Local Print             Scribe Disclosure:  I, Flaca Duran, am serving as a scribe at 7:37 PM on 1/16/2021 to document services personally performed by Reshma Olivo PA-C based on my observations and the provider's statements to me.     Flaca Duran  1/16/2021   [facility] EMERGENCY DEPARTMENT         Reshma Olivo PA-C  01/17/21 0012

## 2021-01-17 NOTE — DISCHARGE INSTRUCTIONS
*You may resume diet and activities as tolerated.  *Take ibuprofen 600 mg 3x per day with food. This will provide both pain control and fight against inflammation.   *Follow-up with your doctor for a recheck in 2-3 days.    *Return if you develop difficulty in breathing, faint or feel like you will faint or become worse in any way.       Discharge Instructions  Chest Pain    You have been seen today for chest pain or discomfort.  At this time, your provider has found no signs that your chest pain is due to a serious or life-threatening condition, (or you have declined more testing and/or admission to the hospital). However, sometimes there is a serious problem that does not show up right away. Your evaluation today may not be complete and you may need further testing and evaluation.     Generally, every Emergency Department visit should have a follow-up clinic visit with either a primary or a specialty clinic/provider. Please follow-up as instructed by your emergency provider today.  Return to the Emergency Department if:  Your chest pain changes, gets worse, starts to happen more often, or comes with less activity.  You are newly short of breath.  You get very weak or tired.  You pass out or faint.  You have any new symptoms, like fever, cough, numb legs, or you cough up blood.  You have anything else that worries you.    Until you follow-up with your regular provider, please do the following:  Take one aspirin daily unless you have an allergy or are told not to by your provider.  If a stress test appointment has been made, go to the appointment.  If you have questions, contact your regular provider.  Follow-up with your regular provider/clinic as directed; this is very important.    If you were given a prescription for medicine here today, be sure to read all of the information (including the package insert) that comes with your prescription.  This will include important information about the medicine, its side  effects, and any warnings that you need to know about.  The pharmacist who fills the prescription can provide more information and answer questions you may have about the medicine.  If you have questions or concerns that the pharmacist cannot address, please call or return to the Emergency Department.       Remember that you can always come back to the Emergency Department if you are not able to see your regular provider in the amount of time listed above, if you get any new symptoms, or if there is anything that worries you.

## 2021-01-17 NOTE — ED TRIAGE NOTES
Left upper chest pain. Chronic pain in this area since 2008. Worse since this last Monday. Now worse tonight. Nothing makes it better or worse. Anxiety.

## 2021-01-18 LAB — INTERPRETATION ECG - MUSE: NORMAL

## 2021-01-19 ENCOUNTER — VIRTUAL VISIT (OUTPATIENT)
Dept: FAMILY MEDICINE | Facility: CLINIC | Age: 56
End: 2021-01-19
Payer: COMMERCIAL

## 2021-01-19 DIAGNOSIS — Z80.42 FAMILY HISTORY OF PROSTATE CANCER: ICD-10-CM

## 2021-01-19 DIAGNOSIS — I10 HYPERTENSION GOAL BP (BLOOD PRESSURE) < 140/90: ICD-10-CM

## 2021-01-19 DIAGNOSIS — L40.50 PSORIATIC ARTHRITIS (H): ICD-10-CM

## 2021-01-19 DIAGNOSIS — Z82.49 FAMILY HISTORY OF CORONARY ARTERY DISEASE: ICD-10-CM

## 2021-01-19 DIAGNOSIS — Z51.81 MEDICATION MONITORING ENCOUNTER: ICD-10-CM

## 2021-01-19 DIAGNOSIS — R07.89 ATYPICAL CHEST PAIN: ICD-10-CM

## 2021-01-19 DIAGNOSIS — F41.9 ANXIETY: Primary | ICD-10-CM

## 2021-01-19 DIAGNOSIS — E78.5 HYPERLIPIDEMIA LDL GOAL <70: ICD-10-CM

## 2021-01-19 DIAGNOSIS — R73.03 PREDIABETES: ICD-10-CM

## 2021-01-19 DIAGNOSIS — I25.810 CORONARY ARTERY DISEASE INVOLVING CORONARY BYPASS GRAFT OF NATIVE HEART WITHOUT ANGINA PECTORIS: ICD-10-CM

## 2021-01-19 DIAGNOSIS — G89.12 POST-THORACOTOMY PAIN SYNDROME: ICD-10-CM

## 2021-01-19 DIAGNOSIS — K21.9 GASTROESOPHAGEAL REFLUX DISEASE WITHOUT ESOPHAGITIS: ICD-10-CM

## 2021-01-19 PROCEDURE — 99214 OFFICE O/P EST MOD 30 MIN: CPT | Mod: 95 | Performed by: FAMILY MEDICINE

## 2021-01-19 RX ORDER — FAMOTIDINE 20 MG/1
20 TABLET, FILM COATED ORAL 2 TIMES DAILY
Qty: 60 TABLET | Refills: 3 | Status: SHIPPED | OUTPATIENT
Start: 2021-01-19 | End: 2022-06-16

## 2021-01-19 RX ORDER — HYDROXYZINE HYDROCHLORIDE 25 MG/1
25-50 TABLET, FILM COATED ORAL EVERY 6 HOURS PRN
Qty: 30 TABLET | Refills: 3 | Status: SHIPPED | OUTPATIENT
Start: 2021-01-19 | End: 2021-07-29

## 2021-01-19 ASSESSMENT — ANXIETY QUESTIONNAIRES
3. WORRYING TOO MUCH ABOUT DIFFERENT THINGS: NOT AT ALL
6. BECOMING EASILY ANNOYED OR IRRITABLE: NOT AT ALL
5. BEING SO RESTLESS THAT IT IS HARD TO SIT STILL: NOT AT ALL
1. FEELING NERVOUS, ANXIOUS, OR ON EDGE: SEVERAL DAYS
2. NOT BEING ABLE TO STOP OR CONTROL WORRYING: SEVERAL DAYS
IF YOU CHECKED OFF ANY PROBLEMS ON THIS QUESTIONNAIRE, HOW DIFFICULT HAVE THESE PROBLEMS MADE IT FOR YOU TO DO YOUR WORK, TAKE CARE OF THINGS AT HOME, OR GET ALONG WITH OTHER PEOPLE: NOT DIFFICULT AT ALL
GAD7 TOTAL SCORE: 2
7. FEELING AFRAID AS IF SOMETHING AWFUL MIGHT HAPPEN: NOT AT ALL

## 2021-01-19 ASSESSMENT — PATIENT HEALTH QUESTIONNAIRE - PHQ9
5. POOR APPETITE OR OVEREATING: NOT AT ALL
SUM OF ALL RESPONSES TO PHQ QUESTIONS 1-9: 0

## 2021-01-19 NOTE — PROGRESS NOTES
[facility] [place]    Telephone visit  - [phone]    Subjective    Mian Lozano is a 55 year old male who is being evaluated via a billable telephone visit.      Chief Complaint   Patient presents with     ER F/U       ED/UC Followup:    Facility:  [facility]  Date of visit: 1/16/21  Reason for visit: chest pain/anxiety  Current Status: started anxiety meds - no side effects but has only taken for 2 days    Notes feeling better, less anxiety, heartburn increasing, feeling warm spot in his stomach, notes no reflux, nothing making better or worse, still coming and going, few times a day, lasting for seconds - rib cage pain lasting minutes to hours, improved, since thoracotomy 2009, Dr Mott, follows with Dr Jones, cardiology, no sob, brief hot flashes/sweats, no nausea, no new radiation, exertion may make better, some episodes of vertigo/off balance/LH, associated with pain    Overall feel like at 50 %, last lexiscan 3/2018    Impression  1.  Myocardial perfusion imaging using single isotope technique  demonstrated mild fixed inferior defect likely secondary to diaphragm  attenuation. There is a small basal anterior defect which appears  mostly fixed. There is no significant ischemia noted on this study.   2. Gated images demonstrated normal LV cavity size and systolic  function.  The left ventricular systolic function is 65%.  3. Compared to the prior study from April 2011, no clinically  important changes .       PHQ 5/11/2018 1/19/2021   PHQ-9 Total Score 1 0   Q9: Thoughts of better off dead/self-harm past 2 weeks Not at all Not at all       LONNY-7 SCORE 3/13/2014 5/11/2018 1/19/2021   Total Score 0 - -   Total Score - 0 2     ACT Total Scores 6/10/2020 1/19/2021 1/19/2021   ACT TOTAL SCORE - - -   ASTHMA ER VISITS - - -   ASTHMA HOSPITALIZATIONS - - -   ACT TOTAL SCORE (Goal Greater than or Equal to 20) 24 - 25   In the past 12 months, how many times did you visit the emergency room for your  asthma without being admitted to the hospital? 0 0 0   In the past 12 months, how many times were you hospitalized overnight because of your asthma? 0 0 0     1/16/2021    Chief Complaint:  Chest Pain        The history is provided by the patient.      Mian Lozano is a 55 year old male with a history of anxiety, GERD, hypertension, and Sullivan's esophagus who presents with left-sided chest pain. The patient reports that he has had intermittent chest pain for several years though has been constant as of Monday 1/11.  He presents to ED as this feels different than his typical chronic chest pain. His pain is accompanied by occasional hot flashes that resolve within a few seconds. He has been using ibuprofen for his pain, last taken this morning. He denies any chills or sweating. Denies fever/chills, cough, and shortness of breath. Denies alleviating and exacerbating symptoms. The patient believes he is established with a Cardiologist through [place] but thinks it has been some time since he has seen them.     Emergency Department Course      ECG:  Indication: Chest pain  Completed at 1946.  Read at 1949 by Dr. Marley.   Normal sinus rhythm   Rate 90 bpm. AR interval 178. QRS duration 80. QT/QTc 352/430. P-R-T axes 43 34 48.  Agree with computer interpretation.      Imaging:  Chest XR, PA & LAT  No change. Previous CABG. Heart size normal. Stable mild pleural thickening along left lateral chest wall. No new infiltrate.  Report per radiology.     Laboratory:  CBC: WBC: 7.4, HGB: 15.7, PLT: 196  BMP: Chloride 110 (H), Anion gap 2 (L), Glucose 107 (H), o/w WNL (Creatinine: 0.94)     Troponin (1934): <0.015  Troponin (2135): <0.015     Emergency Department Course:     Reviewed:  I reviewed the patient's nursing notes, vitals, past medical history and care everywhere.      Assessments:  1937    I performed an exam of the patient in room 21 as documented above.  2103    Patient rechecked and updated on radiographic  findings.    2224    Patient reassessed after repeat troponin.     Interventions:  2002    Toradol 15 mg IV     Disposition:  The patient was discharged to home.      Impression & Plan      Medical Decision Making:  Mian Lozano is a 55 year old male with medical history including CAD s/p CABG in 2008 presents to the emergency department with ongoing, non-exertional, chest pain for the last 5 days.  He denies alleviating or exacerbating factors.  Denies associated symptoms including fevers, cough, shortness of breath.  He notes a history of chronic chest pain after pleurodesis and has followed with the pain clinic in the past.  Work-up in the emergency department is largely unremarkable.  Initial blood pressure elevated, this trended down throughout his stay.  EKG reveals normal sinus rhythm without acute ST changes.  Initial and delta troponin negative.  No evidence of acute coronary syndrome. CXR with no acute findings- it does reveal stable mild pleural thickening along the left lateral chest wall that is unchanged.  There is no pneumonia, pneumothorax, widened mediastinum, or other acute findings.  No pleuritic chest pain or hypoxia to suggest pulmonary embolism.  Remainder blood work reassuring.       I discussed work-up in the emergency department and emphasized importance of close follow-up with his primary care provider.  Patient reports a history of anxiety and increased anxiety recently- prescription for hydroxyzine given.   Strict return precautions discussed including worsening pain, shortness of breath, or any other concerning symptoms develop.  Patient agrees with this plan all questions and concerns addressed prior to discharge home.        Diagnosis:      ICD-10-CM     1. Chest pain, unspecified type  R07.9     2. Anxiety  F41.9          PMH    Past Medical History:   Diagnosis Date     Anxiety      Sullivan's esophagus 7/11     Coronary artery disease 04/2008    cabg x 5     Environmental allergies      seasonal, hay fever     FAM HX-CARDIOVAS DIS NEC     dad at 45, cabg, stent     Family history of prostate cancer      Fatty liver      Gallstone      GERD (gastroesophageal reflux disease) 3/11     History of blood transfusion      Hyperlipidemia LDL goal <70 2006     Hypertension goal BP (blood pressure) < 140/90 4/08     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    Dr Saba, Hx histoplasmosis     Mild intermittent asthma 7/03     Nephrolithiasis 6/13, 7/16    calcium oxalate     Other atopic dermatitis and related conditions 1980     Other diseases of lung, not elsewhere classified 4/08    dr steel - benign bx and ct - granuloma - no further w/u needed     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 gastritis, fowler's - due 3 yr     ESOPHAGOSCOPY, GASTROSCOPY, DUODENOSCOPY (EGD), COMBINED  5/10/2013    COMBINED ESOPHAGOSCOPY, GASTROSCOPY, DUODENOSCOPY (EGD), BIOPSY SINGLE OR MULTIPLE;  ESOPHAGOSCOPY, GASTROSCOPY, DUODENOSCOPY (EGD)  with biopsy;  Surgeon: Angus Reynolds MD;  Location:  GI     ESOPHAGOSCOPY, GASTROSCOPY, DUODENOSCOPY (EGD), COMBINED N/A 7/28/2017    Fowler's - recheck 3 yrs     HC VASECTOMY UNILAT/BILAT W POSTOP SEMEN  1995    Vasectomy     HERNIORRHAPHY UMBILICAL N/A 11/13/2014    Dr Barron     SURGICAL HISTORY OF -  Left 1980    lt patella fx     SURGICAL HISTORY OF -  Left 1985    lt thumb neuroma excised     SURGICAL HISTORY OF -   11/09    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     VASECTOMY          Medications    Current Outpatient Medications   Medication Sig Dispense Refill     albuterol (PROAIR HFA) 108 (90 Base) MCG/ACT inhaler Inhale 2 puffs into the lungs every 4 hours as needed 54 g 3     ASPIRIN 81 MG OR TABS ONE DAILY 100 3     atorvastatin (LIPITOR) 80 MG tablet Take 1 tablet (80 mg) by mouth daily 90 tablet 3     azelastine (ASTELIN) 0.1 % nasal spray Spray 1-2 sprays into both nostrils 2 times daily 3 Bottle 3     carvedilol (COREG) 3.125 MG tablet Take 1 tablet (3.125 mg) by mouth 2 times daily (with meals) 180 tablet 3     cetirizine (ZYRTEC) 10 MG tablet Take 1 tablet (10 mg) by mouth every evening 90 tablet 3     etanercept (ENBREL) 50 MG/ML injection Inject 0.98 mLs (50 mg) Subcutaneous once a week       famotidine (PEPCID) 20 MG tablet Take 1 tablet (20 mg) by mouth 2 times daily 60 tablet 3     fluticasone (FLONASE) 50 MCG/ACT nasal spray Spray 2 sprays into both nostrils daily 54.6 g 3     folic acid (FOLVITE) 1 MG tablet Take 1 tablet (1 mg) by mouth daily 90 tablet 3     hydrOXYzine (ATARAX) 25 MG tablet Take 1-2 tablets (25-50 mg) by mouth every 6 hours as needed for anxiety 30 tablet 3     LANsoprazole (PREVACID) 30 MG DR capsule TAKE ONE CAPSULE BY MOUTH DAILY. TAKE 30 TO 60 MINUTES BEFORE A MEAL. 90 capsule 3     methotrexate 2.5 MG tablet TK 5 TS PO Q WK  0       Allergies    Seasonal allergies    Family History    Family History   Problem Relation Age of Onset     Prostate Cancer Father 69     Coronary Artery Disease Father 38     Hypertension Father      Hypertension Paternal Grandfather      C.A.D. Paternal Grandfather         CHF     Neurologic Disorder Sister         CVA/anuerysm at age 19     Colon Cancer No family hx of        Social History    Social History     Socioeconomic History     Marital status:      Spouse name: Imelda     Number of children: 0     Years of education: 14     Highest education level: Not on file   Occupational History     Comment: [employer]   Social Needs     Financial resource strain: Not on file     Food insecurity     Worry: Not on file     Inability: Not on file     Transportation needs     Medical: Not on file     Non-medical: Not on file   Tobacco Use     Smoking status: Never Smoker     Smokeless tobacco: Never Used   Substance and Sexual Activity     Alcohol use: Yes     Alcohol/week: 6.0 standard drinks     Comment: 6 drinks per week     Drug use: No     Sexual activity: Yes     Partners: Female     Birth control/protection: Male Surgical   Lifestyle     Physical activity     Days per week: Not on file     Minutes per session: Not on file     Stress: Not on file   Relationships     Social connections     Talks on phone: Not on file     Gets together: Not on file     Attends Christian service: Not on file     Active member of club or organization: Not on file     Attends meetings of clubs or organizations: Not on file     Relationship status: Not on file     Intimate partner violence     Fear of current or ex partner: Not on file     Emotionally abused: Not on file     Physically abused: Not on file     Forced sexual activity: Not on file   Other Topics Concern      Service Not Asked     Blood Transfusions Not Asked     Caffeine Concern Yes     Comment: 1 can qd     Occupational Exposure Not Asked     Hobby Hazards Not Asked     Sleep Concern Not Asked     Stress Concern Not Asked     Weight Concern Not Asked     Special Diet Not Asked     Back Care Not Asked     Exercise Yes     Comment: occas     Bike Helmet Not Asked     Seat Belt Yes     Self-Exams Not Asked     Parent/sibling w/ CABG, MI or angioplasty before 65F 55M? Yes   Social History Narrative     Not on file       Reviewed and updated as needed this visit by Provider                   Review of Systems     CONSTITUTIONAL: NEGATIVE for fever, chills, change in weight  INTEGUMENTARY/SKIN: NEGATIVE for worrisome rashes, moles or lesions  EYES: NEGATIVE for vision changes  or irritation  ENT/MOUTH: NEGATIVE for ear, mouth and throat problems  RESP: NEGATIVE for significant cough or SOB  CV: NEGATIVE for chest pain, palpitations or peripheral edema  GI: NEGATIVE for nausea, abdominal pain, heartburn, or change in bowel habits  : NEGATIVE for frequency, dysuria, or hematuria  MUSCULOSKELETAL: NEGATIVE for significant arthralgias or myalgia  NEURO: NEGATIVE for weakness, dizziness or paresthesias  ENDOCRINE: NEGATIVE for temperature intolerance, skin/hair changes  HEME: NEGATIVE for bleeding problems  PSYCHIATRIC: NEGATIVE for changes in mood or affect    Objective    Reported vitals:  There were no vitals taken for this visit.     healthy, alert and no distress  Psych: Alert and oriented times 3; coherent speech, normal   rate and volume, able to articulate logical thoughts, able   to abstract reason, no tangential thoughts, no hallucinations   or delusions  His affect is normal   RESP: No cough, no audible wheezing, able to talk in full sentences  Remainder of exam unable to be completed due to telephone visits    Diagnostic Test Results:  Labs reviewed in Epic         Assessment/Plan:      ICD-10-CM    1. Anxiety  F41.9 hydrOXYzine (ATARAX) 25 MG tablet   2. Atypical chest pain  R07.89    3. Gastroesophageal reflux disease without esophagitis  K21.9 famotidine (PEPCID) 20 MG tablet   4. Coronary artery disease involving coronary bypass graft of native heart without angina pectoris  I25.810    5. Post-thoracotomy pain syndrome  G89.12    6. Prediabetes  R73.03    7. Hypertension goal BP (blood pressure) < 140/90  I10    8. Hyperlipidemia LDL goal <70  E78.5    9. Psoriatic arthritis (H)  L40.50    10. Family history of coronary artery disease  Z82.49    11. Family history of prostate cancer  Z80.42    12. Medication monitoring encounter  Z51.81        Continue current cares  Sx cares reviewed  Med refills    Return in about 1 week (around 1/26/2021), or if symptoms worsen or fail to  improve, for Medication Recheck Visit, Follow Up Acute, Follow Up Chronic.    Phone call duration:  24 minutes           Fady Adams MD, FAAFP     [facility]  [address] [place] [id number]  [email or web address]   Office: [phone]  Fax: [phone]  Pager: [phone]

## 2021-01-20 ASSESSMENT — ASTHMA QUESTIONNAIRES: ACT_TOTALSCORE: 25

## 2021-01-20 ASSESSMENT — ANXIETY QUESTIONNAIRES: GAD7 TOTAL SCORE: 2

## 2021-01-31 ENCOUNTER — HOSPITAL ENCOUNTER (EMERGENCY)
Facility: CLINIC | Age: 56
Discharge: HOME | End: 2021-01-31
Attending: EMERGENCY MEDICINE | Admitting: EMERGENCY MEDICINE
Payer: COMMERCIAL

## 2021-01-31 ENCOUNTER — APPOINTMENT (OUTPATIENT)
Dept: CT IMAGING | Facility: CLINIC | Age: 56
End: 2021-01-31
Attending: EMERGENCY MEDICINE
Payer: COMMERCIAL

## 2021-01-31 VITALS
HEIGHT: 68 IN | HEART RATE: 90 BPM | SYSTOLIC BLOOD PRESSURE: 142 MMHG | TEMPERATURE: 97.3 F | RESPIRATION RATE: 16 BRPM | OXYGEN SATURATION: 96 % | DIASTOLIC BLOOD PRESSURE: 80 MMHG | BODY MASS INDEX: 31.83 KG/M2 | WEIGHT: 210 LBS

## 2021-01-31 DIAGNOSIS — N20.0 NEPHROLITHIASIS: ICD-10-CM

## 2021-01-31 DIAGNOSIS — N23 RENAL COLIC: ICD-10-CM

## 2021-01-31 LAB
ALBUMIN UR-MCNC: 20 MG/DL
ANION GAP SERPL CALCULATED.3IONS-SCNC: 3 MMOL/L (ref 3–14)
APPEARANCE UR: ABNORMAL
BACTERIA #/AREA URNS HPF: ABNORMAL /HPF
BASOPHILS # BLD AUTO: 0.1 10E9/L (ref 0–0.2)
BASOPHILS NFR BLD AUTO: 0.4 %
BILIRUB UR QL STRIP: NEGATIVE
BUN SERPL-MCNC: 14 MG/DL (ref 7–30)
CALCIUM SERPL-MCNC: 9.1 MG/DL (ref 8.5–10.1)
CHLORIDE SERPL-SCNC: 106 MMOL/L (ref 94–109)
CO2 SERPL-SCNC: 29 MMOL/L (ref 20–32)
COLOR UR AUTO: YELLOW
CREAT BLD-MCNC: 1.2 MG/DL (ref 0.66–1.25)
CREAT SERPL-MCNC: 1.08 MG/DL (ref 0.66–1.25)
DIFFERENTIAL METHOD BLD: ABNORMAL
EOSINOPHIL # BLD AUTO: 0.1 10E9/L (ref 0–0.7)
EOSINOPHIL NFR BLD AUTO: 0.5 %
ERYTHROCYTE [DISTWIDTH] IN BLOOD BY AUTOMATED COUNT: 12.5 % (ref 10–15)
GFR SERPL CREATININE-BSD FRML MDRD: 63 ML/MIN/{1.73_M2}
GFR SERPL CREATININE-BSD FRML MDRD: 76 ML/MIN/{1.73_M2}
GLUCOSE SERPL-MCNC: 143 MG/DL (ref 70–99)
GLUCOSE UR STRIP-MCNC: NEGATIVE MG/DL
HCT VFR BLD AUTO: 47.5 % (ref 40–53)
HGB BLD-MCNC: 16.1 G/DL (ref 13.3–17.7)
HGB UR QL STRIP: ABNORMAL
IMM GRANULOCYTES # BLD: 0.1 10E9/L (ref 0–0.4)
IMM GRANULOCYTES NFR BLD: 0.5 %
KETONES UR STRIP-MCNC: NEGATIVE MG/DL
LEUKOCYTE ESTERASE UR QL STRIP: ABNORMAL
LYMPHOCYTES # BLD AUTO: 1.4 10E9/L (ref 0.8–5.3)
LYMPHOCYTES NFR BLD AUTO: 11.2 %
MCH RBC QN AUTO: 31.5 PG (ref 26.5–33)
MCHC RBC AUTO-ENTMCNC: 33.9 G/DL (ref 31.5–36.5)
MCV RBC AUTO: 93 FL (ref 78–100)
MONOCYTES # BLD AUTO: 0.8 10E9/L (ref 0–1.3)
MONOCYTES NFR BLD AUTO: 6.8 %
MUCOUS THREADS #/AREA URNS LPF: PRESENT /LPF
NEUTROPHILS # BLD AUTO: 9.9 10E9/L (ref 1.6–8.3)
NEUTROPHILS NFR BLD AUTO: 80.6 %
NITRATE UR QL: NEGATIVE
NRBC # BLD AUTO: 0 10*3/UL
NRBC BLD AUTO-RTO: 0 /100
PH UR STRIP: 6 PH (ref 5–7)
PLATELET # BLD AUTO: 202 10E9/L (ref 150–450)
POTASSIUM SERPL-SCNC: 4 MMOL/L (ref 3.4–5.3)
RBC # BLD AUTO: 5.11 10E12/L (ref 4.4–5.9)
RBC #/AREA URNS AUTO: 96 /HPF (ref 0–2)
SODIUM SERPL-SCNC: 138 MMOL/L (ref 133–144)
SOURCE: ABNORMAL
SP GR UR STRIP: 1.02 (ref 1–1.03)
SQUAMOUS #/AREA URNS AUTO: <1 /HPF (ref 0–1)
UROBILINOGEN UR STRIP-MCNC: NORMAL MG/DL (ref 0–2)
WBC # BLD AUTO: 12.2 10E9/L (ref 4–11)
WBC #/AREA URNS AUTO: 58 /HPF (ref 0–5)

## 2021-01-31 PROCEDURE — 81001 URINALYSIS AUTO W/SCOPE: CPT | Performed by: EMERGENCY MEDICINE

## 2021-01-31 PROCEDURE — 74177 CT ABD & PELVIS W/CONTRAST: CPT

## 2021-01-31 PROCEDURE — 99285 EMERGENCY DEPT VISIT HI MDM: CPT | Mod: 25

## 2021-01-31 PROCEDURE — 250N000009 HC RX 250: Performed by: EMERGENCY MEDICINE

## 2021-01-31 PROCEDURE — 250N000011 HC RX IP 250 OP 636: Performed by: EMERGENCY MEDICINE

## 2021-01-31 PROCEDURE — 96374 THER/PROPH/DIAG INJ IV PUSH: CPT

## 2021-01-31 PROCEDURE — 82565 ASSAY OF CREATININE: CPT

## 2021-01-31 PROCEDURE — 96375 TX/PRO/DX INJ NEW DRUG ADDON: CPT

## 2021-01-31 PROCEDURE — 80048 BASIC METABOLIC PNL TOTAL CA: CPT | Performed by: EMERGENCY MEDICINE

## 2021-01-31 PROCEDURE — 85025 COMPLETE CBC W/AUTO DIFF WBC: CPT | Performed by: EMERGENCY MEDICINE

## 2021-01-31 RX ORDER — ONDANSETRON 4 MG/1
4 TABLET, ORALLY DISINTEGRATING ORAL EVERY 8 HOURS PRN
Qty: 10 TABLET | Refills: 0 | Status: SHIPPED | OUTPATIENT
Start: 2021-01-31 | End: 2021-02-03

## 2021-01-31 RX ORDER — IBUPROFEN 200 MG
600 TABLET ORAL EVERY 8 HOURS PRN
Qty: 1 TABLET | Refills: 0 | Status: ON HOLD | OUTPATIENT
Start: 2021-01-31 | End: 2024-08-28

## 2021-01-31 RX ORDER — ONDANSETRON 2 MG/ML
4 INJECTION INTRAMUSCULAR; INTRAVENOUS
Status: DISCONTINUED | OUTPATIENT
Start: 2021-01-31 | End: 2021-01-31 | Stop reason: CLARIF

## 2021-01-31 RX ORDER — IOPAMIDOL 755 MG/ML
500 INJECTION, SOLUTION INTRAVASCULAR ONCE
Status: COMPLETED | OUTPATIENT
Start: 2021-01-31 | End: 2021-01-31

## 2021-01-31 RX ORDER — ONDANSETRON 2 MG/ML
4 INJECTION INTRAMUSCULAR; INTRAVENOUS ONCE
Status: COMPLETED | OUTPATIENT
Start: 2021-01-31 | End: 2021-01-31

## 2021-01-31 RX ORDER — HYDROMORPHONE HYDROCHLORIDE 1 MG/ML
0.5 INJECTION, SOLUTION INTRAMUSCULAR; INTRAVENOUS; SUBCUTANEOUS
Status: DISCONTINUED | OUTPATIENT
Start: 2021-01-31 | End: 2021-02-01 | Stop reason: HOSPADM

## 2021-01-31 RX ORDER — ACETAMINOPHEN 500 MG
500-1000 TABLET ORAL EVERY 8 HOURS PRN
Qty: 1 TABLET | Refills: 0 | Status: SHIPPED | OUTPATIENT
Start: 2021-01-31 | End: 2021-02-10

## 2021-01-31 RX ADMIN — SODIUM CHLORIDE 65 ML: 9 INJECTION, SOLUTION INTRAVENOUS at 23:12

## 2021-01-31 RX ADMIN — IOPAMIDOL 100 ML: 755 INJECTION, SOLUTION INTRAVENOUS at 23:12

## 2021-01-31 RX ADMIN — ONDANSETRON 4 MG: 2 INJECTION INTRAMUSCULAR; INTRAVENOUS at 22:22

## 2021-01-31 RX ADMIN — HYDROMORPHONE HYDROCHLORIDE 0.5 MG: 1 INJECTION, SOLUTION INTRAMUSCULAR; INTRAVENOUS; SUBCUTANEOUS at 22:22

## 2021-01-31 ASSESSMENT — MIFFLIN-ST. JEOR: SCORE: 1762.05

## 2021-01-31 ASSESSMENT — ENCOUNTER SYMPTOMS: ABDOMINAL PAIN: 1

## 2021-02-01 ASSESSMENT — ENCOUNTER SYMPTOMS
FEVER: 0
COUGH: 0
HEMATURIA: 0
NAUSEA: 1
SHORTNESS OF BREATH: 0
DYSURIA: 0

## 2021-02-01 NOTE — ED TRIAGE NOTES
LLQ abd pain with nausea x4 hours. States that he has a history of kidney stones, but this feels different. Motrin and antinausea medication taken at home pta. A+Ox4, no acute signs of distress.

## 2021-02-01 NOTE — ED PROVIDER NOTES
History   Chief Complaint:  Abdominal Pain     The history is provided by the patient.      Mian Lozano is a 55 year old male with history of kidney stones, who presents with abdominal pain. He states that earlier tonight he had onset of abdominal pain that waxes and wanes. He reports that he is nauseous, and that he has had some sweats. He denies emesis, blood in the urine, trauma, cough, shortness of breath, and that nothing makes if feel better or worse. He denies scrotum or testicle pain. He states that this pain does not feel similar to his prior kidney stones where he has past two. He notes that his pain is around 7/10. He states that he tried pain meds.     Review of Systems   Constitutional: Negative for fever.   Respiratory: Negative for cough and shortness of breath.    Cardiovascular: Negative for chest pain.   Gastrointestinal: Positive for abdominal pain and nausea.   Genitourinary: Negative for dysuria and hematuria.   All other systems reviewed and are negative.        Allergies:  Seasonal Allergies    Medications:  Albuterol inhaler   Aspirin   Atorvastatin   Coreg   Enbrel  Pepcid   Atarax   Prevacid   Methotrexate    Past Medical History:    Anxiety   Barretts esophagus   CAD  Hypertension   Hyperlipidemia  GERD  Fatty liver  Asthma   Psoriatic arthritis  Latent tuberculosis  Kidney stones    Past Surgical History:    CABG  Colonoscopy   EGD x3  Vasectomy   Vasectomy   Hernia repair   Patella surgery     Family History:    Prostate cancer, father  CAD, father   Hypertension, father     Social History:  PCP: Fady Adams  Presents to the ED alone    Physical Exam     Patient Vitals for the past 24 hrs:   BP Temp Temp src Pulse Resp SpO2 Height Weight   01/31/21 2330 (!) 142/80 -- -- 90 -- 95 % -- --   01/31/21 2320 -- -- -- -- -- 96 % -- --   01/31/21 2315 (!) 146/89 -- -- 96 -- -- -- --   01/31/21 2300 (!) 147/88 -- -- 84 -- 93 % -- --   01/31/21 2245 (!) 148/92 -- -- 85 -- 94 % -- --   01/31/21  "2240 -- -- -- -- -- 97 % -- --   01/31/21 2230 (!) 181/106 -- -- 85 -- 96 % -- --   01/31/21 2215 (!) 155/91 -- -- 86 -- 96 % -- --   01/31/21 2200 (!) 162/87 -- -- 83 -- 97 % -- --   01/31/21 2145 (!) 170/101 -- -- -- -- 98 % -- --   01/31/21 2141 -- -- -- -- -- -- 1.727 m (5' 8\") 95.3 kg (210 lb)   01/31/21 2128 (!) 163/96 97.3  F (36.3  C) Temporal 80 16 93 % -- --       Physical Exam    HENT:    Mouth/Throat: Oropharynx is without swelling or erythema. Oral mucosa moist.    Eyes: Conjunctivae are normal. No scleral icterus.  Neck: Neck supple. No cervical adenopathy  Cardiovascular: Normal rate, regular rhythm and intact distal pulses.    Pulmonary/Chest: Effort normal and breath sounds normal.   Abdominal: Soft.  No distension. Left sided abdominal tenderness, upper and lower.   Musculoskeletal:  No edema, No calf tenderness  Neurological:Alert. Coordination normal.   Skin: Skin is warm and dry.   Psychiatric: Normal mood and affect.        Emergency Department Course     Imaging:  CT Abdomen/Pelvis w contrast:   1.  Mild left-sided ureteral pyelocaliectasis. Tiny stone dependent portion urinary bladder. It is likely the patient has passed the stone from the left ureter recently. Tiny 1 mm stone mid left kidney and lower right kidney.     2.  Fatty liver.     3.  Cholelithiasis.     4.  No appendicitis.    Reading per radiology    Laboratory:  CBC: WBC 12.2(H), HGB 16.1,    BMP: Glucose 143(H), o/w WNL (Creatinine: 1.08)    UA: Blood: large (A), Protein Albumin: 20(A), Leukocyte Esterase: large (A), WBC: 58 (H), RBC: 96 (H), Mucous: Present, o/w Negative  Creatinine: 1.2; GFR 63    Emergency Department Course:  Reviewed:  I reviewed the patient's nursing notes, vitals, past medical records, Care Everywhere.     Assessments:  5126 I performed an assessment and examination of the patient as noted above.     9320 I checked on and updated the patient. His symptoms have resolved.  Findings and plan explained " to the Patient. Patient discharged home with instructions regarding supportive care, medications, and reasons to return. The importance of close follow-up was reviewed.     Interventions:  2222 Dilaudid 0.5mg IV injection   2222 Zofran 4mg IV injection      Disposition:  The patient was discharged to home.       Impression & Plan   Medical Decision Making:  Mian Lozano is a 55 year old male who presented with unilateral flank and abdominal pain. On arrival the differential diagnosis included, but was not limited to nephrolithiasis, pyelonephritis, AAA, testicular torsion. CT is consistent with a left ureteral stone that has passed.  The patient's symptoms have been controlled with described interventions in the Emergency Department. There is no fever or evidence of a urinary tract infection.      . I have explained the importance of short term NSAID use in the management of ureteral colic. He was provided with zofran if he has recurrent symptoms. He understands that he does have a small stone in each kidney which could pass at any time.       The patient understands to return to the ED with uncontrolled pain, vomiting, and fever.  I have recommended that they strain their urine to collect to bring to follow up.     Diagnosis:    ICD-10-CM    1. Renal colic  N23    2. Nephrolithiasis  N20.0     1 mm stone in right and left kidney       Discharge Medications:  New Prescriptions    ACETAMINOPHEN (TYLENOL) 500 MG TABLET    Take 1-2 tablets (500-1,000 mg) by mouth every 8 hours as needed for mild pain (DO NOT FILL! For dosing only.) DO NOT FILL!   For Dosing Only    IBUPROFEN (ADVIL/MOTRIN) 200 MG TABLET    Take 3 tablets (600 mg) by mouth every 8 hours as needed for mild pain    ONDANSETRON (ZOFRAN ODT) 4 MG ODT TAB    Take 1 tablet (4 mg) by mouth every 8 hours as needed for nausea       Scribe Disclosure:  Flaquita DEL CASTILLO, am serving as a scribe at 10:03 PM on 1/31/2021 to document services personally performed  by Radha Ray MD based on my observations and the provider's statements to me.             Radha Ray MD  02/01/21 0001

## 2021-03-23 ENCOUNTER — TRANSFERRED RECORDS (OUTPATIENT)
Dept: HEALTH INFORMATION MANAGEMENT | Facility: CLINIC | Age: 56
End: 2021-03-23

## 2021-03-23 LAB
ALT SERPL-CCNC: 48 IU/L (ref 5–40)
AST SERPL-CCNC: 41 U/L (ref 5–34)
CREAT SERPL-MCNC: 0.83 MG/DL (ref 0.5–1.3)

## 2021-03-24 ENCOUNTER — TELEPHONE (OUTPATIENT)
Dept: FAMILY MEDICINE | Facility: CLINIC | Age: 56
End: 2021-03-24

## 2021-03-24 ENCOUNTER — OFFICE VISIT (OUTPATIENT)
Dept: FAMILY MEDICINE | Facility: CLINIC | Age: 56
End: 2021-03-24
Payer: COMMERCIAL

## 2021-03-24 VITALS
WEIGHT: 206 LBS | HEIGHT: 68 IN | DIASTOLIC BLOOD PRESSURE: 82 MMHG | HEART RATE: 77 BPM | RESPIRATION RATE: 16 BRPM | BODY MASS INDEX: 31.22 KG/M2 | TEMPERATURE: 96.4 F | SYSTOLIC BLOOD PRESSURE: 142 MMHG | OXYGEN SATURATION: 98 %

## 2021-03-24 DIAGNOSIS — I10 HYPERTENSION GOAL BP (BLOOD PRESSURE) < 140/90: Primary | ICD-10-CM

## 2021-03-24 DIAGNOSIS — I25.810 CORONARY ARTERY DISEASE INVOLVING CORONARY BYPASS GRAFT OF NATIVE HEART WITHOUT ANGINA PECTORIS: ICD-10-CM

## 2021-03-24 PROCEDURE — 99213 OFFICE O/P EST LOW 20 MIN: CPT | Performed by: FAMILY MEDICINE

## 2021-03-24 RX ORDER — CARVEDILOL 6.25 MG/1
6.25 TABLET ORAL 2 TIMES DAILY WITH MEALS
Qty: 90 TABLET | Refills: 1 | Status: SHIPPED | OUTPATIENT
Start: 2021-03-24 | End: 2021-07-29

## 2021-03-24 ASSESSMENT — MIFFLIN-ST. JEOR: SCORE: 1738.91

## 2021-03-24 ASSESSMENT — PAIN SCALES - GENERAL: PAINLEVEL: NO PAIN (0)

## 2021-03-24 NOTE — TELEPHONE ENCOUNTER
pharmacy calling to verify quantity of carvedilol (COREG) 6.25 MG [id number] (takes 2 per day)    Please call soon.

## 2021-03-24 NOTE — PROGRESS NOTES
"    Assessment & Plan     Hypertension goal BP (blood pressure) < 140/90: BP improved on recheck but still elevated. As he has been tolerating Coreg well for quite some time, will try increasing dose. Follow up next week to recheck BP. At that time, could consider further titration of Coreg depending on HR. Could also consider starting second antihypertensive medication.  - carvedilol (COREG) 6.25 MG tablet; Take 1 tablet (6.25 mg) by mouth 2 times daily (with meals)    Coronary artery disease involving coronary bypass graft of native heart without angina pectoris  - carvedilol (COREG) 6.25 MG tablet; Take 1 tablet (6.25 mg) by mouth 2 times daily (with meals)       BMI:   Estimated body mass index is 31.32 kg/m  as calculated from the following:    Height as of this encounter: 1.727 m (5' 8\").    Weight as of this encounter: 93.4 kg (206 lb).       Return in about 1 week (around 3/31/2021) for BP Recheck.    Bryan Mcginnis DO  [facility]    Charline Pappas is a 56 year old who presents for the following health issues     HPI     Hypertension Follow-up      Do you check your blood pressure regularly outside of the clinic? Yes 160's-170's     Are you following a low salt diet? No - doesn't use a lot of salt but doesn't track salt intake.     Are your blood pressures ever more than 140 on the top number (systolic) OR more   than 90 on the bottom number (diastolic), for example 140/90? Yes      How many servings of fruits and vegetables do you eat daily?  0-1    On average, how many sweetened beverages do you drink each day (Examples: soda, juice, sweet tea, etc.  Do NOT count diet or artificially sweetened beverages)?   1    How many days per week do you exercise enough to make your heart beat faster? No     How many minutes a day do you exercise enough to make your heart beat faster?     How many days per week do you miss taking your medication? Once in a while    Had COVID-19 in " "February and still feels like lungs are having a hard time getting O2. Using albuterol twice daily with some relief. No shortness of breath or dyspnea. He is noticing some intermittent lightheadedness that lasts anywhere from 2-15 minutes. No vision changes. No new chest pain that is different from post-thoracotomy pain syndrome pain.       Review of Systems   Constitutional, HEENT, cardiovascular, pulmonary, gi and gu systems are negative, except as otherwise noted.      Objective    BP (!) 142/82   Pulse 77   Temp 96.4  F (35.8  C) (Tympanic)   Resp 16   Ht 1.727 m (5' 8\")   Wt 93.4 kg (206 lb)   SpO2 98%   BMI 31.32 kg/m    Body mass index is 31.32 kg/m .  Physical Exam   GENERAL: healthy, alert and no distress  RESP: lungs clear to auscultation - no rales, rhonchi or wheezes  CV: regular rate and rhythm, normal S1 S2, no S3 or S4, no murmur, click or rub, no peripheral edema and peripheral pulses strong  MS: no gross musculoskeletal defects noted, no edema  PSYCH: mentation appears normal, affect normal/bright        "

## 2021-03-25 NOTE — TELEPHONE ENCOUNTER
Writer called [facility] at [phone]  Pharmacy was inquiring about quantity, 90 tabs sent would only be 45 days. Writer advised ok for 180 tabs ( full 90 day supply) with one refill.    Pharmacy stated an understanding and agreed with plan.  Audi LEBLANC RN   [facility]

## 2021-03-31 ENCOUNTER — ALLIED HEALTH/NURSE VISIT (OUTPATIENT)
Dept: NURSING | Facility: CLINIC | Age: 56
End: 2021-03-31
Payer: COMMERCIAL

## 2021-03-31 VITALS
OXYGEN SATURATION: 98 % | DIASTOLIC BLOOD PRESSURE: 76 MMHG | HEART RATE: 72 BPM | SYSTOLIC BLOOD PRESSURE: 128 MMHG | RESPIRATION RATE: 16 BRPM

## 2021-03-31 DIAGNOSIS — I10 HYPERTENSION GOAL BP (BLOOD PRESSURE) < 140/90: Primary | ICD-10-CM

## 2021-03-31 DIAGNOSIS — Z01.30 BP CHECK: ICD-10-CM

## 2021-03-31 ASSESSMENT — PAIN SCALES - GENERAL: PAINLEVEL: NO PAIN (0)

## 2021-03-31 NOTE — PROGRESS NOTES
Mian Lozano is being followed for Blood Pressure management.      BP Readings from Last 3 Encounters:   03/31/21 128/76   03/24/21 (!) 142/82   01/31/21 (!) 142/80       Wt Readings from Last 2 Encounters:   03/24/21 93.4 kg (206 lb)   01/31/21 95.3 kg (210 lb)       Is pulse 55 or greater? - Yes    Pulse Readings from Last 1 Encounters:   03/31/21 72       Current blood pressure medication(s):  Current Outpatient Medications   Medication Sig Dispense Refill     albuterol (PROAIR HFA) 108 (90 Base) MCG/ACT inhaler Inhale 2 puffs into the lungs every 4 hours as needed 54 g 3     ASPIRIN 81 MG OR TABS ONE DAILY 100 3     atorvastatin (LIPITOR) 80 MG tablet Take 1 tablet (80 mg) by mouth daily 90 tablet 3     azelastine (ASTELIN) 0.1 % nasal spray Spray 1-2 sprays into both nostrils 2 times daily 3 Bottle 3     carvedilol (COREG) 6.25 MG tablet Take 1 tablet (6.25 mg) by mouth 2 times daily (with meals) 90 tablet 1     cetirizine (ZYRTEC) 10 MG tablet Take 1 tablet (10 mg) by mouth every evening 90 tablet 3     etanercept (ENBREL) 50 MG/ML injection Inject 0.98 mLs (50 mg) Subcutaneous once a week       famotidine (PEPCID) 20 MG tablet Take 1 tablet (20 mg) by mouth 2 times daily 60 tablet 3     fluticasone (FLONASE) 50 MCG/ACT nasal spray Spray 2 sprays into both nostrils daily 54.6 g 3     folic acid (FOLVITE) 1 MG tablet Take 1 tablet (1 mg) by mouth daily 90 tablet 3     hydrOXYzine (ATARAX) 25 MG tablet Take 1-2 tablets (25-50 mg) by mouth every 6 hours as needed for anxiety 30 tablet 3     ibuprofen (ADVIL/MOTRIN) 200 MG tablet Take 3 tablets (600 mg) by mouth every 8 hours as needed for mild pain 1 tablet 0     LANsoprazole (PREVACID) 30 MG DR capsule TAKE ONE CAPSULE BY MOUTH DAILY. TAKE 30 TO 60 MINUTES BEFORE A MEAL. 90 capsule 3     methotrexate 2.5 MG tablet TK 5 TS PO Q WK  0          1. Follow up instructions include:     Next Provider visit: Follow up in 3 months..      SUBJECTIVE:                                                     The patient is taking medication as prescribed and is tolerating well.   Patient is monitoring Blood Pressure at home.       Per LOV:   Hypertension goal BP (blood pressure) < 140/90: BP improved on recheck but still elevated. As he has been tolerating Coreg well for quite some time, will try increasing dose. Follow up next week to recheck BP. At that time, could consider further titration of Coreg depending on HR. Could also consider starting second antihypertensive medication.  - carvedilol (COREG) 6.25 MG tablet; Take 1 tablet (6.25 mg) by mouth 2 times daily (with meals)    Out of the following complicating factors: Cough, Headache, Lightheadedness, Shortness of breath, Fatigue, Nausea, Sexual Dysfunction, New onset of swelling or edema, Weakness and New onset of Chest Pain, the patient reports:  None    OBJECTIVE:                                                      Today's BP completed using cuff size: regular on left side  arm.      Potassium   Date Value Ref Range Status   01/31/2021 4.0 3.4 - 5.3 mmol/L Final     Creatinine   Date Value Ref Range Status   01/31/2021 1.08 0.66 - 1.25 mg/dL Final     Urea Nitrogen   Date Value Ref Range Status   01/31/2021 14 7 - 30 mg/dL Final     GFR Estimate   Date Value Ref Range Status   01/31/2021 63 >60 mL/min/[1.73_m2] Final         Education:  general discussion/verbal explanation  Ways to help improve BP/HTN:   Medications  Lose weight  Diet low in fat and rich in fruits, vegetables and low fat dairy products  Reduce salt in diet  Do something active for at least 30 minutes a day on most days of the week  Cut down on alcohol (if you drink more than 2 drinks per day)  Decrease stress (exercise, read, yoga, meditation, time for self, etc.)   Patient was given an opportunity to ask questions.    Patient verbalized understanding of this plan and is agreeable.    Audi Menendez RN

## 2021-04-28 ENCOUNTER — IMMUNIZATION (OUTPATIENT)
Dept: NURSING | Facility: CLINIC | Age: 56
End: 2021-04-28
Payer: COMMERCIAL

## 2021-04-28 PROCEDURE — 91300 PR COVID VAC PFIZER DIL RECON 30 MCG/0.3 ML IM: CPT

## 2021-04-28 PROCEDURE — 0001A PR COVID VAC PFIZER DIL RECON 30 MCG/0.3 ML IM: CPT

## 2021-05-19 ENCOUNTER — IMMUNIZATION (OUTPATIENT)
Dept: NURSING | Facility: CLINIC | Age: 56
End: 2021-05-19
Attending: INTERNAL MEDICINE
Payer: COMMERCIAL

## 2021-05-19 PROCEDURE — 91300 PR COVID VAC PFIZER DIL RECON 30 MCG/0.3 ML IM: CPT

## 2021-05-19 PROCEDURE — 0002A PR COVID VAC PFIZER DIL RECON 30 MCG/0.3 ML IM: CPT

## 2021-06-22 DIAGNOSIS — L40.50 PSORIATIC ARTHRITIS (H): ICD-10-CM

## 2021-06-22 DIAGNOSIS — E78.5 HYPERLIPIDEMIA LDL GOAL <70: ICD-10-CM

## 2021-06-22 DIAGNOSIS — Z12.11 SCREEN FOR COLON CANCER: ICD-10-CM

## 2021-06-22 DIAGNOSIS — Z82.49 FAMILY HISTORY OF CORONARY ARTERY DISEASE: ICD-10-CM

## 2021-06-22 DIAGNOSIS — I25.810 CORONARY ARTERY DISEASE INVOLVING CORONARY BYPASS GRAFT OF NATIVE HEART WITHOUT ANGINA PECTORIS: ICD-10-CM

## 2021-06-22 DIAGNOSIS — R73.03 PREDIABETES: ICD-10-CM

## 2021-06-22 DIAGNOSIS — Z95.1 H/O CORONARY ARTERY BYPASS SURGERY: ICD-10-CM

## 2021-06-22 DIAGNOSIS — Z51.81 MEDICATION MONITORING ENCOUNTER: ICD-10-CM

## 2021-06-22 DIAGNOSIS — K22.70 BARRETT'S ESOPHAGUS WITHOUT DYSPLASIA: ICD-10-CM

## 2021-06-22 DIAGNOSIS — Z00.00 ROUTINE GENERAL MEDICAL EXAMINATION AT A HEALTH CARE FACILITY: Primary | ICD-10-CM

## 2021-06-22 DIAGNOSIS — F41.9 ANXIETY: ICD-10-CM

## 2021-06-22 DIAGNOSIS — N20.0 NEPHROLITHIASIS: ICD-10-CM

## 2021-06-22 DIAGNOSIS — Z80.42 FAMILY HISTORY OF PROSTATE CANCER: ICD-10-CM

## 2021-06-22 DIAGNOSIS — I10 HYPERTENSION GOAL BP (BLOOD PRESSURE) < 140/90: ICD-10-CM

## 2021-06-24 RX ORDER — ATORVASTATIN CALCIUM 80 MG/1
TABLET, FILM COATED ORAL
Qty: 90 TABLET | Refills: 3 | Status: SHIPPED | OUTPATIENT
Start: 2021-06-24 | End: 2021-07-29

## 2021-06-24 NOTE — TELEPHONE ENCOUNTER
Failed protocol.  please route to  team if patient needs an appointment     Teena MAYRN BSN  [facility]  [phone]

## 2021-07-06 DIAGNOSIS — J45.20 MILD INTERMITTENT ASTHMA WITHOUT COMPLICATION: ICD-10-CM

## 2021-07-06 DIAGNOSIS — Z91.09 ENVIRONMENTAL ALLERGIES: ICD-10-CM

## 2021-07-06 RX ORDER — FLUTICASONE PROPIONATE 50 MCG
SPRAY, SUSPENSION (ML) NASAL
Qty: 48 G | Refills: 1 | Status: SHIPPED | OUTPATIENT
Start: 2021-07-06 | End: 2021-07-29

## 2021-07-06 RX ORDER — AZELASTINE 1 MG/ML
SPRAY, METERED NASAL
Qty: 90 ML | Refills: 1 | Status: SHIPPED | OUTPATIENT
Start: 2021-07-06 | End: 2021-07-29

## 2021-07-20 ENCOUNTER — TRANSFERRED RECORDS (OUTPATIENT)
Dept: HEALTH INFORMATION MANAGEMENT | Facility: CLINIC | Age: 56
End: 2021-07-20

## 2021-07-20 LAB
AST SERPL-CCNC: 39 U/L (ref 5–34)
CREATININE (EXTERNAL): 0.82 MG/DL (ref 0.5–1.3)
GFR ESTIMATED (EXTERNAL): 103.3 ML/MIN/1.73M2

## 2021-07-21 ENCOUNTER — TRANSFERRED RECORDS (OUTPATIENT)
Dept: HEALTH INFORMATION MANAGEMENT | Facility: CLINIC | Age: 56
End: 2021-07-21

## 2021-07-29 ENCOUNTER — OFFICE VISIT (OUTPATIENT)
Dept: FAMILY MEDICINE | Facility: CLINIC | Age: 56
End: 2021-07-29
Payer: COMMERCIAL

## 2021-07-29 VITALS
SYSTOLIC BLOOD PRESSURE: 122 MMHG | OXYGEN SATURATION: 96 % | HEART RATE: 80 BPM | RESPIRATION RATE: 16 BRPM | HEIGHT: 68 IN | BODY MASS INDEX: 31.98 KG/M2 | TEMPERATURE: 97 F | WEIGHT: 211 LBS | DIASTOLIC BLOOD PRESSURE: 80 MMHG

## 2021-07-29 DIAGNOSIS — Z91.09 ENVIRONMENTAL ALLERGIES: ICD-10-CM

## 2021-07-29 DIAGNOSIS — I25.810 CORONARY ARTERY DISEASE INVOLVING CORONARY BYPASS GRAFT OF NATIVE HEART WITHOUT ANGINA PECTORIS: ICD-10-CM

## 2021-07-29 DIAGNOSIS — N20.0 NEPHROLITHIASIS: ICD-10-CM

## 2021-07-29 DIAGNOSIS — Z82.49 FAMILY HISTORY OF CORONARY ARTERY DISEASE: ICD-10-CM

## 2021-07-29 DIAGNOSIS — F41.9 ANXIETY: ICD-10-CM

## 2021-07-29 DIAGNOSIS — K76.0 FATTY LIVER: ICD-10-CM

## 2021-07-29 DIAGNOSIS — Z80.42 FAMILY HISTORY OF PROSTATE CANCER: ICD-10-CM

## 2021-07-29 DIAGNOSIS — L40.9 PSORIASIS: ICD-10-CM

## 2021-07-29 DIAGNOSIS — G89.12 POST-THORACOTOMY PAIN SYNDROME: ICD-10-CM

## 2021-07-29 DIAGNOSIS — R73.03 PREDIABETES: ICD-10-CM

## 2021-07-29 DIAGNOSIS — L40.50 PSORIATIC ARTHRITIS (H): ICD-10-CM

## 2021-07-29 DIAGNOSIS — Z12.11 SCREEN FOR COLON CANCER: ICD-10-CM

## 2021-07-29 DIAGNOSIS — Z51.81 MEDICATION MONITORING ENCOUNTER: ICD-10-CM

## 2021-07-29 DIAGNOSIS — Z22.7 LATENT TUBERCULOSIS: ICD-10-CM

## 2021-07-29 DIAGNOSIS — Z00.00 ROUTINE GENERAL MEDICAL EXAMINATION AT A HEALTH CARE FACILITY: Primary | ICD-10-CM

## 2021-07-29 DIAGNOSIS — J45.20 MILD INTERMITTENT ASTHMA WITHOUT COMPLICATION: ICD-10-CM

## 2021-07-29 DIAGNOSIS — K21.9 GASTROESOPHAGEAL REFLUX DISEASE WITHOUT ESOPHAGITIS: ICD-10-CM

## 2021-07-29 DIAGNOSIS — K22.70 BARRETT'S ESOPHAGUS WITHOUT DYSPLASIA: ICD-10-CM

## 2021-07-29 DIAGNOSIS — Z95.1 H/O CORONARY ARTERY BYPASS SURGERY: ICD-10-CM

## 2021-07-29 DIAGNOSIS — E78.5 HYPERLIPIDEMIA LDL GOAL <70: ICD-10-CM

## 2021-07-29 DIAGNOSIS — Z12.5 SCREENING FOR PROSTATE CANCER: ICD-10-CM

## 2021-07-29 DIAGNOSIS — I10 HYPERTENSION GOAL BP (BLOOD PRESSURE) < 140/90: ICD-10-CM

## 2021-07-29 DIAGNOSIS — R91.8 PULMONARY NODULES: ICD-10-CM

## 2021-07-29 LAB
ALBUMIN SERPL-MCNC: 3.7 G/DL (ref 3.4–5)
ALBUMIN UR-MCNC: NEGATIVE MG/DL
ALP SERPL-CCNC: 98 U/L (ref 40–150)
ALT SERPL W P-5'-P-CCNC: 62 U/L (ref 0–70)
ANION GAP SERPL CALCULATED.3IONS-SCNC: 5 MMOL/L (ref 3–14)
APPEARANCE UR: CLEAR
AST SERPL W P-5'-P-CCNC: 45 U/L (ref 0–45)
BILIRUB SERPL-MCNC: 0.8 MG/DL (ref 0.2–1.3)
BILIRUB UR QL STRIP: NEGATIVE
BUN SERPL-MCNC: 14 MG/DL (ref 7–30)
CALCIUM SERPL-MCNC: 9.7 MG/DL (ref 8.5–10.1)
CHLORIDE BLD-SCNC: 107 MMOL/L (ref 94–109)
CHOLEST SERPL-MCNC: 134 MG/DL
CK SERPL-CCNC: 46 U/L (ref 30–300)
CO2 SERPL-SCNC: 26 MMOL/L (ref 20–32)
COLOR UR AUTO: YELLOW
CREAT SERPL-MCNC: 1.11 MG/DL (ref 0.66–1.25)
ERYTHROCYTE [DISTWIDTH] IN BLOOD BY AUTOMATED COUNT: 13 % (ref 10–15)
FASTING STATUS PATIENT QL REPORTED: YES
GFR SERPL CREATININE-BSD FRML MDRD: 74 ML/MIN/1.73M2
GLUCOSE BLD-MCNC: 103 MG/DL (ref 70–99)
GLUCOSE UR STRIP-MCNC: NEGATIVE MG/DL
HBA1C MFR BLD: 5.3 % (ref 0–5.6)
HCT VFR BLD AUTO: 44.2 % (ref 40–53)
HDLC SERPL-MCNC: 36 MG/DL
HGB BLD-MCNC: 15.5 G/DL (ref 13.3–17.7)
HGB UR QL STRIP: ABNORMAL
KETONES UR STRIP-MCNC: NEGATIVE MG/DL
LDLC SERPL CALC-MCNC: 55 MG/DL
LEUKOCYTE ESTERASE UR QL STRIP: NEGATIVE
MCH RBC QN AUTO: 31.8 PG (ref 26.5–33)
MCHC RBC AUTO-ENTMCNC: 35.1 G/DL (ref 31.5–36.5)
MCV RBC AUTO: 91 FL (ref 78–100)
NITRATE UR QL: NEGATIVE
NONHDLC SERPL-MCNC: 98 MG/DL
PH UR STRIP: 6 [PH] (ref 5–7)
PLATELET # BLD AUTO: 174 10E3/UL (ref 150–450)
POTASSIUM BLD-SCNC: 4.3 MMOL/L (ref 3.4–5.3)
PROT SERPL-MCNC: 7.1 G/DL (ref 6.8–8.8)
PSA SERPL-MCNC: 2.25 UG/L (ref 0–4)
RBC # BLD AUTO: 4.87 10E6/UL (ref 4.4–5.9)
RBC #/AREA URNS AUTO: NORMAL /HPF
SODIUM SERPL-SCNC: 138 MMOL/L (ref 133–144)
SP GR UR STRIP: 1.01 (ref 1–1.03)
TRIGL SERPL-MCNC: 214 MG/DL
TSH SERPL DL<=0.005 MIU/L-ACNC: 2.78 MU/L (ref 0.4–4)
UROBILINOGEN UR STRIP-ACNC: 0.2 E.U./DL
WBC # BLD AUTO: 5.9 10E3/UL (ref 4–11)
WBC #/AREA URNS AUTO: NORMAL /HPF

## 2021-07-29 PROCEDURE — 99396 PREV VISIT EST AGE 40-64: CPT | Performed by: FAMILY MEDICINE

## 2021-07-29 PROCEDURE — 82550 ASSAY OF CK (CPK): CPT | Performed by: FAMILY MEDICINE

## 2021-07-29 PROCEDURE — 85027 COMPLETE CBC AUTOMATED: CPT | Performed by: FAMILY MEDICINE

## 2021-07-29 PROCEDURE — 82043 UR ALBUMIN QUANTITATIVE: CPT | Performed by: FAMILY MEDICINE

## 2021-07-29 PROCEDURE — 84153 ASSAY OF PSA TOTAL: CPT | Performed by: FAMILY MEDICINE

## 2021-07-29 PROCEDURE — 83036 HEMOGLOBIN GLYCOSYLATED A1C: CPT | Performed by: FAMILY MEDICINE

## 2021-07-29 PROCEDURE — 84443 ASSAY THYROID STIM HORMONE: CPT | Performed by: FAMILY MEDICINE

## 2021-07-29 PROCEDURE — 80053 COMPREHEN METABOLIC PANEL: CPT | Performed by: FAMILY MEDICINE

## 2021-07-29 PROCEDURE — 81001 URINALYSIS AUTO W/SCOPE: CPT | Performed by: FAMILY MEDICINE

## 2021-07-29 PROCEDURE — 80061 LIPID PANEL: CPT | Performed by: FAMILY MEDICINE

## 2021-07-29 PROCEDURE — 36415 COLL VENOUS BLD VENIPUNCTURE: CPT | Performed by: FAMILY MEDICINE

## 2021-07-29 RX ORDER — FOLIC ACID 1 MG/1
1 TABLET ORAL DAILY
Qty: 90 TABLET | Refills: 3 | Status: SHIPPED | OUTPATIENT
Start: 2021-07-29 | End: 2022-08-11

## 2021-07-29 RX ORDER — HYDROXYZINE HYDROCHLORIDE 25 MG/1
25-50 TABLET, FILM COATED ORAL EVERY 6 HOURS PRN
Qty: 30 TABLET | Refills: 3 | Status: SHIPPED | OUTPATIENT
Start: 2021-07-29 | End: 2023-06-22

## 2021-07-29 RX ORDER — CLOBETASOL PROPIONATE 0.5 MG/G
CREAM TOPICAL 2 TIMES DAILY
Qty: 30 G | Refills: 3
Start: 2021-07-29 | End: 2024-05-06

## 2021-07-29 RX ORDER — CETIRIZINE HYDROCHLORIDE 10 MG/1
10 TABLET ORAL EVERY EVENING
Qty: 90 TABLET | Refills: 3 | Status: SHIPPED | OUTPATIENT
Start: 2021-07-29 | End: 2022-06-16

## 2021-07-29 RX ORDER — ATORVASTATIN CALCIUM 80 MG/1
80 TABLET, FILM COATED ORAL DAILY
Qty: 90 TABLET | Refills: 3 | Status: SHIPPED | OUTPATIENT
Start: 2021-07-29 | End: 2022-06-16

## 2021-07-29 RX ORDER — FLUTICASONE PROPIONATE 50 MCG
2 SPRAY, SUSPENSION (ML) NASAL DAILY
Qty: 48 G | Refills: 3 | Status: SHIPPED | OUTPATIENT
Start: 2021-07-29 | End: 2022-06-16

## 2021-07-29 RX ORDER — ALBUTEROL SULFATE 90 UG/1
2 AEROSOL, METERED RESPIRATORY (INHALATION) EVERY 4 HOURS PRN
Qty: 18 G | Refills: 3 | Status: SHIPPED | OUTPATIENT
Start: 2021-07-29 | End: 2022-06-16

## 2021-07-29 RX ORDER — AZELASTINE 1 MG/ML
2 SPRAY, METERED NASAL 2 TIMES DAILY
Qty: 90 ML | Refills: 3 | Status: SHIPPED | OUTPATIENT
Start: 2021-07-29 | End: 2022-06-16

## 2021-07-29 RX ORDER — CARVEDILOL 6.25 MG/1
6.25 TABLET ORAL 2 TIMES DAILY WITH MEALS
Qty: 180 TABLET | Refills: 3 | Status: SHIPPED | OUTPATIENT
Start: 2021-07-29 | End: 2022-06-16

## 2021-07-29 ASSESSMENT — ENCOUNTER SYMPTOMS
MYALGIAS: 0
CONSTIPATION: 0
CHILLS: 0
FEVER: 0
JOINT SWELLING: 0
DYSURIA: 0
DIARRHEA: 0
NERVOUS/ANXIOUS: 0
ARTHRALGIAS: 0
WEAKNESS: 0
FREQUENCY: 0
HEARTBURN: 0
COUGH: 0
PALPITATIONS: 0
PARESTHESIAS: 0
HEADACHES: 0
NAUSEA: 0
SORE THROAT: 0
EYE PAIN: 0
SHORTNESS OF BREATH: 0
HEMATURIA: 0
DIZZINESS: 0
HEMATOCHEZIA: 0
ABDOMINAL PAIN: 0

## 2021-07-29 ASSESSMENT — ASTHMA QUESTIONNAIRES
QUESTION_5 LAST FOUR WEEKS HOW WOULD YOU RATE YOUR ASTHMA CONTROL: COMPLETELY CONTROLLED
ACT_TOTALSCORE: 24
QUESTION_1 LAST FOUR WEEKS HOW MUCH OF THE TIME DID YOUR ASTHMA KEEP YOU FROM GETTING AS MUCH DONE AT WORK, SCHOOL OR AT HOME: NONE OF THE TIME
QUESTION_2 LAST FOUR WEEKS HOW OFTEN HAVE YOU HAD SHORTNESS OF BREATH: NOT AT ALL
QUESTION_4 LAST FOUR WEEKS HOW OFTEN HAVE YOU USED YOUR RESCUE INHALER OR NEBULIZER MEDICATION (SUCH AS ALBUTEROL): ONCE A WEEK OR LESS
QUESTION_3 LAST FOUR WEEKS HOW OFTEN DID YOUR ASTHMA SYMPTOMS (WHEEZING, COUGHING, SHORTNESS OF BREATH, CHEST TIGHTNESS OR PAIN) WAKE YOU UP AT NIGHT OR EARLIER THAN USUAL IN THE MORNING: NOT AT ALL

## 2021-07-29 ASSESSMENT — MIFFLIN-ST. JEOR: SCORE: 1761.59

## 2021-07-29 NOTE — PROGRESS NOTES
[facility]  [place]    SUBJECTIVE    CC: Mian Lozano is an 56 year old male who presents for preventative health visit.     Patient has been advised of split billing requirements and indicates understanding: Yes  Healthy Habits:     Getting at least 3 servings of Calcium per day:  Yes    Bi-annual eye exam:  NO    Dental care twice a year:  Yes    Sleep apnea or symptoms of sleep apnea:  None    Diet:  Regular (no restrictions)    Frequency of exercise:  1 day/week    Duration of exercise:  Less than 15 minutes    Taking medications regularly:  Yes    Medication side effects:  None    PHQ-2 Total Score: 0    Additional concerns today:  No       CAD - no cp, no sob, no edema - follows with cardiology    Post thoracotomy syndrome - Left - on/off, variable, ibuprofen, has seen pain    Prediabetes    Glucose   Date Value Ref Range Status   01/31/2021 143 (H) 70 - 99 mg/dL Final   01/16/2021 107 (H) 70 - 99 mg/dL Final   06/05/2020 110 (H) 70 - 99 mg/dL Final   03/14/2020 96 70 - 99 mg/dL Final   06/12/2019 122 (H) 70 - 99 mg/dL Final     Comment:     Fasting specimen     Lab Results   Component Value Date    A1C 5.4 06/05/2020    A1C 5.4 06/12/2019    A1C 5.6 05/16/2018    A1C 5.4 01/30/2018    A1C 5.6 02/14/2017     Hyperlipidemia Follow-Up      Are you regularly taking any medication or supplement to lower your cholesterol?   Yes- Atorvastatin    Are you having muscle aches or other side effects that you think could be caused by your cholesterol lowering medication?  No     Recent Labs   Lab Test 06/05/20  0903 06/12/19  0746 07/08/15  0757 09/03/14  0745   CHOL 112 126 146 159   HDL 30* 35* 37* 34*   LDL 57 47 79 72   TRIG 125 222* 148 267*   CHOLHDLRATIO  --   --  3.9 4.7     Hypertension Follow-up      Do you check your blood pressure regularly outside of the clinic? Yes     Are you following a low salt diet? No    Are your blood pressures ever more than 140 on the top number (systolic) OR more   than  90 on the bottom number (diastolic), for example 140/90? No     BP Readings from Last 3 Encounters:   07/29/21 122/80   03/31/21 128/76   03/24/21 (!) 142/82     Creatinine   Date Value Ref Range Status   03/23/2021 0.830 0.500 - 1.300 mg/dL Final     GFR Estimate   Date Value Ref Range Status   01/31/2021 63 >60 mL/min/[1.73_m2] Final     Anxiety Follow-Up - PHQ = 0, LONNY = 0      How are you doing with your anxiety since your last visit? No change    Are you having other symptoms that might be associated with anxiety? Yes:  rib pain - chronic- its flared up right now.     Have you had a significant life event? Relationship Concerns     Are you feeling depressed? No    Do you have any concerns with your use of alcohol or other drugs? No    Asthma/Environmental Allergies - zyrtec daily, albuterol max 2/month    ACT Total Scores 1/19/2021 1/19/2021 7/29/2021   ACT TOTAL SCORE - - -   ASTHMA ER VISITS - - -   ASTHMA HOSPITALIZATIONS - - -   ACT TOTAL SCORE (Goal Greater than or Equal to 20) - 25 24   In the past 12 months, how many times did you visit the emergency room for your asthma without being admitted to the hospital? 0 0 0   In the past 12 months, how many times were you hospitalized overnight because of your asthma? 0 0 0     Pulmonary nodules/Latent TB - no concerns    GERD/Sullivan's esophagus - on omeprazole    Psoriatic Arthritis/Psoriasis - Dr Mccall    Increased PSA/Nephrolithiasis - last 2/2021 - Dr Rivera    PSA   Date Value Ref Range Status   10/08/2020 1.59 0 - 4 ug/L Final     Comment:     Assay Method:  Chemiluminescence using Siemens Vista analyzer       Social History     Tobacco Use     Smoking status: Never Smoker     Smokeless tobacco: Never Used   Vaping Use     Vaping Use: Never used   Substance Use Topics     Alcohol use: Yes     Alcohol/week: 6.0 standard drinks     Types: 6 Standard drinks or equivalent per week     Comment: 6 drinks per week     Drug use: No     LONNY-7 SCORE 3/13/2014  5/11/2018 1/19/2021   Total Score 0 - -   Total Score - 0 2     PHQ 5/11/2018 1/19/2021   PHQ-9 Total Score 1 0   Q9: Thoughts of better off dead/self-harm past 2 weeks Not at all Not at all     Asthma Follow-Up    Was ACT completed today?    Yes    ACT Total Scores 7/29/2021   ACT TOTAL SCORE -   ASTHMA ER VISITS -   ASTHMA HOSPITALIZATIONS -   ACT TOTAL SCORE (Goal Greater than or Equal to 20) 24   In the past 12 months, how many times did you visit the emergency room for your asthma without being admitted to the hospital? 0   In the past 12 months, how many times were you hospitalized overnight because of your asthma? 0          How many days per week do you miss taking your asthma controller medication?  I do not have an asthma controller medication    Please describe any recent triggers for your asthma: Patient is unaware of triggers    Have you had any Emergency Room Visits, Urgent Care Visits, or Hospital Admissions since your last office visit?  No      Today's PHQ-2 Score:   PHQ-2 ( 1999 Pfizer) 7/29/2021   Q1: Little interest or pleasure in doing things 0   Q2: Feeling down, depressed or hopeless 0   PHQ-2 Score 0   Q1: Little interest or pleasure in doing things Not at all   Q2: Feeling down, depressed or hopeless Not at all   PHQ-2 Score 0       Abuse: Current or Past(Physical, Sexual or Emotional)- No  Do you feel safe in your environment? Yes        Social History     Tobacco Use     Smoking status: Never Smoker     Smokeless tobacco: Never Used   Substance Use Topics     Alcohol use: Yes     Alcohol/week: 6.0 standard drinks     Types: 6 Standard drinks or equivalent per week     Comment: 6 drinks per week         Alcohol Use 7/29/2021   Prescreen: >3 drinks/day or >7 drinks/week? No   Prescreen: >3 drinks/day or >7 drinks/week? -     Last PSA:   PSA   Date Value Ref Range Status   10/08/2020 1.59 0 - 4 ug/L Final     Comment:     Assay Method:  Chemiluminescence using Siemens Vista analyzer        Reviewed orders with patient. Reviewed health maintenance and updated orders accordingly - Yes    Health Maintenance     Colonoscopy:  Due 8/2025   FIT:  given              PSA:  pending   DEXA:  NA    Health Maintenance Due   Topic Date Due     Pneumococcal Vaccine: Pediatrics (0 to 5 Years) and At-Risk Patients (6 to 64 Years) (1 of 2 - PPSV23) Never done     ZOSTER IMMUNIZATION (1 of 2) Never done     ASTHMA ACTION PLAN  02/01/2018     LIPID  06/05/2021     MICROALBUMIN  06/05/2021       Current Problem List    Patient Active Problem List   Diagnosis     Other atopic dermatitis and related conditions     Mild intermittent asthma     Family history of coronary artery disease     Family history of prostate cancer     Prediabetes     Health Care Home     GERD (gastroesophageal reflux disease)     Sullivan's esophagus     Anxiety     Hypertension goal BP (blood pressure) < 140/90     Post-thoracotomy pain syndrome     Nephrolithiasis     Coronary artery disease     Hyperlipidemia LDL goal <70     Environmental allergies     H/O coronary artery bypass surgery     Psoriatic arthritis (H)     Class 1 obesity with serious comorbidity and body mass index (BMI) of 33.0 to 33.9 in adult, unspecified obesity type     Latent tuberculosis     Pulmonary nodules     Fatty liver     Gallstone     Psoriasis       Past Medical History    Past Medical History:   Diagnosis Date     2019 novel coronavirus disease (COVID-19) 02/2021     Anxiety      Sullivan's esophagus 07/2011     Coronary artery disease 04/2008    cabg x 5     Environmental allergies     seasonal, hay fever     FAM HX-CARDIOVAS DIS NEC     dad at 45, cabg, stent     Family history of prostate cancer      Fatty liver      Gallstone      GERD (gastroesophageal reflux disease) 03/2011     History of blood transfusion      Hyperlipidemia LDL goal <70 2006     Hypertension goal BP (blood pressure) < 140/90 04/2008     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    Dr Saba, Hx  histoplasmosis     Mild intermittent asthma 07/2003     Nephrolithiasis 6/13, 7/16    calcium oxalate - 6/2013, 7/2016, 1/2021     Other atopic dermatitis and related conditions 1980     Other diseases of lung, not elsewhere classified 04/2008    dr steel - benign bx and ct - granuloma - no further w/u needed     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    dr hoover     Prediabetes 08/2008     Psoriasis     Dr Mccall     Psoriatic arthritis (H)     Dr Mccall     Pulmonary nodules 2009    stable in 2012     Seasonal allergies 1980    Allergic rhinitis Hayfever       Past Surgical History    Past Surgical History:   Procedure Laterality Date     C CABG, ARTERY-VEIN, FIVE  4/08    FSD - lung bx benign     C STRESS TEST - REGULAR (FL)  9/06, 4/11    negative stress thallium - FSD     COLONOSCOPY N/A 8/7/2015    normal - due 10 yrs     ESOPHAGOSCOPY, GASTROSCOPY, DUODENOSCOPY (EGD), COMBINED  6/11, 5/13    [facility] - ? gastritis, fowler's - due 3 yr     ESOPHAGOSCOPY, GASTROSCOPY, DUODENOSCOPY (EGD), COMBINED  5/10/2013    COMBINED ESOPHAGOSCOPY, GASTROSCOPY, DUODENOSCOPY (EGD), BIOPSY SINGLE OR MULTIPLE;  ESOPHAGOSCOPY, GASTROSCOPY, DUODENOSCOPY (EGD)  with biopsy;  Surgeon: Angus Reynolds MD;  Location:  GI     ESOPHAGOSCOPY, GASTROSCOPY, DUODENOSCOPY (EGD), COMBINED N/A 7/28/2017    Fowler's - recheck 3 yrs     HC VASECTOMY UNILAT/BILAT W POSTOP SEMEN  1995    Vasectomy     HERNIORRHAPHY UMBILICAL N/A 11/13/2014    Dr Barron     SURGICAL HISTORY OF -  Left 1980    lt patella fx     SURGICAL HISTORY OF -  Left 1985    lt thumb neuroma excised     SURGICAL HISTORY OF -   11/09    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     VASECTOMY         Current Medications    Current Outpatient Medications   Medication Sig Dispense Refill     albuterol (PROAIR HFA) 108 (90 Base) MCG/ACT inhaler Inhale 2 puffs into the lungs every 4 hours as needed for shortness of breath / dyspnea or wheezing 18 g 3     ASPIRIN 81 MG OR  TABS ONE DAILY 100 3     atorvastatin (LIPITOR) 80 MG tablet Take 1 tablet (80 mg) by mouth daily 90 tablet 3     azelastine (ASTELIN) 0.1 % nasal spray Spray 2 sprays into both nostrils 2 times daily 90 mL 3     carvedilol (COREG) 6.25 MG tablet Take 1 tablet (6.25 mg) by mouth 2 times daily (with meals) 180 tablet 3     cetirizine (ZYRTEC) 10 MG tablet Take 1 tablet (10 mg) by mouth every evening 90 tablet 3     clobetasol (TEMOVATE) 0.05 % external cream Apply topically 2 times daily 30 g 3     etanercept (ENBREL) 50 MG/ML injection Inject 0.98 mLs (50 mg) Subcutaneous once a week       famotidine (PEPCID) 20 MG tablet Take 1 tablet (20 mg) by mouth 2 times daily 60 tablet 3     fluticasone (FLONASE) 50 MCG/ACT nasal spray Spray 2 sprays into both nostrils daily 48 g 3     folic acid (FOLVITE) 1 MG tablet Take 1 tablet (1 mg) by mouth daily 90 tablet 3     hydrOXYzine (ATARAX) 25 MG tablet Take 1-2 tablets (25-50 mg) by mouth every 6 hours as needed for anxiety 30 tablet 3     ibuprofen (ADVIL/MOTRIN) 200 MG tablet Take 3 tablets (600 mg) by mouth every 8 hours as needed for mild pain 1 tablet 0     LANsoprazole (PREVACID) 30 MG DR capsule TAKE ONE CAPSULE BY MOUTH DAILY. TAKE 30 TO 60 MINUTES BEFORE A MEAL. 90 capsule 3     methotrexate 2.5 MG tablet TK 5 TS PO Q WK  0       Allergies    Allergies   Allergen Reactions     Seasonal Allergies        Immunizations    Immunization History   Administered Date(s) Administered     COVID-19,PF,Pfizer 04/28/2021, 05/19/2021     Flu, Unspecified 09/24/2015, 09/25/2018, 09/17/2019     Influenza (IIV3) PF 11/26/2012, 11/12/2014, 10/01/2016     Influenza Intranasal Vaccine 4 valent 10/01/2015     Influenza Quad, Recombinant, p-free (RIV4) 10/08/2020     Influenza Vaccine IM > 6 months Valent IIV4 10/20/2015, 01/30/2018, 10/01/2018     TDAP Vaccine (Boostrix) 11/25/2015     Tdap (Adacel,Boostrix) 04/28/2006       Family History    Family History   Problem Relation Age of  Onset     Prostate Cancer Father 69     Coronary Artery Disease Father 38     Hypertension Father      Neurologic Disorder Sister         CVA/aneurysm at age 19     Hypertension Paternal Grandfather      C.A.D. Paternal Grandfather      Heart Failure Paternal Grandfather      Colon Cancer No family hx of        Social History    Social History     Socioeconomic History     Marital status:      Spouse name: Imelda     Number of children: 0     Years of education: 14     Highest education level: Not on file   Occupational History     Comment: [employer]   Tobacco Use     Smoking status: Never Smoker     Smokeless tobacco: Never Used   Vaping Use     Vaping Use: Never used   Substance and Sexual Activity     Alcohol use: Yes     Alcohol/week: 6.0 standard drinks     Types: 6 Standard drinks or equivalent per week     Comment: 6 drinks per week     Drug use: No     Sexual activity: Yes     Partners: Female     Birth control/protection: Male Surgical   Other Topics Concern      Service Not Asked     Blood Transfusions Not Asked     Caffeine Concern Yes     Comment: 1 can qd     Occupational Exposure Not Asked     Hobby Hazards Not Asked     Sleep Concern Not Asked     Stress Concern Not Asked     Weight Concern Not Asked     Special Diet Not Asked     Back Care Not Asked     Exercise Yes     Comment: occas     Bike Helmet Not Asked     Seat Belt Yes     Self-Exams Not Asked     Parent/sibling w/ CABG, MI or angioplasty before 65F 55M? Yes   Social History Narrative     Not on file     Social Determinants of Health     Financial Resource Strain:      Difficulty of Paying Living Expenses:    Food Insecurity:      Worried About Running Out of Food in the Last Year:      Ran Out of Food in the Last Year:    Transportation Needs:      Lack of Transportation (Medical):      Lack of Transportation (Non-Medical):    Physical Activity:      Days of Exercise per Week:      Minutes of Exercise per Session:   "  Stress:      Feeling of Stress :    Social Connections:      Frequency of Communication with Friends and Family:      Frequency of Social Gatherings with Friends and Family:      Attends Religion Services:      Active Member of Clubs or Organizations:      Attends Club or Organization Meetings:      Marital Status:    Intimate Partner Violence:      Fear of Current or Ex-Partner:      Emotionally Abused:      Physically Abused:      Sexually Abused:        ROS    CONSTITUTIONAL: NEGATIVE for fever, chills, change in weight  INTEGUMENTARY/SKIN: NEGATIVE for worrisome rashes, moles or lesions  EYES: NEGATIVE for vision changes or irritation  ENT/MOUTH: NEGATIVE for ear, mouth and throat problems  RESP: NEGATIVE for significant cough or SOB  CV: NEGATIVE for chest pain, palpitations or peripheral edema  GI: NEGATIVE for nausea, abdominal pain, heartburn, or change in bowel habits  : NEGATIVE for frequency, dysuria, or hematuria  MUSCULOSKELETAL: NEGATIVE for significant arthralgias or myalgia  NEURO: NEGATIVE for weakness, dizziness or paresthesias  ENDOCRINE: NEGATIVE for temperature intolerance, skin/hair changes  HEME: NEGATIVE for bleeding problems  PSYCHIATRIC: NEGATIVE for changes in mood or affect    OBJECTIVE    /80   Pulse 80   Temp 97  F (36.1  C)   Resp 16   Ht 1.727 m (5' 8\")   Wt 95.7 kg (211 lb)   SpO2 96%   BMI 32.08 kg/m      EXAM:    GENERAL: healthy, alert and no distress  EYES: Eyes grossly normal to inspection, PERRL and conjunctivae and sclerae normal  HENT: ear canals and TM's normal, nose and mouth without ulcers or lesions  NECK: no adenopathy, no asymmetry, masses, or scars and thyroid normal to palpation  RESP: lungs clear to auscultation - no rales, rhonchi or wheezes  CV: regular rate and rhythm, normal S1 S2, no S3 or S4, no murmur, click or rub, no peripheral edema and peripheral pulses strong  ABDOMEN: soft, nontender, no hepatosplenomegaly, no masses and bowel sounds " normal   (male): testicles normal without atrophy or masses, no hernias and penis normal without urethral discharge  RECTAL: normal sphincter tone, no rectal masses and prostate of normal size for age, smooth, nontender without masses/nodules  MS: no gross musculoskeletal defects noted, no edema  SKIN: no suspicious lesions or rashes  NEURO: Normal strength and tone, mentation intact and speech normal  PSYCH: mentation appears normal, affect normal/bright  LYMPH: no cervical, supraclavicular, axillary, or inguinal adenopathy    DIAGNOSTICS/PROCEDURES    Pending    ASSESSMENT      ICD-10-CM    1. Routine general medical examination at a health care facility  Z00.00 Hemoglobin A1c     TSH with free T4 reflex     CBC with platelets     CK total     Lipid panel reflex to direct LDL Fasting     Comprehensive metabolic panel     PSA, tumor marker     Albumin Random Urine Quantitative with Creat Ratio     UA reflex to Microscopic and Culture     UA reflex to Microscopic and Culture     Urine Microscopic   2. Coronary artery disease involving coronary bypass graft of native heart without angina pectoris  I25.810 Adult Cardiology Eval Referral     atorvastatin (LIPITOR) 80 MG tablet     carvedilol (COREG) 6.25 MG tablet     Hemoglobin A1c     Lipid panel reflex to direct LDL Fasting     Comprehensive metabolic panel     Albumin Random Urine Quantitative with Creat Ratio     UA reflex to Microscopic and Culture     UA reflex to Microscopic and Culture     Urine Microscopic   3. Post-thoracotomy pain syndrome  G89.12 Adult Cardiology Eval Referral     UA reflex to Microscopic and Culture     UA reflex to Microscopic and Culture     Urine Microscopic   4. H/O coronary artery bypass surgery  Z95.1 Adult Cardiology Eval Referral     atorvastatin (LIPITOR) 80 MG tablet     carvedilol (COREG) 6.25 MG tablet     Lipid panel reflex to direct LDL Fasting     Comprehensive metabolic panel     Albumin Random Urine Quantitative with  Creat Ratio     UA reflex to Microscopic and Culture     UA reflex to Microscopic and Culture     Urine Microscopic   5. Prediabetes  R73.03 Adult Cardiology Eval Referral     atorvastatin (LIPITOR) 80 MG tablet     Hemoglobin A1c     Lipid panel reflex to direct LDL Fasting     Comprehensive metabolic panel     Albumin Random Urine Quantitative with Creat Ratio     UA reflex to Microscopic and Culture     UA reflex to Microscopic and Culture     Urine Microscopic   6. Family history of coronary artery disease  Z82.49 Adult Cardiology Eval Referral     Lipid panel reflex to direct LDL Fasting     Comprehensive metabolic panel     Albumin Random Urine Quantitative with Creat Ratio     UA reflex to Microscopic and Culture     UA reflex to Microscopic and Culture     Urine Microscopic   7. Hypertension goal BP (blood pressure) < 140/90  I10 carvedilol (COREG) 6.25 MG tablet     Comprehensive metabolic panel     Albumin Random Urine Quantitative with Creat Ratio     UA reflex to Microscopic and Culture     UA reflex to Microscopic and Culture     Urine Microscopic   8. Hyperlipidemia LDL goal <70  E78.5 atorvastatin (LIPITOR) 80 MG tablet     CK total     Lipid panel reflex to direct LDL Fasting     Comprehensive metabolic panel     UA reflex to Microscopic and Culture     UA reflex to Microscopic and Culture     Urine Microscopic   9. Anxiety  F41.9 hydrOXYzine (ATARAX) 25 MG tablet     TSH with free T4 reflex     UA reflex to Microscopic and Culture     UA reflex to Microscopic and Culture     Urine Microscopic   10. Mild intermittent asthma without complication  J45.20 albuterol (PROAIR HFA) 108 (90 Base) MCG/ACT inhaler     azelastine (ASTELIN) 0.1 % nasal spray     cetirizine (ZYRTEC) 10 MG tablet     fluticasone (FLONASE) 50 MCG/ACT nasal spray     UA reflex to Microscopic and Culture     UA reflex to Microscopic and Culture     Urine Microscopic   11. Environmental allergies  Z91.09 albuterol (PROAIR HFA) 108 (90  Base) MCG/ACT inhaler     azelastine (ASTELIN) 0.1 % nasal spray     cetirizine (ZYRTEC) 10 MG tablet     fluticasone (FLONASE) 50 MCG/ACT nasal spray     UA reflex to Microscopic and Culture     UA reflex to Microscopic and Culture     Urine Microscopic   12. Pulmonary nodules  R91.8 UA reflex to Microscopic and Culture     UA reflex to Microscopic and Culture     Urine Microscopic   13. Psoriasis  L40.9 UA reflex to Microscopic and Culture     UA reflex to Microscopic and Culture     Urine Microscopic     clobetasol (TEMOVATE) 0.05 % external cream   14. Fatty liver  K76.0 UA reflex to Microscopic and Culture     UA reflex to Microscopic and Culture     Urine Microscopic   15. Sullivan's esophagus without dysplasia  K22.70 Adult Gastro Ref - Procedure Only     CBC with platelets     UA reflex to Microscopic and Culture     UA reflex to Microscopic and Culture     Urine Microscopic   16. Gastroesophageal reflux disease without esophagitis  K21.9 Adult Gastro Ref - Procedure Only     UA reflex to Microscopic and Culture     UA reflex to Microscopic and Culture     Urine Microscopic   17. Latent tuberculosis  Z22.7 UA reflex to Microscopic and Culture     UA reflex to Microscopic and Culture     Urine Microscopic   18. Psoriatic arthritis (H)  L40.50 CBC with platelets     Comprehensive metabolic panel     UA reflex to Microscopic and Culture     UA reflex to Microscopic and Culture     Urine Microscopic     clobetasol (TEMOVATE) 0.05 % external cream   19. Nephrolithiasis  N20.0 UA reflex to Microscopic and Culture     UA reflex to Microscopic and Culture     Urine Microscopic   20. Screening for prostate cancer  Z12.5 UA reflex to Microscopic and Culture     UA reflex to Microscopic and Culture     Urine Microscopic   21. Screen for colon cancer  Z12.11 UA reflex to Microscopic and Culture     UA reflex to Microscopic and Culture     Urine Microscopic   22. Medication monitoring encounter  Z51.81 folic acid  (FOLVITE) 1 MG tablet     Hemoglobin A1c     TSH with free T4 reflex     CBC with platelets     CK total     Lipid panel reflex to direct LDL Fasting     Comprehensive metabolic panel     PSA, tumor marker     Albumin Random Urine Quantitative with Creat Ratio     UA reflex to Microscopic and Culture     UA reflex to Microscopic and Culture     Urine Microscopic   23. Family history of prostate cancer  Z80.42 PSA, tumor marker     UA reflex to Microscopic and Culture     UA reflex to Microscopic and Culture     Urine Microscopic       PLAN    Discussed treatment/modality options, including risk and benefits, he desires:    advised alcohol consumption 1oz per day or less, advised aspirin 81 mg po daily, advised 1 multivitamin per day, advised calcium 2466-6891 mg/d and Vitamin D 800-1200 IU/d, advised dentist every 6 months, advised diet, exercise, and weight loss, advised opthalmologist every 1-2 years, advised self testicular exam q month, further health care maintenance, further lab(s), medication refill(s), ACT, Asthma Action Plan, completed and explained, LONNY 7, completed and reviewed, PHQ-9, Depression Action Plan, completed and reviewed and observation    Discussed controversies surrounding PSA. Specifically reviewed 2017 USPSTF findings recommending discussion of PSA testing for men ages 55-69.  Reviewed findings of the  Randomized Study of Screening for Prostate Cancer which showed a 30% reduction in advanced stage prostate cancer and a 20% reduction in death rate from prostate cancer in this age group. PSA-based screening may prevent up to 2 deaths and up to 3 cases of metastatic disease per 1,000 men screened over 13 years.    We've elected to do PSA this year after discussing these controversies.    All diagnosis above reviewed and noted above, otherwise stable.      See [employer] orders for further details.      1) med refills    2) labs pending    3) immunizations reviewed, consider shingrix,  "pneumovax. flu in fall    4) cardiology    5) EGD    Return in about 6 months (around 1/29/2022) for Medication Recheck Visit, Follow Up Chronic, and as needed.    Health Maintenance Due   Topic Date Due     Pneumococcal Vaccine: Pediatrics (0 to 5 Years) and At-Risk Patients (6 to 64 Years) (1 of 2 - PPSV23) Never done     ZOSTER IMMUNIZATION (1 of 2) Never done     ASTHMA ACTION PLAN  02/01/2018     LIPID  06/05/2021     MICROALBUMIN  06/05/2021       COUNSELING    Reviewed preventive health counseling, as reflected in patient instructions    BP Readings from Last 1 Encounters:   07/29/21 122/80     Estimated body mass index is 32.08 kg/m  as calculated from the following:    Height as of this encounter: 1.727 m (5' 8\").    Weight as of this encounter: 95.7 kg (211 lb).      Weight management plan: diet and exercise     reports that he has never smoked. He has never used smokeless tobacco.      Counseling Resources:    ATP IV Guidelines  Pooled Cohorts Equation Calculator  FRAX Risk Assessment  ICSI Preventive Guidelines  Dietary Guidelines for Americans, 2010  [employer] MyPlate  ASA Prophylaxis  Lung CA Screening           Fady Adams MD, FAAFP     [facility]  [address] [place] [address]  [email or web address]   Office: [phone]  Fax: [phone]  Pager: [phone]     Answers for HPI/ROS submitted by the patient on 7/29/2021  abdominal pain: No  Blood in stool: No  Blood in urine: No  chest pain: No  chills: No  congestion: No  constipation: No  cough: No  diarrhea: No  dizziness: No  ear pain: No  eye pain: No  nervous/anxious: No  fever: No  frequency: No  genital sores: No  headaches: No  hearing loss: No  heartburn: No  arthralgias: No  joint swelling: No  peripheral edema: No  mood changes: No  myalgias: No  nausea: No  dysuria: No  palpitations: No  Skin sensation changes: No  sore throat: No  urgency: " No  rash: No  shortness of breath: No  visual disturbance: No  weakness: No  impotence: No  penile discharge: No

## 2021-07-29 NOTE — LETTER
[facility]  [address] [facility], [place] [address] [phone]                    August 4, 2021    Mian Lozano  [address] [place] [address]      Dear Mian,    Here is a summary of your recent test results:    Labs are overall quite good, except     Low HDL (good cholesterol)   Elevated triglycerides   PSA = 2.25     We advise:     Continue current cares.   Balanced low cholesterol diet.   Regular exercise.   Weight loss.   Follow up with Urology, Dr Rivera     For additional lab test information, [email or web address] is an excellent reference.       Your test results are enclosed.      Please contact me if you have any questions.    In addition, here is a list of due or overdue Health Maintenance reminders.    Health Maintenance Due   Topic Date Due     Pneumococcal Vaccine (1 of 2 - PPSV23) Never done     Zoster (Shingles) Vaccine (1 of 2) Never done     Asthma Action Plan - yearly  02/01/2018       Please call us at [phone] (or use [employer]) to address the above recommendations.            Thank you very much for trusting [facility].     Healthy regards,          Fady Adams M.D.        Results for orders placed or performed in visit on 07/29/21   Hemoglobin A1c     Status: Normal   Result Value Ref Range    Hemoglobin A1C 5.3 0.0 - 5.6 %   TSH with free T4 reflex     Status: Normal   Result Value Ref Range    TSH 2.78 0.40 - 4.00 mU/L   CBC with platelets     Status: Normal   Result Value Ref Range    WBC Count 5.9 4.0 - 11.0 10e3/uL    RBC Count 4.87 4.40 - 5.90 10e6/uL    Hemoglobin 15.5 13.3 - 17.7 g/dL    Hematocrit 44.2 40.0 - 53.0 %    MCV 91 78 - 100 fL    MCH 31.8 26.5 - 33.0 pg    MCHC 35.1 31.5 - 36.5 g/dL    RDW 13.0 10.0 - 15.0 %    Platelet Count 174 150 - 450 10e3/uL   CK total     Status: Normal   Result Value Ref Range    CK 46 30 - 300 U/L   Lipid panel reflex to direct LDL Fasting     Status: Abnormal   Result Value Ref  Range    Cholesterol 134 <200 mg/dL    Triglycerides 214 (H) <150 mg/dL    Direct Measure HDL 36 (L) >=40 mg/dL    LDL Cholesterol Calculated 55 <=100 mg/dL    Non HDL Cholesterol 98 <130 mg/dL    Patient Fasting > 8hrs? Yes    Comprehensive metabolic panel     Status: Abnormal   Result Value Ref Range    Sodium 138 133 - 144 mmol/L    Potassium 4.3 3.4 - 5.3 mmol/L    Chloride 107 94 - 109 mmol/L    Carbon Dioxide (CO2) 26 20 - 32 mmol/L    Anion Gap 5 3 - 14 mmol/L    Urea Nitrogen 14 7 - 30 mg/dL    Creatinine 1.11 0.66 - 1.25 mg/dL    Calcium 9.7 8.5 - 10.1 mg/dL    Glucose 103 (H) 70 - 99 mg/dL    Alkaline Phosphatase 98 40 - 150 U/L    AST 45 0 - 45 U/L    ALT 62 0 - 70 U/L    Protein Total 7.1 6.8 - 8.8 g/dL    Albumin 3.7 3.4 - 5.0 g/dL    Bilirubin Total 0.8 0.2 - 1.3 mg/dL    GFR Estimate 74 >60 mL/min/1.73m2   PSA, tumor marker     Status: Normal   Result Value Ref Range    PSA Tumor Marker 2.25 0.00 - 4.00 ug/L   Albumin Random Urine Quantitative with Creat Ratio     Status: None   Result Value Ref Range    Creatinine Urine mg/dL 36 mg/dL    Albumin Urine mg/L <5 mg/dL    Albumin Urine mg/g Cr     UA reflex to Microscopic and Culture     Status: Abnormal    Specimen: Urine, Midstream   Result Value Ref Range    Color Urine Yellow Colorless, Straw, Light Yellow, Yellow    Appearance Urine Clear Clear    Glucose Urine Negative Negative mg/dL    Bilirubin Urine Negative Negative    Ketones Urine Negative Negative mg/dL    Specific Gravity Urine 1.010 1.003 - 1.035    Blood Urine Trace (A) Negative    pH Urine 6.0 5.0 - 7.0    Protein Albumin Urine Negative Negative mg/dL    Urobilinogen Urine 0.2 0.2, 1.0 E.U./dL    Nitrite Urine Negative Negative    Leukocyte Esterase Urine Negative Negative   Urine Microscopic     Status: Normal   Result Value Ref Range    RBC Urine 0-2 0-2 /HPF /HPF    WBC Urine None Seen 0-5 /HPF /HPF    Narrative    Urine Culture not indicated

## 2021-07-30 LAB
CREAT UR-MCNC: 36 MG/DL
MICROALBUMIN UR-MCNC: <5 MG/DL
MICROALBUMIN/CREAT UR: NORMAL MG/G{CREAT}

## 2021-07-30 ASSESSMENT — PATIENT HEALTH QUESTIONNAIRE - PHQ9
SUM OF ALL RESPONSES TO PHQ QUESTIONS 1-9: 0
5. POOR APPETITE OR OVEREATING: NOT AT ALL

## 2021-07-30 ASSESSMENT — ANXIETY QUESTIONNAIRES
1. FEELING NERVOUS, ANXIOUS, OR ON EDGE: NOT AT ALL
GAD7 TOTAL SCORE: 0
6. BECOMING EASILY ANNOYED OR IRRITABLE: NOT AT ALL
IF YOU CHECKED OFF ANY PROBLEMS ON THIS QUESTIONNAIRE, HOW DIFFICULT HAVE THESE PROBLEMS MADE IT FOR YOU TO DO YOUR WORK, TAKE CARE OF THINGS AT HOME, OR GET ALONG WITH OTHER PEOPLE: NOT DIFFICULT AT ALL
5. BEING SO RESTLESS THAT IT IS HARD TO SIT STILL: NOT AT ALL
3. WORRYING TOO MUCH ABOUT DIFFERENT THINGS: NOT AT ALL
2. NOT BEING ABLE TO STOP OR CONTROL WORRYING: NOT AT ALL
7. FEELING AFRAID AS IF SOMETHING AWFUL MIGHT HAPPEN: NOT AT ALL

## 2021-07-30 ASSESSMENT — ASTHMA QUESTIONNAIRES: ACT_TOTALSCORE: 24

## 2021-07-31 ASSESSMENT — ANXIETY QUESTIONNAIRES: GAD7 TOTAL SCORE: 0

## 2021-10-24 ENCOUNTER — HEALTH MAINTENANCE LETTER (OUTPATIENT)
Age: 56
End: 2021-10-24

## 2021-11-22 ENCOUNTER — OFFICE VISIT (OUTPATIENT)
Dept: FAMILY MEDICINE | Facility: CLINIC | Age: 56
End: 2021-11-22
Payer: COMMERCIAL

## 2021-11-22 VITALS
WEIGHT: 217 LBS | SYSTOLIC BLOOD PRESSURE: 142 MMHG | DIASTOLIC BLOOD PRESSURE: 76 MMHG | HEART RATE: 90 BPM | OXYGEN SATURATION: 96 % | BODY MASS INDEX: 32.89 KG/M2 | TEMPERATURE: 97.5 F | HEIGHT: 68 IN

## 2021-11-22 DIAGNOSIS — J22 ACUTE RESPIRATORY INFECTION: Primary | ICD-10-CM

## 2021-11-22 PROCEDURE — 99213 OFFICE O/P EST LOW 20 MIN: CPT | Performed by: FAMILY MEDICINE

## 2021-11-22 RX ORDER — AZITHROMYCIN 250 MG/1
TABLET, FILM COATED ORAL
Qty: 6 TABLET | Refills: 0 | Status: SHIPPED | OUTPATIENT
Start: 2021-11-22 | End: 2022-05-12

## 2021-11-22 ASSESSMENT — ENCOUNTER SYMPTOMS
CHILLS: 0
MYALGIAS: 0
WEIGHT LOSS: 0
SWEATS: 0
HEMOPTYSIS: 0
FEVER: 0
HEARTBURN: 0
WHEEZING: 0
SORE THROAT: 0
HEADACHES: 0
SHORTNESS OF BREATH: 0
COUGH: 1
RHINORRHEA: 0

## 2021-11-22 ASSESSMENT — ANXIETY QUESTIONNAIRES
7. FEELING AFRAID AS IF SOMETHING AWFUL MIGHT HAPPEN: NOT AT ALL
GAD7 TOTAL SCORE: 0
GAD7 TOTAL SCORE: 0
5. BEING SO RESTLESS THAT IT IS HARD TO SIT STILL: NOT AT ALL
1. FEELING NERVOUS, ANXIOUS, OR ON EDGE: NOT AT ALL
8. IF YOU CHECKED OFF ANY PROBLEMS, HOW DIFFICULT HAVE THESE MADE IT FOR YOU TO DO YOUR WORK, TAKE CARE OF THINGS AT HOME, OR GET ALONG WITH OTHER PEOPLE?: NOT DIFFICULT AT ALL
7. FEELING AFRAID AS IF SOMETHING AWFUL MIGHT HAPPEN: NOT AT ALL
GAD7 TOTAL SCORE: 0
2. NOT BEING ABLE TO STOP OR CONTROL WORRYING: NOT AT ALL
3. WORRYING TOO MUCH ABOUT DIFFERENT THINGS: NOT AT ALL
4. TROUBLE RELAXING: NOT AT ALL
6. BECOMING EASILY ANNOYED OR IRRITABLE: NOT AT ALL

## 2021-11-22 ASSESSMENT — PATIENT HEALTH QUESTIONNAIRE - PHQ9
SUM OF ALL RESPONSES TO PHQ QUESTIONS 1-9: 0
SUM OF ALL RESPONSES TO PHQ QUESTIONS 1-9: 0
10. IF YOU CHECKED OFF ANY PROBLEMS, HOW DIFFICULT HAVE THESE PROBLEMS MADE IT FOR YOU TO DO YOUR WORK, TAKE CARE OF THINGS AT HOME, OR GET ALONG WITH OTHER PEOPLE: NOT DIFFICULT AT ALL

## 2021-11-22 ASSESSMENT — MIFFLIN-ST. JEOR: SCORE: 1788.81

## 2021-11-22 NOTE — PROGRESS NOTES
"  Assessment & Plan     Acute respiratory infection: given duration of symptoms and current immunosuppressive medications, will start antibiotic to cover for bacterial infection. Continue supportive cares at home, albuterol PRN. Follow up in 1-2 weeks if symptoms not improving or sooner if worsening.  - azithromycin (ZITHROMAX) 250 MG tablet; Two tablets first day, then one tablet daily for four days.         Return in about 2 weeks (around 12/6/2021) for follow up if symptoms not improving.    Bryan Mcginnis DO  [facility] EDMOND Pappas is a 56 year old who presents for the following health issues     HPI     Acute Illness  Acute illness concerns: coughing up yellow phelgm for a week, chest congestion  Onset/Duration: for a week - he had COVID in February and has been vaccinated as well  Symptoms:  Fever: no  Chills/Sweats: no  Headache (location?): no  Sinus Pressure: no  Conjunctivitis:  no  Ear Pain: no  Rhinorrhea: no  Congestion: YES  Sore Throat: no  Cough: YES-productive of yellow sputum; specks of blood  Wheeze: no  Decreased Appetite: no  Nausea: no  Vomiting: no  Diarrhea: no  Dysuria/Freq.: no  Dysuria or Hematuria: no  Fatigue/Achiness: no  Sick/Strep Exposure: no known exposure -   Therapies tried and outcome: mucinex, dextromethorphan - temporarily helpful. Uses albuterol 2-3x per day.      Review of Systems   Constitutional, HEENT, cardiovascular, pulmonary, gi and gu systems are negative, except as otherwise noted.      Objective    BP (!) 142/76 (BP Location: Left arm, Cuff Size: Adult Regular)   Pulse 90   Temp 97.5  F (36.4  C) (Tympanic)   Ht 1.727 m (5' 8\")   Wt 98.4 kg (217 lb)   SpO2 96%   BMI 32.99 kg/m    Body mass index is 32.99 kg/m .  Physical Exam   GENERAL: healthy, alert and no distress  HENT: ear canals and TM's normal, nose and mouth without ulcers or lesions  NECK: no adenopathy, no asymmetry, masses, or scars and thyroid normal to " palpation  RESP: lungs clear to auscultation - no rales, rhonchi or wheezes  CV: regular rates and rhythm, normal S1 S2, no S3 or S4 and no murmur, click or rub  ABDOMEN: soft, nontender, no hepatosplenomegaly, no masses and bowel sounds normal  MS: no gross musculoskeletal defects noted, no edema

## 2021-11-23 ASSESSMENT — ANXIETY QUESTIONNAIRES: GAD7 TOTAL SCORE: 0

## 2021-11-23 ASSESSMENT — PATIENT HEALTH QUESTIONNAIRE - PHQ9: SUM OF ALL RESPONSES TO PHQ QUESTIONS 1-9: 0

## 2022-01-10 ENCOUNTER — TRANSFERRED RECORDS (OUTPATIENT)
Dept: HEALTH INFORMATION MANAGEMENT | Facility: CLINIC | Age: 57
End: 2022-01-10
Payer: COMMERCIAL

## 2022-01-10 LAB
ALT SERPL-CCNC: 42 IU/L (ref 5–40)
AST SERPL-CCNC: 34 U/L (ref 5–34)
CREATININE (EXTERNAL): 0.9 MG/DL (ref 0.5–1.3)
GFR ESTIMATED (EXTERNAL): 92.8 ML/MIN/1.73M2

## 2022-04-08 ASSESSMENT — ANXIETY QUESTIONNAIRES
2. NOT BEING ABLE TO STOP OR CONTROL WORRYING: SEVERAL DAYS
5. BEING SO RESTLESS THAT IT IS HARD TO SIT STILL: NOT AT ALL
GAD7 TOTAL SCORE: 7
GAD7 TOTAL SCORE: 7
6. BECOMING EASILY ANNOYED OR IRRITABLE: NOT AT ALL
3. WORRYING TOO MUCH ABOUT DIFFERENT THINGS: SEVERAL DAYS
GAD7 TOTAL SCORE: 7
1. FEELING NERVOUS, ANXIOUS, OR ON EDGE: NEARLY EVERY DAY
7. FEELING AFRAID AS IF SOMETHING AWFUL MIGHT HAPPEN: MORE THAN HALF THE DAYS
4. TROUBLE RELAXING: NOT AT ALL
7. FEELING AFRAID AS IF SOMETHING AWFUL MIGHT HAPPEN: MORE THAN HALF THE DAYS

## 2022-04-09 ASSESSMENT — ANXIETY QUESTIONNAIRES: GAD7 TOTAL SCORE: 7

## 2022-04-11 ENCOUNTER — LAB (OUTPATIENT)
Dept: LAB | Facility: CLINIC | Age: 57
End: 2022-04-11

## 2022-04-11 ENCOUNTER — OFFICE VISIT (OUTPATIENT)
Dept: CARDIOLOGY | Facility: CLINIC | Age: 57
End: 2022-04-11
Attending: FAMILY MEDICINE
Payer: COMMERCIAL

## 2022-04-11 VITALS
DIASTOLIC BLOOD PRESSURE: 80 MMHG | HEART RATE: 84 BPM | WEIGHT: 215.5 LBS | BODY MASS INDEX: 32.66 KG/M2 | HEIGHT: 68 IN | SYSTOLIC BLOOD PRESSURE: 136 MMHG

## 2022-04-11 DIAGNOSIS — Z95.1 H/O CORONARY ARTERY BYPASS SURGERY: ICD-10-CM

## 2022-04-11 DIAGNOSIS — I25.810 CORONARY ARTERY DISEASE INVOLVING CORONARY BYPASS GRAFT OF NATIVE HEART WITHOUT ANGINA PECTORIS: ICD-10-CM

## 2022-04-11 DIAGNOSIS — R73.03 PREDIABETES: ICD-10-CM

## 2022-04-11 DIAGNOSIS — G89.12 POST-THORACOTOMY PAIN SYNDROME: ICD-10-CM

## 2022-04-11 DIAGNOSIS — Z82.49 FAMILY HISTORY OF CORONARY ARTERY DISEASE: ICD-10-CM

## 2022-04-11 LAB
ANION GAP SERPL CALCULATED.3IONS-SCNC: 5 MMOL/L (ref 3–14)
BASOPHILS # BLD AUTO: 0.1 10E3/UL (ref 0–0.2)
BASOPHILS NFR BLD AUTO: 1 %
BUN SERPL-MCNC: 9 MG/DL (ref 7–30)
CALCIUM SERPL-MCNC: 9.1 MG/DL (ref 8.5–10.1)
CHLORIDE BLD-SCNC: 110 MMOL/L (ref 94–109)
CO2 SERPL-SCNC: 24 MMOL/L (ref 20–32)
CREAT SERPL-MCNC: 0.9 MG/DL (ref 0.66–1.25)
EOSINOPHIL # BLD AUTO: 0.2 10E3/UL (ref 0–0.7)
EOSINOPHIL NFR BLD AUTO: 3 %
ERYTHROCYTE [DISTWIDTH] IN BLOOD BY AUTOMATED COUNT: 12.6 % (ref 10–15)
GFR SERPL CREATININE-BSD FRML MDRD: >90 ML/MIN/1.73M2
GLUCOSE BLD-MCNC: 113 MG/DL (ref 70–99)
HCT VFR BLD AUTO: 45.9 % (ref 40–53)
HGB BLD-MCNC: 15.6 G/DL (ref 13.3–17.7)
IMM GRANULOCYTES # BLD: 0 10E3/UL
IMM GRANULOCYTES NFR BLD: 0 %
LYMPHOCYTES # BLD AUTO: 1.7 10E3/UL (ref 0.8–5.3)
LYMPHOCYTES NFR BLD AUTO: 24 %
MCH RBC QN AUTO: 32.2 PG (ref 26.5–33)
MCHC RBC AUTO-ENTMCNC: 34 G/DL (ref 31.5–36.5)
MCV RBC AUTO: 95 FL (ref 78–100)
MONOCYTES # BLD AUTO: 0.7 10E3/UL (ref 0–1.3)
MONOCYTES NFR BLD AUTO: 10 %
NEUTROPHILS # BLD AUTO: 4.4 10E3/UL (ref 1.6–8.3)
NEUTROPHILS NFR BLD AUTO: 62 %
NRBC # BLD AUTO: 0 10E3/UL
NRBC BLD AUTO-RTO: 0 /100
PLATELET # BLD AUTO: 177 10E3/UL (ref 150–450)
POTASSIUM BLD-SCNC: 4.1 MMOL/L (ref 3.4–5.3)
RBC # BLD AUTO: 4.84 10E6/UL (ref 4.4–5.9)
SODIUM SERPL-SCNC: 139 MMOL/L (ref 133–144)
TSH SERPL DL<=0.005 MIU/L-ACNC: 2.5 MU/L (ref 0.4–4)
WBC # BLD AUTO: 7 10E3/UL (ref 4–11)

## 2022-04-11 PROCEDURE — 36415 COLL VENOUS BLD VENIPUNCTURE: CPT | Performed by: INTERNAL MEDICINE

## 2022-04-11 PROCEDURE — 80048 BASIC METABOLIC PNL TOTAL CA: CPT | Performed by: INTERNAL MEDICINE

## 2022-04-11 PROCEDURE — 99204 OFFICE O/P NEW MOD 45 MIN: CPT | Performed by: INTERNAL MEDICINE

## 2022-04-11 PROCEDURE — 93000 ELECTROCARDIOGRAM COMPLETE: CPT | Performed by: INTERNAL MEDICINE

## 2022-04-11 PROCEDURE — 85025 COMPLETE CBC W/AUTO DIFF WBC: CPT | Performed by: INTERNAL MEDICINE

## 2022-04-11 PROCEDURE — 84443 ASSAY THYROID STIM HORMONE: CPT | Performed by: INTERNAL MEDICINE

## 2022-04-11 NOTE — PROGRESS NOTES
HPI and Plan:   Mr Lozano returns for cardiac evaluation today.    He is a young gentleman with very premature onset of severe and extensive coronary artery disease.  In 2008 he recalls presenting with lightheadedness and some numbness and tingling in his left thumb.  I read from the excellent medical records at the time that his troponins were elevated and coronary angiography revealed severe diffuse coronary artery disease.    On 04/29/2008, he underwent a 5-vessel coronary artery bypass surgery off-pump with endoscopic vein harvest to the diagonal and circumflex as a Y graft, saphenous vein graft for the PDA and SILVIA sequentially and the left internal mammary artery to the LAD.  A wedge biopsy of a left upper lobe lung nodule was also performed.  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  Unfortunately, the patient developed recurrent left pleural effusion and required pleurodesis.     He tells me that post procedure he developed myofascial syndrome with chronic left-sided and left back pain.    He has been seen intermittently in our clinic since then.  He last visited with Dr. Jones back in 2018.  The 1 prior to that was in 2011.  Julio thought he was doing quite well.    His major risk factors are positive family history of metabolic syndrome.    He has been compliant with his medications.  He is a non-smoker but does indulge in some occasional alcoholic drinks over the weekend.    He has concerns about his heart as he has been experiencing unusual fatigue.  He also has nausea as well but no vomiting.  He has occasional left-sided left back pain from myofascial syndrome and this has not changed.  His symptoms are nonexertional.  He denies any urinary or bowel disturbances.  Appetite is good and he has not lost any weight.  His symptoms are mainly exertional.    His cardiac examination is unremarkable.    Blood pressure appears to be under good control    His last stress test was in 2018.  Preserved left  ventricular systolic function was seen and there was no evidence of inducible ischemia.    On high dose of atorvastatin his last LDL in 2021 was 55 and HDL 36    Overall I am not sure what his symptoms are from but I am not sure it is cardiac in origin.  I will request stress nuclear study as well as echocardiography.  I will check labs including TSH BMP and CBC    I will keep him abreast of his test results and arrange follow-up as necessary.    I advised follow-up with us every 1 to 2 years.        Orders Placed This Encounter   Procedures     Basic metabolic panel     TSH with free T4 reflex     EKG 12-lead complete w/read - Clinics (performed today)     Echocardiogram Complete     CBC with platelets differential       No orders of the defined types were placed in this encounter.      Encounter Diagnoses   Name Primary?     Coronary artery disease involving coronary bypass graft of native heart without angina pectoris      Prediabetes      Post-thoracotomy pain syndrome      H/O coronary artery bypass surgery      Family history of coronary artery disease        CURRENT MEDICATIONS:  Current Outpatient Medications   Medication Sig Dispense Refill     albuterol (PROAIR HFA) 108 (90 Base) MCG/ACT inhaler Inhale 2 puffs into the lungs every 4 hours as needed for shortness of breath / dyspnea or wheezing 18 g 3     ASPIRIN 81 MG OR TABS ONE DAILY 100 3     atorvastatin (LIPITOR) 80 MG tablet Take 1 tablet (80 mg) by mouth daily 90 tablet 3     azelastine (ASTELIN) 0.1 % nasal spray Spray 2 sprays into both nostrils 2 times daily 90 mL 3     carvedilol (COREG) 6.25 MG tablet Take 1 tablet (6.25 mg) by mouth 2 times daily (with meals) 180 tablet 3     cetirizine (ZYRTEC) 10 MG tablet Take 1 tablet (10 mg) by mouth every evening 90 tablet 3     clobetasol (TEMOVATE) 0.05 % external cream Apply topically 2 times daily 30 g 3     etanercept (ENBREL) 50 MG/ML injection Inject 0.98 mLs (50 mg) Subcutaneous once a week        famotidine (PEPCID) 20 MG tablet Take 1 tablet (20 mg) by mouth 2 times daily 60 tablet 3     fluticasone (FLONASE) 50 MCG/ACT nasal spray Spray 2 sprays into both nostrils daily 48 g 3     folic acid (FOLVITE) 1 MG tablet Take 1 tablet (1 mg) by mouth daily 90 tablet 3     hydrOXYzine (ATARAX) 25 MG tablet Take 1-2 tablets (25-50 mg) by mouth every 6 hours as needed for anxiety 30 tablet 3     ibuprofen (ADVIL/MOTRIN) 200 MG tablet Take 3 tablets (600 mg) by mouth every 8 hours as needed for mild pain 1 tablet 0     LANsoprazole (PREVACID) 30 MG DR capsule TAKE ONE CAPSULE BY MOUTH DAILY. TAKE 30 TO 60 MINUTES BEFORE A MEAL. 90 capsule 3     methotrexate 2.5 MG tablet TK 5 TS PO Q WK  0     azithromycin (ZITHROMAX) 250 MG tablet Two tablets first day, then one tablet daily for four days. (Patient not taking: Reported on 4/11/2022) 6 tablet 0       ALLERGIES     Allergies   Allergen Reactions     Seasonal Allergies        PAST MEDICAL HISTORY:  Past Medical History:   Diagnosis Date     2019 novel coronavirus disease (COVID-19) 02/2021     Anxiety      Sullivan's esophagus 07/2011     Coronary artery disease 04/2008    cabg x 5     Environmental allergies     seasonal, hay fever     FAM HX-CARDIOVAS DIS NEC     dad at 45, cabg, stent     Family history of prostate cancer      Fatty liver      Gallstone      GERD (gastroesophageal reflux disease) 03/2011     History of blood transfusion      Hyperlipidemia LDL goal <70 2006     Hypertension goal BP (blood pressure) < 140/90 04/2008     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    Dr Saba, Hx histoplasmosis     Mild intermittent asthma 07/2003     Nephrolithiasis 6/13, 7/16    calcium oxalate - 6/2013, 7/2016, 1/2021     Other atopic dermatitis and related conditions 1980     Other diseases of lung, not elsewhere classified 04/2008    dr steel - benign bx and ct - granuloma - no further w/u needed     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 gastritis, fowler's - due 3 yr     ESOPHAGOSCOPY, GASTROSCOPY, DUODENOSCOPY (EGD), COMBINED  5/10/2013    COMBINED ESOPHAGOSCOPY, GASTROSCOPY, DUODENOSCOPY (EGD), BIOPSY SINGLE OR MULTIPLE;  ESOPHAGOSCOPY, GASTROSCOPY, DUODENOSCOPY (EGD)  with biopsy;  Surgeon: Angus Reynolds MD;  Location:  GI     ESOPHAGOSCOPY, GASTROSCOPY, DUODENOSCOPY (EGD), COMBINED N/A 7/28/2017    Fowler's - recheck 3 yrs     HC VASECTOMY UNILAT/BILAT W POSTOP SEMEN  1995    Vasectomy     HERNIORRHAPHY UMBILICAL N/A 11/13/2014    Dr Barron     SURGICAL HISTORY OF -  Left 1980    lt patella fx     SURGICAL HISTORY OF -  Left 1985    lt thumb neuroma excised     SURGICAL HISTORY OF -   11/09    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 Paternal Grandfather      Heart Failure Paternal Grandfather      Colon Cancer No family hx of        SOCIAL HISTORY:  Social History     Socioeconomic History     Marital status:      Spouse name: Imelda     Number of children: 0     Years of education: 14     Highest education level: None   Occupational History     Comment: [employer]   Tobacco Use     Smoking status: Never Smoker  "    Smokeless tobacco: Never Used   Vaping Use     Vaping Use: Never used   Substance and Sexual Activity     Alcohol use: Yes     Alcohol/week: 6.0 standard drinks     Types: 6 Standard drinks or equivalent per week     Comment: 6 drinks per week     Drug use: No     Sexual activity: Yes     Partners: Female     Birth control/protection: Male Surgical   Other Topics Concern     Caffeine Concern Yes     Comment: 1 can qd     Exercise Yes     Comment: occas     Seat Belt Yes     Parent/sibling w/ CABG, MI or angioplasty before 65F 55M? Yes       Review of Systems:  Skin:  Positive for     Eyes:  Positive for glasses  ENT:  Negative    Respiratory:  Positive for dyspnea on exertion;dyspnea at rest  Cardiovascular:    Positive for;chest pain;lightheadedness;fatigue  Gastroenterology: Negative    Genitourinary:  Negative    Musculoskeletal:  Positive for arthritis  Neurologic:  Positive for migraine headaches  Psychiatric:  Positive for sleep disturbances;anxiety  Heme/Lymph/Imm:  Positive for night sweats  Endocrine:  Negative      Physical Exam:  Vitals: /80   Pulse 84   Ht 1.727 m (5' 8\")   Wt 97.8 kg (215 lb 8 oz)   BMI 32.77 kg/m      Constitutional:  cooperative, alert and oriented, well developed, well nourished, in no acute distress        Skin:  warm and dry to the touch, no apparent skin lesions or masses noted surgical scars well-healed        Head:  normocephalic, no masses or lesions        Eyes:  pupils equal and round, conjunctivae and lids unremarkable, sclera white, no xanthalasma, EOMS intact, no nystagmus        Lymph:No Cervical lymphadenopathy present     ENT:  no pallor or cyanosis, dentition good        Neck:  carotid pulses are full and equal bilaterally, JVP normal, no carotid bruit        Respiratory:  normal breath sounds, clear to auscultation, normal A-P diameter, normal symmetry, normal respiratory excursion, no use of accessory muscles         Cardiac: regular rhythm, normal " S1/S2, no S3 or S4, apical impulse not displaced, no murmurs, gallops or rubs                pulses full and equal, no bruits auscultated                                        GI:  abdomen soft, non-tender, BS normoactive, no mass, no HSM, no bruits        Extremities and Muscular Skeletal:  no deformities, clubbing, cyanosis, erythema observed              Neurological:  no gross motor deficits        Psych:  Alert and Oriented x 3        Recent Lab Results:  LIPID RESULTS:  Lab Results   Component Value Date    CHOL 134 07/29/2021    CHOL 112 06/05/2020    HDL 36 (L) 07/29/2021    HDL 30 (L) 06/05/2020    LDL 55 07/29/2021    LDL 57 06/05/2020    TRIG 214 (H) 07/29/2021    TRIG 125 06/05/2020    CHOLHDLRATIO 3.9 07/08/2015       LIVER ENZYME RESULTS:  Lab Results   Component Value Date    AST 45 07/29/2021    AST 41 (A) 03/23/2021    ALT 62 07/29/2021    ALT 48 (A) 03/23/2021       CBC RESULTS:  Lab Results   Component Value Date    WBC 5.9 07/29/2021    WBC 12.2 (H) 01/31/2021    RBC 4.87 07/29/2021    RBC 5.11 01/31/2021    HGB 15.5 07/29/2021    HGB 16.1 01/31/2021    HCT 44.2 07/29/2021    HCT 47.5 01/31/2021    MCV 91 07/29/2021    MCV 93 01/31/2021    MCH 31.8 07/29/2021    MCH 31.5 01/31/2021    MCHC 35.1 07/29/2021    MCHC 33.9 01/31/2021    RDW 13.0 07/29/2021    RDW 12.5 01/31/2021     07/29/2021     01/31/2021       BMP RESULTS:  Lab Results   Component Value Date     07/29/2021     01/31/2021    POTASSIUM 4.3 07/29/2021    POTASSIUM 4.0 01/31/2021    CHLORIDE 107 07/29/2021    CHLORIDE 106 01/31/2021    CO2 26 07/29/2021    CO2 29 01/31/2021    ANIONGAP 5 07/29/2021    ANIONGAP 3 01/31/2021     (H) 07/29/2021     (H) 01/31/2021    BUN 14 07/29/2021    BUN 14 01/31/2021    CR 1.11 07/29/2021    CR 0.830 03/23/2021    GFRESTIMATED 74 07/29/2021    GFRESTIMATED 63 01/31/2021    GFRESTBLACK 76 01/31/2021    CYNDI 9.7 07/29/2021    CYNDI 9.1 01/31/2021        A1C  RESULTS:  Lab Results   Component Value Date    A1C 5.3 07/29/2021    A1C 5.4 06/05/2020       INR RESULTS:  Lab Results   Component Value Date    INR 0.97 11/23/2009    INR 0.98 11/02/2009           CC  Fady Adams MD  [address] [place] [address]

## 2022-04-11 NOTE — LETTER
4/11/2022    Fady Adams MD  [address] [facility] [address]    RE: Mian Lozano       Dear Colleague,     I had the pleasure of seeing Mian Lozano in the [facility].  HPI and Plan:   Mr Lozano returns for cardiac evaluation today.    He is a young gentleman with very premature onset of severe and extensive coronary artery disease.  In 2008 he recalls presenting with lightheadedness and some numbness and tingling in his left thumb.  I read from the excellent medical records at the time that his troponins were elevated and coronary angiography revealed severe diffuse coronary artery disease.    On 04/29/2008, he underwent a 5-vessel coronary artery bypass surgery off-pump with endoscopic vein harvest to the diagonal and circumflex as a Y graft, saphenous vein graft for the PDA and SILVIA sequentially and the left internal mammary artery to the LAD.  A wedge biopsy of a left upper lobe lung nodule was also performed.  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  Unfortunately, the patient developed recurrent left pleural effusion and required pleurodesis.     He tells me that post procedure he developed myofascial syndrome with chronic left-sided and left back pain.    He has been seen intermittently in our clinic since then.  He last visited with Dr. Karen zhao in 2018.  The 1 prior to that was in 2011.  Julio thought he was doing quite well.    His major risk factors are positive family history of metabolic syndrome.    He has been compliant with his medications.  He is a non-smoker but does indulge in some occasional alcoholic drinks over the weekend.    He has concerns about his heart as he has been experiencing unusual fatigue.  He also has nausea as well but no vomiting.  He has occasional left-sided left back pain from myofascial syndrome and this has not changed.  His symptoms are nonexertional.  He denies any urinary or bowel disturbances.  Appetite is good and he has not  lost any weight.  His symptoms are mainly exertional.    His cardiac examination is unremarkable.    Blood pressure appears to be under good control    His last stress test was in 2018.  Preserved left ventricular systolic function was seen and there was no evidence of inducible ischemia.    On high dose of atorvastatin his last LDL in 2021 was 55 and HDL 36    Overall I am not sure what his symptoms are from but I am not sure it is cardiac in origin.  I will request stress nuclear study as well as echocardiography.  I will check labs including TSH BMP and CBC    I will keep him abreast of his test results and arrange follow-up as necessary.    I advised follow-up with us every 1 to 2 years.        Orders Placed This Encounter   Procedures     Basic metabolic panel     TSH with free T4 reflex     EKG 12-lead complete w/read - Clinics (performed today)     Echocardiogram Complete     CBC with platelets differential       No orders of the defined types were placed in this encounter.      Encounter Diagnoses   Name Primary?     Coronary artery disease involving coronary bypass graft of native heart without angina pectoris      Prediabetes      Post-thoracotomy pain syndrome      H/O coronary artery bypass surgery      Family history of coronary artery disease        CURRENT MEDICATIONS:  Current Outpatient Medications   Medication Sig Dispense Refill     albuterol (PROAIR HFA) 108 (90 Base) MCG/ACT inhaler Inhale 2 puffs into the lungs every 4 hours as needed for shortness of breath / dyspnea or wheezing 18 g 3     ASPIRIN 81 MG OR TABS ONE DAILY 100 3     atorvastatin (LIPITOR) 80 MG tablet Take 1 tablet (80 mg) by mouth daily 90 tablet 3     azelastine (ASTELIN) 0.1 % nasal spray Spray 2 sprays into both nostrils 2 times daily 90 mL 3     carvedilol (COREG) 6.25 MG tablet Take 1 tablet (6.25 mg) by mouth 2 times daily (with meals) 180 tablet 3     cetirizine (ZYRTEC) 10 MG tablet Take 1 tablet (10 mg) by mouth every  evening 90 tablet 3     clobetasol (TEMOVATE) 0.05 % external cream Apply topically 2 times daily 30 g 3     etanercept (ENBREL) 50 MG/ML injection Inject 0.98 mLs (50 mg) Subcutaneous once a week       famotidine (PEPCID) 20 MG tablet Take 1 tablet (20 mg) by mouth 2 times daily 60 tablet 3     fluticasone (FLONASE) 50 MCG/ACT nasal spray Spray 2 sprays into both nostrils daily 48 g 3     folic acid (FOLVITE) 1 MG tablet Take 1 tablet (1 mg) by mouth daily 90 tablet 3     hydrOXYzine (ATARAX) 25 MG tablet Take 1-2 tablets (25-50 mg) by mouth every 6 hours as needed for anxiety 30 tablet 3     ibuprofen (ADVIL/MOTRIN) 200 MG tablet Take 3 tablets (600 mg) by mouth every 8 hours as needed for mild pain 1 tablet 0     LANsoprazole (PREVACID) 30 MG DR capsule TAKE ONE CAPSULE BY MOUTH DAILY. TAKE 30 TO 60 MINUTES BEFORE A MEAL. 90 capsule 3     methotrexate 2.5 MG tablet TK 5 TS PO Q WK  0     azithromycin (ZITHROMAX) 250 MG tablet Two tablets first day, then one tablet daily for four days. (Patient not taking: Reported on 4/11/2022) 6 tablet 0       ALLERGIES     Allergies   Allergen Reactions     Seasonal Allergies        PAST MEDICAL HISTORY:  Past Medical History:   Diagnosis Date     2019 novel coronavirus disease (COVID-19) 02/2021     Anxiety      Sullivan's esophagus 07/2011     Coronary artery disease 04/2008    cabg x 5     Environmental allergies     seasonal, hay fever     FAM HX-CARDIOVAS DIS NEC     dad at 45, cabg, stent     Family history of prostate cancer      Fatty liver      Gallstone      GERD (gastroesophageal reflux disease) 03/2011     History of blood transfusion      Hyperlipidemia LDL goal <70 2006     Hypertension goal BP (blood pressure) < 140/90 04/2008     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    Dr Saba, Hx histoplasmosis     Mild intermittent asthma 07/2003     Nephrolithiasis 6/13, 7/16    calcium oxalate - 6/2013, 7/2016, 1/2021     Other atopic dermatitis and related conditions 1980     Other  diseases of lung, not elsewhere classified 04/2008    dr steel - benign bx and ct - granuloma - no further w/u needed     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 gastritis, fowler's - due 3 yr     ESOPHAGOSCOPY, GASTROSCOPY, DUODENOSCOPY (EGD), COMBINED  5/10/2013    COMBINED ESOPHAGOSCOPY, GASTROSCOPY, DUODENOSCOPY (EGD), BIOPSY SINGLE OR MULTIPLE;  ESOPHAGOSCOPY, GASTROSCOPY, DUODENOSCOPY (EGD)  with biopsy;  Surgeon: Angus Reynolds MD;  Location:  GI     ESOPHAGOSCOPY, GASTROSCOPY, DUODENOSCOPY (EGD), COMBINED N/A 7/28/2017    Fowler's - recheck 3 yrs     HC VASECTOMY UNILAT/BILAT W POSTOP SEMEN  1995    Vasectomy     HERNIORRHAPHY UMBILICAL N/A 11/13/2014    Dr Barron     SURGICAL HISTORY OF -  Left 1980    lt patella fx     SURGICAL HISTORY OF -  Left 1985    lt thumb neuroma excised     SURGICAL HISTORY OF -   11/09    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 Paternal Grandfather      Heart Failure Paternal Grandfather      Colon Cancer No family hx of        SOCIAL HISTORY:  Social History     Socioeconomic History     Marital status:       "Spouse name: Imelda     Number of children: 0     Years of education: 14     Highest education level: None   Occupational History     Comment: [employer]   Tobacco Use     Smoking status: Never Smoker     Smokeless tobacco: Never Used   Vaping Use     Vaping Use: Never used   Substance and Sexual Activity     Alcohol use: Yes     Alcohol/week: 6.0 standard drinks     Types: 6 Standard drinks or equivalent per week     Comment: 6 drinks per week     Drug use: No     Sexual activity: Yes     Partners: Female     Birth control/protection: Male Surgical   Other Topics Concern     Caffeine Concern Yes     Comment: 1 can qd     Exercise Yes     Comment: occas     Seat Belt Yes     Parent/sibling w/ CABG, MI or angioplasty before 65F 55M? Yes       Review of Systems:  Skin:  Positive for     Eyes:  Positive for glasses  ENT:  Negative    Respiratory:  Positive for dyspnea on exertion;dyspnea at rest  Cardiovascular:    Positive for;chest pain;lightheadedness;fatigue  Gastroenterology: Negative    Genitourinary:  Negative    Musculoskeletal:  Positive for arthritis  Neurologic:  Positive for migraine headaches  Psychiatric:  Positive for sleep disturbances;anxiety  Heme/Lymph/Imm:  Positive for night sweats  Endocrine:  Negative      Physical Exam:  Vitals: /80   Pulse 84   Ht 1.727 m (5' 8\")   Wt 97.8 kg (215 lb 8 oz)   BMI 32.77 kg/m      Constitutional:  cooperative, alert and oriented, well developed, well nourished, in no acute distress        Skin:  warm and dry to the touch, no apparent skin lesions or masses noted surgical scars well-healed        Head:  normocephalic, no masses or lesions        Eyes:  pupils equal and round, conjunctivae and lids unremarkable, sclera white, no xanthalasma, EOMS intact, no nystagmus        Lymph:No Cervical lymphadenopathy present     ENT:  no pallor or cyanosis, dentition good        Neck:  carotid pulses are full and equal bilaterally, JVP normal, no carotid bruit    "     Respiratory:  normal breath sounds, clear to auscultation, normal A-P diameter, normal symmetry, normal respiratory excursion, no use of accessory muscles         Cardiac: regular rhythm, normal S1/S2, no S3 or S4, apical impulse not displaced, no murmurs, gallops or rubs                pulses full and equal, no bruits auscultated                                        GI:  abdomen soft, non-tender, BS normoactive, no mass, no HSM, no bruits        Extremities and Muscular Skeletal:  no deformities, clubbing, cyanosis, erythema observed              Neurological:  no gross motor deficits        Psych:  Alert and Oriented x 3        Recent Lab Results:  LIPID RESULTS:  Lab Results   Component Value Date    CHOL 134 07/29/2021    CHOL 112 06/05/2020    HDL 36 (L) 07/29/2021    HDL 30 (L) 06/05/2020    LDL 55 07/29/2021    LDL 57 06/05/2020    TRIG 214 (H) 07/29/2021    TRIG 125 06/05/2020    CHOLHDLRATIO 3.9 07/08/2015       LIVER ENZYME RESULTS:  Lab Results   Component Value Date    AST 45 07/29/2021    AST 41 (A) 03/23/2021    ALT 62 07/29/2021    ALT 48 (A) 03/23/2021       CBC RESULTS:  Lab Results   Component Value Date    WBC 5.9 07/29/2021    WBC 12.2 (H) 01/31/2021    RBC 4.87 07/29/2021    RBC 5.11 01/31/2021    HGB 15.5 07/29/2021    HGB 16.1 01/31/2021    HCT 44.2 07/29/2021    HCT 47.5 01/31/2021    MCV 91 07/29/2021    MCV 93 01/31/2021    MCH 31.8 07/29/2021    MCH 31.5 01/31/2021    MCHC 35.1 07/29/2021    MCHC 33.9 01/31/2021    RDW 13.0 07/29/2021    RDW 12.5 01/31/2021     07/29/2021     01/31/2021       BMP RESULTS:  Lab Results   Component Value Date     07/29/2021     01/31/2021    POTASSIUM 4.3 07/29/2021    POTASSIUM 4.0 01/31/2021    CHLORIDE 107 07/29/2021    CHLORIDE 106 01/31/2021    CO2 26 07/29/2021    CO2 29 01/31/2021    ANIONGAP 5 07/29/2021    ANIONGAP 3 01/31/2021     (H) 07/29/2021     (H) 01/31/2021    BUN 14 07/29/2021    BUN 14  01/31/2021    CR 1.11 07/29/2021    CR 0.830 03/23/2021    GFRESTIMATED 74 07/29/2021    GFRESTIMATED 63 01/31/2021    GFRESTBLACK 76 01/31/2021    CYNDI 9.7 07/29/2021    CYNDI 9.1 01/31/2021        A1C RESULTS:  Lab Results   Component Value Date    A1C 5.3 07/29/2021    A1C 5.4 06/05/2020       INR RESULTS:  Lab Results   Component Value Date    INR 0.97 11/23/2009    INR 0.98 11/02/2009     CC  Fady Adams MD  [address] [place], [place] [id number]    Thank you for allowing me to participate in the care of your patient.    Sincerely,   DR DIAZ MENDOZA MD   [facility]

## 2022-04-12 ENCOUNTER — OFFICE VISIT (OUTPATIENT)
Dept: FAMILY MEDICINE | Facility: CLINIC | Age: 57
End: 2022-04-12
Payer: COMMERCIAL

## 2022-04-12 VITALS
HEIGHT: 68 IN | BODY MASS INDEX: 32.89 KG/M2 | HEART RATE: 89 BPM | SYSTOLIC BLOOD PRESSURE: 132 MMHG | DIASTOLIC BLOOD PRESSURE: 80 MMHG | WEIGHT: 217 LBS | OXYGEN SATURATION: 98 % | TEMPERATURE: 97.8 F

## 2022-04-12 DIAGNOSIS — Z86.16 HISTORY OF COVID-19: ICD-10-CM

## 2022-04-12 DIAGNOSIS — R91.8 PULMONARY NODULES: ICD-10-CM

## 2022-04-12 DIAGNOSIS — M79.622 PAIN OF LEFT UPPER ARM: ICD-10-CM

## 2022-04-12 DIAGNOSIS — F41.9 ANXIETY: Primary | ICD-10-CM

## 2022-04-12 DIAGNOSIS — Z95.1 H/O CORONARY ARTERY BYPASS SURGERY: ICD-10-CM

## 2022-04-12 DIAGNOSIS — J45.20 MILD INTERMITTENT ASTHMA WITHOUT COMPLICATION: ICD-10-CM

## 2022-04-12 DIAGNOSIS — Z22.7 LATENT TUBERCULOSIS: ICD-10-CM

## 2022-04-12 DIAGNOSIS — K80.80 BILIARY CALCULUS OF OTHER SITE WITHOUT OBSTRUCTION: ICD-10-CM

## 2022-04-12 DIAGNOSIS — R06.02 SHORTNESS OF BREATH: ICD-10-CM

## 2022-04-12 DIAGNOSIS — G89.12 POST-THORACOTOMY PAIN SYNDROME: ICD-10-CM

## 2022-04-12 PROCEDURE — 99214 OFFICE O/P EST MOD 30 MIN: CPT | Performed by: NURSE PRACTITIONER

## 2022-04-12 PROCEDURE — 96127 BRIEF EMOTIONAL/BEHAV ASSMT: CPT | Performed by: NURSE PRACTITIONER

## 2022-04-12 PROCEDURE — 93000 ELECTROCARDIOGRAM COMPLETE: CPT | Performed by: NURSE PRACTITIONER

## 2022-04-12 RX ORDER — CITALOPRAM HYDROBROMIDE 10 MG/1
10 TABLET ORAL DAILY
Qty: 60 TABLET | Refills: 5 | Status: SHIPPED | OUTPATIENT
Start: 2022-04-12 | End: 2022-05-12

## 2022-04-12 ASSESSMENT — ANXIETY QUESTIONNAIRES: GAD7 TOTAL SCORE: 7

## 2022-04-12 NOTE — PROGRESS NOTES
Assessment & Plan     Anxiety  Celexa started today. Sometimes in the first two weeks of taking this medication you will have headache, lack of sleep or too much sleep, dizziness or stomach upset.   These typically go away or decrease within about 2 weeks. If symptoms occur but are minor please give them time to improve.  We would like to follow up with you to see how medication is working in about 4 weeks. Let us know sooner if there are any problems.   Mian verbalizes understanding of plan of care and is in agreement.   - citalopram (CELEXA) 10 MG tablet  Dispense: 60 tablet; Refill: 5    Shortness of breath  Reassuring exam no higher level of care felt to be needed.   EKG normal.   CXR awaiting read.   PFTS, CT Chest. Follow up with cards.   Red flag symptoms discussed and if these occur present to the emergency room or call 911.  Mian verbalizes understanding of plan of care and is in agreement.   - XR Chest 2 Views  - EKG 12-lead complete w/read - Clinics  - General PFT Lab (Please always keep checked)  - Pulmonary Function Test  - CT Chest w Contrast    History of COVID-19    - CT Chest w Contrast    Post-thoracotomy pain syndrome    - CT Chest w Contrast    Latent tuberculosis    - CT Chest w Contrast    H/O coronary artery bypass surgery    - XR Chest 2 Views  - EKG 12-lead complete w/read - Clinics  - CT Chest w Contrast    Pulmonary nodules    - CT Chest w Contrast    Mild intermittent asthma without complication    - General PFT Lab (Please always keep checked)  - Pulmonary Function Test  - CT Chest w Contrast    Pain of left upper arm  Rule out pancreatic cause abdomen US.   Xray cervical spine to rule out radicular pain.   - XR Cervical Spine 2/3 Views  - US Abdomen Complete    Biliary calculus of other site without obstruction  Known gallstones. Rule out worsening.   - US Abdomen Complete    BMI:   Estimated body mass index is 32.99 kg/m  as calculated from the following:    Height as of this  "encounter: 1.727 m (5' 8\").    Weight as of this encounter: 98.4 kg (217 lb).   Weight management plan: Discussed healthy diet and exercise guidelines    Return in about 4 weeks (around 5/10/2022) for Recheck, mood check.      RADHA Hicks [facility] PRIOR EDMOND Pappas is a 57 year old who presents for the following health issues     History of Present Illness     Asthma:  He presents for follow up of asthma.  He has no cough, no wheezing, and some shortness of breath. He is using a relief medication a few times a week. He does not have a controller medication. Patient is aware of the following triggers: none. The patient has not had a visit to the Emergency Room, Urgent Care or Hospital due to asthma since the last clinic visit.     Mental Health Follow-up:  Patient presents to follow-up on Anxiety.    Patient's anxiety since last visit has been:  No change  The patient is not having other symptoms associated with anxiety.  Any significant life events: No  Patient is feeling anxious or having panic attacks.  Patient has no concerns about alcohol or drug use.         Today's LONNY-7 Score: 7    He eats 0-1 servings of fruits and vegetables daily.He consumes 2 sweetened beverage(s) daily.He exercises with enough effort to increase his heart rate 9 or less minutes per day.  He exercises with enough effort to increase his heart rate 3 or less days per week.   He is taking medications regularly.       Acute Illness  Acute illness concerns: trouble breathing and rib pain  Onset/Duration: 1 month  Symptoms:  Fever: no  Chills/Sweats: YES  Headache (location?): no  Sinus Pressure: no  Conjunctivitis:  no  Ear Pain: no  Rhinorrhea: no  Congestion: no  Sore Throat: no  Cough: no  Wheeze: no  Decreased Appetite: no  Nausea: no  Vomiting: no  Diarrhea: no  Dysuria/Freq.: no  Dysuria or Hematuria: no  Fatigue/Achiness: no  Sick/Strep Exposure: no  Therapies tried and outcome: None  Feels as " if cannot get a full breath.    Rib pain on left side  Dizziness  Onset/Duration: 1 month  Description:   Do you feel faint: YES  Does it feel like the surroundings (bed, room) are moving: YES  Unsteady/off balance: YES  Have you passed out or fallen: no  Intensity: moderate  Progression of Symptoms: improving and intermittent  Accompanying Signs & Symptoms:  Heart palpitations or chest pain: no  Nausea, vomiting: no  Weakness or lack of coordination in arms or legs: no  Vision or speech changes: no  Numbness or tingling: YES  Ringing in ears (Tinnitus): no  Hearing Loss: no  History:   Head trauma/concussion history: no  Previous similar symptoms: YES  Recent bleeding history: no  Any new medications (BP?): no  Precipitating factors:   Worse with activity: YES  Worse with head movement: YES  Alleviating factors:   Does staying in a fixed position give relief: YES  Therapies tried and outcome: None    No new meds   Wake up left sided chest ribs upper arm pain, may feel sweaty passes gas feel better. He feels like this is anxiety. Was on Celexa in the past.  Significant heart history follows with cards. Saw them yesterday. They ordered stress test and echo.   Gallstones on last CT abdomen pelvis.       HPI and Plan:   Mr Lozano returns for cardiac evaluation today.     He is a young gentleman with very premature onset of severe and extensive coronary artery disease.  In 2008 he recalls presenting with lightheadedness and some numbness and tingling in his left thumb.  I read from the excellent medical records at the time that his troponins were elevated and coronary angiography revealed severe diffuse coronary artery disease.     On 04/29/2008, he underwent a 5-vessel coronary artery bypass surgery off-pump with endoscopic vein harvest to the diagonal and circumflex as a Y graft, saphenous vein graft for the PDA and SILVIA sequentially and the left internal mammary artery to the LAD.  A wedge biopsy of a left upper lobe  lung nodule was also performed.  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  Unfortunately, the patient developed recurrent left pleural effusion and required pleurodesis.      He tells me that post procedure he developed myofascial syndrome with chronic left-sided and left back pain.     He has been seen intermittently in our clinic since then.  He last visited with Dr. Karen zhao in 2018.  The 1 prior to that was in 2011.  Julio thought he was doing quite well.     His major risk factors are positive family history of metabolic syndrome.     He has been compliant with his medications.  He is a non-smoker but does indulge in some occasional alcoholic drinks over the weekend.     He has concerns about his heart as he has been experiencing unusual fatigue.  He also has nausea as well but no vomiting.  He has occasional left-sided left back pain from myofascial syndrome and this has not changed.  His symptoms are nonexertional.  He denies any urinary or bowel disturbances.  Appetite is good and he has not lost any weight.  His symptoms are mainly exertional.     His cardiac examination is unremarkable.     Blood pressure appears to be under good control     His last stress test was in 2018.  Preserved left ventricular systolic function was seen and there was no evidence of inducible ischemia.     On high dose of atorvastatin his last LDL in 2021 was 55 and HDL 36     Overall I am not sure what his symptoms are from but I am not sure it is cardiac in origin.  I will request stress nuclear study as well as echocardiography.  I will check labs including TSH BMP and CBC     I will keep him abreast of his test results and arrange follow-up as necessary.     I advised follow-up with us every 1 to 2 years.    LONNY-7 SCORE 7/30/2021 11/22/2021 4/8/2022   Total Score - - -   Total Score - 0 (minimal anxiety) 7 (mild anxiety)   Total Score 0 0 7     Review of Systems   Constitutional, HEENT, cardiovascular, pulmonary, GI, ,  "musculoskeletal, neuro, skin, endocrine and psych systems are negative, except as otherwise noted in the HPI.      Objective    /80   Pulse 89   Temp 97.8  F (36.6  C)   Ht 1.727 m (5' 8\")   Wt 98.4 kg (217 lb)   SpO2 98%   BMI 32.99 kg/m    Body mass index is 32.99 kg/m .  Physical Exam   GENERAL: healthy, alert and no distress  RESP: lungs clear to auscultation - no rales, rhonchi or wheezes  CV: regular rate and rhythm, normal S1 S2, no S3 or S4, no murmur, click or rub, no peripheral edema and peripheral pulses strong  ABDOMEN: soft, nontender, no hepatosplenomegaly, no masses and bowel sounds normal  MS: no gross musculoskeletal defects noted, no edema  SKIN: no suspicious lesions or rashes  NEURO: Normal strength and tone, mentation intact and speech normal  PSYCH: mentation appears normal, affect flat    Results for orders placed or performed in visit on 04/12/22   XR Cervical Spine 2/3 Views     Status: None    Narrative    CERVICAL SPINE TWO TO THREE VIEWS   4/12/2022 3:43 PM    INDICATION: Pain of left upper arm.  COMPARISON: None.      Impression    IMPRESSION: Normal alignment. No prevertebral soft tissue swelling.  Mild inferior endplate height loss C3-C5 with moderate ventral  inferior endplate spurring at C5. Generally preserved interspaces and  minor facet arthropathy.    GUILLAUME NIETO MD         SYSTEM ID:  [id number]   Results for orders placed or performed in visit on 04/12/22   XR Chest 2 Views     Status: None    Narrative    CHEST TWO VIEWS 4/12/2022 3:44 PM     HISTORY: Shortness of breath; H/O coronary artery bypass surgery    COMPARISON: January 16, 2021       Impression    IMPRESSION: Similar scarring and/or fibrosis in the left compared to  previous. There are no acute infiltrates. The cardiac silhouette is  not enlarged. Pulmonary vasculature is unremarkable.    GONZALO VILLANUEVA MD         SYSTEM ID:  [id number]               "

## 2022-04-12 NOTE — LETTER
"My Asthma Action Plan    Name: Mian Lozano   YOB: 1965  Date: 4/12/2022   My doctor: RADHA Hicks CNP   My clinic: [facility] [place]        My Rescue Medicine:   Albuterol inhaler (Proair/Ventolin/Proventil HFA)  2-4 puffs EVERY 4 HOURS as needed. Use a spacer if recommended by your provider.   My Asthma Severity:   { :734013::\"Intermittent / Exercise Induced\"}  Know your asthma triggers: { :980276}  Patient is unaware of triggers          GREEN ZONE   Good Control    I feel good    No cough or wheeze    Can work, sleep and play without asthma symptoms       Take your asthma control medicine every day.     1. If exercise triggers your asthma, take your rescue medication    15 minutes before exercise or sports, and    During exercise if you have asthma symptoms  2. Spacer to use with inhaler: If you have a spacer, make sure to use it with your inhaler             YELLOW ZONE Getting Worse  I have ANY of these:    I do not feel good    Cough or wheeze    Chest feels tight    Wake up at night   1. Keep taking your Green Zone medications  2. Start taking your rescue medicine:    every 20 minutes for up to 1 hour. Then every 4 hours for 24-48 hours.  3. If you stay in the Yellow Zone for more than 12-24 hours, contact your doctor.  4. If you do not return to the Green Zone in 12-24 hours or you get worse, start taking your oral steroid medicine if prescribed by your provider.           RED ZONE Medical Alert - Get Help  I have ANY of these:    I feel awful    Medicine is not helping    Breathing getting harder    Trouble walking or talking    Nose opens wide to breathe       1. Take your rescue medicine NOW  2. If your provider has prescribed an oral steroid medicine, start taking it NOW  3. Call your doctor NOW  4. If you are still in the Red Zone after 20 minutes and you have not reached your doctor:    Take your rescue medicine again and    Call 911 or go to the emergency " room right away    See your regular doctor within 2 weeks of an Emergency Room or Urgent Care visit for follow-up treatment.          Annual Reminders:  Meet with Asthma Educator,  Flu Shot in the Fall, consider Pneumonia Vaccination for patients with asthma (aged 19 and older).    Pharmacy:    [id number] - [place], [place] - [address] [facility] [address]  WRITTEN PRESCRIPTION REQUESTED  [id number] - [place], [place] - [address] AT [facility] [address] [place]    Electronically signed by RADHA Hicks CNP   Date: 04/12/22                    Asthma Triggers  How To Control Things That Make Your Asthma Worse    Triggers are things that make your asthma worse.  Look at the list below to help you find your triggers and   what you can do about them. You can help prevent asthma flare-ups by staying away from your triggers.      Trigger                                                          What you can do   Cigarette Smoke  Tobacco smoke can make asthma worse. Do not allow smoking in your home, car or around you.  Be sure no one smokes at a child s day care or school.  If you smoke, ask your health care provider for ways to help you quit.  Ask family members to quit too.  Ask your health care provider for a referral to Quit Plan to help you quit smoking, or call 0-408-183-PLAN.     Colds, Flu, Bronchitis  These are common triggers of asthma. Wash your hands often.  Don t touch your eyes, nose or mouth.  Get a flu shot every year.     Dust Mites  These are tiny bugs that live in cloth or carpet. They are too small to see. Wash sheets and blankets in hot water every week.   Encase pillows and mattress in dust mite proof covers.  Avoid having carpet if you can. If you have carpet, vacuum weekly.   Use a dust mask and HEPA vacuum.   Pollen and Outdoor Mold  Some people are allergic to trees, grass, or weed pollen, or molds. Try to keep your windows  closed.  Limit time out doors when pollen count is high.   Ask you health care provider about taking medicine during allergy season.     Animal Dander  Some people are allergic to skin flakes, urine or saliva from pets with fur or feathers. Keep pets with fur or feathers out of your home.    If you can t keep the pet outdoors, then keep the pet out of your bedroom.  Keep the bedroom door closed.  Keep pets off cloth furniture and away from stuffed toys.     Mice, Rats, and Cockroaches  Some people are allergic to the waste from these pests.   Cover food and garbage.  Clean up spills and food crumbs.  Store grease in the refrigerator.   Keep food out of the bedroom.   Indoor Mold  This can be a trigger if your home has high moisture. Fix leaking faucets, pipes, or other sources of water.   Clean moldy surfaces.  Dehumidify basement if it is damp and smelly.   Smoke, Strong Odors, and Sprays  These can reduce air quality. Stay away from strong odors and sprays, such as perfume, powder, hair spray, paints, smoke incense, paint, cleaning products, candles and new carpet.   Exercise or Sports  Some people with asthma have this trigger. Be active!  Ask your doctor about taking medicine before sports or exercise to prevent symptoms.    Warm up for 5-10 minutes before and after sports or exercise.     Other Triggers of Asthma  Cold air:  Cover your nose and mouth with a scarf.  Sometimes laughing or crying can be a trigger.  Some medicines and food can trigger asthma.

## 2022-04-24 NOTE — PROGRESS NOTES
Please call Mian.   Previous history of gallstones notes.   Ordered also a abdomen US to look at gallstones and pancreas.  They will call him to set up.      Reshma Laguerre, MARTHA-BC

## 2022-04-25 NOTE — PROGRESS NOTES
Called and spoke with patient.     Advised of Abdominal US ordered. Patient stated an understanding and agreed with plan.     Alicia Harvey RN  [facility] [place]   ALICIA HARVEY RN on 4/25/2022 at 12:45 PM

## 2022-05-03 ENCOUNTER — HOSPITAL ENCOUNTER (OUTPATIENT)
Dept: CT IMAGING | Facility: CLINIC | Age: 57
Discharge: HOME | End: 2022-05-03
Attending: NURSE PRACTITIONER
Payer: COMMERCIAL

## 2022-05-03 ENCOUNTER — HOSPITAL ENCOUNTER (OUTPATIENT)
Dept: CARDIOLOGY | Facility: CLINIC | Age: 57
Discharge: HOME | End: 2022-05-03
Attending: INTERNAL MEDICINE
Payer: COMMERCIAL

## 2022-05-03 ENCOUNTER — HOSPITAL ENCOUNTER (OUTPATIENT)
Dept: NUCLEAR MEDICINE | Facility: CLINIC | Age: 57
Setting detail: NUCLEAR MEDICINE
Discharge: HOME | End: 2022-05-03
Attending: INTERNAL MEDICINE
Payer: COMMERCIAL

## 2022-05-03 DIAGNOSIS — J45.20 MILD INTERMITTENT ASTHMA WITHOUT COMPLICATION: ICD-10-CM

## 2022-05-03 DIAGNOSIS — R73.03 PREDIABETES: ICD-10-CM

## 2022-05-03 DIAGNOSIS — Z86.16 HISTORY OF COVID-19: ICD-10-CM

## 2022-05-03 DIAGNOSIS — Z82.49 FAMILY HISTORY OF CORONARY ARTERY DISEASE: ICD-10-CM

## 2022-05-03 DIAGNOSIS — G89.12 POST-THORACOTOMY PAIN SYNDROME: ICD-10-CM

## 2022-05-03 DIAGNOSIS — I25.810 CORONARY ARTERY DISEASE INVOLVING CORONARY BYPASS GRAFT OF NATIVE HEART WITHOUT ANGINA PECTORIS: ICD-10-CM

## 2022-05-03 DIAGNOSIS — Z22.7 LATENT TUBERCULOSIS: ICD-10-CM

## 2022-05-03 DIAGNOSIS — Z95.1 H/O CORONARY ARTERY BYPASS SURGERY: ICD-10-CM

## 2022-05-03 DIAGNOSIS — R06.02 SHORTNESS OF BREATH: ICD-10-CM

## 2022-05-03 DIAGNOSIS — R91.8 PULMONARY NODULES: ICD-10-CM

## 2022-05-03 LAB
CV STRESS MAX HR HE: 150
RATE PRESSURE PRODUCT: NORMAL
STRESS ANGINA INDEX: 0
STRESS ECHO BASELINE DIASTOLIC HE: 80
STRESS ECHO BASELINE HR: 84 BPM
STRESS ECHO BASELINE SYSTOLIC BP: 124
STRESS ECHO CALCULATED PERCENT HR: 92 %
STRESS ECHO LAST STRESS DIASTOLIC BP: 84
STRESS ECHO LAST STRESS SYSTOLIC BP: 210
STRESS ECHO POST ESTIMATED WORKLOAD: 8.8 METS
STRESS ECHO POST EXERCISE DUR MIN: 7 MIN
STRESS ECHO POST EXERCISE DUR SEC: 10 SEC
STRESS ECHO TARGET HR: 163
STRESS/REST PERFUSION RATIO: 0.8

## 2022-05-03 PROCEDURE — 71260 CT THORAX DX C+: CPT

## 2022-05-03 PROCEDURE — 250N000011 HC RX IP 250 OP 636: Performed by: NURSE PRACTITIONER

## 2022-05-03 PROCEDURE — 93016 CV STRESS TEST SUPVJ ONLY: CPT | Performed by: INTERNAL MEDICINE

## 2022-05-03 PROCEDURE — 93017 CV STRESS TEST TRACING ONLY: CPT

## 2022-05-03 PROCEDURE — 250N000009 HC RX 250: Performed by: NURSE PRACTITIONER

## 2022-05-03 PROCEDURE — 343N000001 HC RX 343: Performed by: INTERNAL MEDICINE

## 2022-05-03 PROCEDURE — A9502 TC99M TETROFOSMIN: HCPCS | Performed by: INTERNAL MEDICINE

## 2022-05-03 PROCEDURE — 93018 CV STRESS TEST I&R ONLY: CPT | Performed by: INTERNAL MEDICINE

## 2022-05-03 PROCEDURE — 78452 HT MUSCLE IMAGE SPECT MULT: CPT | Mod: 26 | Performed by: INTERNAL MEDICINE

## 2022-05-03 PROCEDURE — 78452 HT MUSCLE IMAGE SPECT MULT: CPT

## 2022-05-03 RX ORDER — IOPAMIDOL 755 MG/ML
500 INJECTION, SOLUTION INTRAVASCULAR ONCE
Status: COMPLETED | OUTPATIENT
Start: 2022-05-03 | End: 2022-05-03

## 2022-05-03 RX ADMIN — TETROFOSMIN 31.5 MCI.: 1.38 INJECTION, POWDER, LYOPHILIZED, FOR SOLUTION INTRAVENOUS at 10:43

## 2022-05-03 RX ADMIN — TETROFOSMIN 10.8 MCI.: 1.38 INJECTION, POWDER, LYOPHILIZED, FOR SOLUTION INTRAVENOUS at 09:17

## 2022-05-03 RX ADMIN — IOPAMIDOL 100 ML: 755 INJECTION, SOLUTION INTRAVENOUS at 10:58

## 2022-05-03 RX ADMIN — SODIUM CHLORIDE 73 ML: 9 INJECTION, SOLUTION INTRAVENOUS at 10:58

## 2022-05-05 NOTE — RESULT ENCOUNTER NOTE
Note to Staff: please call the patient to explain results    Chest CT stable. He does have his gallstones and fatty liver noted again. I would encourage he proceed with his abdomen US to look closer at his gallbladder and liver as well as pancreas. Please help him schedule or give number for him to call to schedule this.  Proceed also with PFTS.    FINDINGS:  Several presumed granulomatous nodules are decreased in  size compared to previous. Some calcified pleural plaques again  evident on the left. No effusions. No consolidation. Granulomatous  lymph nodes again noted in the chest. No aneurysm. Coronary artery  bypass change. No pericardial effusion. Hepatic steatosis and  cholelithiasis seen in the upper abdomen. No frankly destructive bony  lesions.       IMPRESSION: No acute process demonstrated in the chest.       Reshma Laguerre, TANKP-BC

## 2022-05-09 ENCOUNTER — TRANSFERRED RECORDS (OUTPATIENT)
Dept: HEALTH INFORMATION MANAGEMENT | Facility: CLINIC | Age: 57
End: 2022-05-09
Payer: COMMERCIAL

## 2022-05-09 LAB
ALT SERPL-CCNC: 43 IU/L (ref 5–40)
AST SERPL-CCNC: 45 U/L (ref 5–34)
CREATININE (EXTERNAL): 0.89 MG/DL (ref 0.5–1.3)
GFR ESTIMATED (EXTERNAL): 93.6 ML/MIN/1.73M2

## 2022-05-12 ENCOUNTER — VIRTUAL VISIT (OUTPATIENT)
Dept: FAMILY MEDICINE | Facility: CLINIC | Age: 57
End: 2022-05-12
Payer: COMMERCIAL

## 2022-05-12 DIAGNOSIS — R91.8 PULMONARY NODULES: ICD-10-CM

## 2022-05-12 DIAGNOSIS — N20.0 NEPHROLITHIASIS: ICD-10-CM

## 2022-05-12 DIAGNOSIS — K22.70 BARRETT'S ESOPHAGUS WITHOUT DYSPLASIA: ICD-10-CM

## 2022-05-12 DIAGNOSIS — Z86.16 HISTORY OF COVID-19: ICD-10-CM

## 2022-05-12 DIAGNOSIS — Z22.7 LATENT TUBERCULOSIS: ICD-10-CM

## 2022-05-12 DIAGNOSIS — I25.810 CORONARY ARTERY DISEASE INVOLVING CORONARY BYPASS GRAFT OF NATIVE HEART WITHOUT ANGINA PECTORIS: ICD-10-CM

## 2022-05-12 DIAGNOSIS — J45.20 MILD INTERMITTENT ASTHMA WITHOUT COMPLICATION: ICD-10-CM

## 2022-05-12 DIAGNOSIS — Z51.81 MEDICATION MONITORING ENCOUNTER: ICD-10-CM

## 2022-05-12 DIAGNOSIS — Z91.09 ENVIRONMENTAL ALLERGIES: ICD-10-CM

## 2022-05-12 DIAGNOSIS — L40.50 PSORIATIC ARTHRITIS (H): ICD-10-CM

## 2022-05-12 DIAGNOSIS — R06.02 SHORTNESS OF BREATH: ICD-10-CM

## 2022-05-12 DIAGNOSIS — G89.12 POST-THORACOTOMY PAIN SYNDROME: ICD-10-CM

## 2022-05-12 DIAGNOSIS — I10 HYPERTENSION GOAL BP (BLOOD PRESSURE) < 140/90: ICD-10-CM

## 2022-05-12 DIAGNOSIS — Z82.49 FAMILY HISTORY OF CORONARY ARTERY DISEASE: ICD-10-CM

## 2022-05-12 DIAGNOSIS — E78.5 HYPERLIPIDEMIA LDL GOAL <70: ICD-10-CM

## 2022-05-12 DIAGNOSIS — R73.03 PREDIABETES: ICD-10-CM

## 2022-05-12 DIAGNOSIS — F41.9 ANXIETY: Primary | ICD-10-CM

## 2022-05-12 DIAGNOSIS — Z95.1 H/O CORONARY ARTERY BYPASS SURGERY: ICD-10-CM

## 2022-05-12 DIAGNOSIS — K21.9 GASTROESOPHAGEAL REFLUX DISEASE WITHOUT ESOPHAGITIS: ICD-10-CM

## 2022-05-12 DIAGNOSIS — R42 DIZZINESS: ICD-10-CM

## 2022-05-12 PROCEDURE — 99214 OFFICE O/P EST MOD 30 MIN: CPT | Mod: 95 | Performed by: FAMILY MEDICINE

## 2022-05-12 RX ORDER — CITALOPRAM HYDROBROMIDE 20 MG/1
20 TABLET ORAL DAILY
Qty: 90 TABLET | Refills: 3 | Status: SHIPPED | OUTPATIENT
Start: 2022-05-12 | End: 2022-06-16

## 2022-05-12 ASSESSMENT — ANXIETY QUESTIONNAIRES
GAD7 TOTAL SCORE: 0
1. FEELING NERVOUS, ANXIOUS, OR ON EDGE: NOT AT ALL
3. WORRYING TOO MUCH ABOUT DIFFERENT THINGS: NOT AT ALL
7. FEELING AFRAID AS IF SOMETHING AWFUL MIGHT HAPPEN: NOT AT ALL
GAD7 TOTAL SCORE: 0
7. FEELING AFRAID AS IF SOMETHING AWFUL MIGHT HAPPEN: NOT AT ALL
2. NOT BEING ABLE TO STOP OR CONTROL WORRYING: NOT AT ALL
6. BECOMING EASILY ANNOYED OR IRRITABLE: NOT AT ALL
5. BEING SO RESTLESS THAT IT IS HARD TO SIT STILL: NOT AT ALL
4. TROUBLE RELAXING: NOT AT ALL
8. IF YOU CHECKED OFF ANY PROBLEMS, HOW DIFFICULT HAVE THESE MADE IT FOR YOU TO DO YOUR WORK, TAKE CARE OF THINGS AT HOME, OR GET ALONG WITH OTHER PEOPLE?: NOT DIFFICULT AT ALL
GAD7 TOTAL SCORE: 0

## 2022-05-12 ASSESSMENT — ASTHMA QUESTIONNAIRES
QUESTION_4 LAST FOUR WEEKS HOW OFTEN HAVE YOU USED YOUR RESCUE INHALER OR NEBULIZER MEDICATION (SUCH AS ALBUTEROL): TWO OR THREE TIMES PER WEEK
ACT_TOTALSCORE: 21
QUESTION_1 LAST FOUR WEEKS HOW MUCH OF THE TIME DID YOUR ASTHMA KEEP YOU FROM GETTING AS MUCH DONE AT WORK, SCHOOL OR AT HOME: NONE OF THE TIME
ACT_TOTALSCORE: 21
QUESTION_5 LAST FOUR WEEKS HOW WOULD YOU RATE YOUR ASTHMA CONTROL: WELL CONTROLLED
QUESTION_3 LAST FOUR WEEKS HOW OFTEN DID YOUR ASTHMA SYMPTOMS (WHEEZING, COUGHING, SHORTNESS OF BREATH, CHEST TIGHTNESS OR PAIN) WAKE YOU UP AT NIGHT OR EARLIER THAN USUAL IN THE MORNING: NOT AT ALL
QUESTION_2 LAST FOUR WEEKS HOW OFTEN HAVE YOU HAD SHORTNESS OF BREATH: ONCE OR TWICE A WEEK

## 2022-05-12 ASSESSMENT — PATIENT HEALTH QUESTIONNAIRE - PHQ9
10. IF YOU CHECKED OFF ANY PROBLEMS, HOW DIFFICULT HAVE THESE PROBLEMS MADE IT FOR YOU TO DO YOUR WORK, TAKE CARE OF THINGS AT HOME, OR GET ALONG WITH OTHER PEOPLE: NOT DIFFICULT AT ALL
SUM OF ALL RESPONSES TO PHQ QUESTIONS 1-9: 0
SUM OF ALL RESPONSES TO PHQ QUESTIONS 1-9: 0

## 2022-05-12 NOTE — PROGRESS NOTES
Mian is a 57 year old who is being evaluated via a billable video visit.      How would you like to obtain your AVS? MyChart  If the video visit is dropped, the invitation should be resent by: Text to cell phone: [phone]  Will anyone else be joining your video visit? No      Video Start Time: 1:18 PM    Assessment & Plan       ICD-10-CM    1. Anxiety  F41.9 citalopram (CELEXA) 20 MG tablet   2. Dizziness  R42    3. 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 History of COVID-19  Z86.16    8. Mild intermittent asthma without complication  J45.20    9. Environmental allergies  Z91.09    10. Coronary artery disease involving coronary bypass graft of native heart without angina pectoris  I25.810    11. H/O coronary artery bypass surgery  Z95.1    12. Prediabetes  R73.03    13. Hypertension goal BP (blood pressure) < 140/90  I10    14. Hyperlipidemia LDL goal <70  E78.5    15. Family history of coronary artery disease  Z82.49    16. Gastroesophageal reflux disease without esophagitis  K21.9    17. Sullivan's esophagus without dysplasia  K22.70    18. Psoriatic arthritis (H)  L40.50    19. Nephrolithiasis  N20.0    20. Medication monitoring encounter  Z51.81      Discussed treatment/modality options, including risk and benefits, he desires:    1) increase citalopram to 20 mg daily    2) consider ECHO    3) consider RUQ US    4) consider PT    5) Consider COVID #4, pneumovax, Shingrix    All diagnosis above reviewed and noted above, otherwise stable.      See [facility] orders for further details.      Return in about 1 month (around 6/12/2022) for Medication Recheck Visit, Follow Up Acute, Follow Up Chronic.    No LOS data to display    Doing chart review, history and exam, documentation and further activities as noted.           Fady Adams MD, FAAFP     [facility]  [address]  [place] [address]  [email or web address]   Office: [phone]  Fax: [phone]  Pager: [phone]       Charline Pappas is a 57 year old who presents for the following health issues     History of Present Illness       Mental Health Follow-up:  Patient presents to follow-up on Anxiety.    Patient's anxiety since last visit has been:  Good  The patient is not having other symptoms associated with anxiety.  Any significant life events: No  Patient is not feeling anxious or having panic attacks.  Patient has no concerns about alcohol or drug use.       Today's PHQ-9         PHQ-9 Total Score: 0  PHQ-9 Q9 Thoughts of better off dead/self-harm past 2 weeks :   (P) Not at all    How difficult have these problems made it for you to do your work, take care of things at home, or get along with other people: Not difficult at all    Today's LONNY-7 Score: 0      LONNY-7 SCORE 11/22/2021 4/8/2022 5/12/2022   Total Score - - -   Total Score 0 (minimal anxiety) 7 (mild anxiety) 0 (minimal anxiety)   Total Score 0 7 0     PHQ 7/30/2021 11/22/2021 5/12/2022   PHQ-9 Total Score 0 0 0   Q9: Thoughts of better off dead/self-harm past 2 weeks Not at all Not at all Not at all     Last PHQ-9 5/12/2022   1.  Little interest or pleasure in doing things 0   2.  Feeling down, depressed, or hopeless 0   3.  Trouble falling or staying asleep, or sleeping too much 0   4.  Feeling tired or having little energy 0   5.  Poor appetite or overeating 0   6.  Feeling bad about yourself 0   7.  Trouble concentrating 0   8.  Moving slowly or restless 0   Q9: Thoughts of better off dead/self-harm past 2 weeks 0   PHQ-9 Total Score 0   Difficulty at work, home, or with people -     LONNY-7  5/12/2022   1. Feeling nervous, anxious, or on edge 0   2. Not being able to stop or control worrying 0   3. Worrying too much about different things 0   4. Trouble relaxing 0   5. Being so restless that it is hard  to sit still 0   6. Becoming easily annoyed or irritable 0   7. Feeling afraid, as if something awful might happen 0   LONYN-7 Total Score 0   If you checked any problems, how difficult have they made it for you to do your work, take care of things at home, or get along with other people? -     Dizziness notes on/off for 2+ years, dizziness with disorientation, feels faint, nearing white out, increasing over he last 1 month, notes tinnitus Rt>Lt, no CP, no palpitations, no unusual sob, no edema    Recent Labs   Lab Test 07/29/21  0744 06/05/20  0903 01/27/16  0709 07/08/15  0757 09/03/14  0745   CHOL 134 112   < > 146 159   HDL 36* 30*   < > 37* 34*   LDL 55 57   < > 79 72   TRIG 214* 125   < > 148 267*   CHOLHDLRATIO  --   --   --  3.9 4.7    < > = values in this interval not displayed.     Lab Results   Component Value Date    A1C 5.3 07/29/2021    A1C 5.4 06/05/2020    A1C 5.4 06/12/2019    A1C 5.6 05/16/2018    A1C 5.4 01/30/2018    A1C 5.6 02/14/2017     Glucose   Date Value Ref Range Status   04/11/2022 113 (H) 70 - 99 mg/dL Final   07/29/2021 103 (H) 70 - 99 mg/dL Final   01/31/2021 143 (H) 70 - 99 mg/dL Final   01/16/2021 107 (H) 70 - 99 mg/dL Final   06/05/2020 110 (H) 70 - 99 mg/dL Final   03/14/2020 96 70 - 99 mg/dL Final   06/12/2019 122 (H) 70 - 99 mg/dL Final     Comment:     Fasting specimen     BP Readings from Last 3 Encounters:   04/12/22 132/80   04/11/22 136/80   11/22/21 (!) 142/76     Creatinine   Date Value Ref Range Status   04/11/2022 0.90 0.66 - 1.25 mg/dL Final   03/23/2021 0.830 0.500 - 1.300 mg/dL Final     GFR Estimate   Date Value Ref Range Status   04/11/2022 >90 >60 mL/min/1.73m2 Final     Comment:     Effective December 21, 2021 eGFRcr in adults is calculated using the 2021 CKD-EPI creatinine equation which includes age and gender (Arden et al., NEJ, DOI: 10.1056/HWFCkq6672089)   01/31/2021 63 >60 mL/min/[1.73_m2] Final     Social History     Tobacco Use     Smoking status: Never  Smoker     Smokeless tobacco: Never Used   Vaping Use     Vaping Use: Never used   Substance Use Topics     Alcohol use: Yes     Alcohol/week: 6.0 standard drinks     Types: 6 Standard drinks or equivalent per week     Comment: 6 drinks per week     Drug use: No     Review of Systems   CONSTITUTIONAL: NEGATIVE for fever, chills, change in weight  INTEGUMENTARY/SKIN: NEGATIVE for worrisome rashes, moles or lesions  EYES: NEGATIVE for vision changes or irritation  ENT/MOUTH: NEGATIVE for ear, mouth and throat problems  RESP: NEGATIVE for significant cough or SOB  CV: NEGATIVE for chest pain, palpitations or peripheral edema  GI: NEGATIVE for nausea, abdominal pain, heartburn, or change in bowel habits  : NEGATIVE for frequency, dysuria, or hematuria  MUSCULOSKELETAL: NEGATIVE for significant arthralgias or myalgia  NEURO: NEGATIVE for weakness, dizziness or paresthesias  ENDOCRINE: NEGATIVE for temperature intolerance, skin/hair changes  HEME: NEGATIVE for bleeding problems  PSYCHIATRIC: NEGATIVE for changes in mood or affect      Objective         Vitals:  No vitals were obtained today due to virtual visit.    Physical Exam   GENERAL: Healthy, alert and no distress  EYES: Eyes grossly normal to inspection.  No discharge or erythema, or obvious scleral/conjunctival abnormalities.  RESP: No audible wheeze, cough, or visible cyanosis.  No visible retractions or increased work of breathing.    SKIN: Visible skin clear. No significant rash, abnormal pigmentation or lesions.  NEURO: Cranial nerves grossly intact.  Mentation and speech appropriate for age.  PSYCH: Mentation appears normal, affect normal/bright, judgement and insight intact, normal speech and appearance well-groomed.    CT chest - negative  Ally Scan - negative  PFT's moderate restiction    Video-Visit Details    Type of service:  Video Visit    Video End Time:1:48 pm    Originating Location (pt. Location): Home    Distant Location (provider location):    [facility]     Platform used for Video Visit: Danii

## 2022-05-13 ASSESSMENT — ANXIETY QUESTIONNAIRES: GAD7 TOTAL SCORE: 0

## 2022-05-13 ASSESSMENT — PATIENT HEALTH QUESTIONNAIRE - PHQ9: SUM OF ALL RESPONSES TO PHQ QUESTIONS 1-9: 0

## 2022-05-23 ENCOUNTER — HOSPITAL ENCOUNTER (OUTPATIENT)
Dept: RESPIRATORY THERAPY | Facility: CLINIC | Age: 57
Discharge: HOME | End: 2022-05-23
Attending: NURSE PRACTITIONER | Admitting: NURSE PRACTITIONER
Payer: COMMERCIAL

## 2022-05-23 DIAGNOSIS — R06.02 SHORTNESS OF BREATH: ICD-10-CM

## 2022-05-23 DIAGNOSIS — J45.20 MILD INTERMITTENT ASTHMA WITHOUT COMPLICATION: ICD-10-CM

## 2022-05-23 PROCEDURE — 94726 PLETHYSMOGRAPHY LUNG VOLUMES: CPT

## 2022-05-23 PROCEDURE — 94729 DIFFUSING CAPACITY: CPT

## 2022-05-23 PROCEDURE — 94010 BREATHING CAPACITY TEST: CPT

## 2022-05-23 PROCEDURE — 999N000157 HC STATISTIC RCP TIME EA 10 MIN

## 2022-05-23 NOTE — PROGRESS NOTES
PFT Note:        Pt completed pulmonary function testing with DLCO.  Good Pt effort and cooperation.  All testing meets ATS recommendations.  DLCO is an average of 2 maneuvers.  No recent Hgb available for DLCO correction.    May 23, 2022.1:12 PM  Arslan Witt, RT

## 2022-05-25 LAB
DLCOUNC-%PRED-PRE: 72 %
DLCOUNC-PRE: 19.13 ML/MIN/MMHG
DLCOUNC-PRED: 26.45 ML/MIN/MMHG
ERV-%PRED-PRE: 96 %
ERV-PRE: 0.79 L
ERV-PRED: 0.82 L
EXPTIME-PRE: 8.86 SEC
FEF2575-%PRED-PRE: 60 %
FEF2575-PRE: 1.83 L/SEC
FEF2575-PRED: 3.02 L/SEC
FEFMAX-%PRED-PRE: 56 %
FEFMAX-PRE: 5.12 L/SEC
FEFMAX-PRED: 8.99 L/SEC
FEV1-%PRED-PRE: 62 %
FEV1-PRE: 2.15 L
FEV1FEV6-PRE: 78 %
FEV1FEV6-PRED: 79 %
FEV1FVC-PRE: 77 %
FEV1FVC-PRED: 78 %
FEV1SVC-PRE: 76 %
FEV1SVC-PRED: 74 %
FIFMAX-PRE: 4.08 L/SEC
FRCPLETH-%PRED-PRE: 61 %
FRCPLETH-PRE: 2.12 L
FRCPLETH-PRED: 3.46 L
FVC-%PRED-PRE: 62 %
FVC-PRE: 2.79 L
FVC-PRED: 4.43 L
IC-%PRED-PRE: 52 %
IC-PRE: 2.05 L
IC-PRED: 3.89 L
RVPLETH-%PRED-PRE: 57 %
RVPLETH-PRE: 1.33 L
RVPLETH-PRED: 2.29 L
TLCPLETH-%PRED-PRE: 62 %
TLCPLETH-PRE: 4.17 L
TLCPLETH-PRED: 6.72 L
VA-%PRED-PRE: 60 %
VA-PRE: 3.69 L
VC-%PRED-PRE: 60 %
VC-PRE: 2.84 L
VC-PRED: 4.71 L

## 2022-06-01 NOTE — RESULT ENCOUNTER NOTE
Note to Staff: please call the patient to explain results.    PFTS abnormal with restriction noted.  Restrictive lung disease causes problem person's ability to inhale air.  This can contribute to anxiety shortness of breath persisting cough.  There can also be symptoms of feeling hard to breathe.  He might have benefit from pulmonary rehab is he interested in this? Please route back to me.   He can also discuss this more with his PCP at his next visit.    IMPRESSION:   Moderate restriction.   Normal diffusing capacity.       Reshma Laguerre, TANKP-BC

## 2022-06-03 DIAGNOSIS — R94.2 ABNORMAL PFTS: ICD-10-CM

## 2022-06-03 DIAGNOSIS — R06.02 SHORTNESS OF BREATH: Primary | ICD-10-CM

## 2022-06-03 DIAGNOSIS — G89.12 POST-THORACOTOMY PAIN SYNDROME: ICD-10-CM

## 2022-06-03 NOTE — RESULT ENCOUNTER NOTE
Pulm rehab ordered.   Has close follow-up with his PCP.    MARTHA Hicks  [facility]

## 2022-06-09 ENCOUNTER — HOSPITAL ENCOUNTER (OUTPATIENT)
Dept: CARDIOLOGY | Facility: CLINIC | Age: 57
Discharge: HOME | End: 2022-06-09
Attending: INTERNAL MEDICINE | Admitting: INTERNAL MEDICINE
Payer: COMMERCIAL

## 2022-06-09 DIAGNOSIS — G89.12 POST-THORACOTOMY PAIN SYNDROME: ICD-10-CM

## 2022-06-09 DIAGNOSIS — Z95.1 H/O CORONARY ARTERY BYPASS SURGERY: ICD-10-CM

## 2022-06-09 DIAGNOSIS — Z82.49 FAMILY HISTORY OF CORONARY ARTERY DISEASE: ICD-10-CM

## 2022-06-09 DIAGNOSIS — I25.810 CORONARY ARTERY DISEASE INVOLVING CORONARY BYPASS GRAFT OF NATIVE HEART WITHOUT ANGINA PECTORIS: ICD-10-CM

## 2022-06-09 DIAGNOSIS — R73.03 PREDIABETES: ICD-10-CM

## 2022-06-09 LAB — LVEF ECHO: NORMAL

## 2022-06-09 PROCEDURE — 999N000208 ECHOCARDIOGRAM COMPLETE

## 2022-06-09 PROCEDURE — 255N000002 HC RX 255 OP 636: Performed by: INTERNAL MEDICINE

## 2022-06-09 PROCEDURE — 93306 TTE W/DOPPLER COMPLETE: CPT | Mod: 26 | Performed by: INTERNAL MEDICINE

## 2022-06-09 RX ADMIN — HUMAN ALBUMIN MICROSPHERES AND PERFLUTREN 3 ML: 10; .22 INJECTION, SOLUTION INTRAVENOUS at 09:30

## 2022-06-10 ENCOUNTER — CARE COORDINATION (OUTPATIENT)
Dept: CARDIOLOGY | Facility: CLINIC | Age: 57
End: 2022-06-10
Payer: COMMERCIAL

## 2022-06-10 NOTE — PROGRESS NOTES
Received results review from Dr. Welch.   Sent message to Mian via [employer].   Mian was here and saw Dr. Welch in April 2022.            Included information that he does have trace\mild aortic regurgitation, but we don't need to do anything about that.  Writer invited Mian that he can call us or [employer] anytime with concerns or questions.

## 2022-06-14 ASSESSMENT — ENCOUNTER SYMPTOMS
SORE THROAT: 0
HEARTBURN: 0
DYSURIA: 0
ARTHRALGIAS: 0
FEVER: 0
SHORTNESS OF BREATH: 0
EYE PAIN: 0
JOINT SWELLING: 0
WEAKNESS: 0
NAUSEA: 0
MYALGIAS: 0
FREQUENCY: 0
PARESTHESIAS: 0
DIZZINESS: 0
HEMATOCHEZIA: 0
DIARRHEA: 0
CHILLS: 0
HEADACHES: 0
NERVOUS/ANXIOUS: 0
COUGH: 0
HEMATURIA: 0
CONSTIPATION: 0
PALPITATIONS: 0
ABDOMINAL PAIN: 0

## 2022-06-16 ENCOUNTER — OFFICE VISIT (OUTPATIENT)
Dept: FAMILY MEDICINE | Facility: CLINIC | Age: 57
End: 2022-06-16
Payer: COMMERCIAL

## 2022-06-16 VITALS
SYSTOLIC BLOOD PRESSURE: 122 MMHG | DIASTOLIC BLOOD PRESSURE: 84 MMHG | TEMPERATURE: 97.8 F | OXYGEN SATURATION: 97 % | BODY MASS INDEX: 31.98 KG/M2 | WEIGHT: 211 LBS | HEART RATE: 85 BPM | HEIGHT: 68 IN | RESPIRATION RATE: 18 BRPM

## 2022-06-16 DIAGNOSIS — K80.20 CALCULUS OF GALLBLADDER WITHOUT CHOLECYSTITIS WITHOUT OBSTRUCTION: ICD-10-CM

## 2022-06-16 DIAGNOSIS — G89.12 POST-THORACOTOMY PAIN SYNDROME: ICD-10-CM

## 2022-06-16 DIAGNOSIS — L40.50 PSORIATIC ARTHRITIS (H): ICD-10-CM

## 2022-06-16 DIAGNOSIS — Z95.1 H/O CORONARY ARTERY BYPASS SURGERY: ICD-10-CM

## 2022-06-16 DIAGNOSIS — Z12.5 SCREENING FOR PROSTATE CANCER: ICD-10-CM

## 2022-06-16 DIAGNOSIS — R94.2 ABNORMAL PFTS: ICD-10-CM

## 2022-06-16 DIAGNOSIS — Z00.00 ROUTINE GENERAL MEDICAL EXAMINATION AT A HEALTH CARE FACILITY: Primary | ICD-10-CM

## 2022-06-16 DIAGNOSIS — E78.5 HYPERLIPIDEMIA LDL GOAL <70: ICD-10-CM

## 2022-06-16 DIAGNOSIS — E66.811 CLASS 1 OBESITY WITH SERIOUS COMORBIDITY AND BODY MASS INDEX (BMI) OF 32.0 TO 32.9 IN ADULT, UNSPECIFIED OBESITY TYPE: ICD-10-CM

## 2022-06-16 DIAGNOSIS — Z12.11 SCREEN FOR COLON CANCER: ICD-10-CM

## 2022-06-16 DIAGNOSIS — J45.20 MILD INTERMITTENT ASTHMA WITHOUT COMPLICATION: ICD-10-CM

## 2022-06-16 DIAGNOSIS — K21.9 GASTROESOPHAGEAL REFLUX DISEASE WITHOUT ESOPHAGITIS: ICD-10-CM

## 2022-06-16 DIAGNOSIS — L40.9 PSORIASIS: ICD-10-CM

## 2022-06-16 DIAGNOSIS — I10 HYPERTENSION GOAL BP (BLOOD PRESSURE) < 140/90: ICD-10-CM

## 2022-06-16 DIAGNOSIS — R91.8 PULMONARY NODULES: ICD-10-CM

## 2022-06-16 DIAGNOSIS — F41.9 ANXIETY: ICD-10-CM

## 2022-06-16 DIAGNOSIS — Z80.42 FAMILY HISTORY OF PROSTATE CANCER: ICD-10-CM

## 2022-06-16 DIAGNOSIS — R73.03 PREDIABETES: ICD-10-CM

## 2022-06-16 DIAGNOSIS — Z82.49 FAMILY HISTORY OF CORONARY ARTERY DISEASE: ICD-10-CM

## 2022-06-16 DIAGNOSIS — K22.70 BARRETT'S ESOPHAGUS WITHOUT DYSPLASIA: ICD-10-CM

## 2022-06-16 DIAGNOSIS — Z22.7 LATENT TUBERCULOSIS: ICD-10-CM

## 2022-06-16 DIAGNOSIS — K76.0 FATTY LIVER: ICD-10-CM

## 2022-06-16 DIAGNOSIS — Z51.81 MEDICATION MONITORING ENCOUNTER: ICD-10-CM

## 2022-06-16 DIAGNOSIS — Z91.09 ENVIRONMENTAL ALLERGIES: ICD-10-CM

## 2022-06-16 DIAGNOSIS — Z86.16 HISTORY OF COVID-19: ICD-10-CM

## 2022-06-16 DIAGNOSIS — N20.0 NEPHROLITHIASIS: ICD-10-CM

## 2022-06-16 DIAGNOSIS — I25.810 CORONARY ARTERY DISEASE INVOLVING CORONARY BYPASS GRAFT OF NATIVE HEART WITHOUT ANGINA PECTORIS: ICD-10-CM

## 2022-06-16 LAB
ALBUMIN SERPL-MCNC: 3.4 G/DL (ref 3.4–5)
ALBUMIN UR-MCNC: NEGATIVE MG/DL
ALP SERPL-CCNC: 110 U/L (ref 40–150)
ALT SERPL W P-5'-P-CCNC: 34 U/L (ref 0–70)
ANION GAP SERPL CALCULATED.3IONS-SCNC: 1 MMOL/L (ref 3–14)
APPEARANCE UR: CLEAR
AST SERPL W P-5'-P-CCNC: 32 U/L (ref 0–45)
BILIRUB SERPL-MCNC: 0.9 MG/DL (ref 0.2–1.3)
BILIRUB UR QL STRIP: NEGATIVE
BUN SERPL-MCNC: 11 MG/DL (ref 7–30)
CALCIUM SERPL-MCNC: 9.3 MG/DL (ref 8.5–10.1)
CHLORIDE BLD-SCNC: 108 MMOL/L (ref 94–109)
CHOLEST SERPL-MCNC: 115 MG/DL
CK SERPL-CCNC: 172 U/L (ref 30–300)
CO2 SERPL-SCNC: 30 MMOL/L (ref 20–32)
COLOR UR AUTO: YELLOW
CREAT SERPL-MCNC: 0.97 MG/DL (ref 0.66–1.25)
CREAT UR-MCNC: 198 MG/DL
ERYTHROCYTE [DISTWIDTH] IN BLOOD BY AUTOMATED COUNT: 13.3 % (ref 10–15)
FASTING STATUS PATIENT QL REPORTED: YES
GFR SERPL CREATININE-BSD FRML MDRD: >90 ML/MIN/1.73M2
GLUCOSE BLD-MCNC: 110 MG/DL (ref 70–99)
GLUCOSE UR STRIP-MCNC: NEGATIVE MG/DL
HBA1C MFR BLD: 5.4 % (ref 0–5.6)
HCT VFR BLD AUTO: 43.3 % (ref 40–53)
HDLC SERPL-MCNC: 40 MG/DL
HGB BLD-MCNC: 15 G/DL (ref 13.3–17.7)
HGB UR QL STRIP: NEGATIVE
KETONES UR STRIP-MCNC: NEGATIVE MG/DL
LDLC SERPL CALC-MCNC: 44 MG/DL
LEUKOCYTE ESTERASE UR QL STRIP: NEGATIVE
MCH RBC QN AUTO: 32.1 PG (ref 26.5–33)
MCHC RBC AUTO-ENTMCNC: 34.6 G/DL (ref 31.5–36.5)
MCV RBC AUTO: 93 FL (ref 78–100)
MICROALBUMIN UR-MCNC: 9 MG/L
MICROALBUMIN/CREAT UR: 4.55 MG/G CR (ref 0–17)
NITRATE UR QL: NEGATIVE
NONHDLC SERPL-MCNC: 75 MG/DL
PH UR STRIP: 6 [PH] (ref 5–7)
PLATELET # BLD AUTO: 156 10E3/UL (ref 150–450)
POTASSIUM BLD-SCNC: 4.9 MMOL/L (ref 3.4–5.3)
PROT SERPL-MCNC: 7.1 G/DL (ref 6.8–8.8)
PSA SERPL-MCNC: 1.09 UG/L (ref 0–4)
RBC # BLD AUTO: 4.67 10E6/UL (ref 4.4–5.9)
SODIUM SERPL-SCNC: 139 MMOL/L (ref 133–144)
SP GR UR STRIP: 1.02 (ref 1–1.03)
TRIGL SERPL-MCNC: 157 MG/DL
TSH SERPL DL<=0.005 MIU/L-ACNC: 2.96 MU/L (ref 0.4–4)
UROBILINOGEN UR STRIP-ACNC: 1 E.U./DL
WBC # BLD AUTO: 6.4 10E3/UL (ref 4–11)

## 2022-06-16 PROCEDURE — 99214 OFFICE O/P EST MOD 30 MIN: CPT | Mod: 25 | Performed by: FAMILY MEDICINE

## 2022-06-16 PROCEDURE — 81003 URINALYSIS AUTO W/O SCOPE: CPT | Performed by: FAMILY MEDICINE

## 2022-06-16 PROCEDURE — G0103 PSA SCREENING: HCPCS | Performed by: FAMILY MEDICINE

## 2022-06-16 PROCEDURE — 83036 HEMOGLOBIN GLYCOSYLATED A1C: CPT | Performed by: FAMILY MEDICINE

## 2022-06-16 PROCEDURE — 82550 ASSAY OF CK (CPK): CPT | Performed by: FAMILY MEDICINE

## 2022-06-16 PROCEDURE — 80053 COMPREHEN METABOLIC PANEL: CPT | Performed by: FAMILY MEDICINE

## 2022-06-16 PROCEDURE — 82043 UR ALBUMIN QUANTITATIVE: CPT | Performed by: FAMILY MEDICINE

## 2022-06-16 PROCEDURE — 99396 PREV VISIT EST AGE 40-64: CPT | Performed by: FAMILY MEDICINE

## 2022-06-16 PROCEDURE — 85027 COMPLETE CBC AUTOMATED: CPT | Performed by: FAMILY MEDICINE

## 2022-06-16 PROCEDURE — 36415 COLL VENOUS BLD VENIPUNCTURE: CPT | Performed by: FAMILY MEDICINE

## 2022-06-16 PROCEDURE — 84443 ASSAY THYROID STIM HORMONE: CPT | Performed by: FAMILY MEDICINE

## 2022-06-16 PROCEDURE — 80061 LIPID PANEL: CPT | Performed by: FAMILY MEDICINE

## 2022-06-16 RX ORDER — CARVEDILOL 6.25 MG/1
6.25 TABLET ORAL 2 TIMES DAILY WITH MEALS
Qty: 180 TABLET | Refills: 3 | Status: SHIPPED | OUTPATIENT
Start: 2022-06-16 | End: 2023-06-22

## 2022-06-16 RX ORDER — CETIRIZINE HYDROCHLORIDE 10 MG/1
10 TABLET ORAL EVERY EVENING
Qty: 90 TABLET | Refills: 3 | Status: SHIPPED | OUTPATIENT
Start: 2022-06-16 | End: 2023-06-22

## 2022-06-16 RX ORDER — CITALOPRAM HYDROBROMIDE 20 MG/1
20 TABLET ORAL DAILY
Qty: 90 TABLET | Refills: 3 | Status: SHIPPED | OUTPATIENT
Start: 2022-06-16 | End: 2023-06-01

## 2022-06-16 RX ORDER — FLUTICASONE PROPIONATE 50 MCG
2 SPRAY, SUSPENSION (ML) NASAL DAILY
Qty: 48 G | Refills: 3 | Status: SHIPPED | OUTPATIENT
Start: 2022-06-16 | End: 2023-06-22

## 2022-06-16 RX ORDER — AZELASTINE 1 MG/ML
2 SPRAY, METERED NASAL 2 TIMES DAILY
Qty: 90 ML | Refills: 3 | Status: SHIPPED | OUTPATIENT
Start: 2022-06-16 | End: 2023-06-22

## 2022-06-16 RX ORDER — ATORVASTATIN CALCIUM 80 MG/1
80 TABLET, FILM COATED ORAL DAILY
Qty: 90 TABLET | Refills: 3 | Status: SHIPPED | OUTPATIENT
Start: 2022-06-16 | End: 2023-06-22

## 2022-06-16 RX ORDER — ALBUTEROL SULFATE 90 UG/1
2 AEROSOL, METERED RESPIRATORY (INHALATION) EVERY 4 HOURS PRN
Qty: 18 G | Refills: 3 | Status: SHIPPED | OUTPATIENT
Start: 2022-06-16 | End: 2023-06-22

## 2022-06-16 ASSESSMENT — ENCOUNTER SYMPTOMS
DYSURIA: 0
SHORTNESS OF BREATH: 0
COUGH: 0
WEAKNESS: 0
FEVER: 0
PALPITATIONS: 0
HEMATURIA: 0
MYALGIAS: 0
NAUSEA: 0
EYE PAIN: 0
DIARRHEA: 0
ARTHRALGIAS: 0
ABDOMINAL PAIN: 0
DIZZINESS: 0
HEMATOCHEZIA: 0
FREQUENCY: 0
HEARTBURN: 0
NERVOUS/ANXIOUS: 0
JOINT SWELLING: 0
PARESTHESIAS: 0
CHILLS: 0
SORE THROAT: 0
HEADACHES: 0
CONSTIPATION: 0

## 2022-06-16 ASSESSMENT — ASTHMA QUESTIONNAIRES: ACT_TOTALSCORE: 25

## 2022-06-16 NOTE — PROGRESS NOTES
[facility]  [place]    SUBJECTIVE    CC: Mian Lozano is an 57 year old male who presents for preventative health visit.     Patient has been advised of split billing requirements and indicates understanding: Yes     Healthy Habits:     Getting at least 3 servings of Calcium per day:  NO    Bi-annual eye exam:  NO    Dental care twice a year:  Yes    Sleep apnea or symptoms of sleep apnea:  None    Diet:  Regular (no restrictions)    Frequency of exercise:  None    Taking medications regularly:  Yes    Medication side effects:  None    PHQ-2 Total Score: 0    Additional concerns today:  No    Asthma Follow-Up - Moderate restriction on PFT's - Pulmonary nodules - Latent TB - Pulm rehab starts on 6/30    Was ACT completed today?      Yes      ACT Total Scores 6/16/2022   ACT TOTAL SCORE -   ASTHMA ER VISITS -   ASTHMA HOSPITALIZATIONS -   ACT TOTAL SCORE (Goal Greater than or Equal to 20) 25   In the past 12 months, how many times did you visit the emergency room for your asthma without being admitted to the hospital? 0   In the past 12 months, how many times were you hospitalized overnight because of your asthma? 0       How many days per week do you miss taking your asthma controller medication?  I do not have an asthma controller medication    Please describe any recent triggers for your asthma: exercise or sports    Have you had any Emergency Room Visits, Urgent Care Visits, or Hospital Admissions since your last office visit?  No    CT Chest - 5/3    FINDINGS:  Several presumed granulomatous nodules are decreased in  size compared to previous. Some calcified pleural plaques again  evident on the left. No effusions. No consolidation. Granulomatous  lymph nodes again noted in the chest. No aneurysm. Coronary artery  bypass change. No pericardial effusion. Hepatic steatosis and  cholelithiasis seen in the upper abdomen. No frankly destructive bony  Lesions.    CAD / S/P CABG / post thoracotomy syndrome  (lt)    5/3 Lexiscan       The nuclear stress test is negative for inducible myocardial ischemia or infarction. The left ventricular ejection fraction at stress is greater than 70%. Left ventricular function is normal.     Average exercise capacity, no chest pain or EKG changes.  Peak blood pressure 210/84.     No changes when directly compared to nuclear stress from 2018.    6/9 ECHO    1. The left ventricle is normal in size. Left ventricular systolic function is  normal. The visual ejection fraction is 55-60%. Left ventricular diastolic  function is not assessable. No regional wall motion abnormalities noted. There  is no thrombus seen in the left ventricle.  2. The right ventricular systolic function is normal.  3. Trace mitral and tricuspid regurgitation.  4. Mild aortic regurgitation.  5. No pericardial effusion.  6. In comparison to the previous report dated 10/26/2009, the findings are  similar.    Prediabetes    Lab Results   Component Value Date    A1C 5.3 07/29/2021    A1C 5.4 06/05/2020    A1C 5.4 06/12/2019    A1C 5.6 05/16/2018    A1C 5.4 01/30/2018    A1C 5.6 02/14/2017     Hyperlipidemia Follow-Up      Are you regularly taking any medication or supplement to lower your cholesterol?   Yes- Atrovastatin    Are you having muscle aches or other side effects that you think could be caused by your cholesterol lowering medication?  No     Recent Labs   Lab Test 07/29/21  0744 06/05/20  0903 01/27/16  0709 07/08/15  0757 09/03/14  0745   CHOL 134 112   < > 146 159   HDL 36* 30*   < > 37* 34*   LDL 55 57   < > 79 72   TRIG 214* 125   < > 148 267*   CHOLHDLRATIO  --   --   --  3.9 4.7    < > = values in this interval not displayed.     Hypertension Follow-up      Do you check your blood pressure regularly outside of the clinic? Yes     Are you following a low salt diet? No    Are your blood pressures ever more than 140 on the top number (systolic) OR more   than 90 on the bottom number (diastolic), for example  140/90? No    BP Readings from Last 3 Encounters:   06/16/22 122/84   04/12/22 132/80   04/11/22 136/80     Creatinine   Date Value Ref Range Status   04/11/2022 0.90 0.66 - 1.25 mg/dL Final   03/23/2021 0.830 0.500 - 1.300 mg/dL Final     GFR Estimate   Date Value Ref Range Status   04/11/2022 >90 >60 mL/min/1.73m2 Final     Comment:     Effective December 21, 2021 eGFRcr in adults is calculated using the 2021 CKD-EPI creatinine equation which includes age and gender (Arden et al., NEJM, DOI: 10.1056/YIJHhj0713048)   01/31/2021 63 >60 mL/min/[1.73_m2] Final     Anxiety - improved with increased citalopram, to 20 mg    PHQ 7/30/2021 11/22/2021 5/12/2022   PHQ-9 Total Score 0 0 0   Q9: Thoughts of better off dead/self-harm past 2 weeks Not at all Not at all Not at all     LONNY-7 SCORE 11/22/2021 4/8/2022 5/12/2022   Total Score - - -   Total Score 0 (minimal anxiety) 7 (mild anxiety) 0 (minimal anxiety)   Total Score 0 7 0     GERD / Sullivan's    Controlled with prevacid  Due for EGD    Psoriatic arthritis / Psoriasis  - Dr Mccall    Overall controlled    Cholelithiasis    No current symptoms    Nephrolithiasis    No recent issues    Chronic rhinitis    Zyrtec/flonase/astelin    Today's PHQ-2 Score:   PHQ-2 ( 1999 Pfizer) 6/14/2022   Q1: Little interest or pleasure in doing things 0   Q2: Feeling down, depressed or hopeless 0   PHQ-2 Score 0   PHQ-2 Total Score (12-17 Years)- Positive if 3 or more points; Administer PHQ-A if positive -   Q1: Little interest or pleasure in doing things Not at all   Q2: Feeling down, depressed or hopeless Not at all   PHQ-2 Score 0     Abuse: Current or Past(Physical, Sexual or Emotional)- No  Do you feel safe in your environment? Yes    Social History     Tobacco Use     Smoking status: Never Smoker     Smokeless tobacco: Never Used   Substance Use Topics     Alcohol use: Yes     Types: 6 Standard drinks or equivalent per week     Comment: 6 drinks per week     If you drink alcohol do  you typically have >3 drinks per day or >7 drinks per week? No    Alcohol Use 6/16/2022   Prescreen: >3 drinks/day or >7 drinks/week? -   Prescreen: >3 drinks/day or >7 drinks/week? No     Last PSA:   PSA   Date Value Ref Range Status   10/08/2020 1.59 0 - 4 ug/L Final     Comment:     Assay Method:  Chemiluminescence using Siemens Vista analyzer     PSA Tumor Marker   Date Value Ref Range Status   07/29/2021 2.25 0.00 - 4.00 ug/L Final     Reviewed orders with patient. Reviewed health maintenance and updated orders accordingly - Yes    Reviewed and updated as needed this visit by clinical staff   Tobacco  Allergies  Meds                Reviewed and updated as needed this visit by Provider                   Review of Systems   Constitutional: Negative for chills and fever.   HENT: Negative for congestion, ear pain, hearing loss and sore throat.    Eyes: Negative for pain and visual disturbance.   Respiratory: Negative for cough and shortness of breath.    Cardiovascular: Negative for chest pain, palpitations and peripheral edema.   Gastrointestinal: Negative for abdominal pain, constipation, diarrhea, heartburn, hematochezia and nausea.   Genitourinary: Negative for dysuria, frequency, genital sores, hematuria, impotence, penile discharge and urgency.   Musculoskeletal: Negative for arthralgias, joint swelling and myalgias.   Skin: Negative for rash.   Neurological: Negative for dizziness, weakness, headaches and paresthesias.   Psychiatric/Behavioral: Negative for mood changes. The patient is not nervous/anxious.      Health Maintenance     Colonoscopy:  Due 8/2025   FIT:  given              PSA:  pending   DEXA:  NA    There are no preventive care reminders to display for this patient.    Current Problem List    Patient Active Problem List   Diagnosis     Other atopic dermatitis and related conditions     Mild intermittent asthma     Family history of coronary artery disease     Family history of prostate  cancer     Prediabetes     Health Care Home     GERD (gastroesophageal reflux disease)     Sullivan's esophagus     Anxiety     Hypertension goal BP (blood pressure) < 140/90     Post-thoracotomy pain syndrome     Nephrolithiasis     Coronary artery disease     Hyperlipidemia LDL goal <70     Environmental allergies     H/O coronary artery bypass surgery     Psoriatic arthritis (H)     Class 1 obesity with serious comorbidity and body mass index (BMI) of 32.0 to 32.9 in adult, unspecified obesity type     Latent tuberculosis     Pulmonary nodules     Fatty liver     Gallstone     Psoriasis       Past Medical History    Past Medical History:   Diagnosis Date     2019 novel coronavirus disease (COVID-19) 02/2021     Anxiety      Sullivan's esophagus 07/2011     Coronary artery disease 04/2008    cabg x 5     Environmental allergies     seasonal, hay fever     FAM HX-CARDIOVAS DIS NEC     dad at 45, cabg, stent     Family history of prostate cancer      Fatty liver      Gallstone      GERD (gastroesophageal reflux disease) 03/2011     History of blood transfusion      Hyperlipidemia LDL goal <70 2006     Hypertension goal BP (blood pressure) < 140/90 04/2008     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    Dr Saba, Hx histoplasmosis     Mild intermittent asthma 07/2003     Nephrolithiasis 6/13, 7/16    calcium oxalate - 6/2013, 7/2016, 1/2021     Other atopic dermatitis and related conditions 1980     Other diseases of lung, not elsewhere classified 04/2008    dr steel - benign bx and ct - granuloma - no further w/u needed     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    dr hoover     Prediabetes 08/2008     Psoriasis     Dr Mccall     Psoriatic arthritis (H)     Dr Mccall     Pulmonary nodules 2009    stable in 2012     Seasonal allergies 1980    Allergic rhinitis Hayfever       Past Surgical History    Past Surgical History:   Procedure Laterality Date     COLONOSCOPY N/A 08/07/2015    normal - due 10 yrs     ESOPHAGOSCOPY,  GASTROSCOPY, DUODENOSCOPY (EGD), COMBINED  6/11, 5/13    [facility] - ? gastritis, fowler's - due 3 yr     ESOPHAGOSCOPY, GASTROSCOPY, DUODENOSCOPY (EGD), COMBINED  05/10/2013    COMBINED ESOPHAGOSCOPY, GASTROSCOPY, DUODENOSCOPY (EGD), BIOPSY SINGLE OR MULTIPLE;  ESOPHAGOSCOPY, GASTROSCOPY, DUODENOSCOPY (EGD)  with biopsy;  Surgeon: Angus Reynolds MD;  Location:  GI     ESOPHAGOSCOPY, GASTROSCOPY, DUODENOSCOPY (EGD), COMBINED N/A 07/28/2017    Fowler's - recheck 3 yrs     HC VASECTOMY UNILAT/BILAT W POSTOP SEMEN  1995    Vasectomy     HERNIORRHAPHY UMBILICAL N/A 11/13/2014    Dr Barron     SURGICAL HISTORY OF -  Left 1980    lt patella fx     SURGICAL HISTORY OF -  Left 1985    lt thumb neuroma excised     SURGICAL HISTORY OF -   11/2009    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     ZZC CABG, ARTERY-VEIN, FIVE  04/2008    FSD - lung bx benign     ZZC STRESS TEST - REGULAR (FL)  9/06, 4/11    negative stress thallium - FSD       Current Medications    Current Outpatient Medications   Medication Sig Dispense Refill     albuterol (PROAIR HFA) 108 (90 Base) MCG/ACT inhaler Inhale 2 puffs into the lungs every 4 hours as needed for shortness of breath / dyspnea or wheezing 18 g 3     atorvastatin (LIPITOR) 80 MG tablet Take 1 tablet (80 mg) by mouth daily 90 tablet 3     azelastine (ASTELIN) 0.1 % nasal spray Spray 2 sprays into both nostrils 2 times daily 90 mL 3     carvedilol (COREG) 6.25 MG tablet Take 1 tablet (6.25 mg) by mouth 2 times daily (with meals) 180 tablet 3     cetirizine (ZYRTEC) 10 MG tablet Take 1 tablet (10 mg) by mouth every evening 90 tablet 3     citalopram (CELEXA) 20 MG tablet Take 1 tablet (20 mg) by mouth daily 90 tablet 3     fluticasone (FLONASE) 50 MCG/ACT nasal spray Spray 2 sprays into both nostrils daily 48 g 3     ASPIRIN 81 MG OR TABS ONE DAILY 100 3     clobetasol (TEMOVATE) 0.05 % external cream Apply topically 2 times daily 30 g 3     etanercept (ENBREL) 50 MG/ML  injection Inject 0.98 mLs (50 mg) Subcutaneous once a week       folic acid (FOLVITE) 1 MG tablet Take 1 tablet (1 mg) by mouth daily 90 tablet 3     hydrOXYzine (ATARAX) 25 MG tablet Take 1-2 tablets (25-50 mg) by mouth every 6 hours as needed for anxiety 30 tablet 3     ibuprofen (ADVIL/MOTRIN) 200 MG tablet Take 3 tablets (600 mg) by mouth every 8 hours as needed for mild pain 1 tablet 0     LANsoprazole (PREVACID) 30 MG DR capsule TAKE ONE CAPSULE BY MOUTH DAILY. TAKE 30 TO 60 MINUTES BEFORE A MEAL. 90 capsule 3     methotrexate 2.5 MG tablet TK 5 TS PO Q WK  0       Allergies    Allergies   Allergen Reactions     Seasonal Allergies        Immunizations    Immunization History   Administered Date(s) Administered     COVID-19,PF,Pfizer (12+ Yrs) 04/28/2021, 05/19/2021, 12/22/2021     COVID-19,PF,Pfizer 12+ Yrs (2022 and After) 05/26/2022     Flu, Unspecified 09/24/2015, 09/25/2018, 09/17/2019     Influenza (IIV3) PF 11/26/2012, 11/12/2014, 10/01/2016     Influenza Intranasal Vaccine 4 valent (FluMist) 10/01/2015     Influenza Quad, Recombinant, pf(RIV4) (Flublok) 10/08/2020, 12/22/2021     Influenza Vaccine IM > 6 months Valent IIV4 (Alfuria,Fluzone) 10/20/2015, 01/30/2018, 10/01/2018     Influenza Vaccine, 6+MO IM (QUADRIVALENT W/PRESERVATIVES) 09/24/2015, 09/25/2018, 09/17/2019     Influenza vaccine ages 6-35 months 09/24/2015, 09/25/2018     Pneumococcal 20 valent Conjugate (Prevnar 20) 06/13/2022     TDAP Vaccine (Boostrix) 11/25/2015     Tdap (Adacel,Boostrix) 04/28/2006     Zoster vaccine recombinant adjuvanted (SHINGRIX) 06/13/2022       Family History    Family History   Problem Relation Age of Onset     Prostate Cancer Father 69     Coronary Artery Disease Father 38     Hypertension Father      Neurologic Disorder Sister         CVA/aneurysm at age 19     Hypertension Paternal Grandfather      C.A.D. Paternal Grandfather      Heart Failure Paternal Grandfather      Colon Cancer No family hx of         Social History    Social History     Socioeconomic History     Marital status:      Spouse name: Imelda     Number of children: 0     Years of education: 14     Highest education level: Not on file   Occupational History     Comment: [employer]   Tobacco Use     Smoking status: Never Smoker     Smokeless tobacco: Never Used   Vaping Use     Vaping Use: Never used   Substance and Sexual Activity     Alcohol use: Yes     Types: 6 Standard drinks or equivalent per week     Comment: 6 drinks per week     Drug use: No     Sexual activity: Yes     Partners: Female     Birth control/protection: Male Surgical   Other Topics Concern      Service Not Asked     Blood Transfusions Not Asked     Caffeine Concern Yes     Comment: 1 can qd     Occupational Exposure Not Asked     Hobby Hazards Not Asked     Sleep Concern Not Asked     Stress Concern Not Asked     Weight Concern Not Asked     Special Diet Not Asked     Back Care Not Asked     Exercise Yes     Comment: occas     Bike Helmet Not Asked     Seat Belt Yes     Self-Exams Not Asked     Parent/sibling w/ CABG, MI or angioplasty before 65F 55M? Yes   Social History Narrative     Not on file     Social Determinants of Health     Financial Resource Strain: Not on file   Food Insecurity: Not on file   Transportation Needs: Not on file   Physical Activity: Not on file   Stress: Not on file   Social Connections: Not on file   Intimate Partner Violence: Not on file   Housing Stability: Not on file       ROS    CONSTITUTIONAL: NEGATIVE for fever, chills, change in weight  INTEGUMENTARY/SKIN: NEGATIVE for worrisome rashes, moles or lesions  EYES: NEGATIVE for vision changes or irritation  ENT/MOUTH: NEGATIVE for ear, mouth and throat problems  RESP: NEGATIVE for significant cough or SOB  BREAST: NEGATIVE for masses, tenderness or discharge  CV: NEGATIVE for chest pain, palpitations or peripheral edema  GI: NEGATIVE for nausea, abdominal pain, heartburn, or  "change in bowel habits  : NEGATIVE for frequency, dysuria, or hematuria  MUSCULOSKELETAL: NEGATIVE for significant arthralgias or myalgia  NEURO: NEGATIVE for weakness, dizziness or paresthesias  ENDOCRINE: NEGATIVE for temperature intolerance, skin/hair changes  HEME: NEGATIVE for bleeding problems  PSYCHIATRIC: NEGATIVE for changes in mood or affect    OBJECTIVE    /84   Pulse 85   Temp 97.8  F (36.6  C)   Resp 18   Ht 1.727 m (5' 8\")   Wt 95.7 kg (211 lb)   SpO2 97%   BMI 32.08 kg/m      EXAM:    GENERAL: healthy, alert and no distress  EYES: Eyes grossly normal to inspection, PERRL and conjunctivae and sclerae normal  HENT: ear canals and TM's normal, nose and mouth without ulcers or lesions  NECK: no adenopathy, no asymmetry, masses, or scars and thyroid normal to palpation  RESP: lungs clear to auscultation - no rales, rhonchi or wheezes  CV: regular rate and rhythm, normal S1 S2, no S3 or S4, no murmur, click or rub, no peripheral edema and peripheral pulses strong  ABDOMEN: soft, nontender, no hepatosplenomegaly, no masses and bowel sounds normal   (male): Pt Declines  RECTAL: Pt Declines  MS: no gross musculoskeletal defects noted, no edema  SKIN: no suspicious lesions or rashes  NEURO: Normal strength and tone, mentation intact and speech normal  PSYCH: mentation appears normal, affect normal/bright  LYMPH: no cervical, supraclavicular, axillary, or inguinal adenopathy    DIAGNOSTICS/PROCEDURES    Pending    ASSESSMENT      ICD-10-CM    1. Routine general medical examination at a health care facility  Z00.00 REVIEW OF HEALTH MAINTENANCE PROTOCOL ORDERS     Comprehensive metabolic panel     Lipid panel reflex to direct LDL Fasting     CBC with platelets     CK total     UA reflex to Microscopic and Culture     Albumin Random Urine Quantitative with Creat Ratio     TSH with free T4 reflex     Prostate Specific Antigen Screen     Fecal colorectal cancer screen FIT     Hemoglobin A1c     Fecal " colorectal cancer screen FIT     Comprehensive metabolic panel     Lipid panel reflex to direct LDL Fasting     CBC with platelets     CK total     UA reflex to Microscopic and Culture     Albumin Random Urine Quantitative with Creat Ratio     TSH with free T4 reflex     Prostate Specific Antigen Screen     Hemoglobin A1c     CANCELED: Fecal colorectal cancer screen FIT   2. Coronary artery disease involving coronary bypass graft of native heart without angina pectoris  I25.810 REVIEW OF HEALTH MAINTENANCE PROTOCOL ORDERS     Comprehensive metabolic panel     Lipid panel reflex to direct LDL Fasting     UA reflex to Microscopic and Culture     Albumin Random Urine Quantitative with Creat Ratio     Hemoglobin A1c     atorvastatin (LIPITOR) 80 MG tablet     carvedilol (COREG) 6.25 MG tablet     Comprehensive metabolic panel     Lipid panel reflex to direct LDL Fasting     UA reflex to Microscopic and Culture     Albumin Random Urine Quantitative with Creat Ratio     Hemoglobin A1c   3. H/O coronary artery bypass surgery  Z95.1 REVIEW OF HEALTH MAINTENANCE PROTOCOL ORDERS     Comprehensive metabolic panel     Lipid panel reflex to direct LDL Fasting     UA reflex to Microscopic and Culture     Albumin Random Urine Quantitative with Creat Ratio     Hemoglobin A1c     atorvastatin (LIPITOR) 80 MG tablet     carvedilol (COREG) 6.25 MG tablet     Comprehensive metabolic panel     Lipid panel reflex to direct LDL Fasting     UA reflex to Microscopic and Culture     Albumin Random Urine Quantitative with Creat Ratio     Hemoglobin A1c   4. Post-thoracotomy pain syndrome  G89.12 REVIEW OF HEALTH MAINTENANCE PROTOCOL ORDERS   5. Prediabetes  R73.03 REVIEW OF HEALTH MAINTENANCE PROTOCOL ORDERS     Comprehensive metabolic panel     Lipid panel reflex to direct LDL Fasting     UA reflex to Microscopic and Culture     Albumin Random Urine Quantitative with Creat Ratio     Hemoglobin A1c     atorvastatin (LIPITOR) 80 MG tablet      Comprehensive metabolic panel     Lipid panel reflex to direct LDL Fasting     UA reflex to Microscopic and Culture     Albumin Random Urine Quantitative with Creat Ratio     Hemoglobin A1c   6. Hypertension goal BP (blood pressure) < 140/90  I10 REVIEW OF HEALTH MAINTENANCE PROTOCOL ORDERS     Comprehensive metabolic panel     UA reflex to Microscopic and Culture     Albumin Random Urine Quantitative with Creat Ratio     Hemoglobin A1c     carvedilol (COREG) 6.25 MG tablet     Comprehensive metabolic panel     UA reflex to Microscopic and Culture     Albumin Random Urine Quantitative with Creat Ratio     Hemoglobin A1c   7. Hyperlipidemia LDL goal <70  E78.5 REVIEW OF HEALTH MAINTENANCE PROTOCOL ORDERS     Comprehensive metabolic panel     Lipid panel reflex to direct LDL Fasting     CK total     atorvastatin (LIPITOR) 80 MG tablet     Comprehensive metabolic panel     Lipid panel reflex to direct LDL Fasting     CK total   8. Family history of coronary artery disease  Z82.49 REVIEW OF HEALTH MAINTENANCE PROTOCOL ORDERS     Lipid panel reflex to direct LDL Fasting     Hemoglobin A1c     Lipid panel reflex to direct LDL Fasting     Hemoglobin A1c   9. Mild intermittent asthma without complication  J45.20 REVIEW OF HEALTH MAINTENANCE PROTOCOL ORDERS     Asthma Action Plan (AAP)     albuterol (PROAIR HFA) 108 (90 Base) MCG/ACT inhaler     azelastine (ASTELIN) 0.1 % nasal spray     cetirizine (ZYRTEC) 10 MG tablet     fluticasone (FLONASE) 50 MCG/ACT nasal spray   10. Abnormal PFTs  R94.2 REVIEW OF HEALTH MAINTENANCE PROTOCOL ORDERS   11. Pulmonary nodules  R91.8 REVIEW OF HEALTH MAINTENANCE PROTOCOL ORDERS   12. Latent tuberculosis  Z22.7 REVIEW OF HEALTH MAINTENANCE PROTOCOL ORDERS   13. History of COVID-19  Z86.16 REVIEW OF HEALTH MAINTENANCE PROTOCOL ORDERS   14. Environmental allergies  Z91.09 REVIEW OF HEALTH MAINTENANCE PROTOCOL ORDERS     albuterol (PROAIR HFA) 108 (90 Base) MCG/ACT inhaler     azelastine  (ASTELIN) 0.1 % nasal spray     cetirizine (ZYRTEC) 10 MG tablet     fluticasone (FLONASE) 50 MCG/ACT nasal spray   15. Anxiety  F41.9 REVIEW OF HEALTH MAINTENANCE PROTOCOL ORDERS     TSH with free T4 reflex     citalopram (CELEXA) 20 MG tablet     TSH with free T4 reflex   16. Gastroesophageal reflux disease without esophagitis  K21.9 REVIEW OF HEALTH MAINTENANCE PROTOCOL ORDERS     Adult Gastro Ref - Procedure Only     CBC with platelets     CBC with platelets   17. Sullivan's esophagus without dysplasia  K22.70 REVIEW OF HEALTH MAINTENANCE PROTOCOL ORDERS     Adult Gastro Ref - Procedure Only     CBC with platelets     CBC with platelets   18. Psoriatic arthritis (H)  L40.50 REVIEW OF HEALTH MAINTENANCE PROTOCOL ORDERS     Comprehensive metabolic panel     CBC with platelets     Comprehensive metabolic panel     CBC with platelets   19. Psoriasis  L40.9 REVIEW OF HEALTH MAINTENANCE PROTOCOL ORDERS     Comprehensive metabolic panel     CBC with platelets     Comprehensive metabolic panel     CBC with platelets   20. Nephrolithiasis  N20.0 REVIEW OF HEALTH MAINTENANCE PROTOCOL ORDERS     UA reflex to Microscopic and Culture     UA reflex to Microscopic and Culture   21. Fatty liver  K76.0 REVIEW OF HEALTH MAINTENANCE PROTOCOL ORDERS     US Abdomen Limited     Comprehensive metabolic panel     Comprehensive metabolic panel   22. Calculus of gallbladder without cholecystitis without obstruction  K80.20 US Abdomen Limited     Comprehensive metabolic panel     Comprehensive metabolic panel   23. Class 1 obesity with serious comorbidity and body mass index (BMI) of 32.0 to 32.9 in adult, unspecified obesity type  E66.9 REVIEW OF HEALTH MAINTENANCE PROTOCOL ORDERS    Z68.32    24. Family history of prostate cancer  Z80.42 REVIEW OF HEALTH MAINTENANCE PROTOCOL ORDERS     Prostate Specific Antigen Screen     Prostate Specific Antigen Screen   25. Screening for prostate cancer  Z12.5 REVIEW OF HEALTH MAINTENANCE PROTOCOL  ORDERS     Prostate Specific Antigen Screen     Prostate Specific Antigen Screen   26. Screen for colon cancer  Z12.11 REVIEW OF HEALTH MAINTENANCE PROTOCOL ORDERS     Fecal colorectal cancer screen FIT     Fecal colorectal cancer screen FIT     CANCELED: Fecal colorectal cancer screen FIT   27. Medication monitoring encounter  Z51.81 REVIEW OF HEALTH MAINTENANCE PROTOCOL ORDERS     Comprehensive metabolic panel     Lipid panel reflex to direct LDL Fasting     CBC with platelets     CK total     UA reflex to Microscopic and Culture     Albumin Random Urine Quantitative with Creat Ratio     TSH with free T4 reflex     Hemoglobin A1c     Comprehensive metabolic panel     Lipid panel reflex to direct LDL Fasting     CBC with platelets     CK total     UA reflex to Microscopic and Culture     Albumin Random Urine Quantitative with Creat Ratio     TSH with free T4 reflex     Hemoglobin A1c       PLAN    Discussed treatment/modality options, including risk and benefits, he desires:    advised alcohol consumption 1oz per day or less, advised aspirin 81 mg po daily, advised 1 multivitamin per day, advised calcium 8284-4214 mg/d and Vitamin D 800-1200 IU/d, advised dentist every 6 months, advised diet, exercise, and weight loss, advised opthalmologist every 1-2 years, advised self testicular exam q month, further diagnostic(s), further health care maintenance, further imaging, further lab(s), immunization(s), medication refill(s), ACT, Asthma Action Plan, completed and explained, LONNY 7, completed and reviewed, PHQ-9, Depression Action Plan, completed and reviewed and observation    Discussed controversies surrounding PSA. Specifically reviewed 2017 USPSTF findings recommending discussion of PSA testing for men ages 55-69.  Reviewed findings of the  Randomized Study of Screening for Prostate Cancer which showed a 30% reduction in advanced stage prostate cancer and a 20% reduction in death rate from prostate cancer in  "this age group. PSA-based screening may prevent up to 2 deaths and up to 3 cases of metastatic disease per 1,000 men screened over 13 years.    We've elected to do PSA this year after discussing these controversies.    All diagnosis above reviewed and noted above, otherwise stable.      See [facility] orders for further details.      1) Med refills - continue citalopram 20 mg    2) labs pending    3) immunizations reviewed    4) Dr Mccall    5) Cardiology    6) EGD    7) RUQ US    8) Pulm rehab, consider pulmonary consult    Return in about 1 month (around 7/16/2022) for Medication Recheck Visit, Follow Up Chronic, Follow Up Acute.    There are no preventive care reminders to display for this patient.    COUNSELING    Reviewed preventive health counseling, as reflected in patient instructions    BP Readings from Last 1 Encounters:   06/16/22 122/84     Estimated body mass index is 32.08 kg/m  as calculated from the following:    Height as of this encounter: 1.727 m (5' 8\").    Weight as of this encounter: 95.7 kg (211 lb).      Weight management plan: diet and exercise     reports that he has never smoked. He has never used smokeless tobacco.      Counseling Resources:    ATP IV Guidelines  Pooled Cohorts Equation Calculator  FRAX Risk Assessment  ICSI Preventive Guidelines  Dietary Guidelines for Americans, 2010  USDA's MyPlate  ASA Prophylaxis  Lung CA Screening           Fady Adams MD, FAAWinona [facility]  [address] [place] [id number]  [email or web address]   Office: [phone]  Fax: [phone]  Pager: [phone]     "

## 2022-06-16 NOTE — LETTER
[facility]  [address] [place] [address]  [phone]                    June 27, 2022    Mian Lozano  [address] [place] [address]      Dear Mian,    Here is a summary of your recent test results:  FIT test (screening test for colon cancer) was normal.     We advise:     FIT annually   Colonoscopy due in August, 2025.   Your test results are enclosed.      Please contact me if you have any questions.           Thank you very much for trusting [facility].     Healthy regards,          Fady Adams M.D.        Results for orders placed or performed in visit on 06/16/22   Fecal colorectal cancer screen FIT     Status: Normal   Result Value Ref Range    Occult Blood Screen FIT Negative Negative   Comprehensive metabolic panel     Status: Abnormal   Result Value Ref Range    Sodium 139 133 - 144 mmol/L    Potassium 4.9 3.4 - 5.3 mmol/L    Chloride 108 94 - 109 mmol/L    Carbon Dioxide (CO2) 30 20 - 32 mmol/L    Anion Gap 1 (L) 3 - 14 mmol/L    Urea Nitrogen 11 7 - 30 mg/dL    Creatinine 0.97 0.66 - 1.25 mg/dL    Calcium 9.3 8.5 - 10.1 mg/dL    Glucose 110 (H) 70 - 99 mg/dL    Alkaline Phosphatase 110 40 - 150 U/L    AST 32 0 - 45 U/L    ALT 34 0 - 70 U/L    Protein Total 7.1 6.8 - 8.8 g/dL    Albumin 3.4 3.4 - 5.0 g/dL    Bilirubin Total 0.9 0.2 - 1.3 mg/dL    GFR Estimate >90 >60 mL/min/1.73m2   Lipid panel reflex to direct LDL Fasting     Status: Abnormal   Result Value Ref Range    Cholesterol 115 <200 mg/dL    Triglycerides 157 (H) <150 mg/dL    Direct Measure HDL 40 >=40 mg/dL    LDL Cholesterol Calculated 44 <=100 mg/dL    Non HDL Cholesterol 75 <130 mg/dL    Patient Fasting > 8hrs? Yes     Narrative    Cholesterol  Desirable:  <200 mg/dL    Triglycerides  Normal:  Less than 150 mg/dL  Borderline High:  150-199 mg/dL  High:  200-499 mg/dL  Very High:  Greater than or equal to 500 mg/dL    Direct Measure HDL  Female:  Greater than or  equal to 50 mg/dL   Male:  Greater than or equal to 40 mg/dL    LDL Cholesterol  Desirable:  <100mg/dL  Above Desirable:  100-129 mg/dL   Borderline High:  130-159 mg/dL   High:  160-189 mg/dL   Very High:  >= 190 mg/dL    Non HDL Cholesterol  Desirable:  130 mg/dL  Above Desirable:  130-159 mg/dL  Borderline High:  160-189 mg/dL  High:  190-219 mg/dL  Very High:  Greater than or equal to 220 mg/dL   CBC with platelets     Status: Normal   Result Value Ref Range    WBC Count 6.4 4.0 - 11.0 10e3/uL    RBC Count 4.67 4.40 - 5.90 10e6/uL    Hemoglobin 15.0 13.3 - 17.7 g/dL    Hematocrit 43.3 40.0 - 53.0 %    MCV 93 78 - 100 fL    MCH 32.1 26.5 - 33.0 pg    MCHC 34.6 31.5 - 36.5 g/dL    RDW 13.3 10.0 - 15.0 %    Platelet Count 156 150 - 450 10e3/uL   CK total     Status: Normal   Result Value Ref Range     30 - 300 U/L   UA reflex to Microscopic and Culture     Status: Normal    Specimen: Urine, Midstream   Result Value Ref Range    Color Urine Yellow Colorless, Straw, Light Yellow, Yellow    Appearance Urine Clear Clear    Glucose Urine Negative Negative mg/dL    Bilirubin Urine Negative Negative    Ketones Urine Negative Negative mg/dL    Specific Gravity Urine 1.025 1.003 - 1.035    Blood Urine Negative Negative    pH Urine 6.0 5.0 - 7.0    Protein Albumin Urine Negative Negative mg/dL    Urobilinogen Urine 1.0 0.2, 1.0 E.U./dL    Nitrite Urine Negative Negative    Leukocyte Esterase Urine Negative Negative    Narrative    Microscopic not indicated   Albumin Random Urine Quantitative with Creat Ratio     Status: None   Result Value Ref Range    Creatinine Urine mg/dL 198 mg/dL    Albumin Urine mg/L 9 mg/L    Albumin Urine mg/g Cr 4.55 0.00 - 17.00 mg/g Cr   TSH with free T4 reflex     Status: Normal   Result Value Ref Range    TSH 2.96 0.40 - 4.00 mU/L   Prostate Specific Antigen Screen     Status: Normal   Result Value Ref Range    Prostate Specific Antigen Screen 1.09 0.00 - 4.00 ug/L   Hemoglobin A1c      Status: Normal   Result Value Ref Range    Hemoglobin A1C 5.4 0.0 - 5.6 %

## 2022-06-16 NOTE — LETTER
June 22, 2022      Mian JAELYN Blake  [address] [place] [address]        Dear ,    We are writing to inform you of your test results.    Your recent results have been reviewed.     They showed:     Labs are overall quite good     We advise:     Continue current cares.   Balanced low cholesterol diet.   Regular exercise.   Weight loss.     For additional lab test information, [email or web address] is an excellent reference.     Let us know if you have any questions or concerns.     Thank you for choosing us for your health care needs!        Resulted Orders   Comprehensive metabolic panel   Result Value Ref Range    Sodium 139 133 - 144 mmol/L    Potassium 4.9 3.4 - 5.3 mmol/L    Chloride 108 94 - 109 mmol/L    Carbon Dioxide (CO2) 30 20 - 32 mmol/L    Anion Gap 1 (L) 3 - 14 mmol/L    Urea Nitrogen 11 7 - 30 mg/dL    Creatinine 0.97 0.66 - 1.25 mg/dL    Calcium 9.3 8.5 - 10.1 mg/dL    Glucose 110 (H) 70 - 99 mg/dL    Alkaline Phosphatase 110 40 - 150 U/L    AST 32 0 - 45 U/L    ALT 34 0 - 70 U/L    Protein Total 7.1 6.8 - 8.8 g/dL    Albumin 3.4 3.4 - 5.0 g/dL    Bilirubin Total 0.9 0.2 - 1.3 mg/dL    GFR Estimate >90 >60 mL/min/1.73m2      Comment:      Effective December 21, 2021 eGFRcr in adults is calculated using the 2021 CKD-EPI creatinine equation which includes age and gender (Arden raza al., NEJM, DOI: 10.1056/PRLKwq1509040)   Lipid panel reflex to direct LDL Fasting   Result Value Ref Range    Cholesterol 115 <200 mg/dL    Triglycerides 157 (H) <150 mg/dL    Direct Measure HDL 40 >=40 mg/dL    LDL Cholesterol Calculated 44 <=100 mg/dL    Non HDL Cholesterol 75 <130 mg/dL    Patient Fasting > 8hrs? Yes     Narrative    Cholesterol  Desirable:  <200 mg/dL    Triglycerides  Normal:  Less than 150 mg/dL  Borderline High:  150-199 mg/dL  High:  200-499 mg/dL  Very High:  Greater than or equal to 500 mg/dL    Direct Measure HDL  Female:  Greater than or equal to 50 mg/dL   Male:  Greater than or equal  to 40 mg/dL    LDL Cholesterol  Desirable:  <100mg/dL  Above Desirable:  100-129 mg/dL   Borderline High:  130-159 mg/dL   High:  160-189 mg/dL   Very High:  >= 190 mg/dL    Non HDL Cholesterol  Desirable:  130 mg/dL  Above Desirable:  130-159 mg/dL  Borderline High:  160-189 mg/dL  High:  190-219 mg/dL  Very High:  Greater than or equal to 220 mg/dL   CBC with platelets   Result Value Ref Range    WBC Count 6.4 4.0 - 11.0 10e3/uL    RBC Count 4.67 4.40 - 5.90 10e6/uL    Hemoglobin 15.0 13.3 - 17.7 g/dL    Hematocrit 43.3 40.0 - 53.0 %    MCV 93 78 - 100 fL    MCH 32.1 26.5 - 33.0 pg    MCHC 34.6 31.5 - 36.5 g/dL    RDW 13.3 10.0 - 15.0 %    Platelet Count 156 150 - 450 10e3/uL   CK total   Result Value Ref Range     30 - 300 U/L   UA reflex to Microscopic and Culture   Result Value Ref Range    Color Urine Yellow Colorless, Straw, Light Yellow, Yellow    Appearance Urine Clear Clear    Glucose Urine Negative Negative mg/dL    Bilirubin Urine Negative Negative    Ketones Urine Negative Negative mg/dL    Specific Gravity Urine 1.025 1.003 - 1.035    Blood Urine Negative Negative    pH Urine 6.0 5.0 - 7.0    Protein Albumin Urine Negative Negative mg/dL    Urobilinogen Urine 1.0 0.2, 1.0 E.U./dL    Nitrite Urine Negative Negative    Leukocyte Esterase Urine Negative Negative    Narrative    Microscopic not indicated   Albumin Random Urine Quantitative with Creat Ratio   Result Value Ref Range    Creatinine Urine mg/dL 198 mg/dL    Albumin Urine mg/L 9 mg/L    Albumin Urine mg/g Cr 4.55 0.00 - 17.00 mg/g Cr   TSH with free T4 reflex   Result Value Ref Range    TSH 2.96 0.40 - 4.00 mU/L   Prostate Specific Antigen Screen   Result Value Ref Range    Prostate Specific Antigen Screen 1.09 0.00 - 4.00 ug/L   Hemoglobin A1c   Result Value Ref Range    Hemoglobin A1C 5.4 0.0 - 5.6 %      Comment:      Normal <5.7%   Prediabetes 5.7-6.4%    Diabetes 6.5% or higher     Note: Adopted from ADA consensus guidelines.       If  you have any questions or concerns, please call the clinic at the number listed above.       Sincerely,      Fady Adams MD

## 2022-06-22 PROCEDURE — 82274 ASSAY TEST FOR BLOOD FECAL: CPT | Performed by: FAMILY MEDICINE

## 2022-06-24 LAB — HEMOCCULT STL QL IA: NEGATIVE

## 2022-06-30 ENCOUNTER — HOSPITAL ENCOUNTER (OUTPATIENT)
Dept: CARDIAC REHAB | Facility: CLINIC | Age: 57
Discharge: HOME | End: 2022-06-30
Attending: NURSE PRACTITIONER
Payer: COMMERCIAL

## 2022-06-30 DIAGNOSIS — R06.02 SHORTNESS OF BREATH: ICD-10-CM

## 2022-06-30 DIAGNOSIS — G89.12 POST-THORACOTOMY PAIN SYNDROME: ICD-10-CM

## 2022-06-30 DIAGNOSIS — R94.2 ABNORMAL PFTS: ICD-10-CM

## 2022-06-30 PROCEDURE — G0237 THERAPEUTIC PROCD STRG ENDUR: HCPCS

## 2022-06-30 PROCEDURE — G0238 OTH RESP PROC, INDIV: HCPCS

## 2022-07-05 ENCOUNTER — HOSPITAL ENCOUNTER (OUTPATIENT)
Dept: CARDIAC REHAB | Facility: CLINIC | Age: 57
Discharge: HOME | End: 2022-07-05
Attending: NURSE PRACTITIONER
Payer: COMMERCIAL

## 2022-07-05 PROCEDURE — G0239 OTH RESP PROC, GROUP: HCPCS

## 2022-07-07 ENCOUNTER — HOSPITAL ENCOUNTER (OUTPATIENT)
Dept: CARDIAC REHAB | Facility: CLINIC | Age: 57
Discharge: HOME | End: 2022-07-07
Attending: NURSE PRACTITIONER
Payer: COMMERCIAL

## 2022-07-07 PROCEDURE — G0239 OTH RESP PROC, GROUP: HCPCS

## 2022-07-12 ENCOUNTER — HOSPITAL ENCOUNTER (OUTPATIENT)
Dept: CARDIAC REHAB | Facility: CLINIC | Age: 57
Discharge: HOME | End: 2022-07-12
Attending: NURSE PRACTITIONER
Payer: COMMERCIAL

## 2022-07-12 PROCEDURE — G0239 OTH RESP PROC, GROUP: HCPCS

## 2022-07-14 ENCOUNTER — HOSPITAL ENCOUNTER (OUTPATIENT)
Dept: CARDIAC REHAB | Facility: CLINIC | Age: 57
Discharge: HOME | End: 2022-07-14
Attending: NURSE PRACTITIONER
Payer: COMMERCIAL

## 2022-07-14 PROCEDURE — G0239 OTH RESP PROC, GROUP: HCPCS

## 2022-07-19 ENCOUNTER — HOSPITAL ENCOUNTER (OUTPATIENT)
Dept: CARDIAC REHAB | Facility: CLINIC | Age: 57
Setting detail: THERAPIES SERIES
Discharge: HOME | End: 2022-07-19
Attending: NURSE PRACTITIONER
Payer: COMMERCIAL

## 2022-07-19 PROCEDURE — G0239 OTH RESP PROC, GROUP: HCPCS

## 2022-07-21 ENCOUNTER — HOSPITAL ENCOUNTER (OUTPATIENT)
Dept: CARDIAC REHAB | Facility: CLINIC | Age: 57
Setting detail: THERAPIES SERIES
Discharge: HOME | End: 2022-07-21
Attending: NURSE PRACTITIONER
Payer: COMMERCIAL

## 2022-07-21 PROCEDURE — G0239 OTH RESP PROC, GROUP: HCPCS

## 2022-08-02 ENCOUNTER — HOSPITAL ENCOUNTER (OUTPATIENT)
Dept: CARDIAC REHAB | Facility: CLINIC | Age: 57
Discharge: HOME | End: 2022-08-02
Attending: PHYSICIAN ASSISTANT
Payer: COMMERCIAL

## 2022-08-02 ENCOUNTER — TRANSFERRED RECORDS (OUTPATIENT)
Dept: HEALTH INFORMATION MANAGEMENT | Facility: CLINIC | Age: 57
End: 2022-08-02

## 2022-08-02 PROCEDURE — G0238 OTH RESP PROC, INDIV: HCPCS

## 2022-08-04 ENCOUNTER — HOSPITAL ENCOUNTER (OUTPATIENT)
Dept: CARDIAC REHAB | Facility: CLINIC | Age: 57
Discharge: HOME | End: 2022-08-04
Attending: NURSE PRACTITIONER
Payer: COMMERCIAL

## 2022-08-04 PROCEDURE — G0239 OTH RESP PROC, GROUP: HCPCS

## 2022-08-08 DIAGNOSIS — Z51.81 MEDICATION MONITORING ENCOUNTER: ICD-10-CM

## 2022-08-09 ENCOUNTER — HOSPITAL ENCOUNTER (OUTPATIENT)
Dept: CARDIAC REHAB | Facility: CLINIC | Age: 57
Discharge: HOME | End: 2022-08-09
Attending: NURSE PRACTITIONER
Payer: COMMERCIAL

## 2022-08-09 PROCEDURE — G0239 OTH RESP PROC, GROUP: HCPCS

## 2022-08-11 RX ORDER — FOLIC ACID 1 MG/1
TABLET ORAL
Qty: 90 TABLET | Refills: 0 | Status: SHIPPED | OUTPATIENT
Start: 2022-08-11 | End: 2024-06-25

## 2022-08-16 ENCOUNTER — HOSPITAL ENCOUNTER (OUTPATIENT)
Dept: CARDIAC REHAB | Facility: CLINIC | Age: 57
Discharge: HOME | End: 2022-08-16
Attending: NURSE PRACTITIONER
Payer: COMMERCIAL

## 2022-08-16 PROCEDURE — G0239 OTH RESP PROC, GROUP: HCPCS

## 2022-08-18 ENCOUNTER — HOSPITAL ENCOUNTER (OUTPATIENT)
Dept: CARDIAC REHAB | Facility: CLINIC | Age: 57
Discharge: HOME | End: 2022-08-18
Attending: NURSE PRACTITIONER
Payer: COMMERCIAL

## 2022-08-18 PROCEDURE — G0239 OTH RESP PROC, GROUP: HCPCS

## 2022-08-23 ENCOUNTER — HOSPITAL ENCOUNTER (OUTPATIENT)
Dept: CARDIAC REHAB | Facility: CLINIC | Age: 57
Discharge: HOME | End: 2022-08-23
Attending: NURSE PRACTITIONER
Payer: COMMERCIAL

## 2022-08-23 PROCEDURE — G0239 OTH RESP PROC, GROUP: HCPCS

## 2022-08-25 ENCOUNTER — HOSPITAL ENCOUNTER (OUTPATIENT)
Dept: CARDIAC REHAB | Facility: CLINIC | Age: 57
Discharge: HOME | End: 2022-08-25
Attending: NURSE PRACTITIONER
Payer: COMMERCIAL

## 2022-08-25 PROCEDURE — G0239 OTH RESP PROC, GROUP: HCPCS

## 2022-09-06 ENCOUNTER — TRANSFERRED RECORDS (OUTPATIENT)
Dept: HEALTH INFORMATION MANAGEMENT | Facility: CLINIC | Age: 57
End: 2022-09-06

## 2022-09-06 ENCOUNTER — HOSPITAL ENCOUNTER (OUTPATIENT)
Dept: CARDIAC REHAB | Facility: CLINIC | Age: 57
Discharge: HOME | End: 2022-09-06
Attending: NURSE PRACTITIONER
Payer: COMMERCIAL

## 2022-09-06 LAB
ALT SERPL-CCNC: 46 IU/L (ref 5–40)
AST SERPL-CCNC: 52 U/L (ref 5–34)
CREATININE (EXTERNAL): 0.92 MG/DL (ref 0.5–1.3)
GFR ESTIMATED (EXTERNAL): 90.1 ML/MIN/1.73M2

## 2022-09-06 PROCEDURE — G0238 OTH RESP PROC, INDIV: HCPCS | Performed by: REHABILITATION PRACTITIONER

## 2022-10-15 ENCOUNTER — HEALTH MAINTENANCE LETTER (OUTPATIENT)
Age: 57
End: 2022-10-15

## 2022-10-27 ENCOUNTER — ANCILLARY PROCEDURE (OUTPATIENT)
Dept: GENERAL RADIOLOGY | Facility: CLINIC | Age: 57
End: 2022-10-27
Attending: FAMILY MEDICINE
Payer: COMMERCIAL

## 2022-10-27 ENCOUNTER — OFFICE VISIT (OUTPATIENT)
Dept: FAMILY MEDICINE | Facility: CLINIC | Age: 57
End: 2022-10-27
Payer: COMMERCIAL

## 2022-10-27 VITALS
TEMPERATURE: 97.3 F | HEART RATE: 76 BPM | DIASTOLIC BLOOD PRESSURE: 78 MMHG | WEIGHT: 210.7 LBS | BODY MASS INDEX: 31.93 KG/M2 | OXYGEN SATURATION: 98 % | HEIGHT: 68 IN | SYSTOLIC BLOOD PRESSURE: 124 MMHG

## 2022-10-27 DIAGNOSIS — Z23 NEED FOR IMMUNIZATION AGAINST INFLUENZA: ICD-10-CM

## 2022-10-27 DIAGNOSIS — M54.6 ACUTE LEFT-SIDED THORACIC BACK PAIN: Primary | ICD-10-CM

## 2022-10-27 DIAGNOSIS — R07.81 RIB PAIN: ICD-10-CM

## 2022-10-27 DIAGNOSIS — M54.6 ACUTE LEFT-SIDED THORACIC BACK PAIN: ICD-10-CM

## 2022-10-27 DIAGNOSIS — Z23 HIGH PRIORITY FOR 2019-NCOV VACCINE: ICD-10-CM

## 2022-10-27 PROCEDURE — 91312 COVID-19,PF,PFIZER BOOSTER BIVALENT: CPT | Performed by: FAMILY MEDICINE

## 2022-10-27 PROCEDURE — 90471 IMMUNIZATION ADMIN: CPT | Performed by: FAMILY MEDICINE

## 2022-10-27 PROCEDURE — 72072 X-RAY EXAM THORAC SPINE 3VWS: CPT | Mod: TC | Performed by: RADIOLOGY

## 2022-10-27 PROCEDURE — 0124A COVID-19,PF,PFIZER BOOSTER BIVALENT: CPT | Performed by: FAMILY MEDICINE

## 2022-10-27 PROCEDURE — 90682 RIV4 VACC RECOMBINANT DNA IM: CPT | Performed by: FAMILY MEDICINE

## 2022-10-27 PROCEDURE — 99213 OFFICE O/P EST LOW 20 MIN: CPT | Mod: 25 | Performed by: FAMILY MEDICINE

## 2022-10-27 PROCEDURE — 71101 X-RAY EXAM UNILAT RIBS/CHEST: CPT | Mod: TC | Performed by: RADIOLOGY

## 2022-10-27 RX ORDER — CYCLOBENZAPRINE HCL 5 MG
5-10 TABLET ORAL 3 TIMES DAILY PRN
Qty: 30 TABLET | Refills: 0 | Status: SHIPPED | OUTPATIENT
Start: 2022-10-27 | End: 2023-06-22
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
## 2023-02-01 ENCOUNTER — TRANSFERRED RECORDS (OUTPATIENT)
Dept: HEALTH INFORMATION MANAGEMENT | Facility: CLINIC | Age: 58
End: 2023-02-01
Payer: COMMERCIAL

## 2023-02-01 LAB
ALT SERPL-CCNC: 47 IU/L (ref 5–40)
AST SERPL-CCNC: 47 U/L (ref 5–34)
CREATININE (EXTERNAL): 0.97 MG/DL (ref 0.5–1.3)
HEP C HIM: NORMAL

## 2023-06-21 ASSESSMENT — ASTHMA QUESTIONNAIRES
ACT_TOTALSCORE: 22
ACT_TOTALSCORE: 22
QUESTION_1 LAST FOUR WEEKS HOW MUCH OF THE TIME DID YOUR ASTHMA KEEP YOU FROM GETTING AS MUCH DONE AT WORK, SCHOOL OR AT HOME: NONE OF THE TIME
QUESTION_2 LAST FOUR WEEKS HOW OFTEN HAVE YOU HAD SHORTNESS OF BREATH: ONCE OR TWICE A WEEK
QUESTION_4 LAST FOUR WEEKS HOW OFTEN HAVE YOU USED YOUR RESCUE INHALER OR NEBULIZER MEDICATION (SUCH AS ALBUTEROL): ONCE A WEEK OR LESS
QUESTION_3 LAST FOUR WEEKS HOW OFTEN DID YOUR ASTHMA SYMPTOMS (WHEEZING, COUGHING, SHORTNESS OF BREATH, CHEST TIGHTNESS OR PAIN) WAKE YOU UP AT NIGHT OR EARLIER THAN USUAL IN THE MORNING: NOT AT ALL
QUESTION_5 LAST FOUR WEEKS HOW WOULD YOU RATE YOUR ASTHMA CONTROL: WELL CONTROLLED

## 2023-06-21 ASSESSMENT — ENCOUNTER SYMPTOMS
HEMATOCHEZIA: 0
FREQUENCY: 0
CHILLS: 0
WEAKNESS: 0
JOINT SWELLING: 0
MYALGIAS: 0
COUGH: 0
NAUSEA: 0
CONSTIPATION: 0
DYSURIA: 0
EYE PAIN: 0
SHORTNESS OF BREATH: 0
DIARRHEA: 0
NERVOUS/ANXIOUS: 0
PALPITATIONS: 0
DIZZINESS: 0
HEARTBURN: 0
FEVER: 0
SORE THROAT: 0
HEADACHES: 0
ABDOMINAL PAIN: 0
ARTHRALGIAS: 0
PARESTHESIAS: 0
HEMATURIA: 0

## 2023-06-22 ENCOUNTER — OFFICE VISIT (OUTPATIENT)
Dept: FAMILY MEDICINE | Facility: CLINIC | Age: 58
End: 2023-06-22
Payer: COMMERCIAL

## 2023-06-22 ENCOUNTER — TELEPHONE (OUTPATIENT)
Dept: FAMILY MEDICINE | Facility: CLINIC | Age: 58
End: 2023-06-22

## 2023-06-22 VITALS
HEIGHT: 68 IN | HEART RATE: 90 BPM | DIASTOLIC BLOOD PRESSURE: 80 MMHG | TEMPERATURE: 96.8 F | RESPIRATION RATE: 16 BRPM | SYSTOLIC BLOOD PRESSURE: 130 MMHG | WEIGHT: 210 LBS | BODY MASS INDEX: 31.83 KG/M2 | OXYGEN SATURATION: 98 %

## 2023-06-22 DIAGNOSIS — Z82.49 FAMILY HISTORY OF CORONARY ARTERY DISEASE: ICD-10-CM

## 2023-06-22 DIAGNOSIS — E66.811 CLASS 1 OBESITY WITH SERIOUS COMORBIDITY AND BODY MASS INDEX (BMI) OF 31.0 TO 31.9 IN ADULT, UNSPECIFIED OBESITY TYPE: ICD-10-CM

## 2023-06-22 DIAGNOSIS — L40.9 PSORIASIS: ICD-10-CM

## 2023-06-22 DIAGNOSIS — R73.03 PREDIABETES: ICD-10-CM

## 2023-06-22 DIAGNOSIS — K21.9 GASTROESOPHAGEAL REFLUX DISEASE WITHOUT ESOPHAGITIS: ICD-10-CM

## 2023-06-22 DIAGNOSIS — Z12.5 SCREENING FOR PROSTATE CANCER: ICD-10-CM

## 2023-06-22 DIAGNOSIS — N20.0 NEPHROLITHIASIS: ICD-10-CM

## 2023-06-22 DIAGNOSIS — K22.70 BARRETT'S ESOPHAGUS WITHOUT DYSPLASIA: ICD-10-CM

## 2023-06-22 DIAGNOSIS — I10 HYPERTENSION GOAL BP (BLOOD PRESSURE) < 140/90: ICD-10-CM

## 2023-06-22 DIAGNOSIS — Z00.00 ROUTINE GENERAL MEDICAL EXAMINATION AT A HEALTH CARE FACILITY: Primary | ICD-10-CM

## 2023-06-22 DIAGNOSIS — K76.0 FATTY LIVER: ICD-10-CM

## 2023-06-22 DIAGNOSIS — Z12.11 SCREEN FOR COLON CANCER: ICD-10-CM

## 2023-06-22 DIAGNOSIS — R91.8 PULMONARY NODULES: ICD-10-CM

## 2023-06-22 DIAGNOSIS — Z51.81 MEDICATION MONITORING ENCOUNTER: ICD-10-CM

## 2023-06-22 DIAGNOSIS — Z80.42 FAMILY HISTORY OF PROSTATE CANCER: ICD-10-CM

## 2023-06-22 DIAGNOSIS — Z95.1 H/O CORONARY ARTERY BYPASS SURGERY: ICD-10-CM

## 2023-06-22 DIAGNOSIS — G89.12 POST-THORACOTOMY PAIN SYNDROME: ICD-10-CM

## 2023-06-22 DIAGNOSIS — L40.50 PSORIATIC ARTHRITIS (H): ICD-10-CM

## 2023-06-22 DIAGNOSIS — Z91.09 ENVIRONMENTAL ALLERGIES: ICD-10-CM

## 2023-06-22 DIAGNOSIS — E78.5 HYPERLIPIDEMIA LDL GOAL <70: ICD-10-CM

## 2023-06-22 DIAGNOSIS — J45.20 MILD INTERMITTENT ASTHMA WITHOUT COMPLICATION: ICD-10-CM

## 2023-06-22 DIAGNOSIS — I25.810 CORONARY ARTERY DISEASE INVOLVING CORONARY BYPASS GRAFT OF NATIVE HEART WITHOUT ANGINA PECTORIS: ICD-10-CM

## 2023-06-22 DIAGNOSIS — F41.9 ANXIETY: ICD-10-CM

## 2023-06-22 LAB
ALBUMIN SERPL BCG-MCNC: 4.1 G/DL (ref 3.5–5.2)
ALBUMIN UR-MCNC: NEGATIVE MG/DL
ALP SERPL-CCNC: 118 U/L (ref 40–129)
ALT SERPL W P-5'-P-CCNC: 41 U/L (ref 0–70)
ANION GAP SERPL CALCULATED.3IONS-SCNC: 10 MMOL/L (ref 7–15)
APPEARANCE UR: CLEAR
AST SERPL W P-5'-P-CCNC: 42 U/L (ref 0–45)
BACTERIA #/AREA URNS HPF: ABNORMAL /HPF
BILIRUB SERPL-MCNC: 0.7 MG/DL
BILIRUB UR QL STRIP: NEGATIVE
BUN SERPL-MCNC: 9.7 MG/DL (ref 6–20)
CALCIUM SERPL-MCNC: 9.7 MG/DL (ref 8.6–10)
CHLORIDE SERPL-SCNC: 103 MMOL/L (ref 98–107)
CHOLEST SERPL-MCNC: 150 MG/DL
CK SERPL-CCNC: 42 U/L (ref 39–308)
COLOR UR AUTO: YELLOW
CREAT SERPL-MCNC: 1.1 MG/DL (ref 0.67–1.17)
CREAT UR-MCNC: 173 MG/DL
DEPRECATED HCO3 PLAS-SCNC: 27 MMOL/L (ref 22–29)
ERYTHROCYTE [DISTWIDTH] IN BLOOD BY AUTOMATED COUNT: 13 % (ref 10–15)
GFR SERPL CREATININE-BSD FRML MDRD: 78 ML/MIN/1.73M2
GLUCOSE SERPL-MCNC: 126 MG/DL (ref 70–99)
GLUCOSE UR STRIP-MCNC: NEGATIVE MG/DL
HBA1C MFR BLD: 5.5 % (ref 0–5.6)
HCT VFR BLD AUTO: 43.3 % (ref 40–53)
HDLC SERPL-MCNC: 36 MG/DL
HGB BLD-MCNC: 14.9 G/DL (ref 13.3–17.7)
HGB UR QL STRIP: ABNORMAL
HYALINE CASTS #/AREA URNS LPF: ABNORMAL /LPF
KETONES UR STRIP-MCNC: NEGATIVE MG/DL
LDLC SERPL CALC-MCNC: 70 MG/DL
LEUKOCYTE ESTERASE UR QL STRIP: NEGATIVE
MCH RBC QN AUTO: 31.8 PG (ref 26.5–33)
MCHC RBC AUTO-ENTMCNC: 34.4 G/DL (ref 31.5–36.5)
MCV RBC AUTO: 93 FL (ref 78–100)
MICROALBUMIN UR-MCNC: <12 MG/L
MICROALBUMIN/CREAT UR: NORMAL MG/G{CREAT}
NITRATE UR QL: NEGATIVE
NONHDLC SERPL-MCNC: 114 MG/DL
PH UR STRIP: 7 [PH] (ref 5–7)
PLATELET # BLD AUTO: 190 10E3/UL (ref 150–450)
POTASSIUM SERPL-SCNC: 4.8 MMOL/L (ref 3.4–5.3)
PROT SERPL-MCNC: 7 G/DL (ref 6.4–8.3)
PSA SERPL DL<=0.01 NG/ML-MCNC: 0.73 NG/ML (ref 0–3.5)
RBC # BLD AUTO: 4.68 10E6/UL (ref 4.4–5.9)
RBC #/AREA URNS AUTO: ABNORMAL /HPF
SODIUM SERPL-SCNC: 140 MMOL/L (ref 136–145)
SP GR UR STRIP: 1.02 (ref 1–1.03)
TRIGL SERPL-MCNC: 222 MG/DL
TSH SERPL DL<=0.005 MIU/L-ACNC: 2.06 UIU/ML (ref 0.3–4.2)
UROBILINOGEN UR STRIP-ACNC: 1 E.U./DL
WBC # BLD AUTO: 5.8 10E3/UL (ref 4–11)
WBC #/AREA URNS AUTO: ABNORMAL /HPF

## 2023-06-22 PROCEDURE — 81001 URINALYSIS AUTO W/SCOPE: CPT | Performed by: FAMILY MEDICINE

## 2023-06-22 PROCEDURE — 82550 ASSAY OF CK (CPK): CPT | Performed by: FAMILY MEDICINE

## 2023-06-22 PROCEDURE — 83036 HEMOGLOBIN GLYCOSYLATED A1C: CPT | Performed by: FAMILY MEDICINE

## 2023-06-22 PROCEDURE — 82043 UR ALBUMIN QUANTITATIVE: CPT | Performed by: FAMILY MEDICINE

## 2023-06-22 PROCEDURE — 99214 OFFICE O/P EST MOD 30 MIN: CPT | Mod: 25 | Performed by: FAMILY MEDICINE

## 2023-06-22 PROCEDURE — 99396 PREV VISIT EST AGE 40-64: CPT | Performed by: FAMILY MEDICINE

## 2023-06-22 PROCEDURE — 82570 ASSAY OF URINE CREATININE: CPT | Performed by: FAMILY MEDICINE

## 2023-06-22 PROCEDURE — 84443 ASSAY THYROID STIM HORMONE: CPT | Performed by: FAMILY MEDICINE

## 2023-06-22 PROCEDURE — G0103 PSA SCREENING: HCPCS | Performed by: FAMILY MEDICINE

## 2023-06-22 PROCEDURE — 36415 COLL VENOUS BLD VENIPUNCTURE: CPT | Performed by: FAMILY MEDICINE

## 2023-06-22 PROCEDURE — 80053 COMPREHEN METABOLIC PANEL: CPT | Performed by: FAMILY MEDICINE

## 2023-06-22 PROCEDURE — 85027 COMPLETE CBC AUTOMATED: CPT | Performed by: FAMILY MEDICINE

## 2023-06-22 PROCEDURE — 80061 LIPID PANEL: CPT | Performed by: FAMILY MEDICINE

## 2023-06-22 RX ORDER — CETIRIZINE HYDROCHLORIDE 10 MG/1
10 TABLET ORAL EVERY EVENING
Qty: 90 TABLET | Refills: 3 | Status: SHIPPED | OUTPATIENT
Start: 2023-06-22 | End: 2024-06-25

## 2023-06-22 RX ORDER — AZELASTINE 1 MG/ML
2 SPRAY, METERED NASAL 2 TIMES DAILY
Qty: 90 ML | Refills: 3 | Status: SHIPPED | OUTPATIENT
Start: 2023-06-22 | End: 2024-06-25

## 2023-06-22 RX ORDER — CITALOPRAM HYDROBROMIDE 20 MG/1
20 TABLET ORAL DAILY
Qty: 90 TABLET | Refills: 3 | Status: SHIPPED | OUTPATIENT
Start: 2023-06-22 | End: 2024-06-25

## 2023-06-22 RX ORDER — LANSOPRAZOLE 30 MG/1
CAPSULE, DELAYED RELEASE ORAL
Qty: 90 CAPSULE | Refills: 3 | Status: SHIPPED | OUTPATIENT
Start: 2023-06-22 | End: 2024-06-25

## 2023-06-22 RX ORDER — CARVEDILOL 6.25 MG/1
6.25 TABLET ORAL 2 TIMES DAILY WITH MEALS
Qty: 180 TABLET | Refills: 3 | Status: SHIPPED | OUTPATIENT
Start: 2023-06-22 | End: 2024-06-25

## 2023-06-22 RX ORDER — ALBUTEROL SULFATE 90 UG/1
2 AEROSOL, METERED RESPIRATORY (INHALATION) EVERY 4 HOURS PRN
Qty: 18 G | Refills: 3 | Status: SHIPPED | OUTPATIENT
Start: 2023-06-22 | End: 2024-06-25

## 2023-06-22 RX ORDER — ATORVASTATIN CALCIUM 80 MG/1
80 TABLET, FILM COATED ORAL DAILY
Qty: 90 TABLET | Refills: 3 | Status: SHIPPED | OUTPATIENT
Start: 2023-06-22 | End: 2024-06-25

## 2023-06-22 RX ORDER — FLUTICASONE PROPIONATE 50 MCG
2 SPRAY, SUSPENSION (ML) NASAL DAILY
Qty: 48 G | Refills: 3 | Status: SHIPPED | OUTPATIENT
Start: 2023-06-22 | End: 2024-06-25

## 2023-06-22 ASSESSMENT — ENCOUNTER SYMPTOMS
PALPITATIONS: 0
FEVER: 0
SHORTNESS OF BREATH: 0
DYSURIA: 0
CHILLS: 0
ABDOMINAL PAIN: 0
HEADACHES: 0
FREQUENCY: 0
HEMATURIA: 0
CONSTIPATION: 0
MYALGIAS: 0
HEARTBURN: 0
EYE PAIN: 0
SORE THROAT: 0
COUGH: 0
WEAKNESS: 0
DIARRHEA: 0
DIZZINESS: 0
HEMATOCHEZIA: 0
JOINT SWELLING: 0
PARESTHESIAS: 0
NERVOUS/ANXIOUS: 0
NAUSEA: 0
ARTHRALGIAS: 0

## 2023-06-22 ASSESSMENT — PATIENT HEALTH QUESTIONNAIRE - PHQ9
SUM OF ALL RESPONSES TO PHQ QUESTIONS 1-9: 0
5. POOR APPETITE OR OVEREATING: NOT AT ALL

## 2023-06-22 ASSESSMENT — ANXIETY QUESTIONNAIRES
GAD7 TOTAL SCORE: 0
3. WORRYING TOO MUCH ABOUT DIFFERENT THINGS: NOT AT ALL
1. FEELING NERVOUS, ANXIOUS, OR ON EDGE: NOT AT ALL
GAD7 TOTAL SCORE: 0
6. BECOMING EASILY ANNOYED OR IRRITABLE: NOT AT ALL
IF YOU CHECKED OFF ANY PROBLEMS ON THIS QUESTIONNAIRE, HOW DIFFICULT HAVE THESE PROBLEMS MADE IT FOR YOU TO DO YOUR WORK, TAKE CARE OF THINGS AT HOME, OR GET ALONG WITH OTHER PEOPLE: NOT DIFFICULT AT ALL
2. NOT BEING ABLE TO STOP OR CONTROL WORRYING: NOT AT ALL
7. FEELING AFRAID AS IF SOMETHING AWFUL MIGHT HAPPEN: NOT AT ALL
5. BEING SO RESTLESS THAT IT IS HARD TO SIT STILL: NOT AT ALL

## 2023-06-22 NOTE — PROGRESS NOTES
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 A1c     UA Microscopic with Reflex to Culture     PRIMARY CARE FOLLOW-UP SCHEDULING          PLAN    Discussed treatment/modality options, including risk and benefits, he desires:    advised alcohol consumption 1oz per day or less, advised aspirin 81 mg po daily, advised 1 multivitamin per day, advised calcium 1165-1067 mg/d and Vitamin D 800-1200 IU/d, advised dentist every 6 months, advised diet, exercise, and weight loss, advised opthalmologist every 1-2 years, advised self testicular exam q month, further health care maintenance, further lab(s), immunization(s), medication refill(s) and observation    Discussed controversies surrounding PSA. Specifically reviewed 2017 USPSTF findings recommending discussion of PSA testing for men ages 55-69.  Reviewed findings of the  Randomized Study of Screening for Prostate Cancer which showed a 30% reduction in advanced stage prostate cancer and a 20% reduction in death rate from prostate cancer in this age group. PSA-based screening may prevent up to 2 deaths and up to 3 cases of metastatic disease per 1,000 men screened over 13 years.    We've elected to do PSA this year after discussing these controversies.    All diagnosis above reviewed and noted above, otherwise stable.      See [facility] orders for further  "details.      1) med refills    2) labs pending    3) immunizations  - consider COVID Flu in fall    4) EGD    5) Dr Mccall    6) Dr Welch / recent echo / stress    Return in about 1 year (around 6/22/2024) for Complete Physical, Medication Recheck Visit, Follow Up Chronic.    Health Maintenance Due   Topic Date Due     HEPATITIS B IMMUNIZATION (1 of 3 - 3-dose series) Never done     BMP  06/16/2023     LIPID  06/16/2023     MICROALBUMIN  06/16/2023     PSA  06/16/2023     ASTHMA ACTION PLAN  06/16/2023       COUNSELING    Reviewed preventive health counseling, as reflected in patient instructions    BP Readings from Last 1 Encounters:   06/22/23 130/80     Estimated body mass index is 31.93 kg/m  as calculated from the following:    Height as of this encounter: 1.727 m (5' 8\").    Weight as of this encounter: 95.3 kg (210 lb).      Weight management plan: diet and exercise     reports that he has quit smoking. His smoking use included cigars. He has never used smokeless tobacco.      Counseling Resources:    ATP IV Guidelines  Pooled Cohorts Equation Calculator  FRAX Risk Assessment  ICSI Preventive Guidelines  Dietary Guidelines for Americans, 2010  USDA's MyPlate  ASA Prophylaxis  Lung CA Screening           Fady Adams MD, FAAFP     [facility]  [address] [place] [address]  [email or web address]   Office: [phone]  Fax: [phone]  Pager: [phone]     "

## 2023-06-22 NOTE — TELEPHONE ENCOUNTER
Prior Authorization Retail Medication Request    Medication/Dose:  LANsoprazole (PREVACID) 30 MG DR capsule  ICD code (if different than what is on RX):     Previously Tried and Failed:     Rationale:       Insurance Name:   [employer]  Insurance ID:   [id number]  BIN:  [id number]  PCN:  [id number]  Group:  [id number]  [phone]      Pharmacy Information (if different than what is on RX)  Name:     Phone:

## 2023-06-22 NOTE — PATIENT INSTRUCTIONS
Preventive Health Recommendations  Male Ages 50 - 64    Yearly exam:             See your health care provider every year in order to  o   Review health changes.   o   Discuss preventive care.    o   Review your medicines if your doctor has prescribed any.   Have a cholesterol test every 5 years, or more frequently if you are at risk for high cholesterol/heart disease.   Have a diabetes test (fasting glucose) every three years. If you are at risk for diabetes, you should have this test more often.   Have a colonoscopy at age 50, or have a yearly FIT test (stool test). These exams will check for colon cancer.    Talk with your health care provider about whether or not a prostate cancer screening test (PSA) is right for you.  You should be tested each year for STDs (sexually transmitted diseases), if you re at risk.     Shots: Get a flu shot each year. Get a tetanus shot every 10 years.     Nutrition:  Eat at least 5 servings of fruits and vegetables daily.   Eat whole-grain bread, whole-wheat pasta and brown rice instead of white grains and rice.   Get adequate Calcium and Vitamin D.     Lifestyle  Exercise for at least 150 minutes a week (30 minutes a day, 5 days a week). This will help you control your weight and prevent disease.   Limit alcohol to one drink per day.   No smoking.   Wear sunscreen to prevent skin cancer.   See your dentist every six months for an exam and cleaning.   See your eye doctor every 1 to 2 years.

## 2023-06-22 NOTE — TELEPHONE ENCOUNTER
Forms/Letter Request    Type of form/letter: Prior Auth for Lansoprazole from [facility]    Have you been seen for this request: N/A    Do we have the form/letter: Yes: At the     Who is the form from? [facility]     Where did/will the form come from? form was faxed in

## 2023-06-27 ENCOUNTER — TRANSFERRED RECORDS (OUTPATIENT)
Dept: HEALTH INFORMATION MANAGEMENT | Facility: CLINIC | Age: 58
End: 2023-06-27
Payer: COMMERCIAL

## 2023-06-28 NOTE — TELEPHONE ENCOUNTER
Called patient and advised of note. Patient states he has been buying Prilosec OTC    Archana BURNS RN   [facility]

## 2023-10-17 ENCOUNTER — TRANSFERRED RECORDS (OUTPATIENT)
Dept: HEALTH INFORMATION MANAGEMENT | Facility: CLINIC | Age: 58
End: 2023-10-17
Payer: COMMERCIAL

## 2023-10-17 LAB
ALT SERPL-CCNC: 38 IU/L (ref 5–40)
AST SERPL-CCNC: 35 U/L (ref 5–34)
CREATININE (EXTERNAL): 0.88 MG/DL (ref 0.5–1.3)
GFR ESTIMATED (EXTERNAL): 94.5 ML/MIN/1.73M2

## 2024-02-14 ENCOUNTER — TRANSFERRED RECORDS (OUTPATIENT)
Dept: HEALTH INFORMATION MANAGEMENT | Facility: CLINIC | Age: 59
End: 2024-02-14
Payer: COMMERCIAL

## 2024-02-14 LAB
ALT SERPL-CCNC: 34 IU/L (ref 5–40)
AST SERPL-CCNC: 41 U/L (ref 5–34)
CREATININE (EXTERNAL): 0.91 MG/DL (ref 0.5–1.3)

## 2024-05-06 ENCOUNTER — MYC REFILL (OUTPATIENT)
Dept: FAMILY MEDICINE | Facility: CLINIC | Age: 59
End: 2024-05-06
Payer: COMMERCIAL

## 2024-05-06 DIAGNOSIS — L40.50 PSORIATIC ARTHRITIS (H): ICD-10-CM

## 2024-05-06 DIAGNOSIS — L40.9 PSORIASIS: ICD-10-CM

## 2024-05-06 RX ORDER — CLOBETASOL PROPIONATE 0.5 MG/G
CREAM TOPICAL 2 TIMES DAILY
Qty: 30 G | Refills: 3 | Status: SHIPPED | OUTPATIENT
Start: 2024-05-06 | End: 2024-06-25

## 2024-06-12 ENCOUNTER — TRANSFERRED RECORDS (OUTPATIENT)
Dept: HEALTH INFORMATION MANAGEMENT | Facility: CLINIC | Age: 59
End: 2024-06-12
Payer: COMMERCIAL

## 2024-06-12 LAB
ALT SERPL-CCNC: 36 IU/L (ref 5–40)
AST SERPL-CCNC: 42 IU/L (ref 5–34)
CREATININE (EXTERNAL): 1 MG/DL (ref 0.5–1.3)

## 2024-06-24 SDOH — HEALTH STABILITY: PHYSICAL HEALTH: ON AVERAGE, HOW MANY DAYS PER WEEK DO YOU ENGAGE IN MODERATE TO STRENUOUS EXERCISE (LIKE A BRISK WALK)?: 1 DAY

## 2024-06-24 SDOH — HEALTH STABILITY: PHYSICAL HEALTH: ON AVERAGE, HOW MANY MINUTES DO YOU ENGAGE IN EXERCISE AT THIS LEVEL?: 20 MIN

## 2024-06-24 ASSESSMENT — ASTHMA QUESTIONNAIRES
QUESTION_5 LAST FOUR WEEKS HOW WOULD YOU RATE YOUR ASTHMA CONTROL: COMPLETELY CONTROLLED
QUESTION_2 LAST FOUR WEEKS HOW OFTEN HAVE YOU HAD SHORTNESS OF BREATH: NOT AT ALL
QUESTION_3 LAST FOUR WEEKS HOW OFTEN DID YOUR ASTHMA SYMPTOMS (WHEEZING, COUGHING, SHORTNESS OF BREATH, CHEST TIGHTNESS OR PAIN) WAKE YOU UP AT NIGHT OR EARLIER THAN USUAL IN THE MORNING: NOT AT ALL
ACT_TOTALSCORE: 25
ACT_TOTALSCORE: 25
QUESTION_4 LAST FOUR WEEKS HOW OFTEN HAVE YOU USED YOUR RESCUE INHALER OR NEBULIZER MEDICATION (SUCH AS ALBUTEROL): NOT AT ALL
QUESTION_1 LAST FOUR WEEKS HOW MUCH OF THE TIME DID YOUR ASTHMA KEEP YOU FROM GETTING AS MUCH DONE AT WORK, SCHOOL OR AT HOME: NONE OF THE TIME

## 2024-06-24 ASSESSMENT — SOCIAL DETERMINANTS OF HEALTH (SDOH): HOW OFTEN DO YOU GET TOGETHER WITH FRIENDS OR RELATIVES?: ONCE A WEEK

## 2024-06-24 NOTE — COMMUNITY RESOURCES LIST (ENGLISH)
June 24, 2024           YOUR PERSONALIZED LIST OF SERVICES & PROGRAMS               Bill Payment Assistance      [employer] (CAP) [facility] - Energy Assistance Program (EAP)  [address] [place] [address] (Distance: 8.3 miles)  Phone: [phone]  Language: English, [place] Islander  Fee: Free      [employer] [place] [employer] - [facility] [employer]  [address] [place] [address] (Distance: 4.6 miles)  Phone: [phone]  Language: English  Fee: Free  Accessibility: Ada accessible      - Dislocated Worker/Adult [employer] Employment Program  Phone: [phone]  Email: berhane@[employer]  Website: https://[employer]/services/employment-services/dislocated-worker-program/  Language: English, Serbian  Hours: Mon 8:00 AM - 4:30 PM Tue 8:00 AM - 4:30 PM Wed 8:00 AM - 4:30 PM Thu 8:00 AM - 4:30 PM Fri 8:00 AM - 4:30 PM  Fee: Free  Accessibility: Ada accessible               IMPORTANT NUMBERS & WEBSITES        Emergency Services  911  .   United Way  211 http://211unitedway.org  .   Poison Control  (205) 341-8676 http://[place]poison.org http://wis[place]poison.org  .     Suicide and Crisis Lifeline  988 http://988lifeline.org  .   Childhelp National Child Abuse Hotline  450.193.5492 http://Childhelphotline.org   .   National Sexual Assault Hotline  (746) 406-9614 (HOPE) http://Rainn.org   .     National Runaway Safeline  (133) 382-6463 (RUNAWAY) http://1800runaway.org  .   Pregnancy & Postpartum Support  Call/text 670-281-4253  [place]: http://ppsupportmn.org  [place]: http://MyAGENT.com/wi  .   Substance Abuse National Helpline ([facility])  385-540-HELP (3749) http://Findtreatment.gov   .                DISCLAIMER: These resources have been generated via the [employer] Platform. [employer] does not endorse any service providers mentioned in this resource list. [employer] does not guarantee that the services mentioned in this resource list will be available to you or  will improve your health or wellness.    [facility]

## 2024-06-25 ENCOUNTER — OFFICE VISIT (OUTPATIENT)
Dept: FAMILY MEDICINE | Facility: CLINIC | Age: 59
End: 2024-06-25
Payer: COMMERCIAL

## 2024-06-25 VITALS
HEIGHT: 68 IN | OXYGEN SATURATION: 98 % | HEART RATE: 75 BPM | WEIGHT: 214 LBS | TEMPERATURE: 97.1 F | RESPIRATION RATE: 18 BRPM | DIASTOLIC BLOOD PRESSURE: 80 MMHG | SYSTOLIC BLOOD PRESSURE: 132 MMHG | BODY MASS INDEX: 32.43 KG/M2

## 2024-06-25 DIAGNOSIS — I25.810 CORONARY ARTERY DISEASE INVOLVING CORONARY BYPASS GRAFT OF NATIVE HEART WITHOUT ANGINA PECTORIS: ICD-10-CM

## 2024-06-25 DIAGNOSIS — R91.8 PULMONARY NODULES: ICD-10-CM

## 2024-06-25 DIAGNOSIS — Z00.00 ROUTINE GENERAL MEDICAL EXAMINATION AT A HEALTH CARE FACILITY: Primary | ICD-10-CM

## 2024-06-25 DIAGNOSIS — I10 HYPERTENSION GOAL BP (BLOOD PRESSURE) < 140/90: ICD-10-CM

## 2024-06-25 DIAGNOSIS — G89.12 POST-THORACOTOMY PAIN SYNDROME: ICD-10-CM

## 2024-06-25 DIAGNOSIS — Z95.1 H/O CORONARY ARTERY BYPASS SURGERY: ICD-10-CM

## 2024-06-25 DIAGNOSIS — Z82.49 FAMILY HISTORY OF CORONARY ARTERY DISEASE: ICD-10-CM

## 2024-06-25 DIAGNOSIS — Z91.09 ENVIRONMENTAL ALLERGIES: ICD-10-CM

## 2024-06-25 DIAGNOSIS — J45.20 MILD INTERMITTENT ASTHMA WITHOUT COMPLICATION: ICD-10-CM

## 2024-06-25 DIAGNOSIS — Z80.42 FAMILY HISTORY OF PROSTATE CANCER: ICD-10-CM

## 2024-06-25 DIAGNOSIS — K22.70 BARRETT'S ESOPHAGUS WITHOUT DYSPLASIA: ICD-10-CM

## 2024-06-25 DIAGNOSIS — E78.5 HYPERLIPIDEMIA LDL GOAL <70: ICD-10-CM

## 2024-06-25 DIAGNOSIS — K76.0 FATTY LIVER: ICD-10-CM

## 2024-06-25 DIAGNOSIS — Z51.81 MEDICATION MONITORING ENCOUNTER: ICD-10-CM

## 2024-06-25 DIAGNOSIS — K21.9 GASTROESOPHAGEAL REFLUX DISEASE WITHOUT ESOPHAGITIS: ICD-10-CM

## 2024-06-25 DIAGNOSIS — L40.9 PSORIASIS: ICD-10-CM

## 2024-06-25 DIAGNOSIS — N20.0 NEPHROLITHIASIS: ICD-10-CM

## 2024-06-25 DIAGNOSIS — R73.03 PREDIABETES: ICD-10-CM

## 2024-06-25 DIAGNOSIS — Z12.11 SCREEN FOR COLON CANCER: ICD-10-CM

## 2024-06-25 DIAGNOSIS — L40.50 PSORIATIC ARTHRITIS (H): ICD-10-CM

## 2024-06-25 DIAGNOSIS — F41.9 ANXIETY: ICD-10-CM

## 2024-06-25 DIAGNOSIS — Z12.5 SCREENING FOR PROSTATE CANCER: ICD-10-CM

## 2024-06-25 LAB
ALBUMIN SERPL BCG-MCNC: 4.1 G/DL (ref 3.5–5.2)
ALBUMIN UR-MCNC: NEGATIVE MG/DL
ALP SERPL-CCNC: 112 U/L (ref 40–150)
ALT SERPL W P-5'-P-CCNC: 35 U/L (ref 0–70)
ANION GAP SERPL CALCULATED.3IONS-SCNC: 7 MMOL/L (ref 7–15)
APPEARANCE UR: CLEAR
AST SERPL W P-5'-P-CCNC: 39 U/L (ref 0–45)
BILIRUB SERPL-MCNC: 0.5 MG/DL
BILIRUB UR QL STRIP: NEGATIVE
BUN SERPL-MCNC: 12.1 MG/DL (ref 8–23)
CALCIUM SERPL-MCNC: 9.5 MG/DL (ref 8.6–10)
CHLORIDE SERPL-SCNC: 104 MMOL/L (ref 98–107)
CHOLEST SERPL-MCNC: 161 MG/DL
CK SERPL-CCNC: 42 U/L (ref 39–308)
COLOR UR AUTO: YELLOW
CREAT SERPL-MCNC: 1 MG/DL (ref 0.67–1.17)
CREAT UR-MCNC: 121 MG/DL
CRP SERPL-MCNC: 5.39 MG/L
DEPRECATED HCO3 PLAS-SCNC: 29 MMOL/L (ref 22–29)
EGFRCR SERPLBLD CKD-EPI 2021: 87 ML/MIN/1.73M2
ERYTHROCYTE [DISTWIDTH] IN BLOOD BY AUTOMATED COUNT: 13.1 % (ref 10–15)
ERYTHROCYTE [SEDIMENTATION RATE] IN BLOOD BY WESTERGREN METHOD: 9 MM/HR (ref 0–20)
FASTING STATUS PATIENT QL REPORTED: YES
FASTING STATUS PATIENT QL REPORTED: YES
GLUCOSE SERPL-MCNC: 117 MG/DL (ref 70–99)
GLUCOSE UR STRIP-MCNC: NEGATIVE MG/DL
HBA1C MFR BLD: 5.4 % (ref 0–5.6)
HCT VFR BLD AUTO: 41.4 % (ref 40–53)
HDLC SERPL-MCNC: 41 MG/DL
HGB BLD-MCNC: 14.3 G/DL (ref 13.3–17.7)
HGB UR QL STRIP: NEGATIVE
KETONES UR STRIP-MCNC: NEGATIVE MG/DL
LDLC SERPL CALC-MCNC: 69 MG/DL
LEUKOCYTE ESTERASE UR QL STRIP: NEGATIVE
MCH RBC QN AUTO: 31.6 PG (ref 26.5–33)
MCHC RBC AUTO-ENTMCNC: 34.5 G/DL (ref 31.5–36.5)
MCV RBC AUTO: 91 FL (ref 78–100)
MICROALBUMIN UR-MCNC: <12 MG/L
MICROALBUMIN/CREAT UR: NORMAL MG/G{CREAT}
NITRATE UR QL: NEGATIVE
NONHDLC SERPL-MCNC: 120 MG/DL
PH UR STRIP: 7 [PH] (ref 5–7)
PLATELET # BLD AUTO: 167 10E3/UL (ref 150–450)
POTASSIUM SERPL-SCNC: 4.8 MMOL/L (ref 3.4–5.3)
PROT SERPL-MCNC: 6.7 G/DL (ref 6.4–8.3)
PSA SERPL DL<=0.01 NG/ML-MCNC: 0.73 NG/ML (ref 0–3.5)
RBC # BLD AUTO: 4.53 10E6/UL (ref 4.4–5.9)
SODIUM SERPL-SCNC: 140 MMOL/L (ref 135–145)
SP GR UR STRIP: 1.02 (ref 1–1.03)
TRIGL SERPL-MCNC: 253 MG/DL
TSH SERPL DL<=0.005 MIU/L-ACNC: 2.31 UIU/ML (ref 0.3–4.2)
UROBILINOGEN UR STRIP-ACNC: 0.2 E.U./DL
WBC # BLD AUTO: 5.9 10E3/UL (ref 4–11)

## 2024-06-25 PROCEDURE — 82043 UR ALBUMIN QUANTITATIVE: CPT | Performed by: FAMILY MEDICINE

## 2024-06-25 PROCEDURE — 81003 URINALYSIS AUTO W/O SCOPE: CPT | Performed by: FAMILY MEDICINE

## 2024-06-25 PROCEDURE — 86140 C-REACTIVE PROTEIN: CPT | Performed by: FAMILY MEDICINE

## 2024-06-25 PROCEDURE — 80053 COMPREHEN METABOLIC PANEL: CPT | Performed by: FAMILY MEDICINE

## 2024-06-25 PROCEDURE — 99214 OFFICE O/P EST MOD 30 MIN: CPT | Mod: 25 | Performed by: FAMILY MEDICINE

## 2024-06-25 PROCEDURE — G0103 PSA SCREENING: HCPCS | Performed by: FAMILY MEDICINE

## 2024-06-25 PROCEDURE — 84443 ASSAY THYROID STIM HORMONE: CPT | Performed by: FAMILY MEDICINE

## 2024-06-25 PROCEDURE — 80061 LIPID PANEL: CPT | Performed by: FAMILY MEDICINE

## 2024-06-25 PROCEDURE — 85027 COMPLETE CBC AUTOMATED: CPT | Performed by: FAMILY MEDICINE

## 2024-06-25 PROCEDURE — 36415 COLL VENOUS BLD VENIPUNCTURE: CPT | Performed by: FAMILY MEDICINE

## 2024-06-25 PROCEDURE — 82550 ASSAY OF CK (CPK): CPT | Performed by: FAMILY MEDICINE

## 2024-06-25 PROCEDURE — 83036 HEMOGLOBIN GLYCOSYLATED A1C: CPT | Performed by: FAMILY MEDICINE

## 2024-06-25 PROCEDURE — 85652 RBC SED RATE AUTOMATED: CPT | Performed by: FAMILY MEDICINE

## 2024-06-25 PROCEDURE — 82570 ASSAY OF URINE CREATININE: CPT | Performed by: FAMILY MEDICINE

## 2024-06-25 PROCEDURE — 99396 PREV VISIT EST AGE 40-64: CPT | Performed by: FAMILY MEDICINE

## 2024-06-25 RX ORDER — CITALOPRAM HYDROBROMIDE 20 MG/1
20 TABLET ORAL DAILY
Qty: 90 TABLET | Refills: 3 | Status: SHIPPED | OUTPATIENT
Start: 2024-06-25

## 2024-06-25 RX ORDER — CARVEDILOL 6.25 MG/1
6.25 TABLET ORAL 2 TIMES DAILY WITH MEALS
Qty: 180 TABLET | Refills: 3 | Status: SHIPPED | OUTPATIENT
Start: 2024-06-25

## 2024-06-25 RX ORDER — ALBUTEROL SULFATE 90 UG/1
2 AEROSOL, METERED RESPIRATORY (INHALATION) EVERY 4 HOURS PRN
Qty: 18 G | Refills: 3 | Status: SHIPPED | OUTPATIENT
Start: 2024-06-25

## 2024-06-25 RX ORDER — CETIRIZINE HYDROCHLORIDE 10 MG/1
10 TABLET ORAL EVERY EVENING
Qty: 90 TABLET | Refills: 3 | Status: SHIPPED | OUTPATIENT
Start: 2024-06-25

## 2024-06-25 RX ORDER — LANSOPRAZOLE 30 MG/1
CAPSULE, DELAYED RELEASE ORAL
Qty: 90 CAPSULE | Refills: 3 | Status: SHIPPED | OUTPATIENT
Start: 2024-06-25

## 2024-06-25 RX ORDER — FLUTICASONE PROPIONATE 50 MCG
2 SPRAY, SUSPENSION (ML) NASAL DAILY
Qty: 48 G | Refills: 3 | Status: SHIPPED | OUTPATIENT
Start: 2024-06-25

## 2024-06-25 RX ORDER — ATORVASTATIN CALCIUM 80 MG/1
80 TABLET, FILM COATED ORAL DAILY
Qty: 90 TABLET | Refills: 3 | Status: SHIPPED | OUTPATIENT
Start: 2024-06-25

## 2024-06-25 RX ORDER — AZELASTINE 1 MG/ML
2 SPRAY, METERED NASAL 2 TIMES DAILY
Qty: 90 ML | Refills: 3 | Status: SHIPPED | OUTPATIENT
Start: 2024-06-25

## 2024-06-25 RX ORDER — CLOBETASOL PROPIONATE 0.5 MG/G
CREAM TOPICAL 2 TIMES DAILY
Qty: 30 G | Refills: 3 | Status: SHIPPED | OUTPATIENT
Start: 2024-06-25

## 2024-06-25 NOTE — LETTER
July 19, 2024      Mian [place]ST[place]  [address] [place] [address]        Dear ,    We are writing to inform you of your test results.    -FIT test (screening test for colon cancer) was normal.     We advise:     FIT annually  Colonoscopy due in August, 2015.     Let us know if you have any questions or concerns.       Resulted Orders   Comprehensive metabolic panel   Result Value Ref Range    Sodium 140 135 - 145 mmol/L      Comment:      Reference intervals for this test were updated on 09/26/2023 to more accurately reflect our healthy population. There may be differences in the flagging of prior results with similar values performed with this method. Interpretation of those prior results can be made in the context of the updated reference intervals.     Potassium 4.8 3.4 - 5.3 mmol/L    Carbon Dioxide (CO2) 29 22 - 29 mmol/L    Anion Gap 7 7 - 15 mmol/L    Urea Nitrogen 12.1 8.0 - 23.0 mg/dL    Creatinine 1.00 0.67 - 1.17 mg/dL    GFR Estimate 87 >60 mL/min/1.73m2      Comment:      eGFR calculated using 2021 CKD-EPI equation.    Calcium 9.5 8.6 - 10.0 mg/dL    Chloride 104 98 - 107 mmol/L    Glucose 117 (H) 70 - 99 mg/dL    Alkaline Phosphatase 112 40 - 150 U/L    AST 39 0 - 45 U/L      Comment:      Reference intervals for this test were updated on 6/12/2023 to more accurately reflect our healthy population. There may be differences in the flagging of prior results with similar values performed with this method. Interpretation of those prior results can be made in the context of the updated reference intervals.    ALT 35 0 - 70 U/L      Comment:      Reference intervals for this test were updated on 6/12/2023 to more accurately reflect our healthy population. There may be differences in the flagging of prior results with similar values performed with this method. Interpretation of those prior results can be made in the context of the updated reference intervals.      Protein Total 6.7 6.4 - 8.3  g/dL    Albumin 4.1 3.5 - 5.2 g/dL    Bilirubin Total 0.5 <=1.2 mg/dL    Patient Fasting > 8hrs? Yes    Lipid panel reflex to direct LDL Fasting   Result Value Ref Range    Cholesterol 161 <200 mg/dL    Triglycerides 253 (H) <150 mg/dL    Direct Measure HDL 41 >=40 mg/dL    LDL Cholesterol Calculated 69 <=100 mg/dL    Non HDL Cholesterol 120 <130 mg/dL    Patient Fasting > 8hrs? Yes     Narrative    Cholesterol  Desirable:  <200 mg/dL    Triglycerides  Normal:  Less than 150 mg/dL  Borderline High:  150-199 mg/dL  High:  200-499 mg/dL  Very High:  Greater than or equal to 500 mg/dL    Direct Measure HDL  Female:  Greater than or equal to 50 mg/dL   Male:  Greater than or equal to 40 mg/dL    LDL Cholesterol  Desirable:  <100mg/dL  Above Desirable:  100-129 mg/dL   Borderline High:  130-159 mg/dL   High:  160-189 mg/dL   Very High:  >= 190 mg/dL    Non HDL Cholesterol  Desirable:  130 mg/dL  Above Desirable:  130-159 mg/dL  Borderline High:  160-189 mg/dL  High:  190-219 mg/dL  Very High:  Greater than or equal to 220 mg/dL   CBC with platelets   Result Value Ref Range    WBC Count 5.9 4.0 - 11.0 10e3/uL    RBC Count 4.53 4.40 - 5.90 10e6/uL    Hemoglobin 14.3 13.3 - 17.7 g/dL    Hematocrit 41.4 40.0 - 53.0 %    MCV 91 78 - 100 fL    MCH 31.6 26.5 - 33.0 pg    MCHC 34.5 31.5 - 36.5 g/dL    RDW 13.1 10.0 - 15.0 %    Platelet Count 167 150 - 450 10e3/uL   CK total   Result Value Ref Range    CK 42 39 - 308 U/L   UA Macroscopic with reflex to Microscopic and Culture   Result Value Ref Range    Color Urine Yellow Colorless, Straw, Light Yellow, Yellow    Appearance Urine Clear Clear    Glucose Urine Negative Negative mg/dL    Bilirubin Urine Negative Negative    Ketones Urine Negative Negative mg/dL    Specific Gravity Urine 1.025 1.003 - 1.035    Blood Urine Negative Negative    pH Urine 7.0 5.0 - 7.0    Protein Albumin Urine Negative Negative mg/dL    Urobilinogen Urine 0.2 0.2, 1.0 E.U./dL    Nitrite Urine Negative  Negative    Leukocyte Esterase Urine Negative Negative    Narrative    Microscopic not indicated   Albumin Random Urine Quantitative with Creat Ratio   Result Value Ref Range    Creatinine Urine mg/dL 121.0 mg/dL      Comment:      The reference ranges have not been established in urine creatinine. The results should be integrated into the clinical context for interpretation.    Albumin Urine mg/L <12.0 mg/L      Comment:      The reference ranges have not been established in urine albumin. The results should be integrated into the clinical context for interpretation.    Albumin Urine mg/g Cr        Comment:      Unable to calculate, urine albumin and/or urine creatinine is outside detectable limits.  Microalbuminuria is defined as an albumin:creatinine ratio of 17 to 299 for males and 25 to 299 for females. A ratio of albumin:creatinine of 300 or higher is indicative of overt proteinuria.  Due to biologic variability, positive results should be confirmed by a second, first-morning random or 24-hour timed urine specimen. If there is discrepancy, a third specimen is recommended. When 2 out of 3 results are in the microalbuminuria range, this is evidence for incipient nephropathy and warrants increased efforts at glucose control, blood pressure control, and institution of therapy with an angiotensin-converting-enzyme (ACE) inhibitor (if the patient can tolerate it).     TSH with free T4 reflex   Result Value Ref Range    TSH 2.31 0.30 - 4.20 uIU/mL   Prostate Specific Antigen Screen   Result Value Ref Range    Prostate Specific Antigen Screen 0.73 0.00 - 3.50 ng/mL    Narrative    This result is obtained using the Roche Elecsys total PSA method on the gianfranco [id number] immunoassay analyzer. Results obtained with different assay methods or kits cannot be used interchangeably.   Fecal colorectal cancer screen FIT   Result Value Ref Range    Occult Blood Screen FIT Negative Negative   ESR: Erythrocyte sedimentation rate    Result Value Ref Range    Erythrocyte Sedimentation Rate 9 0 - 20 mm/hr   CRP, inflammation   Result Value Ref Range    CRP Inflammation 5.39 (H) <5.00 mg/L   Hemoglobin A1c   Result Value Ref Range    Hemoglobin A1C 5.4 0.0 - 5.6 %      Comment:      Normal <5.7%   Prediabetes 5.7-6.4%    Diabetes 6.5% or higher     Note: Adopted from ADA consensus guidelines.       If you have any questions or concerns, please call the clinic at the number listed above.       Sincerely,            Fady Adams M.D.

## 2024-06-25 NOTE — PROGRESS NOTES
Preventive Care Visit  [facility] [place]  Fady Adams MD, Family Medicine  Jun 25, 2024      Assessment & Plan     Routine general medical examination at a health care facility    - Comprehensive metabolic panel  - Lipid panel reflex to direct LDL Fasting  - CBC with platelets  - CK total  - UA Macroscopic with reflex to Microscopic and Culture  - Albumin Random Urine Quantitative with Creat Ratio  - TSH with free T4 reflex  - Prostate Specific Antigen Screen  - Fecal colorectal cancer screen FIT  - ESR: Erythrocyte sedimentation rate  - CRP, inflammation  - Hemoglobin A1c  - REVIEW OF HEALTH MAINTENANCE PROTOCOL ORDERS  - Comprehensive metabolic panel  - Lipid panel reflex to direct LDL Fasting  - CBC with platelets  - CK total  - UA Macroscopic with reflex to Microscopic and Culture  - Albumin Random Urine Quantitative with Creat Ratio  - TSH with free T4 reflex  - Prostate Specific Antigen Screen  - Fecal colorectal cancer screen FIT  - ESR: Erythrocyte sedimentation rate  - CRP, inflammation  - Hemoglobin A1c    Coronary artery disease involving coronary bypass graft of native heart without angina pectoris    - Comprehensive metabolic panel  - Lipid panel reflex to direct LDL Fasting  - UA Macroscopic with reflex to Microscopic and Culture  - Albumin Random Urine Quantitative with Creat Ratio  - Hemoglobin A1c  - REVIEW OF HEALTH MAINTENANCE PROTOCOL ORDERS  - Adult Cardiology [facility] Referral  - atorvastatin (LIPITOR) 80 MG tablet  Dispense: 90 tablet; Refill: 3  - carvedilol (COREG) 6.25 MG tablet  Dispense: 180 tablet; Refill: 3  - Comprehensive metabolic panel  - Lipid panel reflex to direct LDL Fasting  - UA Macroscopic with reflex to Microscopic and Culture  - Albumin Random Urine Quantitative with Creat Ratio  - Hemoglobin A1c  - OFFICE/OUTPT VISIT,EST,LEVL IV    H/O coronary artery bypass surgery    - Comprehensive metabolic panel  - Lipid panel reflex to direct LDL Fasting  - UA  Macroscopic with reflex to Microscopic and Culture  - Albumin Random Urine Quantitative with Creat Ratio  - Hemoglobin A1c  - REVIEW OF HEALTH MAINTENANCE PROTOCOL ORDERS  - Adult Cardiology [facility] Referral  - atorvastatin (LIPITOR) 80 MG tablet  Dispense: 90 tablet; Refill: 3  - carvedilol (COREG) 6.25 MG tablet  Dispense: 180 tablet; Refill: 3  - Comprehensive metabolic panel  - Lipid panel reflex to direct LDL Fasting  - UA Macroscopic with reflex to Microscopic and Culture  - Albumin Random Urine Quantitative with Creat Ratio  - Hemoglobin A1c  - OFFICE/OUTPT VISIT,EST,LEVL IV    Post-thoracotomy pain syndrome    - Comprehensive metabolic panel  - Lipid panel reflex to direct LDL Fasting  - UA Macroscopic with reflex to Microscopic and Culture  - Albumin Random Urine Quantitative with Creat Ratio  - Hemoglobin A1c  - REVIEW OF HEALTH MAINTENANCE PROTOCOL ORDERS  - Comprehensive metabolic panel  - Lipid panel reflex to direct LDL Fasting  - UA Macroscopic with reflex to Microscopic and Culture  - Albumin Random Urine Quantitative with Creat Ratio  - Hemoglobin A1c  - OFFICE/OUTPT VISIT,EST,LEVL IV    Prediabetes    - Comprehensive metabolic panel  - Lipid panel reflex to direct LDL Fasting  - UA Macroscopic with reflex to Microscopic and Culture  - Albumin Random Urine Quantitative with Creat Ratio  - Hemoglobin A1c  - REVIEW OF HEALTH MAINTENANCE PROTOCOL ORDERS  - Adult Cardiology [facility] Referral  - atorvastatin (LIPITOR) 80 MG tablet  Dispense: 90 tablet; Refill: 3  - Comprehensive metabolic panel  - Lipid panel reflex to direct LDL Fasting  - UA Macroscopic with reflex to Microscopic and Culture  - Albumin Random Urine Quantitative with Creat Ratio  - Hemoglobin A1c  - OFFICE/OUTPT VISIT,EST,LEVL IV    Hypertension goal BP (blood pressure) < 140/90    - Comprehensive metabolic panel  - UA Macroscopic with reflex to Microscopic and Culture  - Albumin Random Urine Quantitative with Creat  Ratio  - REVIEW OF HEALTH MAINTENANCE PROTOCOL ORDERS  - Adult Cardiology [facility] Referral  - carvedilol (COREG) 6.25 MG tablet  Dispense: 180 tablet; Refill: 3  - Comprehensive metabolic panel  - UA Macroscopic with reflex to Microscopic and Culture  - Albumin Random Urine Quantitative with Creat Ratio  - OFFICE/OUTPT VISIT,EST,LEVL IV    Hyperlipidemia LDL goal <70    - Comprehensive metabolic panel  - Lipid panel reflex to direct LDL Fasting  - CK total  - REVIEW OF HEALTH MAINTENANCE PROTOCOL ORDERS  - Adult Cardiology [facility] Referral  - atorvastatin (LIPITOR) 80 MG tablet  Dispense: 90 tablet; Refill: 3  - Comprehensive metabolic panel  - Lipid panel reflex to direct LDL Fasting  - CK total  - OFFICE/OUTPT VISIT,EST,LEVL IV    Family history of coronary artery disease    - Comprehensive metabolic panel  - Lipid panel reflex to direct LDL Fasting  - UA Macroscopic with reflex to Microscopic and Culture  - Albumin Random Urine Quantitative with Creat Ratio  - REVIEW OF HEALTH MAINTENANCE PROTOCOL ORDERS  - Adult Cardiology [facility] Referral  - Comprehensive metabolic panel  - Lipid panel reflex to direct LDL Fasting  - UA Macroscopic with reflex to Microscopic and Culture  - Albumin Random Urine Quantitative with Creat Ratio  - OFFICE/OUTPT VISIT,EST,LEVL IV    Mild intermittent asthma without complication    - REVIEW OF HEALTH MAINTENANCE PROTOCOL ORDERS  - albuterol (PROAIR HFA) 108 (90 Base) MCG/ACT inhaler  Dispense: 18 g; Refill: 3  - azelastine (ASTELIN) 0.1 % nasal spray  Dispense: 90 mL; Refill: 3  - cetirizine (ZYRTEC) 10 MG tablet  Dispense: 90 tablet; Refill: 3  - fluticasone (FLONASE) 50 MCG/ACT nasal spray  Dispense: 48 g; Refill: 3  - OFFICE/OUTPT VISIT,EST,LEVL IV    Pulmonary nodules    - REVIEW OF HEALTH MAINTENANCE PROTOCOL ORDERS  - OFFICE/OUTPT VISIT,EST,LEVL IV    Environmental allergies    - REVIEW OF HEALTH MAINTENANCE PROTOCOL ORDERS  - albuterol (PROAIR HFA) 108 (90  Base) MCG/ACT inhaler  Dispense: 18 g; Refill: 3  - azelastine (ASTELIN) 0.1 % nasal spray  Dispense: 90 mL; Refill: 3  - cetirizine (ZYRTEC) 10 MG tablet  Dispense: 90 tablet; Refill: 3  - fluticasone (FLONASE) 50 MCG/ACT nasal spray  Dispense: 48 g; Refill: 3  - OFFICE/OUTPT VISIT,EST,LEVL IV    Sullivan's esophagus without dysplasia    - CBC with platelets  - REVIEW OF HEALTH MAINTENANCE PROTOCOL ORDERS  - Adult GI  Referral - Procedure Only  - CBC with platelets  - OFFICE/OUTPT VISIT,EST,LEVL IV    Gastroesophageal reflux disease without esophagitis    - CBC with platelets  - REVIEW OF HEALTH MAINTENANCE PROTOCOL ORDERS  - Adult GI  Referral - Procedure Only  - LANsoprazole (PREVACID) 30 MG DR capsule  Dispense: 90 capsule; Refill: 3  - CBC with platelets  - OFFICE/OUTPT VISIT,EST,LEVL IV    Psoriasis    - ESR: Erythrocyte sedimentation rate  - CRP, inflammation  - REVIEW OF HEALTH MAINTENANCE PROTOCOL ORDERS  - clobetasol propionate (TEMOVATE) 0.05 % external cream  Dispense: 30 g; Refill: 3  - ESR: Erythrocyte sedimentation rate  - CRP, inflammation  - OFFICE/OUTPT VISIT,EST,LEVL IV    Psoriatic arthritis (H)    - ESR: Erythrocyte sedimentation rate  - CRP, inflammation  - REVIEW OF HEALTH MAINTENANCE PROTOCOL ORDERS  - clobetasol propionate (TEMOVATE) 0.05 % external cream  Dispense: 30 g; Refill: 3  - ESR: Erythrocyte sedimentation rate  - CRP, inflammation  - OFFICE/OUTPT VISIT,EST,LEVL IV    Anxiety    - TSH with free T4 reflex  - REVIEW OF HEALTH MAINTENANCE PROTOCOL ORDERS  - citalopram (CELEXA) 20 MG tablet  Dispense: 90 tablet; Refill: 3  - TSH with free T4 reflex  - OFFICE/OUTPT VISIT,EST,LEVL IV    Nephrolithiasis    - UA Macroscopic with reflex to Microscopic and Culture  - Albumin Random Urine Quantitative with Creat Ratio  - REVIEW OF HEALTH MAINTENANCE PROTOCOL ORDERS  - UA Macroscopic with reflex to Microscopic and Culture  - Albumin Random Urine Quantitative with Creat Ratio  -  "OFFICE/OUTPT VISIT,EST,LEVL IV    Fatty liver    - Comprehensive metabolic panel  - REVIEW OF HEALTH MAINTENANCE PROTOCOL ORDERS  - Comprehensive metabolic panel  - OFFICE/OUTPT VISIT,AILYN GARRISON IV    Family history of prostate cancer    - Prostate Specific Antigen Screen  - REVIEW OF HEALTH MAINTENANCE PROTOCOL ORDERS  - Prostate Specific Antigen Screen  - OFFICE/OUTPT VISIT,EST,LEVL IV    Screening for prostate cancer    - Prostate Specific Antigen Screen  - REVIEW OF HEALTH MAINTENANCE PROTOCOL ORDERS  - Prostate Specific Antigen Screen  - OFFICE/OUTPT VISIT,AILYN GARRISON IV    Screen for colon cancer    - Fecal colorectal cancer screen FIT  - REVIEW OF HEALTH MAINTENANCE PROTOCOL ORDERS  - Fecal colorectal cancer screen FIT  - OFFICE/OUTPT VISIT,EST,LEVL IV    Medication monitoring encounter    - Comprehensive metabolic panel  - Lipid panel reflex to direct LDL Fasting  - CBC with platelets  - CK total  - UA Macroscopic with reflex to Microscopic and Culture  - Albumin Random Urine Quantitative with Creat Ratio  - TSH with free T4 reflex  - Prostate Specific Antigen Screen  - Fecal colorectal cancer screen FIT  - ESR: Erythrocyte sedimentation rate  - CRP, inflammation  - Hemoglobin A1c  - REVIEW OF HEALTH MAINTENANCE PROTOCOL ORDERS  - Comprehensive metabolic panel  - Lipid panel reflex to direct LDL Fasting  - CBC with platelets  - CK total  - UA Macroscopic with reflex to Microscopic and Culture  - Albumin Random Urine Quantitative with Creat Ratio  - TSH with free T4 reflex  - Prostate Specific Antigen Screen  - Fecal colorectal cancer screen FIT  - ESR: Erythrocyte sedimentation rate  - CRP, inflammation  - Hemoglobin A1c  - OFFICE/OUTPT VISIT,AILYN GARRISON IV      Patient has been advised of split billing requirements and indicates understanding: Yes    BMI  Estimated body mass index is 32.54 kg/m  as calculated from the following:    Height as of this encounter: 1.727 m (5' 8\").    Weight as of this encounter: 97.1 " kg (214 lb).   Weight management plan: diet and exercise    Counseling  Appropriate preventive services were discussed with this patient, including applicable screening as appropriate for fall prevention, nutrition, physical activity, Tobacco-use cessation, weight loss and cognition.  Checklist reviewing preventive services available has been given to the patient.  Reviewed patient's diet, addressing concerns and/or questions.   He is at risk for lack of exercise and has been provided with information to increase physical activity for the benefit of his well-being.   He is at risk for psychosocial distress and has been provided with information to reduce risk.     Work on weight loss  Regular exercise    Plan:    1) Medications: refilled    2) Labs: pending    3) Immunizations: reviewed, covid / flu in fall, RSV @ 60, consider twinrix    4) Imaging/Diagnostics: EGD    5) Consults: cardiology, rheumatology    6) heat / ice / stretching / voltaren gel for Rt Knee    40 minutes spent by myself on the date of the encounter doing chart review, history and exam, documentation and further activities per the note.    Charline Pappas is a 59 year old, presenting for the followin/25/2024     9:46 AM   Additional Questions   Roomed by [facility] Care Directive  Patient does not have a Health Care Directive or Living Will:     CAD / S/P CABG - no cp - no sob - no edema - Dr Ip    Post thoracotomy pain syndrome - stable, but not better - has seen pain clinic    Prediabetes    Lab Results   Component Value Date    A1C 5.5 2023    A1C 5.4 2022    A1C 5.3 2021    A1C 5.4 2020    A1C 5.4 2019    A1C 5.6 2018    A1C 5.4 2018    A1C 5.6 2017     Hyperlipidemia Follow-Up    Are you regularly taking any medication or supplement to lower your cholesterol?   Yes- Lipitor  Are you having muscle aches or other side effects that you think could be caused by your  cholesterol lowering medication?  No    Recent Labs   Lab Test 06/22/23  0734 06/16/22  0748   CHOL 150 115   HDL 36* 40   LDL 70 44   TRIG 222* 157*     Psoriasis / Psoriatic arthritis - Dr Mccall - recent visit and stopped methotrexate/folic acid - 3 cm plaque Rt Elbow    Depression and Anxiety   How are you doing with your depression since your last visit? No change  How are you doing with your anxiety since your last visit?  No change  Are you having other symptoms that might be associated with depression or anxiety? No  Have you had a significant life event? No   Do you have any concerns with your use of alcohol or other drugs? No        11/22/2021     2:50 PM 5/12/2022     6:36 AM 6/22/2023     9:12 AM   PHQ   PHQ-9 Total Score 0 0 0   Q9: Thoughts of better off dead/self-harm past 2 weeks Not at all Not at all Not at all         4/8/2022    10:02 AM 5/12/2022     6:36 AM 6/22/2023     9:12 AM   LONNY-7 SCORE   Total Score 7 (mild anxiety) 0 (minimal anxiety)    Total Score 7 0 0         6/22/2023     9:12 AM   Last PHQ-9   1.  Little interest or pleasure in doing things 0   2.  Feeling down, depressed, or hopeless 0   3.  Trouble falling or staying asleep, or sleeping too much 0   4.  Feeling tired or having little energy 0   5.  Poor appetite or overeating 0   6.  Feeling bad about yourself 0   7.  Trouble concentrating 0   8.  Moving slowly or restless 0   Q9: Thoughts of better off dead/self-harm past 2 weeks 0   PHQ-9 Total Score 0   Difficulty at work, home, or with people Not difficult at all         6/22/2023     9:12 AM   LONNY-7    1. Feeling nervous, anxious, or on edge 0   2. Not being able to stop or control worrying 0   3. Worrying too much about different things 0   4. Trouble relaxing 0   5. Being so restless that it is hard to sit still 0   6. Becoming easily annoyed or irritable 0   7. Feeling afraid, as if something awful might happen 0   LONNY-7 Total Score 0   If you checked any problems, how  difficult have they made it for you to do your work, take care of things at home, or get along with other people? Not difficult at all     Suicide Assessment Five-step Evaluation and Treatment (SAFE-T)      Pulmonary nodules - Asthma - Environmental allergies - stable        6/16/2022     6:54 AM 6/21/2023    10:16 AM 6/24/2024    10:17 AM   ACT Total Scores   ACT TOTAL SCORE (Goal Greater than or Equal to 20) 25 22 25   In the past 12 months, how many times did you visit the emergency room for your asthma without being admitted to the hospital? 0 0 0   In the past 12 months, how many times were you hospitalized overnight because of your asthma? 0 0 0     Sullivan's / GERD - no issues    Nephrolithiasis last 9/2023    Fatty Liver - no known issues    Wt Readings from Last 4 Encounters:   06/25/24 97.1 kg (214 lb)   06/22/23 95.3 kg (210 lb)   10/27/22 95.6 kg (210 lb 11.2 oz)   06/16/22 95.7 kg (211 lb)     Rt lateral knee pain after twisting knee        6/24/2024   General Health   How would you rate your overall physical health? Good   Feel stress (tense, anxious, or unable to sleep) Only a little      (!) STRESS CONCERN      6/24/2024   Nutrition   Three or more servings of calcium each day? (!) NO   Diet: Regular (no restrictions)   How many servings of fruit and vegetables per day? (!) 0-1   How many sweetened beverages each day? (!) 2            6/24/2024   Exercise   Days per week of moderate/strenous exercise 1 day   Average minutes spent exercising at this level 20 min      (!) EXERCISE CONCERN      6/24/2024   Social Factors   Frequency of gathering with friends or relatives Once a week   Worry food won't last until get money to buy more No   Food not last or not have enough money for food? No   Do you have housing? (Housing is defined as stable permanent housing and does not include staying ouside in a car, in a tent, in an abandoned building, in an overnight shelter, or couch-surfing.) Yes   Are you  worried about losing your housing? No   Lack of transportation? No   Unable to get utilities (heat,electricity)? Yes   Want help with housing or utility concern? No      (!) FINANCIAL RESOURCE STRAIN CONCERN      6/24/2024   Fall Risk   Fallen 2 or more times in the past year? No   Trouble with walking or balance? No             6/24/2024   Dental   Dentist two times every year? Yes            6/24/2024   TB Screening   Were you born outside of the [place]? No            Today's PHQ-2 Score:       6/24/2024    10:15 AM   PHQ-2 ( 1999 [employer])   Q1: Little interest or pleasure in doing things 0   Q2: Feeling down, depressed or hopeless 0   PHQ-2 Score 0   Q1: Little interest or pleasure in doing things Not at all   Q2: Feeling down, depressed or hopeless Not at all   PHQ-2 Score 0           6/24/2024   Substance Use   Alcohol more than 3/day or more than 7/wk No   Do you use any other substances recreationally? No        Social History     Tobacco Use    Smoking status: Former     Types: Cigars    Smokeless tobacco: Never    Tobacco comments:     In past rare cigar, last 2005   Vaping Use    Vaping status: Never Used   Substance Use Topics    Alcohol use: Yes     Types: 6 Standard drinks or equivalent per week     Comment: 6 drinks per week    Drug use: No           6/24/2024   STI Screening   New sexual partner(s) since last STI/HIV test? No      Last PSA:   PSA   Date Value Ref Range Status   10/08/2020 1.59 0 - 4 ug/L Final     Comment:     Assay Method:  Chemiluminescence using Siemens Vista analyzer     Prostate Specific Antigen Screen   Date Value Ref Range Status   06/25/2024 0.73 0.00 - 3.50 ng/mL Final   06/16/2022 1.09 0.00 - 4.00 ug/L Final     ASCVD Risk   The 10-year ASCVD risk score (Sarah DELGADO, et al., 2019) is: 9.2%    Values used to calculate the score:      Age: 59 years      Sex: Male      Is Non- : No      Diabetic: No      Tobacco smoker: No      Systolic Blood Pressure:  132 mmHg      Is BP treated: Yes      HDL Cholesterol: 41 mg/dL      Total Cholesterol: 161 mg/dL    Fracture Risk Assessment Tool  Link to Frax Calculator  Use the information below to complete the Frax calculator  : 1965  Sex: male  Weight (kg): 97.1 kg (actual weight)  Height (cm): 172.7 cm  Previous Fragility Fracture:  No  History of parent with fractured hip:  No  Current Smoking:  No  Patient has been on glucocorticoids for more than 3 months (5mg/day or more): Yes  Rheumatoid Arthritis on Problem List:  No  Secondary Osteoporosis on Problem List:  No  Consumes 3 or more units of alcohol per day: No  Femoral Neck BMD (g/cm2)           Reviewed and updated as needed this visit by Provider                  Patient Active Problem List   Diagnosis    Other atopic dermatitis and related conditions    Mild intermittent asthma    Family history of coronary artery disease    Family history of prostate cancer    Prediabetes    GERD (gastroesophageal reflux disease)    Sullivan's esophagus    Anxiety    Hypertension goal BP (blood pressure) < 140/90    Post-thoracotomy pain syndrome    Nephrolithiasis    Coronary artery disease    Hyperlipidemia LDL goal <70    Environmental allergies    H/O coronary artery bypass surgery    Psoriatic arthritis (H)    Class 1 obesity with serious comorbidity and body mass index (BMI) of 31.0 to 31.9 in adult, unspecified obesity type    Latent tuberculosis    Pulmonary nodules    Fatty liver    Gallstone    Psoriasis       Past Medical History:   Diagnosis Date    2019 novel coronavirus disease (COVID-19) 2021    Anxiety     Sullivan's esophagus 2011    Coronary artery disease 2008    cabg x 5    Environmental allergies     seasonal, hay fever    FAM HX-CARDIOVAS DIS NEC     dad at 45, cabg, stent    Family history of prostate cancer     Fatty liver     Gallstone     GERD (gastroesophageal reflux disease) 2011    History of blood transfusion     Hyperlipidemia LDL  goal <70 2006    Hypertension goal BP (blood pressure) < 140/90 04/2008    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    Dr Saba, Hx histoplasmosis, treated    Mild intermittent asthma 07/2003    Nephrolithiasis 6/13, 7/16    calcium oxalate - 6/2013, 7/2016, 1/2021    Other atopic dermatitis and related conditions 1980    Other diseases of lung, not elsewhere classified 04/2008    dr steel - benign bx and ct - granuloma - no further w/u needed    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    dr hoover    Prediabetes 08/2008    Psoriasis     Dr Mccall    Psoriatic arthritis (H)     Dr Mccall    Pulmonary nodules 2009    stable in 2012    Seasonal allergies 1980    Allergic rhinitis Hayfever       Past Surgical History:   Procedure Laterality Date    COLONOSCOPY N/A 08/07/2015    normal - due 10 yrs    ESOPHAGOSCOPY, GASTROSCOPY, DUODENOSCOPY (EGD), COMBINED  6/11, 5/13    [facility] - ? gastritis, fowler's - due 3 yr    ESOPHAGOSCOPY, GASTROSCOPY, DUODENOSCOPY (EGD), COMBINED  05/10/2013    COMBINED ESOPHAGOSCOPY, GASTROSCOPY, DUODENOSCOPY (EGD), BIOPSY SINGLE OR MULTIPLE;  ESOPHAGOSCOPY, GASTROSCOPY, DUODENOSCOPY (EGD)  with biopsy;  Surgeon: Angus Reynolds MD;  Location:  GI    ESOPHAGOSCOPY, GASTROSCOPY, DUODENOSCOPY (EGD), COMBINED N/A 07/28/2017    Fowler's - recheck 3 yrs    HC VASECTOMY UNILAT/BILAT W POSTOP SEMEN  1995    Vasectomy    HERNIORRHAPHY UMBILICAL N/A 11/13/2014    Dr Barron    SURGICAL HISTORY OF -  Left 1980    lt patella fx    SURGICAL HISTORY OF -  Left 1985    lt thumb neuroma excised    SURGICAL HISTORY OF -   11/2009    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    ZZC CABG, ARTERY-VEIN, FIVE  04/2008    FSD - lung bx benign    ZZC STRESS TEST - REGULAR (FL)  9/06, 4/11    negative stress thallium - FSD       Current Outpatient Medications   Medication Sig Dispense Refill    albuterol (PROAIR HFA) 108 (90 Base) MCG/ACT inhaler Inhale 2 puffs into the lungs every 4 hours as needed for shortness of  breath or wheezing 18 g 3    ASPIRIN 81 MG OR TABS ONE DAILY 100 3    atorvastatin (LIPITOR) 80 MG tablet Take 1 tablet (80 mg) by mouth daily 90 tablet 3    azelastine (ASTELIN) 0.1 % nasal spray Spray 2 sprays into both nostrils 2 times daily 90 mL 3    carvedilol (COREG) 6.25 MG tablet Take 1 tablet (6.25 mg) by mouth 2 times daily (with meals) 180 tablet 3    cetirizine (ZYRTEC) 10 MG tablet Take 1 tablet (10 mg) by mouth every evening 90 tablet 3    citalopram (CELEXA) 20 MG tablet Take 1 tablet (20 mg) by mouth daily 90 tablet 3    clobetasol propionate (TEMOVATE) 0.05 % external cream Apply topically 2 times daily 30 g 3    etanercept (ENBREL) 50 MG/ML injection Inject 0.98 mLs (50 mg) Subcutaneous once a week      fluticasone (FLONASE) 50 MCG/ACT nasal spray Spray 2 sprays into both nostrils daily 48 g 3    LANsoprazole (PREVACID) 30 MG DR capsule TAKE ONE CAPSULE BY MOUTH DAILY. TAKE 30 TO 60 MINUTES BEFORE A MEAL. 90 capsule 3    ibuprofen (ADVIL/MOTRIN) 200 MG tablet Take 3 tablets (600 mg) by mouth every 8 hours as needed for mild pain 1 tablet 0       Allergies   Allergen Reactions    Seasonal Allergies        Family History   Problem Relation Age of Onset    Prostate Cancer Father 69    Coronary Artery Disease Father 38    Hypertension Father     Neurologic Disorder Sister         CVA/aneurysm at age 19, decreased memory, partial paralysis    Hypertension Paternal Grandfather     C.A.D. Paternal Grandfather     Heart Failure Paternal Grandfather     Colon Cancer No family hx of        Social History     Socioeconomic History    Marital status:      Spouse name: Imelda    Number of children: 0    Years of education: 14   Occupational History     Comment: [employer]   Tobacco Use    Smoking status: Former     Types: Cigars    Smokeless tobacco: Never    Tobacco comments:     In past rare cigar   Vaping Use    Vaping status: Never Used   Substance and Sexual Activity    Alcohol use: Yes      Types: 6 Standard drinks or equivalent per week     Comment: 6 drinks per week    Drug use: No    Sexual activity: Yes     Partners: Female     Birth control/protection: Male Surgical   Other Topics Concern    Caffeine Concern Yes     Comment: 1 can qd    Exercise Yes     Comment: occas    Seat Belt Yes    Parent/sibling w/ CABG, MI or angioplasty before 65F 55M? Yes     Social Determinants of Health     Financial Resource Strain: High Risk (6/24/2024)    Financial Resource Strain     Within the past 12 months, have you or your family members you live with been unable to get utilities (heat, electricity) when it was really needed?: Yes   Food Insecurity: Low Risk  (6/24/2024)    Food Insecurity     Within the past 12 months, did you worry that your food would run out before you got money to buy more?: No     Within the past 12 months, did the food you bought just not last and you didn t have money to get more?: No   Transportation Needs: Low Risk  (6/24/2024)    Transportation Needs     Within the past 12 months, has lack of transportation kept you from medical appointments, getting your medicines, non-medical meetings or appointments, work, or from getting things that you need?: No   Physical Activity: Insufficiently Active (6/24/2024)    Exercise Vital Sign     Days of Exercise per Week: 1 day     Minutes of Exercise per Session: 20 min   Stress: No Stress Concern Present (6/24/2024)    [employer] [place] [employer] - Occupational Stress Questionnaire     Feeling of Stress : Only a little   Social Connections: Unknown (6/24/2024)    Social Connection and Isolation Panel [NHANES]     Frequency of Social Gatherings with Friends and Family: Once a week   Interpersonal Safety: Low Risk  (6/25/2024)    Interpersonal Safety     Do you feel physically and emotionally safe where you currently live?: Yes     Within the past 12 months, have you been hit, slapped, kicked or otherwise physically hurt by someone?:  "No     Within the past 12 months, have you been humiliated or emotionally abused in other ways by your partner or ex-partner?: No   Housing Stability: Low Risk  (6/24/2024)    Housing Stability     Do you have housing? : Yes     Are you worried about losing your housing?: No     Colonoscopy:  due 8/2015  FIT / Cologuard: given  PSA: ordered      Review of Systems  CONSTITUTIONAL: NEGATIVE for fever, chills, change in weight  INTEGUMENTARY/SKIN: NEGATIVE for worrisome rashes, moles or lesions  EYES: NEGATIVE for vision changes or irritation  ENT/MOUTH: NEGATIVE for ear, mouth and throat problems  RESP: NEGATIVE for significant cough or SOB  CV: NEGATIVE for chest pain, palpitations or peripheral edema  GI: NEGATIVE for nausea, abdominal pain, heartburn, or change in bowel habits  : NEGATIVE for frequency, dysuria, or hematuria  MUSCULOSKELETAL: NEGATIVE for significant arthralgias or myalgia  NEURO: NEGATIVE for weakness, dizziness or paresthesias  ENDOCRINE: NEGATIVE for temperature intolerance, skin/hair changes  HEME: NEGATIVE for bleeding problems  PSYCHIATRIC: NEGATIVE for changes in mood or affect     Objective    Exam  /80   Pulse 75   Temp 97.1  F (36.2  C) (Tympanic)   Resp 18   Ht 1.727 m (5' 8\")   Wt 97.1 kg (214 lb)   SpO2 98%   BMI 32.54 kg/m     Estimated body mass index is 32.54 kg/m  as calculated from the following:    Height as of this encounter: 1.727 m (5' 8\").    Weight as of this encounter: 97.1 kg (214 lb).    Physical Exam  GENERAL: alert and no distress  EYES: Eyes grossly normal to inspection, PERRL and conjunctivae and sclerae normal  HENT: ear canals and TM's normal, nose and mouth without ulcers or lesions  NECK: no adenopathy, no asymmetry, masses, or scars  RESP: lungs clear to auscultation - no rales, rhonchi or wheezes  CV: regular rate and rhythm, normal S1 S2, no S3 or S4, no murmur, click or rub, no peripheral edema  ABDOMEN: soft, nontender, no hepatosplenomegaly, " no masses and bowel sounds normal   (male): pt declines  RECTAL: pt declines  MS: no gross musculoskeletal defects noted, no edema  SKIN: no suspicious lesions or rashes  NEURO: Normal strength and tone, mentation intact and speech normal  PSYCH: mentation appears normal, affect normal/bright    Signed Electronically by:              Fady Adams MD, FAAFP     [facility]  [address] [place] [id number]  [email or web address]   Office: [phone]  Fax: [phone]

## 2024-06-25 NOTE — LETTER
July 8, 2024      Mian CHRISTY Blake  [address] [place] [address]        Dear ,    We are writing to inform you of your test results.    Labs are overall quite good, except     Triglycerides are elevated   Prediabetes   Minimal signs of inflammation     We advise:     Continue current cares.   Balanced low cholesterol diet.   Regular exercise.   Weight loss.     For additional lab test information, [email or web address] is an excellent reference.     Resulted Orders   Comprehensive metabolic panel   Result Value Ref Range    Sodium 140 135 - 145 mmol/L      Comment:      Reference intervals for this test were updated on 09/26/2023 to more accurately reflect our healthy population. There may be differences in the flagging of prior results with similar values performed with this method. Interpretation of those prior results can be made in the context of the updated reference intervals.     Potassium 4.8 3.4 - 5.3 mmol/L    Carbon Dioxide (CO2) 29 22 - 29 mmol/L    Anion Gap 7 7 - 15 mmol/L    Urea Nitrogen 12.1 8.0 - 23.0 mg/dL    Creatinine 1.00 0.67 - 1.17 mg/dL    GFR Estimate 87 >60 mL/min/1.73m2      Comment:      eGFR calculated using 2021 CKD-EPI equation.    Calcium 9.5 8.6 - 10.0 mg/dL    Chloride 104 98 - 107 mmol/L    Glucose 117 (H) 70 - 99 mg/dL    Alkaline Phosphatase 112 40 - 150 U/L    AST 39 0 - 45 U/L      Comment:      Reference intervals for this test were updated on 6/12/2023 to more accurately reflect our healthy population. There may be differences in the flagging of prior results with similar values performed with this method. Interpretation of those prior results can be made in the context of the updated reference intervals.    ALT 35 0 - 70 U/L      Comment:      Reference intervals for this test were updated on 6/12/2023 to more accurately reflect our healthy population. There may be differences in the flagging of prior results with similar values performed with this method.  Interpretation of those prior results can be made in the context of the updated reference intervals.      Protein Total 6.7 6.4 - 8.3 g/dL    Albumin 4.1 3.5 - 5.2 g/dL    Bilirubin Total 0.5 <=1.2 mg/dL    Patient Fasting > 8hrs? Yes    Lipid panel reflex to direct LDL Fasting   Result Value Ref Range    Cholesterol 161 <200 mg/dL    Triglycerides 253 (H) <150 mg/dL    Direct Measure HDL 41 >=40 mg/dL    LDL Cholesterol Calculated 69 <=100 mg/dL    Non HDL Cholesterol 120 <130 mg/dL    Patient Fasting > 8hrs? Yes     Narrative    Cholesterol  Desirable:  <200 mg/dL    Triglycerides  Normal:  Less than 150 mg/dL  Borderline High:  150-199 mg/dL  High:  200-499 mg/dL  Very High:  Greater than or equal to 500 mg/dL    Direct Measure HDL  Female:  Greater than or equal to 50 mg/dL   Male:  Greater than or equal to 40 mg/dL    LDL Cholesterol  Desirable:  <100mg/dL  Above Desirable:  100-129 mg/dL   Borderline High:  130-159 mg/dL   High:  160-189 mg/dL   Very High:  >= 190 mg/dL    Non HDL Cholesterol  Desirable:  130 mg/dL  Above Desirable:  130-159 mg/dL  Borderline High:  160-189 mg/dL  High:  190-219 mg/dL  Very High:  Greater than or equal to 220 mg/dL   CBC with platelets   Result Value Ref Range    WBC Count 5.9 4.0 - 11.0 10e3/uL    RBC Count 4.53 4.40 - 5.90 10e6/uL    Hemoglobin 14.3 13.3 - 17.7 g/dL    Hematocrit 41.4 40.0 - 53.0 %    MCV 91 78 - 100 fL    MCH 31.6 26.5 - 33.0 pg    MCHC 34.5 31.5 - 36.5 g/dL    RDW 13.1 10.0 - 15.0 %    Platelet Count 167 150 - 450 10e3/uL   CK total   Result Value Ref Range    CK 42 39 - 308 U/L   UA Macroscopic with reflex to Microscopic and Culture   Result Value Ref Range    Color Urine Yellow Colorless, Straw, Light Yellow, Yellow    Appearance Urine Clear Clear    Glucose Urine Negative Negative mg/dL    Bilirubin Urine Negative Negative    Ketones Urine Negative Negative mg/dL    Specific Gravity Urine 1.025 1.003 - 1.035    Blood Urine Negative Negative    pH Urine  7.0 5.0 - 7.0    Protein Albumin Urine Negative Negative mg/dL    Urobilinogen Urine 0.2 0.2, 1.0 E.U./dL    Nitrite Urine Negative Negative    Leukocyte Esterase Urine Negative Negative    Narrative    Microscopic not indicated   Albumin Random Urine Quantitative with Creat Ratio   Result Value Ref Range    Creatinine Urine mg/dL 121.0 mg/dL      Comment:      The reference ranges have not been established in urine creatinine. The results should be integrated into the clinical context for interpretation.    Albumin Urine mg/L <12.0 mg/L      Comment:      The reference ranges have not been established in urine albumin. The results should be integrated into the clinical context for interpretation.    Albumin Urine mg/g Cr        Comment:      Unable to calculate, urine albumin and/or urine creatinine is outside detectable limits.  Microalbuminuria is defined as an albumin:creatinine ratio of 17 to 299 for males and 25 to 299 for females. A ratio of albumin:creatinine of 300 or higher is indicative of overt proteinuria.  Due to biologic variability, positive results should be confirmed by a second, first-morning random or 24-hour timed urine specimen. If there is discrepancy, a third specimen is recommended. When 2 out of 3 results are in the microalbuminuria range, this is evidence for incipient nephropathy and warrants increased efforts at glucose control, blood pressure control, and institution of therapy with an angiotensin-converting-enzyme (ACE) inhibitor (if the patient can tolerate it).     TSH with free T4 reflex   Result Value Ref Range    TSH 2.31 0.30 - 4.20 uIU/mL   Prostate Specific Antigen Screen   Result Value Ref Range    Prostate Specific Antigen Screen 0.73 0.00 - 3.50 ng/mL    Narrative    This result is obtained using the Roche Elecsys total PSA method on the gianfranco [id number] immunoassay analyzer. Results obtained with different assay methods or kits cannot be used interchangeably.   ESR: Erythrocyte  sedimentation rate   Result Value Ref Range    Erythrocyte Sedimentation Rate 9 0 - 20 mm/hr   CRP, inflammation   Result Value Ref Range    CRP Inflammation 5.39 (H) <5.00 mg/L   Hemoglobin A1c   Result Value Ref Range    Hemoglobin A1C 5.4 0.0 - 5.6 %      Comment:      Normal <5.7%   Prediabetes 5.7-6.4%    Diabetes 6.5% or higher     Note: Adopted from ADA consensus guidelines.       If you have any questions or concerns, please call the clinic at the number listed above.       Sincerely,            Fady Adams M.D.

## 2024-07-12 PROCEDURE — 82274 ASSAY TEST FOR BLOOD FECAL: CPT | Performed by: FAMILY MEDICINE

## 2024-07-18 LAB — HEMOCCULT STL QL IA: NEGATIVE

## 2024-08-26 ENCOUNTER — APPOINTMENT (OUTPATIENT)
Dept: GENERAL RADIOLOGY | Facility: CLINIC | Age: 59
DRG: 322 | End: 2024-08-26
Attending: EMERGENCY MEDICINE
Payer: COMMERCIAL

## 2024-08-26 ENCOUNTER — HOSPITAL ENCOUNTER (INPATIENT)
Facility: CLINIC | Age: 59
LOS: 1 days | Discharge: HOME | DRG: 322 | End: 2024-08-28
Attending: EMERGENCY MEDICINE | Admitting: INTERNAL MEDICINE
Payer: COMMERCIAL

## 2024-08-26 DIAGNOSIS — I25.5 ISCHEMIC CARDIOMYOPATHY: ICD-10-CM

## 2024-08-26 DIAGNOSIS — I24.9 ACS (ACUTE CORONARY SYNDROME) (H): Primary | ICD-10-CM

## 2024-08-26 DIAGNOSIS — E78.5 HYPERLIPIDEMIA LDL GOAL <70: ICD-10-CM

## 2024-08-26 DIAGNOSIS — I21.4 NSTEMI (NON-ST ELEVATED MYOCARDIAL INFARCTION) (H): ICD-10-CM

## 2024-08-26 LAB
ALBUMIN SERPL BCG-MCNC: 4.3 G/DL (ref 3.5–5.2)
ALP SERPL-CCNC: 115 U/L (ref 40–150)
ALT SERPL W P-5'-P-CCNC: 34 U/L (ref 0–70)
ANION GAP SERPL CALCULATED.3IONS-SCNC: 15 MMOL/L (ref 7–15)
AST SERPL W P-5'-P-CCNC: 78 U/L (ref 0–45)
BASOPHILS # BLD AUTO: 0 10E3/UL (ref 0–0.2)
BASOPHILS NFR BLD AUTO: 0 %
BILIRUB DIRECT SERPL-MCNC: <0.2 MG/DL (ref 0–0.3)
BILIRUB SERPL-MCNC: 0.6 MG/DL
BUN SERPL-MCNC: 8.8 MG/DL (ref 8–23)
CALCIUM SERPL-MCNC: 9.3 MG/DL (ref 8.8–10.4)
CHLORIDE SERPL-SCNC: 102 MMOL/L (ref 98–107)
CREAT SERPL-MCNC: 0.87 MG/DL (ref 0.67–1.17)
EGFRCR SERPLBLD CKD-EPI 2021: >90 ML/MIN/1.73M2
EOSINOPHIL # BLD AUTO: 0.1 10E3/UL (ref 0–0.7)
EOSINOPHIL NFR BLD AUTO: 1 %
ERYTHROCYTE [DISTWIDTH] IN BLOOD BY AUTOMATED COUNT: 12.3 % (ref 10–15)
GLUCOSE SERPL-MCNC: 116 MG/DL (ref 70–99)
HCO3 SERPL-SCNC: 21 MMOL/L (ref 22–29)
HCT VFR BLD AUTO: 46.5 % (ref 40–53)
HGB BLD-MCNC: 16.1 G/DL (ref 13.3–17.7)
HOLD SPECIMEN: NORMAL
HOLD SPECIMEN: NORMAL
IMM GRANULOCYTES # BLD: 0 10E3/UL
IMM GRANULOCYTES NFR BLD: 0 %
LIPASE SERPL-CCNC: 35 U/L (ref 13–60)
LYMPHOCYTES # BLD AUTO: 1.8 10E3/UL (ref 0.8–5.3)
LYMPHOCYTES NFR BLD AUTO: 16 %
MCH RBC QN AUTO: 30.7 PG (ref 26.5–33)
MCHC RBC AUTO-ENTMCNC: 34.6 G/DL (ref 31.5–36.5)
MCV RBC AUTO: 89 FL (ref 78–100)
MONOCYTES # BLD AUTO: 0.8 10E3/UL (ref 0–1.3)
MONOCYTES NFR BLD AUTO: 8 %
NEUTROPHILS # BLD AUTO: 8.2 10E3/UL (ref 1.6–8.3)
NEUTROPHILS NFR BLD AUTO: 75 %
NRBC # BLD AUTO: 0 10E3/UL
NRBC BLD AUTO-RTO: 0 /100
PLATELET # BLD AUTO: 206 10E3/UL (ref 150–450)
POTASSIUM SERPL-SCNC: 4.4 MMOL/L (ref 3.4–5.3)
PROT SERPL-MCNC: 7 G/DL (ref 6.4–8.3)
RBC # BLD AUTO: 5.24 10E6/UL (ref 4.4–5.9)
SODIUM SERPL-SCNC: 138 MMOL/L (ref 135–145)
TROPONIN T SERPL HS-MCNC: 203 NG/L
WBC # BLD AUTO: 11 10E3/UL (ref 4–11)

## 2024-08-26 PROCEDURE — 82248 BILIRUBIN DIRECT: CPT | Performed by: EMERGENCY MEDICINE

## 2024-08-26 PROCEDURE — 84484 ASSAY OF TROPONIN QUANT: CPT | Performed by: EMERGENCY MEDICINE

## 2024-08-26 PROCEDURE — 96375 TX/PRO/DX INJ NEW DRUG ADDON: CPT

## 2024-08-26 PROCEDURE — 93005 ELECTROCARDIOGRAM TRACING: CPT

## 2024-08-26 PROCEDURE — 99291 CRITICAL CARE FIRST HOUR: CPT | Mod: 25

## 2024-08-26 PROCEDURE — 71046 X-RAY EXAM CHEST 2 VIEWS: CPT

## 2024-08-26 PROCEDURE — 96365 THER/PROPH/DIAG IV INF INIT: CPT | Mod: 59

## 2024-08-26 PROCEDURE — 80048 BASIC METABOLIC PNL TOTAL CA: CPT | Performed by: EMERGENCY MEDICINE

## 2024-08-26 PROCEDURE — 36415 COLL VENOUS BLD VENIPUNCTURE: CPT | Performed by: EMERGENCY MEDICINE

## 2024-08-26 PROCEDURE — 250N000011 HC RX IP 250 OP 636: Performed by: EMERGENCY MEDICINE

## 2024-08-26 PROCEDURE — 80053 COMPREHEN METABOLIC PANEL: CPT | Performed by: EMERGENCY MEDICINE

## 2024-08-26 PROCEDURE — 85025 COMPLETE CBC W/AUTO DIFF WBC: CPT | Performed by: EMERGENCY MEDICINE

## 2024-08-26 PROCEDURE — 250N000013 HC RX MED GY IP 250 OP 250 PS 637: Performed by: EMERGENCY MEDICINE

## 2024-08-26 PROCEDURE — 83735 ASSAY OF MAGNESIUM: CPT | Performed by: INTERNAL MEDICINE

## 2024-08-26 PROCEDURE — 83690 ASSAY OF LIPASE: CPT | Performed by: EMERGENCY MEDICINE

## 2024-08-26 PROCEDURE — 96374 THER/PROPH/DIAG INJ IV PUSH: CPT | Mod: 59

## 2024-08-26 PROCEDURE — 93005 ELECTROCARDIOGRAM TRACING: CPT | Mod: 76

## 2024-08-26 PROCEDURE — 96366 THER/PROPH/DIAG IV INF ADDON: CPT

## 2024-08-26 RX ORDER — ONDANSETRON 2 MG/ML
4 INJECTION INTRAMUSCULAR; INTRAVENOUS ONCE
Status: COMPLETED | OUTPATIENT
Start: 2024-08-26 | End: 2024-08-26

## 2024-08-26 RX ORDER — MORPHINE SULFATE 4 MG/ML
4 INJECTION, SOLUTION INTRAMUSCULAR; INTRAVENOUS ONCE
Status: COMPLETED | OUTPATIENT
Start: 2024-08-26 | End: 2024-08-26

## 2024-08-26 RX ORDER — ONDANSETRON 4 MG/1
4 TABLET, ORALLY DISINTEGRATING ORAL ONCE
Status: COMPLETED | OUTPATIENT
Start: 2024-08-26 | End: 2024-08-26

## 2024-08-26 RX ORDER — ASPIRIN 81 MG/1
324 TABLET, CHEWABLE ORAL ONCE
Status: COMPLETED | OUTPATIENT
Start: 2024-08-26 | End: 2024-08-26

## 2024-08-26 RX ORDER — HEPARIN SODIUM 10000 [USP'U]/100ML
0-5000 INJECTION, SOLUTION INTRAVENOUS CONTINUOUS
Status: DISCONTINUED | OUTPATIENT
Start: 2024-08-26 | End: 2024-08-27

## 2024-08-26 RX ORDER — NITROGLYCERIN 0.4 MG/1
0.4 TABLET SUBLINGUAL ONCE
Status: COMPLETED | OUTPATIENT
Start: 2024-08-26 | End: 2024-08-26

## 2024-08-26 RX ADMIN — NITROGLYCERIN 0.4 MG: 0.4 TABLET SUBLINGUAL at 20:45

## 2024-08-26 RX ADMIN — ONDANSETRON 4 MG: 4 TABLET, ORALLY DISINTEGRATING ORAL at 19:48

## 2024-08-26 RX ADMIN — NITROGLYCERIN 10 MCG/MIN: 20 INJECTION INTRAVENOUS at 23:58

## 2024-08-26 RX ADMIN — HEPARIN SODIUM 1150 UNITS/HR: 10000 INJECTION, SOLUTION INTRAVENOUS at 21:34

## 2024-08-26 RX ADMIN — MORPHINE SULFATE 4 MG: 4 INJECTION, SOLUTION INTRAMUSCULAR; INTRAVENOUS at 21:37

## 2024-08-26 RX ADMIN — ASPIRIN 81 MG CHEWABLE TABLET 324 MG: 81 TABLET CHEWABLE at 20:45

## 2024-08-26 ASSESSMENT — COLUMBIA-SUICIDE SEVERITY RATING SCALE - C-SSRS
2. HAVE YOU ACTUALLY HAD ANY THOUGHTS OF KILLING YOURSELF IN THE PAST MONTH?: NO
6. HAVE YOU EVER DONE ANYTHING, STARTED TO DO ANYTHING, OR PREPARED TO DO ANYTHING TO END YOUR LIFE?: NO
1. IN THE PAST MONTH, HAVE YOU WISHED YOU WERE DEAD OR WISHED YOU COULD GO TO SLEEP AND NOT WAKE UP?: NO

## 2024-08-26 ASSESSMENT — ACTIVITIES OF DAILY LIVING (ADL)
ADLS_ACUITY_SCORE: 35

## 2024-08-26 NOTE — Clinical Note
"Post Procedure Summary:  Prior to the start of the procedure and with procedural staff participation, I verbally confirmed the patient's identity using two indicators, relevant allergies, that the procedure was appropriate and matched the consent or emergent situation, and that the correct equipment/implants were available. Immediately prior to starting the procedure I conducted the Time Out with the procedural staff and re-confirmed th e patient's name, procedure, and site/side. (The Joint Commission universal protocol was followed.)  Yes                                        Sedatives: Fentanyl  200 mcg and Midazolam (Versed) 6 mg     Vital signs, airway and pulse oximetry were monitored and remained stable throughout the procedure and sedation was maintained until the procedure was complete.  The patient was monitored by staff until sedation discharge criteria were  met.     Patient tolerance: Patient tolerated the procedure well with no immediate complications.    Patient c/o Rt. Arm discomfort/stiffness that is \"starting to go away\".  Pt. Transferred to room 373 via monitored cart accompanied by RN.  Belongings with patient.  Report provided to THIERNO Hernandez.  Rt. Groin site also assessed with THIERNO Hernandez.     "

## 2024-08-26 NOTE — Clinical Note
The first balloon was inserted into the saphenous vein graft.Max pressure = 12 chavez. Total duration = 30 seconds.     Max pressure = 20 chavez. Total duration = 30 seconds.  Balloon reinflated a fourth time: Max pressure = 22 chavez. Total duration = 30 seconds.

## 2024-08-26 NOTE — Clinical Note
The first balloon was inserted into the saphenous vein graft.Max pressure = 14 chavez. Total duration = 30 seconds.

## 2024-08-26 NOTE — Clinical Note
The first balloon was inserted into the saphenous vein graft.Max pressure = 5 chavez. Total duration = 35 seconds.     Max pressure = 8 chavez. Total duration = 40 seconds.   SVG to PDA.  Balloon reinflated a second time: Max pressure = 8 chavez. Total duration = 40 seconds. Max pressure = 12 chavez. Total duration = 40 seconds.  Balloon reinflated a fourth time: Max pressure = 10 chavez. Total duration = 40 seconds.

## 2024-08-26 NOTE — Clinical Note
The first balloon was inserted into the saphenous vein graft.Max pressure = 10 chavez. Total duration = 40 seconds.     SVG to PDA.

## 2024-08-26 NOTE — Clinical Note
Balloon removed intact. Patient with moderate foul, liquid stool. Sample sent and patient placed in contact isolation.

## 2024-08-27 ENCOUNTER — APPOINTMENT (OUTPATIENT)
Dept: CARDIOLOGY | Facility: CLINIC | Age: 59
DRG: 322 | End: 2024-08-27
Attending: INTERNAL MEDICINE
Payer: COMMERCIAL

## 2024-08-27 PROBLEM — I21.4 NSTEMI (NON-ST ELEVATED MYOCARDIAL INFARCTION) (H): Status: ACTIVE | Noted: 2024-08-27

## 2024-08-27 LAB
ACT BLD: 102 SECONDS (ref 74–150)
ACT BLD: 249 SECONDS (ref 74–150)
ACT BLD: 287 SECONDS (ref 74–150)
ACT BLD: 333 SECONDS (ref 74–150)
ANION GAP SERPL CALCULATED.3IONS-SCNC: 14 MMOL/L (ref 7–15)
ATRIAL RATE - MUSE: 69 BPM
ATRIAL RATE - MUSE: 77 BPM
ATRIAL RATE - MUSE: 84 BPM
BUN SERPL-MCNC: 8.8 MG/DL (ref 8–23)
CALCIUM SERPL-MCNC: 8.9 MG/DL (ref 8.8–10.4)
CHLORIDE SERPL-SCNC: 102 MMOL/L (ref 98–107)
CREAT SERPL-MCNC: 0.93 MG/DL (ref 0.67–1.17)
DIASTOLIC BLOOD PRESSURE - MUSE: NORMAL MMHG
EGFRCR SERPLBLD CKD-EPI 2021: >90 ML/MIN/1.73M2
ERYTHROCYTE [DISTWIDTH] IN BLOOD BY AUTOMATED COUNT: 12.3 % (ref 10–15)
GLUCOSE BLDC GLUCOMTR-MCNC: 128 MG/DL (ref 70–99)
GLUCOSE SERPL-MCNC: 143 MG/DL (ref 70–99)
HCO3 SERPL-SCNC: 23 MMOL/L (ref 22–29)
HCT VFR BLD AUTO: 42 % (ref 40–53)
HGB BLD-MCNC: 14.4 G/DL (ref 13.3–17.7)
INTERPRETATION ECG - MUSE: NORMAL
LVEF ECHO: NORMAL
MAGNESIUM SERPL-MCNC: 1.6 MG/DL (ref 1.7–2.3)
MAGNESIUM SERPL-MCNC: 2 MG/DL (ref 1.7–2.3)
MCH RBC QN AUTO: 31 PG (ref 26.5–33)
MCHC RBC AUTO-ENTMCNC: 34.3 G/DL (ref 31.5–36.5)
MCV RBC AUTO: 90 FL (ref 78–100)
P AXIS - MUSE: 55 DEGREES
P AXIS - MUSE: 57 DEGREES
P AXIS - MUSE: 60 DEGREES
PLATELET # BLD AUTO: 189 10E3/UL (ref 150–450)
POTASSIUM SERPL-SCNC: 4.2 MMOL/L (ref 3.4–5.3)
PR INTERVAL - MUSE: 174 MS
PR INTERVAL - MUSE: 176 MS
PR INTERVAL - MUSE: 186 MS
QRS DURATION - MUSE: 80 MS
QRS DURATION - MUSE: 82 MS
QRS DURATION - MUSE: 86 MS
QT - MUSE: 366 MS
QT - MUSE: 374 MS
QT - MUSE: 392 MS
QTC - MUSE: 420 MS
QTC - MUSE: 423 MS
QTC - MUSE: 432 MS
R AXIS - MUSE: 33 DEGREES
R AXIS - MUSE: 45 DEGREES
R AXIS - MUSE: 50 DEGREES
RBC # BLD AUTO: 4.65 10E6/UL (ref 4.4–5.9)
SODIUM SERPL-SCNC: 139 MMOL/L (ref 135–145)
SYSTOLIC BLOOD PRESSURE - MUSE: NORMAL MMHG
T AXIS - MUSE: 43 DEGREES
T AXIS - MUSE: 57 DEGREES
T AXIS - MUSE: 82 DEGREES
TROPONIN T SERPL HS-MCNC: 620 NG/L
TROPONIN T SERPL HS-MCNC: 792 NG/L
UFH PPP CHRO-ACNC: 0.6 IU/ML
VENTRICULAR RATE- MUSE: 69 BPM
VENTRICULAR RATE- MUSE: 77 BPM
VENTRICULAR RATE- MUSE: 84 BPM
WBC # BLD AUTO: 10.6 10E3/UL (ref 4–11)

## 2024-08-27 PROCEDURE — 85347 COAGULATION TIME ACTIVATED: CPT

## 2024-08-27 PROCEDURE — 250N000013 HC RX MED GY IP 250 OP 250 PS 637: Performed by: INTERNAL MEDICINE

## 2024-08-27 PROCEDURE — 93005 ELECTROCARDIOGRAM TRACING: CPT

## 2024-08-27 PROCEDURE — C1887 CATHETER, GUIDING: HCPCS | Performed by: INTERNAL MEDICINE

## 2024-08-27 PROCEDURE — 250N000011 HC RX IP 250 OP 636: Performed by: INTERNAL MEDICINE

## 2024-08-27 PROCEDURE — 83735 ASSAY OF MAGNESIUM: CPT | Performed by: INTERNAL MEDICINE

## 2024-08-27 PROCEDURE — 258N000003 HC RX IP 258 OP 636: Performed by: INTERNAL MEDICINE

## 2024-08-27 PROCEDURE — C1753 CATH, INTRAVAS ULTRASOUND: HCPCS | Performed by: INTERNAL MEDICINE

## 2024-08-27 PROCEDURE — C8929 TTE W OR WO FOL WCON,DOPPLER: HCPCS

## 2024-08-27 PROCEDURE — 93455 CORONARY ART/GRFT ANGIO S&I: CPT | Performed by: INTERNAL MEDICINE

## 2024-08-27 PROCEDURE — 99153 MOD SED SAME PHYS/QHP EA: CPT | Performed by: INTERNAL MEDICINE

## 2024-08-27 PROCEDURE — 250N000009 HC RX 250: Performed by: INTERNAL MEDICINE

## 2024-08-27 PROCEDURE — C1769 GUIDE WIRE: HCPCS | Performed by: INTERNAL MEDICINE

## 2024-08-27 PROCEDURE — B21F1ZZ FLUOROSCOPY OF OTHER BYPASS GRAFT USING LOW OSMOLAR CONTRAST: ICD-10-PCS | Performed by: INTERNAL MEDICINE

## 2024-08-27 PROCEDURE — B2111ZZ FLUOROSCOPY OF MULTIPLE CORONARY ARTERIES USING LOW OSMOLAR CONTRAST: ICD-10-PCS | Performed by: INTERNAL MEDICINE

## 2024-08-27 PROCEDURE — 92937 PRQ TRLUML REVSC CAB GRF 1: CPT | Mod: RC | Performed by: INTERNAL MEDICINE

## 2024-08-27 PROCEDURE — 99152 MOD SED SAME PHYS/QHP 5/>YRS: CPT | Performed by: INTERNAL MEDICINE

## 2024-08-27 PROCEDURE — 99223 1ST HOSP IP/OBS HIGH 75: CPT | Performed by: INTERNAL MEDICINE

## 2024-08-27 PROCEDURE — C1874 STENT, COATED/COV W/DEL SYS: HCPCS | Performed by: INTERNAL MEDICINE

## 2024-08-27 PROCEDURE — 93799 UNLISTED CV SVC/PROCEDURE: CPT | Performed by: INTERNAL MEDICINE

## 2024-08-27 PROCEDURE — C1760 CLOSURE DEV, VASC: HCPCS | Performed by: INTERNAL MEDICINE

## 2024-08-27 PROCEDURE — 85027 COMPLETE CBC AUTOMATED: CPT | Performed by: INTERNAL MEDICINE

## 2024-08-27 PROCEDURE — 84484 ASSAY OF TROPONIN QUANT: CPT | Performed by: INTERNAL MEDICINE

## 2024-08-27 PROCEDURE — 027034Z DILATION OF CORONARY ARTERY, ONE ARTERY WITH DRUG-ELUTING INTRALUMINAL DEVICE, PERCUTANEOUS APPROACH: ICD-10-PCS | Performed by: INTERNAL MEDICINE

## 2024-08-27 PROCEDURE — 02C03ZZ EXTIRPATION OF MATTER FROM CORONARY ARTERY, ONE ARTERY, PERCUTANEOUS APPROACH: ICD-10-PCS | Performed by: INTERNAL MEDICINE

## 2024-08-27 PROCEDURE — 999N000208 ECHOCARDIOGRAM COMPLETE

## 2024-08-27 PROCEDURE — 120N000013 HC R&B IMCU

## 2024-08-27 PROCEDURE — 85520 HEPARIN ASSAY: CPT | Performed by: INTERNAL MEDICINE

## 2024-08-27 PROCEDURE — 36415 COLL VENOUS BLD VENIPUNCTURE: CPT | Performed by: INTERNAL MEDICINE

## 2024-08-27 PROCEDURE — 93306 TTE W/DOPPLER COMPLETE: CPT | Mod: 26 | Performed by: INTERNAL MEDICINE

## 2024-08-27 PROCEDURE — C1725 CATH, TRANSLUMIN NON-LASER: HCPCS | Performed by: INTERNAL MEDICINE

## 2024-08-27 PROCEDURE — 93455 CORONARY ART/GRFT ANGIO S&I: CPT | Mod: 26 | Performed by: INTERNAL MEDICINE

## 2024-08-27 PROCEDURE — C1757 CATH, THROMBECTOMY/EMBOLECT: HCPCS | Performed by: INTERNAL MEDICINE

## 2024-08-27 PROCEDURE — 80048 BASIC METABOLIC PNL TOTAL CA: CPT | Performed by: INTERNAL MEDICINE

## 2024-08-27 PROCEDURE — 272N000001 HC OR GENERAL SUPPLY STERILE: Performed by: INTERNAL MEDICINE

## 2024-08-27 PROCEDURE — 92978 ENDOLUMINL IVUS OCT C 1ST: CPT | Mod: RC | Performed by: INTERNAL MEDICINE

## 2024-08-27 PROCEDURE — 255N000002 HC RX 255 OP 636: Performed by: INTERNAL MEDICINE

## 2024-08-27 PROCEDURE — C9604 PERC D-E COR REVASC T CABG S: HCPCS | Performed by: INTERNAL MEDICINE

## 2024-08-27 PROCEDURE — B240ZZ3 ULTRASONOGRAPHY OF SINGLE CORONARY ARTERY, INTRAVASCULAR: ICD-10-PCS | Performed by: INTERNAL MEDICINE

## 2024-08-27 PROCEDURE — B2181ZZ FLUOROSCOPY OF LEFT INTERNAL MAMMARY BYPASS GRAFT USING LOW OSMOLAR CONTRAST: ICD-10-PCS | Performed by: INTERNAL MEDICINE

## 2024-08-27 PROCEDURE — 92978 ENDOLUMINL IVUS OCT C 1ST: CPT | Mod: 26 | Performed by: INTERNAL MEDICINE

## 2024-08-27 PROCEDURE — 99207 PR APP CREDIT; MD BILLING SHARED VISIT: CPT | Performed by: INTERNAL MEDICINE

## 2024-08-27 PROCEDURE — 93010 ELECTROCARDIOGRAM REPORT: CPT | Mod: XU | Performed by: INTERNAL MEDICINE

## 2024-08-27 DEVICE — STENT CORONARY DES SYNERGY XD MR US 3.50X38MM H7493941838350: Type: IMPLANTABLE DEVICE | Status: FUNCTIONAL

## 2024-08-27 DEVICE — CLOSURE ANGIOSEAL 6FR 610130: Type: IMPLANTABLE DEVICE | Status: FUNCTIONAL

## 2024-08-27 RX ORDER — ONDANSETRON 2 MG/ML
4 INJECTION INTRAMUSCULAR; INTRAVENOUS EVERY 6 HOURS PRN
Status: DISCONTINUED | OUTPATIENT
Start: 2024-08-27 | End: 2024-08-28 | Stop reason: HOSPADM

## 2024-08-27 RX ORDER — POTASSIUM CHLORIDE 1500 MG/1
20 TABLET, EXTENDED RELEASE ORAL
Status: DISCONTINUED | OUTPATIENT
Start: 2024-08-27 | End: 2024-08-27 | Stop reason: HOSPADM

## 2024-08-27 RX ORDER — PROCHLORPERAZINE MALEATE 5 MG
10 TABLET ORAL EVERY 6 HOURS PRN
Status: DISCONTINUED | OUTPATIENT
Start: 2024-08-27 | End: 2024-08-28 | Stop reason: HOSPADM

## 2024-08-27 RX ORDER — ONDANSETRON 2 MG/ML
4 INJECTION INTRAMUSCULAR; INTRAVENOUS EVERY 6 HOURS PRN
Status: DISCONTINUED | OUTPATIENT
Start: 2024-08-27 | End: 2024-08-27

## 2024-08-27 RX ORDER — MAGNESIUM SULFATE HEPTAHYDRATE 40 MG/ML
2 INJECTION, SOLUTION INTRAVENOUS ONCE
Status: COMPLETED | OUTPATIENT
Start: 2024-08-27 | End: 2024-08-27

## 2024-08-27 RX ORDER — ACETAMINOPHEN 650 MG/1
650 SUPPOSITORY RECTAL EVERY 4 HOURS PRN
Status: DISCONTINUED | OUTPATIENT
Start: 2024-08-27 | End: 2024-08-28 | Stop reason: HOSPADM

## 2024-08-27 RX ORDER — MORPHINE SULFATE 4 MG/ML
4 INJECTION, SOLUTION INTRAMUSCULAR; INTRAVENOUS
Status: DISCONTINUED | OUTPATIENT
Start: 2024-08-27 | End: 2024-08-28 | Stop reason: HOSPADM

## 2024-08-27 RX ORDER — LIDOCAINE 40 MG/G
CREAM TOPICAL
Status: DISCONTINUED | OUTPATIENT
Start: 2024-08-27 | End: 2024-08-27

## 2024-08-27 RX ORDER — ATORVASTATIN CALCIUM 40 MG/1
80 TABLET, FILM COATED ORAL DAILY
Status: DISCONTINUED | OUTPATIENT
Start: 2024-08-27 | End: 2024-08-28 | Stop reason: HOSPADM

## 2024-08-27 RX ORDER — ONDANSETRON 4 MG/1
4 TABLET, ORALLY DISINTEGRATING ORAL EVERY 6 HOURS PRN
Status: DISCONTINUED | OUTPATIENT
Start: 2024-08-27 | End: 2024-08-28 | Stop reason: HOSPADM

## 2024-08-27 RX ORDER — SODIUM CHLORIDE 9 MG/ML
INJECTION, SOLUTION INTRAVENOUS CONTINUOUS
Status: DISCONTINUED | OUTPATIENT
Start: 2024-08-27 | End: 2024-08-27 | Stop reason: HOSPADM

## 2024-08-27 RX ORDER — HEPARIN SODIUM 1000 [USP'U]/ML
INJECTION, SOLUTION INTRAVENOUS; SUBCUTANEOUS
Status: DISCONTINUED | OUTPATIENT
Start: 2024-08-27 | End: 2024-08-27 | Stop reason: HOSPADM

## 2024-08-27 RX ORDER — IOPAMIDOL 755 MG/ML
INJECTION, SOLUTION INTRAVASCULAR
Status: DISCONTINUED | OUTPATIENT
Start: 2024-08-27 | End: 2024-08-27 | Stop reason: HOSPADM

## 2024-08-27 RX ORDER — NITROGLYCERIN 0.4 MG/1
0.4 TABLET SUBLINGUAL EVERY 5 MIN PRN
Status: DISCONTINUED | OUTPATIENT
Start: 2024-08-27 | End: 2024-08-28 | Stop reason: HOSPADM

## 2024-08-27 RX ORDER — CALCIUM CARBONATE 500 MG/1
1000 TABLET, CHEWABLE ORAL 4 TIMES DAILY PRN
Status: DISCONTINUED | OUTPATIENT
Start: 2024-08-27 | End: 2024-08-28 | Stop reason: HOSPADM

## 2024-08-27 RX ORDER — ATROPINE SULFATE 0.1 MG/ML
0.5 INJECTION INTRAVENOUS
Status: ACTIVE | OUTPATIENT
Start: 2024-08-27 | End: 2024-08-27

## 2024-08-27 RX ORDER — NALOXONE HYDROCHLORIDE 0.4 MG/ML
0.2 INJECTION, SOLUTION INTRAMUSCULAR; INTRAVENOUS; SUBCUTANEOUS
Status: ACTIVE | OUTPATIENT
Start: 2024-08-27 | End: 2024-08-27

## 2024-08-27 RX ORDER — LIDOCAINE 40 MG/G
CREAM TOPICAL
Status: DISCONTINUED | OUTPATIENT
Start: 2024-08-27 | End: 2024-08-28 | Stop reason: HOSPADM

## 2024-08-27 RX ORDER — ASPIRIN 81 MG/1
81 TABLET ORAL DAILY
Qty: 30 TABLET | Refills: 3 | Status: SHIPPED | OUTPATIENT
Start: 2024-08-28

## 2024-08-27 RX ORDER — LISINOPRIL 10 MG/1
10 TABLET ORAL DAILY
Status: DISCONTINUED | OUTPATIENT
Start: 2024-08-27 | End: 2024-08-28

## 2024-08-27 RX ORDER — AMOXICILLIN 250 MG
1 CAPSULE ORAL 2 TIMES DAILY PRN
Status: DISCONTINUED | OUTPATIENT
Start: 2024-08-27 | End: 2024-08-28 | Stop reason: HOSPADM

## 2024-08-27 RX ORDER — CITALOPRAM HYDROBROMIDE 20 MG/1
20 TABLET ORAL DAILY
Status: DISCONTINUED | OUTPATIENT
Start: 2024-08-27 | End: 2024-08-28 | Stop reason: HOSPADM

## 2024-08-27 RX ORDER — LORAZEPAM 0.5 MG/1
0.5 TABLET ORAL
Status: DISCONTINUED | OUTPATIENT
Start: 2024-08-27 | End: 2024-08-27 | Stop reason: HOSPADM

## 2024-08-27 RX ORDER — ONDANSETRON 4 MG/1
4 TABLET, ORALLY DISINTEGRATING ORAL EVERY 6 HOURS PRN
Status: DISCONTINUED | OUTPATIENT
Start: 2024-08-27 | End: 2024-08-27

## 2024-08-27 RX ORDER — NITROGLYCERIN 0.4 MG/1
0.4 TABLET SUBLINGUAL EVERY 5 MIN PRN
Status: DISCONTINUED | OUTPATIENT
Start: 2024-08-27 | End: 2024-08-27

## 2024-08-27 RX ORDER — ACETAMINOPHEN 325 MG/1
650 TABLET ORAL EVERY 4 HOURS PRN
Status: DISCONTINUED | OUTPATIENT
Start: 2024-08-27 | End: 2024-08-28 | Stop reason: HOSPADM

## 2024-08-27 RX ORDER — CETIRIZINE HYDROCHLORIDE 10 MG/1
10 TABLET ORAL EVERY EVENING
Status: DISCONTINUED | OUTPATIENT
Start: 2024-08-27 | End: 2024-08-28 | Stop reason: HOSPADM

## 2024-08-27 RX ORDER — SODIUM CHLORIDE 9 MG/ML
INJECTION, SOLUTION INTRAVENOUS CONTINUOUS
Status: ACTIVE | OUTPATIENT
Start: 2024-08-27 | End: 2024-08-27

## 2024-08-27 RX ORDER — OXYCODONE HYDROCHLORIDE 10 MG/1
10 TABLET ORAL EVERY 4 HOURS PRN
Status: DISCONTINUED | OUTPATIENT
Start: 2024-08-27 | End: 2024-08-28 | Stop reason: HOSPADM

## 2024-08-27 RX ORDER — ASPIRIN 81 MG/1
81 TABLET, CHEWABLE ORAL ONCE
Status: DISCONTINUED | OUTPATIENT
Start: 2024-08-27 | End: 2024-08-27

## 2024-08-27 RX ORDER — ASPIRIN 81 MG/1
325 TABLET, CHEWABLE ORAL ONCE
Status: COMPLETED | OUTPATIENT
Start: 2024-08-27 | End: 2024-08-27

## 2024-08-27 RX ORDER — FENTANYL CITRATE 50 UG/ML
INJECTION, SOLUTION INTRAMUSCULAR; INTRAVENOUS
Status: DISCONTINUED | OUTPATIENT
Start: 2024-08-27 | End: 2024-08-27 | Stop reason: HOSPADM

## 2024-08-27 RX ORDER — NALOXONE HYDROCHLORIDE 0.4 MG/ML
0.4 INJECTION, SOLUTION INTRAMUSCULAR; INTRAVENOUS; SUBCUTANEOUS
Status: ACTIVE | OUTPATIENT
Start: 2024-08-27 | End: 2024-08-27

## 2024-08-27 RX ORDER — HYDRALAZINE HYDROCHLORIDE 20 MG/ML
10 INJECTION INTRAMUSCULAR; INTRAVENOUS EVERY 4 HOURS PRN
Status: DISCONTINUED | OUTPATIENT
Start: 2024-08-27 | End: 2024-08-28 | Stop reason: HOSPADM

## 2024-08-27 RX ORDER — FENTANYL CITRATE 50 UG/ML
25 INJECTION, SOLUTION INTRAMUSCULAR; INTRAVENOUS
Status: DISCONTINUED | OUTPATIENT
Start: 2024-08-27 | End: 2024-08-28 | Stop reason: HOSPADM

## 2024-08-27 RX ORDER — METOPROLOL TARTRATE 1 MG/ML
5 INJECTION, SOLUTION INTRAVENOUS
Status: DISCONTINUED | OUTPATIENT
Start: 2024-08-27 | End: 2024-08-28 | Stop reason: HOSPADM

## 2024-08-27 RX ORDER — PANTOPRAZOLE SODIUM 40 MG/1
40 TABLET, DELAYED RELEASE ORAL
Status: DISCONTINUED | OUTPATIENT
Start: 2024-08-27 | End: 2024-08-28 | Stop reason: HOSPADM

## 2024-08-27 RX ORDER — ACETAMINOPHEN 325 MG/1
650 TABLET ORAL EVERY 4 HOURS PRN
Status: DISCONTINUED | OUTPATIENT
Start: 2024-08-27 | End: 2024-08-27

## 2024-08-27 RX ORDER — CARVEDILOL 6.25 MG/1
6.25 TABLET ORAL 2 TIMES DAILY WITH MEALS
Status: DISCONTINUED | OUTPATIENT
Start: 2024-08-27 | End: 2024-08-28 | Stop reason: HOSPADM

## 2024-08-27 RX ORDER — OXYCODONE HYDROCHLORIDE 5 MG/1
5 TABLET ORAL EVERY 4 HOURS PRN
Status: DISCONTINUED | OUTPATIENT
Start: 2024-08-27 | End: 2024-08-28 | Stop reason: HOSPADM

## 2024-08-27 RX ORDER — ASPIRIN 81 MG/1
81 TABLET, CHEWABLE ORAL DAILY
Status: DISCONTINUED | OUTPATIENT
Start: 2024-08-28 | End: 2024-08-28 | Stop reason: HOSPADM

## 2024-08-27 RX ORDER — ASPIRIN 81 MG/1
243 TABLET, CHEWABLE ORAL ONCE
Status: COMPLETED | OUTPATIENT
Start: 2024-08-27 | End: 2024-08-27

## 2024-08-27 RX ORDER — NITROGLYCERIN 5 MG/ML
VIAL (ML) INTRAVENOUS
Status: DISCONTINUED | OUTPATIENT
Start: 2024-08-27 | End: 2024-08-27 | Stop reason: HOSPADM

## 2024-08-27 RX ORDER — LIDOCAINE 40 MG/G
CREAM TOPICAL
Status: DISCONTINUED | OUTPATIENT
Start: 2024-08-27 | End: 2024-08-27 | Stop reason: HOSPADM

## 2024-08-27 RX ORDER — PROCHLORPERAZINE 25 MG
25 SUPPOSITORY, RECTAL RECTAL EVERY 12 HOURS PRN
Status: DISCONTINUED | OUTPATIENT
Start: 2024-08-27 | End: 2024-08-28 | Stop reason: HOSPADM

## 2024-08-27 RX ORDER — AMOXICILLIN 250 MG
2 CAPSULE ORAL 2 TIMES DAILY PRN
Status: DISCONTINUED | OUTPATIENT
Start: 2024-08-27 | End: 2024-08-28 | Stop reason: HOSPADM

## 2024-08-27 RX ORDER — LORAZEPAM 2 MG/ML
0.5 INJECTION INTRAMUSCULAR
Status: DISCONTINUED | OUTPATIENT
Start: 2024-08-27 | End: 2024-08-27 | Stop reason: HOSPADM

## 2024-08-27 RX ORDER — ASPIRIN 81 MG/1
81 TABLET ORAL DAILY
Status: DISCONTINUED | OUTPATIENT
Start: 2024-08-28 | End: 2024-08-27

## 2024-08-27 RX ORDER — FLUMAZENIL 0.1 MG/ML
0.2 INJECTION, SOLUTION INTRAVENOUS
Status: ACTIVE | OUTPATIENT
Start: 2024-08-27 | End: 2024-08-27

## 2024-08-27 RX ADMIN — MAGNESIUM SULFATE HEPTAHYDRATE 2 G: 40 INJECTION, SOLUTION INTRAVENOUS at 02:06

## 2024-08-27 RX ADMIN — ONDANSETRON 4 MG: 2 INJECTION INTRAMUSCULAR; INTRAVENOUS at 01:22

## 2024-08-27 RX ADMIN — SODIUM CHLORIDE: 9 INJECTION, SOLUTION INTRAVENOUS at 11:34

## 2024-08-27 RX ADMIN — ASPIRIN 325 MG ORAL TABLET 325 MG: 325 PILL ORAL at 11:36

## 2024-08-27 RX ADMIN — HUMAN ALBUMIN MICROSPHERES AND PERFLUTREN 3 ML: 10; .22 INJECTION, SOLUTION INTRAVENOUS at 08:34

## 2024-08-27 RX ADMIN — TICAGRELOR 90 MG: 90 TABLET ORAL at 22:09

## 2024-08-27 RX ADMIN — CARVEDILOL 6.25 MG: 6.25 TABLET, FILM COATED ORAL at 17:25

## 2024-08-27 RX ADMIN — CETIRIZINE HYDROCHLORIDE 10 MG: 10 TABLET, FILM COATED ORAL at 22:09

## 2024-08-27 RX ADMIN — LISINOPRIL 10 MG: 10 TABLET ORAL at 15:20

## 2024-08-27 RX ADMIN — SODIUM CHLORIDE: 9 INJECTION, SOLUTION INTRAVENOUS at 17:24

## 2024-08-27 RX ADMIN — ONDANSETRON 4 MG: 4 TABLET, ORALLY DISINTEGRATING ORAL at 07:20

## 2024-08-27 ASSESSMENT — ACTIVITIES OF DAILY LIVING (ADL)
ADLS_ACUITY_SCORE: 39
ADLS_ACUITY_SCORE: 35
ADLS_ACUITY_SCORE: 29
ADLS_ACUITY_SCORE: 35
ADLS_ACUITY_SCORE: 29
ADLS_ACUITY_SCORE: 35
ADLS_ACUITY_SCORE: 29
ADLS_ACUITY_SCORE: 32
ADLS_ACUITY_SCORE: 35
ADLS_ACUITY_SCORE: 29
ADLS_ACUITY_SCORE: 35
ADLS_ACUITY_SCORE: 35
ADLS_ACUITY_SCORE: 29
ADLS_ACUITY_SCORE: 35

## 2024-08-27 NOTE — PLAN OF CARE
"Goal Outcome Evaluation:      Plan of Care Reviewed With: patient    Overall Patient Progress: improvingOverall Patient Progress: improving    Outcome Evaluation: Post cath lab admission    ICU End of Shift Summary.  For vital signs and complete assessments, please see documentation flowsheets.      Pertinent assessments: A&Ox4. Afebrile. Denies pain. Tele shows SR. BPs stable. RA. Lungs diminished. BS+. Voiding without complications. Tolerating oral foods and fluids. Right groin angio approach. PIV x2.    Major Shift Events: -Post cath lab admission                                    -Fluids started                                    -Monitoring groin cite.                                    -Completed bedrest without complications    Plan (Upcoming Events): Continue with current plan of care. Education on post PCI with stent placement.     Discharge/Transfer Needs:TBD     Bedside Shift Report Completed : yes  Bedside Safety Check Completed: yes    Problem: Adult Inpatient Plan of Care  Goal: Plan of Care Review  Description: The Plan of Care Review/Shift note should be completed every shift.  The Outcome Evaluation is a brief statement about your assessment that the patient is improving, declining, or no change.  This information will be displayed automatically on your shift  note.  Outcome: Progressing  Flowsheets (Taken 8/27/2024 1776)  Outcome Evaluation: Post cath lab admission  Plan of Care Reviewed With: patient  Overall Patient Progress: improving  Goal: Patient-Specific Goal (Individualized)  Description: You can add care plan individualizations to a care plan. Examples of Individualization might be:  \"Parent requests to be called daily at 9am for status\", \"I have a hard time hearing out of my right ear\", or \"Do not touch me to wake me up as it startles  me\".  Outcome: Progressing  Goal: Absence of Hospital-Acquired Illness or Injury  Outcome: Progressing  Intervention: Identify and Manage Fall " Risk  Recent Flowsheet Documentation  Taken 8/27/2024 1545 by Gretta Jason, RN  Safety Promotion/Fall Prevention:   activity supervised   clutter free environment maintained   increased rounding and observation   increase visualization of patient   lighting adjusted   nonskid shoes/slippers when out of bed   patient and family education   room organization consistent   room near nurse's station   safety round/check completed   treat reversible contributory factors   treat underlying cause   supervised activity  Intervention: Prevent Skin Injury  Recent Flowsheet Documentation  Taken 8/27/2024 1545 by Gretta Jason RN  Body Position: position maintained  Skin Protection:   adhesive use limited   transparent dressing maintained   skin to skin areas padded   skin to device areas padded  Device Skin Pressure Protection:   skin-to-device areas padded   skin-to-skin areas padded   tubing/devices free from skin contact  Intervention: Prevent and Manage VTE (Venous Thromboembolism) Risk  Recent Flowsheet Documentation  Taken 8/27/2024 1545 by Gretta Jason RN  VTE Prevention/Management: SCDs on (sequential compression devices)  Intervention: Prevent Infection  Recent Flowsheet Documentation  Taken 8/27/2024 1545 by Gretta Jason RN  Infection Prevention:   equipment surfaces disinfected   hand hygiene promoted  Goal: Optimal Comfort and Wellbeing  Outcome: Progressing  Intervention: Provide Person-Centered Care  Recent Flowsheet Documentation  Taken 8/27/2024 1545 by Gretta Jason RN  Trust Relationship/Rapport:   care explained   choices provided   emotional support provided   questions answered   questions encouraged   reassurance provided   thoughts/feelings acknowledged  Goal: Readiness for Transition of Care  Outcome: Progressing  Intervention: Mutually Develop Transition Plan  Recent Flowsheet Documentation  Taken 8/27/2024 1500 by Gretta Jason RN  Equipment Currently Used at Home:   cane, quad    none

## 2024-08-27 NOTE — ED NOTES
Brief ED visit to facilitate workup near end of shift 59-year-old gentleman with a history of coronary disease and coronary bypass grafting x 5 presenting with intermittent left chest discomfort over the last 1 week but constant pain since noon today described as a pressure-like sensation primarily in the left chest but radiates over to the right.  No preceding falls or trauma or change in activity level.  No preceding infectious prodrome.    No history of venal thromboembolic disease.  Currently on a baby aspirin daily.    Mild associated sense of nausea.      ECG shows ST depression in the lateral and anterior leads.  Subtle ST elevation in lead III with a Q wave is present initially.  Flat ST segments in lead II and aVF relative to a baseline of the TP segment.    Last echocardiogram and stress test was 2022 and were essentially normal.    Last CT scan of his chest comment on the upper abdomen of cholelithiasis.      Aspirin and nitro ordered, understands another provider will finish his care.     Asim Fallon MD  08/26/24 2037

## 2024-08-27 NOTE — ED NOTES
Pt states 0-1/10 CP. Reports some nausea. ODT zofran given. Pt on phone with wife. Awaiting admission. VSS at this time. Call light in reach. Side rails up x2.

## 2024-08-27 NOTE — ED NOTES
DATE/TIME OF CALL RECEIVED FROM LAB:  08/26/24 at 8:55 PM   LAB TEST:  troponin  LAB VALUE:  203  PROVIDER NOTIFIED?: Yes  PROVIDER NAME: Walker  DATE/TIME LAB VALUE REPORTED TO PROVIDER: 2055  MECHANISM OF PROVIDER NOTIFICATION: Face-To-Face

## 2024-08-27 NOTE — PHARMACY-ADMISSION MEDICATION HISTORY
Pharmacist Admission Medication History    Admission medication history is complete. The information provided in this note is only as accurate as the sources available at the time of the update.    Information Source(s): Patient via in-person  Pertinent Information: none    Changes made to PTA medication list:  Added: None  Deleted: None  Changed: None    Allergies reviewed with patient and updates made in EHR: no  Medication History Completed By: Leonardo Cox RPH 8/27/2024 8:45 AM    PTA Med List   Medication Sig Last Dose    albuterol (PROAIR HFA) 108 (90 Base) MCG/ACT inhaler Inhale 2 puffs into the lungs every 4 hours as needed for shortness of breath or wheezing More than a month    ASPIRIN 81 MG OR TABS Take 81 mg by mouth daily. 8/26/2024 at am    atorvastatin (LIPITOR) 80 MG tablet Take 1 tablet (80 mg) by mouth daily 8/26/2024 at am    azelastine (ASTELIN) 0.1 % nasal spray Spray 2 sprays into both nostrils 2 times daily Past Month at PRN    carvedilol (COREG) 6.25 MG tablet Take 1 tablet (6.25 mg) by mouth 2 times daily (with meals) 8/26/2024 at x 1    citalopram (CELEXA) 20 MG tablet Take 1 tablet (20 mg) by mouth daily 8/26/2024 at am    clobetasol propionate (TEMOVATE) 0.05 % external cream Apply topically 2 times daily More than a month at PRN    fluticasone (FLONASE) 50 MCG/ACT nasal spray Spray 2 sprays into both nostrils daily Past Month at PRN    ibuprofen (ADVIL/MOTRIN) 200 MG tablet Take 3 tablets (600 mg) by mouth every 8 hours as needed for mild pain Past Week    LANsoprazole (PREVACID) 30 MG DR capsule TAKE ONE CAPSULE BY MOUTH DAILY. TAKE 30 TO 60 MINUTES BEFORE A MEAL. 8/26/2024 at am

## 2024-08-27 NOTE — ED PROVIDER NOTES
".  Emergency Department Note      History of Present Illness     Chief Complaint   Chest Pain    HPI   Mian Lozano is a 59 year old male on low dose aspirin with a history of MI and status post 5 vessel bypass in 2008 who presents for chest pain with onset last night. Mian states that his chest pain is similar to his pain after his thoracotomy but is more intense. At one point since onset, his pain radiated \"all the way through\" his right side. His worst pain was rated an 8/10. In the ED, his pain is rated a 5/10 and localized to the left aspect of his chest. He denies his pain aggravated with exertion or by other factors. He denies other blood thinners. He denies any heart issues since his bypass surgery.     Independent Historian   None    Review of External Notes   None    Past Medical History     Medical History and Problem List   Anxiety  Sullivan's esophagus  CAD  Fatty liver  Gallstone  GERD   HLD   HTN   Latent tuberculosis  Mild intermittent asthma  Nephrolithiasis  Other atopic dermatitis and related conditions  Other diseases of lung, not elsewhere classified  Post-thoracotomy pain syndrome  Prediabetes  Psoriasis  Psoriatic arthritis   Pulmonary nodules    Medications   Albuterol   Aspirin (81 mg)  Atorvastatin  Carvedilol  Citalopram  Etanercept  Lansoprazole    Surgical History   Colonoscopy  EGD, combined (X2)  Vasectomy  Herniorrhaphy umbilical  Surgery of patella fracture (left)  Thumb neuroma excised (left)  Thoracocentesis  CABG, artery-vein, five      Physical Exam     Patient Vitals for the past 24 hrs:   BP Temp Temp src Pulse Resp SpO2 Weight   08/26/24 2045 (!) 155/91 -- -- 76 14 99 % --   08/26/24 2030 (!) 158/87 -- -- 75 13 98 % --   08/26/24 2015 (!) 176/104 -- -- -- -- -- --   08/26/24 1947 (!) 173/103 96.8  F (36  C) Temporal 92 18 96 % 97.5 kg (215 lb)     Physical Exam  Nursing note and vitals reviewed.  HENT:   Mouth/Throat: Moist mucous membranes.   Eyes: EOMI, nonicteric " sclera  Cardiovascular: Normal rate, regular rhythm, no murmurs, rubs, or gallops  Pulmonary/Chest: Effort normal and breath sounds normal. No respiratory distress. No wheezes. No rales.   Abdominal: Soft. Nontender, nondistended, no guarding or rigidity.   Musculoskeletal: Normal range of motion.   Neurological: Alert. Moves all extremities spontaneously.   Skin: Skin is warm and dry. No rash noted.         Diagnostics     Lab Results   Labs Ordered and Resulted from Time of ED Arrival to Time of ED Departure   BASIC METABOLIC PANEL - Abnormal       Result Value    Sodium 138      Potassium 4.4      Chloride 102      Carbon Dioxide (CO2) 21 (*)     Anion Gap 15      Urea Nitrogen 8.8      Creatinine 0.87      GFR Estimate >90      Calcium 9.3      Glucose 116 (*)    TROPONIN T, HIGH SENSITIVITY - Abnormal    Troponin T, High Sensitivity 203 (*)    HEPATIC FUNCTION PANEL - Abnormal    Protein Total 7.0      Albumin 4.3      Bilirubin Total 0.6      Alkaline Phosphatase 115      AST 78 (*)     ALT 34      Bilirubin Direct <0.20     TROPONIN T, HIGH SENSITIVITY - Abnormal    Troponin T, High Sensitivity 620 (*)    MAGNESIUM - Abnormal    Magnesium 1.6 (*)    LIPASE - Normal    Lipase 35     CBC WITH PLATELETS AND DIFFERENTIAL    WBC Count 11.0      RBC Count 5.24      Hemoglobin 16.1      Hematocrit 46.5      MCV 89      MCH 30.7      MCHC 34.6      RDW 12.3      Platelet Count 206      % Neutrophils 75      % Lymphocytes 16      % Monocytes 8      % Eosinophils 1      % Basophils 0      % Immature Granulocytes 0      NRBCs per 100 WBC 0      Absolute Neutrophils 8.2      Absolute Lymphocytes 1.8      Absolute Monocytes 0.8      Absolute Eosinophils 0.1      Absolute Basophils 0.0      Absolute Immature Granulocytes 0.0      Absolute NRBCs 0.0     GLUCOSE MONITOR NURSING POCT   HEPARIN UNFRACTIONATED ANTI XA LEVEL       Imaging   Chest XR,  PA & LAT   Final Result   IMPRESSION: No acute airspace consolidation.  Multiple left-sided pleural plaques. No pleural effusion or pneumothorax.      Heart size is normal. Atherosclerotic calcifications of the thoracic aorta.      Median sternotomy and coronary artery bypass grafting.      Echocardiogram Complete    (Results Pending)       EKG   ECG results from 08/26/24   EKG 12-lead, tracing only     Value    Systolic Blood Pressure     Diastolic Blood Pressure     Ventricular Rate 69    Atrial Rate 69    MT Interval 174    QRS Duration 86        QTc 420    P Axis 57    R AXIS 45    T Axis 57    Interpretation ECG      Sinus rhythm  Nonspecific ST abnormality  Abnormal ECG  When compared with ECG of 03-May-2022, mild inferior ST elevation with reciprocal depression now present.     EKG 12 lead     Value    Systolic Blood Pressure     Diastolic Blood Pressure     Ventricular Rate 77    Atrial Rate 77    MT Interval 186    QRS Duration 82        QTc 423    P Axis 60    R AXIS 33    T Axis 43    Interpretation ECG      Sinus rhythm  Nonspecific ST abnormality  Abnormal ECG  When compared with ECG from earlier today, inferior RUTH ANN improved.             Independent Interpretation   I independently reviewed the CXR. I see no evidence of mediastinal widening/PTX.       ED Course      Medications Administered   Medications   morphine (PF) injection 4 mg (has no administration in time range)   heparin loading dose for LOW INTENSITY TREATMENT * Give BEFORE starting heparin infusion (has no administration in time range)   heparin 25,000 units in 0.45% NaCl 250 mL ANTICOAGULANT infusion (has no administration in time range)   ondansetron (ZOFRAN ODT) ODT tab 4 mg (4 mg Oral $Given 8/26/24 1948)   aspirin (ASA) chewable tablet 324 mg (324 mg Oral $Given 8/26/24 2045)   nitroGLYcerin (NITROSTAT) sublingual tablet 0.4 mg (0.4 mg Sublingual $Given 8/26/24 2045)           Discussion of Management   Discussed with cardiology, Dr. Tariq Escobar.   Discussed with hospitalist, Dr. Sims.  Discussed  with hospitalist, Dr. Lord.        Additional Documentation  None    Medical Decision Making / Diagnosis     CMS Diagnoses: None    MIPS       None    MDM   Mian Lozano is a 59 year old male who presents with chief complaint chest pain that has been ongoing a little less than 24 hours before arrival.  Patient has concerning EKG changes initially with some mild ST elevation of his inferior leads with reciprocal depression that do not meet STEMI criteria.  After treatment with aspirin, nitro, and morphine, these changes have improved.  Patient's pain is also mostly resolved.  Considered other causes such as pneumothorax, aortic dissection, PE, but most likely etiology is NSTEMI.  I discussed with on-call cardiology, Dr. Tariq Escobar, who stated that given patient's pain is improved  as well as improved EKG, that patient did not require emergent Cath Lab activation and could be admitted at [facility].  I discussed with our hospitalist service, both Dr. Sims & Dr. Lord, who asked that we transfer patient to [facility] given the dynamic EKG changes and concern for specialty intervention that we do not provide at [facility].  There were no beds available in our system.  Patient declined transfer outside of our system, therefore patient will be admitted here at [facility].  Dr. Lord accepted for admission.    Critical care time 40 minutes excluding procedures.    Disposition   The patient was admitted to the hospital.     Diagnosis     ICD-10-CM    1. NSTEMI (non-ST elevated myocardial infarction) (H)  I21.4                   Colby Armstrong MD  08/27/24 0352

## 2024-08-27 NOTE — H&P
[facility]  Hospitalist Admission Note  Name: Mian Lozano    MRN: [id number]  YOB: 1965    Age: 59 year old  Date of admission: 8/26/2024  Primary care provider: Fady Adams    Chief Complaint:  chest pain    Assessment and Plan:   Acute coronary syndrome  Postthoracotomy pain syndrome  CAD s/p 5V CABG  HTN  HLD: Patient had a 5-day CABG in 2008.  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  He gets sharp stabbing type pain on the left lateral chest a couple of times a week at baseline.  This was happening more frequently intermittently this past 1 week.  Today he had constant pain in this area and that radiated across his entire chest which was much different than his baseline.  He also had some shortness breath, nausea, and diaphoresis.  Initial EKG does show some ST elevation in lead III and to a lesser degree in aVF along with diffuse anterolateral depression did not meet STEMI criteria for ER provider/on-call cardiology.  He received sublingual nitroglycerin with repeat EKG showing improvement in the ST elevation in V3 and ST depression along with drop in his pain down to maybe 1 out of 10 similar to his baseline postthoracotomy pain.  Initial troponin T is 203 and repeat troponin T is elevated at 620.  Presentation consistent with acute coronary syndrome.  Cardiology recommended heparin drip initiation, aspirin, nitroglycerin drip which has been initiated and admission here.  I recommended transfer to [facility] or other hospital that has 24-hour coronary angiography availability given his 5V CABG history, ongoing mild chest pain, and rising troponin however there is no beds in system and the patient refused transfer to [facility].  I did discuss also discussed these concerns with the patient, but he would prefer admission here and again declines transfer to [facility].  -Consult cardiology, n.p.o. for coronary angiogram today  -Heparin  drip  -Nitroglycerin drip  resume PTA carvedilol- titrate to pain, discussed with patient that he needs to notify if his pain intensity is increasing  -6.25 mg twice daily, atorvastatin 80 mg daily, and aspirin 81 mg daily  -Obtain TTE  -Cardiac monitoring  -Serial troponin x 2    Hypomagnesemia: Magnesium low 1.6.  -Give 2 g IV magnesium now  -Potassium and magnesium replacement protocols    Obesity: BMI 32.6.    Psoriasis  Psoriatic arthritis: Is on Enbrel weekly, hold while here.    Sullivan's esophagus  GERD: Use pantoprazole 40 NOAC daily in place of his Prevacid.    Anxiety: Resume PTA citalopram 25 daily per    Asthma: No wheezing or sign of exacerbation at this time.    Prediabetes: Not on medications for this.  Glucose 116.    DVT Prophylaxis: Heparin drip  Code Status: Full Code  FEN: N.p.o.  Discharge Dispo: Home  Estimated Disch Date / # of Days until Disch: Admit patient IMC status for nitroglycerin drip.  Anticipate a 2 night hospitalization.      History of Present Illness:  Mian Lozano is a 59 year old male with PMH including CAD s/p 5V CABG in 2008, postthoracotomy pain syndrome, HTN, HLD, psoriasis/psoriatic arthritis on Enbrel, obesity, GERD/Sullivan's esophagus, asthma, prediabetes, latent TB, fatty liver, and anxiety who presents with chest pain.  Ever since his CABG he has had lateral left-sided chest pain described as sharp and stabbing a couple of times per week secondary to postthoracotomy pain.  Over this past 1 week this type of pain has been more intense and much more frequent.  Today he had constant pain in this area now rating across his entire chest which is different than his baseline associate with some shortness of breath, nausea, and diaphoresis prompting him to seek evaluation.  At baseline he does get some shortness of breath and chest tightness with exertion.  He has not had any fevers, chills, cough.  Has been taking all medications as prescribed including 81 mg aspirin.  He  does not smoke.    History obtained from patient, medical record, and from Dr. Armstrong in the emergency department.  Initial blood pressure 173/103, heart rate 92, temperature 96.8  F, oxygen 96% on room air.  Troponin T elevated at 203, glucose 116, CBC and BMP within normal limits.  Slight elevation in AST at 78 otherwise LFTs and lipase normal.  Chest x-ray shows sternotomy along with left-sided pleural plaques and a calcified thoracic aorta.  Initial EKG does show some ST elevation in lead III and to a lesser degree in aVF along with diffuse anterolateral depression did not meet STEMI criteria for ER provider/on-call cardiology.  He received sublingual nitroglycerin with repeat EKG showing improvement in the ST elevation in V3 and ST depression along with drop in his pain down to maybe 1 out of 10 similar to his baseline postthoracotomy pain.  Initial troponin T is 203 and repeat troponin T is elevated at 620.  Presentation consistent with acute coronary syndrome.  Cardiology recommended heparin drip initiation, aspirin, nitroglycerin drip which has been initiated and admission here.  I recommended transfer to [facility] or other hospital that has 24-hour coronary angiography availability given his 5V CABG history, ongoing mild chest pain, and rising troponin however there is no beds in system and the patient refused transfer to [facility].  I did discuss also discussed these concerns with the patient, but he would prefer admission here and again declines transfer to [facility].  Admit inpatient IMC status for nitroglycerin drip.  He received 324 mg aspirin and was started on heparin drip.  Also received 1 dose 4 mg morphine and Zofran.  Keep n.p.o. for coronary angiogram in the morning.       Clinically Significant Risk Factors Present on Admission                # Drug Induced Platelet Defect: home medication list includes an antiplatelet medication   # Hypertension:  Noted on problem list             # Asthma: noted on problem list                        Past Medical History reviewed:  Past Medical History:   Diagnosis Date    2019 novel coronavirus disease (COVID-19) 02/2021    Anxiety     Fowler's esophagus 07/2011    Coronary artery disease 04/2008    cabg x 5    Environmental allergies     seasonal, hay fever    FAM HX-CARDIOVAS DIS NEC     dad at 45, cabg, stent    Family history of prostate cancer     Fatty liver     Gallstone     GERD (gastroesophageal reflux disease) 03/2011    History of blood transfusion     Hyperlipidemia LDL goal <70 2006    Hypertension goal BP (blood pressure) < 140/90 04/2008    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    Dr Saba, Hx histoplasmosis, treated    Mild intermittent asthma 07/2003    Nephrolithiasis 6/13, 7/16    calcium oxalate - 6/2013, 7/2016, 1/2021    Other atopic dermatitis and related conditions 1980    Other diseases of lung, not elsewhere classified 04/2008    dr steel - benign bx and ct - granuloma - no further w/u needed    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    dr hoover    Prediabetes 08/2008    Psoriasis     Dr Mccall    Psoriatic arthritis (H)     Dr Mccall    Pulmonary nodules 2009    stable in 2012    Seasonal allergies 1980    Allergic rhinitis Hayfever     Past Surgical History reviewed:  Past Surgical History:   Procedure Laterality Date    COLONOSCOPY N/A 08/07/2015    normal - due 10 yrs    ESOPHAGOSCOPY, GASTROSCOPY, DUODENOSCOPY (EGD), COMBINED  6/11, 5/13    [facility] - ? gastritis, fowler's - due 3 yr    ESOPHAGOSCOPY, GASTROSCOPY, DUODENOSCOPY (EGD), COMBINED  05/10/2013    COMBINED ESOPHAGOSCOPY, GASTROSCOPY, DUODENOSCOPY (EGD), BIOPSY SINGLE OR MULTIPLE;  ESOPHAGOSCOPY, GASTROSCOPY, DUODENOSCOPY (EGD)  with biopsy;  Surgeon: Angus Reynolds MD;  Location:  GI    ESOPHAGOSCOPY, GASTROSCOPY, DUODENOSCOPY (EGD), COMBINED N/A 07/28/2017    Fowler's - recheck 3 yrs    HC VASECTOMY UNILAT/BILAT W POSTOP SEMEN  1995     Vasectomy    HERNIORRHAPHY UMBILICAL N/A 11/13/2014    Dr Barron    SURGICAL HISTORY OF -  Left 1980    lt patella fx    SURGICAL HISTORY OF -  Left 1985    lt thumb neuroma excised    SURGICAL HISTORY OF -   11/2009    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    ZZC CABG, ARTERY-VEIN, FIVE  04/2008    [facility] - lung bx benign    ZZC STRESS TEST - REGULAR (FL)  9/06, 4/11    negative stress thallium - [facility]     Social History reviewed:  Social History     Tobacco Use    Smoking status: Former     Types: Cigars    Smokeless tobacco: Never    Tobacco comments:     In past rare cigar, last 2005   Substance Use Topics    Alcohol use: Yes     Types: 6 Standard drinks or equivalent per week     Comment: 6 drinks per week     Social History     Social History Narrative    Not on file     Family History reviewed:  Family History   Problem Relation Age of Onset    Prostate Cancer Father 69    Coronary Artery Disease Father 38    Hypertension Father     Neurologic Disorder Sister         CVA/aneurysm at age 19, decreased memory, partial paralysis    Hypertension Paternal Grandfather     C.A.D. Paternal Grandfather     Heart Failure Paternal Grandfather     Colon Cancer No family hx of      Allergies:  Allergies   Allergen Reactions    Seasonal Allergies      Medications:  Prior to Admission medications    Medication Sig Last Dose Taking? Auth Provider Long Term End Date   albuterol (PROAIR HFA) 108 (90 Base) MCG/ACT inhaler Inhale 2 puffs into the lungs every 4 hours as needed for shortness of breath or wheezing   Fady Adams MD Yes    ASPIRIN 81 MG OR TABS ONE DAILY   Tayo Valenzuela PA-C     atorvastatin (LIPITOR) 80 MG tablet Take 1 tablet (80 mg) by mouth daily   Fady Adams MD Yes    azelastine (ASTELIN) 0.1 % nasal spray Spray 2 sprays into both nostrils 2 times daily   Fady Adams MD     carvedilol (COREG) 6.25 MG tablet Take 1 tablet (6.25 mg) by mouth 2 times daily (with meals)   Fady Adams MD Yes     cetirizine (ZYRTEC) 10 MG tablet Take 1 tablet (10 mg) by mouth every evening   Fady Adams MD     citalopram (CELEXA) 20 MG tablet Take 1 tablet (20 mg) by mouth daily   Fady Adams MD Yes    clobetasol propionate (TEMOVATE) 0.05 % external cream Apply topically 2 times daily   Fady Adams MD     etanercept (ENBREL) 50 MG/ML injection Inject 0.98 mLs (50 mg) Subcutaneous once a week   Fady Adams MD Yes    fluticasone (FLONASE) 50 MCG/ACT nasal spray Spray 2 sprays into both nostrils daily   Fady Adams MD     ibuprofen (ADVIL/MOTRIN) 200 MG tablet Take 3 tablets (600 mg) by mouth every 8 hours as needed for mild pain   Radha Ray MD     LANsoprazole (PREVACID) 30 MG DR capsule TAKE ONE CAPSULE BY MOUTH DAILY. TAKE 30 TO 60 MINUTES BEFORE A MEAL.   Fady Adams MD       Review of Systems:  A Comprehensive greater than 10 system review of systems was carried out.  Pertinent positives and negatives are noted above.  Otherwise negative.     Physical Exam:  Blood pressure (!) 149/94, pulse 83, temperature 96.8  F (36  C), temperature source Temporal, resp. rate 13, weight 97.5 kg (215 lb), SpO2 96%.  Wt Readings from Last 1 Encounters:   08/26/24 97.5 kg (215 lb)     Exam:  Constitutional: Awake, NAD   Eyes: sclera white   HEENT:  MMM  Respiratory: no respiratory distress, lungs cta bilaterally, no crackles or wheeze  Cardiovascular: RRR.  1/6 systolic murmur right upper sternal border  GI: Obese, non-tender, not distended, bowel sounds present  Skin: no rash   Musculoskeletal/extremities: No edema  Neurologic: A&O, speech clear  Psychiatric: calm, cooperative, normal affect    Lab and imaging data personally reviewed:  Labs:  Recent Labs   Lab 08/26/24 2004   WBC 11.0   HGB 16.1   HCT 46.5   MCV 89        Recent Labs   Lab 08/26/24  2358 08/26/24 2004   NA  --  138   POTASSIUM  --  4.4   CHLORIDE  --  102   CO2  --  21*   ANIONGAP  --  15   GLC  --  116*   BUN  --  8.8   CR  --  0.87    GFRESTIMATED  --  >90   CYNDI  --  9.3   MAG 1.6*  --    PROTTOTAL  --  7.0   ALBUMIN  --  4.3   BILITOTAL  --  0.6   ALKPHOS  --  115   AST  --  78*   ALT  --  34     Recent Labs   Lab 08/26/24 2004   LIPASE 35     Troponin T 203 with repeat 620  EKG:  some ST elevation in lead III and to a lesser degree in aVF along with diffuse anterolateral depression   Repeat EKG after nitroglycerin showed improvement in ST elevation and ST depression, but not resolution    Imaging:  Recent Results (from the past 24 hour(s))   Chest XR,  PA & LAT    Narrative    EXAM: XR CHEST 2 VIEWS  LOCATION: [facility]  DATE: 8/26/2024    INDICATION: Chest pain.  COMPARISON: 10/27/2022.      Impression    IMPRESSION: No acute airspace consolidation. Multiple left-sided pleural plaques. No pleural effusion or pneumothorax.    Heart size is normal. Atherosclerotic calcifications of the thoracic aorta.    Median sternotomy and coronary artery bypass grafting.       Huang Lord MD  Hospitalist  [facility]

## 2024-08-27 NOTE — CONSULTS
[facility]    Cardiology Consultation     Date of Admission:  8/26/2024    Assessment & Plan   Mian Lozano is a 59 year old male who was admitted on 8/26/2024.      Chest discomfort with the dynamic EKG changes with the elevated troponin, overall consistent with acute coronary syndrome.  He is feeling better with nitroglycerin drip.  Discussed at length with patient about his clinical presentation which is concerning for acute coronary syndrome.  Discussed option of medical management versus medical management plus coronary angiogram.  Risk benefits of coronary angiogram with risks including but not limited risk of stroke, MI, death, need for PRBC transfusion, emergent bypass surgery were discussed with patient.  Patient understands the rational coronary angiogram, the risk, and wishes to proceed with it.  No obvious contraindication for dual antiplatelet therapy if needed.  CAD with history of CABG in 2008-HINES to LAD, SVG has a Y-shaped graft to diagonal and circumflex, SVG to PDA and SILVIA sequentially.  Stress test was negative for ischemia and 14 2022.  Postthoracotomy chest pain since the bypass surgery, current episode of chest discomfort is different than that.    Recommendations  Agree with echocardiogram  Continue heparin drip, aspirin, Lipitor, carvedilol  Coronary angiogram with possible revascularization.  Continue NPO.          José Manuel Fung MD, MD    Primary Care Physician   Fady Adams    Reason for Consult   Reason for consult: I was asked to evaluate this patient for non-STEMI.    History of Present Illness   Mian Lozano is a 59 year old male who presents with chest discomfort which started about a week ago but became worse yesterday.  Patient has history of CAD with history of CABG in 2008.  He also has history of postthoracotomy chest pain.  Patient tells me that the current episode started a week ago similar to his usual postthoracotomy chest pain but then evolved and  became different in character more like tightness going to the right side.  Upon presentation he was noted to have some subtle ST elevation in inferior leads which resolved subsequently he also has ST depression in lateral leads.  He was started on heparin and nitroglycerin.  His symptoms are much improved now.  High since her troponin has risen to 792 from 203 initially.  He denies any bleeding issues or anticipated surgery.  He does not use any tobacco.    Past Medical History   Past Medical History:   Diagnosis Date    2019 novel coronavirus disease (COVID-19) 02/2021    Anxiety     Fowler's esophagus 07/2011    Coronary artery disease 04/2008    cabg x 5    Environmental allergies     seasonal, hay fever    FAM HX-CARDIOVAS DIS NEC     dad at 45, cabg, stent    Family history of prostate cancer     Fatty liver     Gallstone     GERD (gastroesophageal reflux disease) 03/2011    History of blood transfusion     Hyperlipidemia LDL goal <70 2006    Hypertension goal BP (blood pressure) < 140/90 04/2008    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    Dr Saba, Hx histoplasmosis, treated    Mild intermittent asthma 07/2003    Nephrolithiasis 6/13, 7/16    calcium oxalate - 6/2013, 7/2016, 1/2021    Other atopic dermatitis and related conditions 1980    Other diseases of lung, not elsewhere classified 04/2008    dr steel - benign bx and ct - granuloma - no further w/u needed    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 gastritis, fowler's - due 3 yr    ESOPHAGOSCOPY, GASTROSCOPY, DUODENOSCOPY (EGD), COMBINED   05/10/2013    COMBINED ESOPHAGOSCOPY, GASTROSCOPY, DUODENOSCOPY (EGD), BIOPSY SINGLE OR MULTIPLE;  ESOPHAGOSCOPY, GASTROSCOPY, DUODENOSCOPY (EGD)  with biopsy;  Surgeon: Angus Reynolds MD;  Location:  GI    ESOPHAGOSCOPY, GASTROSCOPY, DUODENOSCOPY (EGD), COMBINED N/A 07/28/2017    [facility] - recheck 3 yrs    HC VASECTOMY UNILAT/BILAT W POSTOP SEMEN  1995    Vasectomy    HERNIORRHAPHY UMBILICAL N/A 11/13/2014    Dr Barron    SURGICAL HISTORY OF -  Left 1980    lt patella fx    SURGICAL HISTORY OF -  Left 1985    lt thumb neuroma excised    SURGICAL HISTORY OF -   11/2009    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 Taking?   ASPIRIN 81 MG OR TABS 8/26/2024 at am  Yes Yes   Sig: Take 81 mg by mouth daily.   LANsoprazole (PREVACID) 30 MG DR capsule 8/26/2024 at am  No Yes   Sig: TAKE ONE CAPSULE BY MOUTH DAILY. TAKE 30 TO 60 MINUTES BEFORE A MEAL.   albuterol (PROAIR HFA) 108 (90 Base) MCG/ACT inhaler More than a month  No Yes   Sig: Inhale 2 puffs into the lungs every 4 hours as needed for shortness of breath or wheezing   atorvastatin (LIPITOR) 80 MG tablet 8/26/2024 at am  No Yes   Sig: Take 1 tablet (80 mg) by mouth daily   azelastine (ASTELIN) 0.1 % nasal spray Past Month at PRN  No Yes   Sig: Spray 2 sprays into both nostrils 2 times daily   carvedilol (COREG) 6.25 MG tablet 8/26/2024 at x 1  No Yes   Sig: Take 1 tablet (6.25 mg) by mouth 2 times daily (with meals)   cetirizine (ZYRTEC) 10 MG tablet 8/25/2024 at pm  No No   Sig: Take 1 tablet (10 mg) by mouth every evening   citalopram (CELEXA) 20 MG tablet 8/26/2024 at am  No Yes   Sig: Take 1 tablet (20 mg) by mouth daily   clobetasol propionate (TEMOVATE) 0.05 % external cream More  than a month at PRN  No Yes   Sig: Apply topically 2 times daily   etanercept (ENBREL) 50 MG/ML injection 8/22/2024  Yes No   Sig: Inject 0.98 mLs (50 mg) Subcutaneous once a week   fluticasone (FLONASE) 50 MCG/ACT nasal spray Past Month at PRN  No Yes   Sig: Spray 2 sprays into both nostrils daily   ibuprofen (ADVIL/MOTRIN) 200 MG tablet Past Week  No Yes   Sig: Take 3 tablets (600 mg) by mouth every 8 hours as needed for mild pain      Facility-Administered Medications: None     Current Facility-Administered Medications   Medication Dose Route Frequency Provider Last Rate Last Admin    acetaminophen (TYLENOL) tablet 650 mg  650 mg Oral Q4H PRN Huang Lord MD        Or    acetaminophen (TYLENOL) Suppository 650 mg  650 mg Rectal Q4H PRN Huang Lord MD        [START ON 8/28/2024] aspirin (ASA) chewable tablet 81 mg  81 mg Oral Daily Huang Lord MD        atorvastatin (LIPITOR) tablet 80 mg  80 mg Oral Daily Huang Lord MD        calcium carbonate (TUMS) chewable tablet 1,000 mg  1,000 mg Oral 4x Daily PRN Huang Lord MD        carvedilol (COREG) tablet 6.25 mg  6.25 mg Oral BID w/meals Huang Lord MD        cetirizine (zyrTEC) tablet 10 mg  10 mg Oral QPM Huang Lord MD        citalopram (celeXA) tablet 20 mg  20 mg Oral Daily Huang Lord MD        Continuing beta blocker from home medication list OR beta blocker order already placed during this visit   Does not apply DOES NOT GO TO Huang Barahona MD        Continuing statin from home medication list OR statin order already placed during this visit   Does not apply DOES NOT GO TO Huang Barahona MD        heparin 25,000 units in 0.45% NaCl 250 mL ANTICOAGULANT infusion  0-5,000 Units/hr Intravenous Continuous Huang Lord MD 9.5 mL/hr at 08/27/24 0935 950 Units/hr at 08/27/24 0935    HOLD: nitroGLYcerin IF   Does not apply HOLD Huang Lord  MD Ivan        lidocaine (LMX4) cream   Topical Q1H PRN Huang Lord MD        lidocaine (LMX4) cream   Topical Q1H PRN Huang Lord MD        lidocaine 1 % 0.1-1 mL  0.1-1 mL Other Q1H PRN Huang Lord MD        lidocaine 1 % 0.1-1 mL  0.1-1 mL Other Q1H PRHuang David MD        medication instruction   Does not apply Continuous PRN Huang Lord MD        morphine (PF) injection 4 mg  4 mg Intravenous Q2H PRN Huang Lord MD        nitroGLYcerin 50 mg in D5W 250 mL PERIPHERAL IV infusion   mcg/min Intravenous Continuous Huang Lord MD 3 mL/hr at 08/27/24 0935 10 mcg/min at 08/27/24 0935    ondansetron (ZOFRAN ODT) ODT tab 4 mg  4 mg Oral Q6H PRN Huang Lord MD   4 mg at 08/27/24 0720    Or    ondansetron (ZOFRAN) injection 4 mg  4 mg Intravenous Q6H PRN Huang Lord MD   4 mg at 08/27/24 0122    pantoprazole (PROTONIX) EC tablet 40 mg  40 mg Oral QAM AC Huang Lord MD        Patient is already receiving anticoagulation with heparin, enoxaparin (LOVENOX), warfarin (COUMADIN)  or other anticoagulant medication   Does not apply Continuous PRN Huang Lord MD        prochlorperazine (COMPAZINE) injection 10 mg  10 mg Intravenous Q6H PRN Huang Lord MD        Or    prochlorperazine (COMPAZINE) tablet 10 mg  10 mg Oral Q6H PRN Huang Lord MD        Or    prochlorperazine (COMPAZINE) suppository 25 mg  25 mg Rectal Q12H PRN Huang Lord MD        Reason ACE/ARB/ARNI order not selected   Other DOES NOT GO TO Huang Barahona MD        senna-docusate (SENOKOT-S/PERICOLACE) 8.6-50 MG per tablet 1 tablet  1 tablet Oral BID PRN Huang Lord MD        Or    senna-docusate (SENOKOT-S/PERICOLACE) 8.6-50 MG per tablet 2 tablet  2 tablet Oral BID PRN Huang Lord MD        sodium chloride (PF) 0.9% PF flush 3 mL  3 mL Intracatheter Q8H Pop  Huang Love MD        sodium chloride (PF) 0.9% PF flush 3 mL  3 mL Intracatheter q1 min prn Huang Lord MD        sodium chloride (PF) 0.9% PF flush 3 mL  3 mL Intracatheter Q8H Huang Lord MD        sodium chloride (PF) 0.9% PF flush 3 mL  3 mL Intracatheter q1 min prn uHang Lord MD         Current Outpatient Medications   Medication Sig Dispense Refill    albuterol (PROAIR HFA) 108 (90 Base) MCG/ACT inhaler Inhale 2 puffs into the lungs every 4 hours as needed for shortness of breath or wheezing 18 g 3    ASPIRIN 81 MG OR TABS Take 81 mg by mouth daily. 100 3    atorvastatin (LIPITOR) 80 MG tablet Take 1 tablet (80 mg) by mouth daily 90 tablet 3    azelastine (ASTELIN) 0.1 % nasal spray Spray 2 sprays into both nostrils 2 times daily 90 mL 3    carvedilol (COREG) 6.25 MG tablet Take 1 tablet (6.25 mg) by mouth 2 times daily (with meals) 180 tablet 3    citalopram (CELEXA) 20 MG tablet Take 1 tablet (20 mg) by mouth daily 90 tablet 3    clobetasol propionate (TEMOVATE) 0.05 % external cream Apply topically 2 times daily 30 g 3    fluticasone (FLONASE) 50 MCG/ACT nasal spray Spray 2 sprays into both nostrils daily 48 g 3    ibuprofen (ADVIL/MOTRIN) 200 MG tablet Take 3 tablets (600 mg) by mouth every 8 hours as needed for mild pain 1 tablet 0    LANsoprazole (PREVACID) 30 MG DR capsule TAKE ONE CAPSULE BY MOUTH DAILY. TAKE 30 TO 60 MINUTES BEFORE A MEAL. 90 capsule 3    cetirizine (ZYRTEC) 10 MG tablet Take 1 tablet (10 mg) by mouth every evening 90 tablet 3    etanercept (ENBREL) 50 MG/ML injection Inject 0.98 mLs (50 mg) Subcutaneous once a week       Current Facility-Administered Medications   Medication Dose Route Frequency Provider Last Rate Last Admin    acetaminophen (TYLENOL) tablet 650 mg  650 mg Oral Q4H PRN Huang Lord MD        Or    acetaminophen (TYLENOL) Suppository 650 mg  650 mg Rectal Q4H PRN Huang Lord MD        [START ON 8/28/2024]  aspirin (ASA) chewable tablet 81 mg  81 mg Oral Daily Huang Lord MD        atorvastatin (LIPITOR) tablet 80 mg  80 mg Oral Daily Huang Lord MD        calcium carbonate (TUMS) chewable tablet 1,000 mg  1,000 mg Oral 4x Daily PRN Huang Lord MD        carvedilol (COREG) tablet 6.25 mg  6.25 mg Oral BID w/meals Huang Lord MD        cetirizine (zyrTEC) tablet 10 mg  10 mg Oral QPM Huang Lord MD        citalopram (celeXA) tablet 20 mg  20 mg Oral Daily Huang Lord MD        Continuing beta blocker from home medication list OR beta blocker order already placed during this visit   Does not apply DOES NOT GO TO Huang Barahona MD        Continuing statin from home medication list OR statin order already placed during this visit   Does not apply DOES NOT GO TO Huang Barahona MD        heparin 25,000 units in 0.45% NaCl 250 mL ANTICOAGULANT infusion  0-5,000 Units/hr Intravenous Continuous Huang Lord MD 9.5 mL/hr at 08/27/24 0935 950 Units/hr at 08/27/24 0935    HOLD: nitroGLYcerin IF   Does not apply HOLD Huang Lord MD        lidocaine (LMX4) cream   Topical Q1H PRN Huang Lord MD        lidocaine (LMX4) cream   Topical Q1H PRN Huang Lord MD        lidocaine 1 % 0.1-1 mL  0.1-1 mL Other Q1H PRN Huang Lord MD        lidocaine 1 % 0.1-1 mL  0.1-1 mL Other Q1H PRN Huang oLrd MD        medication instruction   Does not apply Continuous PRN Huang Lord MD        morphine (PF) injection 4 mg  4 mg Intravenous Q2H PRN Huang Lord MD        nitroGLYcerin 50 mg in D5W 250 mL PERIPHERAL IV infusion   mcg/min Intravenous Continuous Haung Lord MD 3 mL/hr at 08/27/24 0935 10 mcg/min at 08/27/24 0935    ondansetron (ZOFRAN ODT) ODT tab 4 mg  4 mg Oral Q6H PRN Huang Lord MD   4 mg at 08/27/24 0720    Or    ondansetron (ZOFRAN)  injection 4 mg  4 mg Intravenous Q6H PRN Huang Lord MD   4 mg at 08/27/24 0122    pantoprazole (PROTONIX) EC tablet 40 mg  40 mg Oral QAM  Huang Lord MD        Patient is already receiving anticoagulation with heparin, enoxaparin (LOVENOX), warfarin (COUMADIN)  or other anticoagulant medication   Does not apply Continuous PRN Huang Lord MD        prochlorperazine (COMPAZINE) injection 10 mg  10 mg Intravenous Q6H PRN Huang Lord MD        Or    prochlorperazine (COMPAZINE) tablet 10 mg  10 mg Oral Q6H PRN Huang Lord MD        Or    prochlorperazine (COMPAZINE) suppository 25 mg  25 mg Rectal Q12H PRN Huang Lord MD        Reason ACE/ARB/ARNI order not selected   Other DOES NOT GO TO Huang Barahona MD        senna-docusate (SENOKOT-S/PERICOLACE) 8.6-50 MG per tablet 1 tablet  1 tablet Oral BID PRN Huang Lord MD        Or    senna-docusate (SENOKOT-S/PERICOLACE) 8.6-50 MG per tablet 2 tablet  2 tablet Oral BID PRN Huang Lord MD        sodium chloride (PF) 0.9% PF flush 3 mL  3 mL Intracatheter Q8H Huang Lord MD        sodium chloride (PF) 0.9% PF flush 3 mL  3 mL Intracatheter q1 min prn Huang Lord MD        sodium chloride (PF) 0.9% PF flush 3 mL  3 mL Intracatheter Q8H Huang Lord MD        sodium chloride (PF) 0.9% PF flush 3 mL  3 mL Intracatheter q1 min prn Huang Lord MD         Current Outpatient Medications   Medication Sig Dispense Refill    albuterol (PROAIR HFA) 108 (90 Base) MCG/ACT inhaler Inhale 2 puffs into the lungs every 4 hours as needed for shortness of breath or wheezing 18 g 3    ASPIRIN 81 MG OR TABS Take 81 mg by mouth daily. 100 3    atorvastatin (LIPITOR) 80 MG tablet Take 1 tablet (80 mg) by mouth daily 90 tablet 3    azelastine (ASTELIN) 0.1 % nasal spray Spray 2 sprays into both nostrils 2 times daily 90 mL 3    carvedilol (COREG) 6.25 MG  tablet Take 1 tablet (6.25 mg) by mouth 2 times daily (with meals) 180 tablet 3    citalopram (CELEXA) 20 MG tablet Take 1 tablet (20 mg) by mouth daily 90 tablet 3    clobetasol propionate (TEMOVATE) 0.05 % external cream Apply topically 2 times daily 30 g 3    fluticasone (FLONASE) 50 MCG/ACT nasal spray Spray 2 sprays into both nostrils daily 48 g 3    ibuprofen (ADVIL/MOTRIN) 200 MG tablet Take 3 tablets (600 mg) by mouth every 8 hours as needed for mild pain 1 tablet 0    LANsoprazole (PREVACID) 30 MG DR capsule TAKE ONE CAPSULE BY MOUTH DAILY. TAKE 30 TO 60 MINUTES BEFORE A MEAL. 90 capsule 3    cetirizine (ZYRTEC) 10 MG tablet Take 1 tablet (10 mg) by mouth every evening 90 tablet 3    etanercept (ENBREL) 50 MG/ML injection Inject 0.98 mLs (50 mg) Subcutaneous once a week       Allergies   Allergies   Allergen Reactions    Seasonal Allergies        Social History    reports that he has quit smoking. His smoking use included cigars. He has never used smokeless tobacco. He reports current alcohol use. He reports that he does not use drugs.      Family History   I have reviewed this patient's family history and updated it with pertinent information if needed.  Family History   Problem Relation Age of Onset    Prostate Cancer Father 69    Coronary Artery Disease Father 38    Hypertension Father     Neurologic Disorder Sister         CVA/aneurysm at age 19, decreased memory, partial paralysis    Hypertension Paternal Grandfather     C.A.D. Paternal Grandfather     Heart Failure Paternal Grandfather     Colon Cancer No family hx of           Review of Systems   A comprehensive review of system was performed and is negative other than that noted in the HPI or here.     Physical Exam   Vital Signs with Ranges  Temp:  [96.8  F (36  C)] 96.8  F (36  C)  Pulse:  [] 92  Resp:  [10-30] 16  BP: ()/() 161/99  SpO2:  [92 %-100 %] 96 %  Wt Readings from Last 4 Encounters:   08/26/24 97.5 kg (215 lb)  "  06/25/24 97.1 kg (214 lb)   06/22/23 95.3 kg (210 lb)   10/27/22 95.6 kg (210 lb 11.2 oz)     No intake/output data recorded.      Vitals: BP (!) 161/99   Pulse 92   Temp 96.8  F (36  C) (Temporal)   Resp 16   Wt 97.5 kg (215 lb)   SpO2 96%   BMI 32.69 kg/m      Physical Exam:   General - Alert and oriented to time place and person in no acute distress  Eyes - No scleral icterus  HEENT - Neck supple, moist mucous membranes  Cardiovascular -S1-S2 normal no murmur rub or gallop  Extremities - There is no pitting.  Edema  Respiratory -clear to auscultation bilaterally      No lab results found in last 7 days.    Invalid input(s): \"TROPONINIES\"    Recent Labs   Lab 08/27/24  0859 08/26/24 2004   WBC 10.6 11.0   HGB 14.4 16.1   MCV 90 89    206    138   POTASSIUM 4.2 4.4   CHLORIDE 102 102   CO2 23 21*   BUN 8.8 8.8   CR 0.93 0.87   GFRESTIMATED >90 >90   ANIONGAP 14 15   CYNDI 8.9 9.3   * 116*   ALBUMIN  --  4.3   PROTTOTAL  --  7.0   BILITOTAL  --  0.6   ALKPHOS  --  115   ALT  --  34   AST  --  78*   LIPASE  --  35     Recent Labs   Lab Test 06/25/24  1111 06/22/23  0734   CHOL 161 150   HDL 41 36*   LDL 69 70   TRIG 253* 222*     Recent Labs   Lab 08/27/24  0859 08/26/24 2004   WBC 10.6 11.0   HGB 14.4 16.1   HCT 42.0 46.5   MCV 90 89    206     No results for input(s): \"PH\", \"PHV\", \"PO2\", \"PO2V\", \"SAT\", \"PCO2\", \"PCO2V\", \"HCO3\", \"HCO3V\" in the last 168 hours.  No results for input(s): \"NTBNPI\", \"NTBNP\" in the last 168 hours.  No results for input(s): \"DD\" in the last 168 hours.  No results for input(s): \"SED\", \"CRP\" in the last 168 hours.  Recent Labs   Lab 08/27/24  0859 08/26/24 2004    206     No results for input(s): \"TSH\" in the last 168 hours.  No results for input(s): \"COLOR\", \"APPEARANCE\", \"URINEGLC\", \"URINEBILI\", \"URINEKETONE\", \"SG\", \"UBLD\", \"URINEPH\", \"PROTEIN\", \"UROBILINOGEN\", \"NITRITE\", \"LEUKEST\", \"RBCU\", \"WBCU\" in the last 168 hours.    Imaging:  Recent Results " (from the past 48 hour(s))   Chest XR,  PA & LAT    Narrative    EXAM: XR CHEST 2 VIEWS  LOCATION: [facility]  DATE: 8/26/2024    INDICATION: Chest pain.  COMPARISON: 10/27/2022.      Impression    IMPRESSION: No acute airspace consolidation. Multiple left-sided pleural plaques. No pleural effusion or pneumothorax.    Heart size is normal. Atherosclerotic calcifications of the thoracic aorta.    Median sternotomy and coronary artery bypass grafting.       Echo:  No results found for this or any previous visit (from the past 4320 hour(s)).    Clinically Significant Risk Factors Present on Admission            # Hypomagnesemia: Lowest Mg = 1.6 mg/dL in last 2 days, will replace as needed     # Drug Induced Platelet Defect: home medication list includes an antiplatelet medication   # Hypertension: Noted on problem list             # Asthma: noted on problem list

## 2024-08-27 NOTE — PRE-PROCEDURE
GENERAL PRE-PROCEDURE:   Procedure:  Heart catheterization possible intervention  Date/Time:  8/27/2024 12:11 PM    Written consent obtained?: Yes    Risks and benefits: Risks, benefits and alternatives were discussed    Consent given by:  Patient  Patient states understanding of procedure being performed: Yes    Patient's understanding of procedure matches consent: Yes    Procedure consent matches procedure scheduled: Yes    Expected level of sedation:  Moderate  Appropriately NPO:  Yes  Lungs:  Lungs clear with good breath sounds bilaterally  Heart:  Normal heart sounds and rate  History & Physical reviewed:  History and physical reviewed and no updates needed  Statement of review:  I have reviewed the lab findings, diagnostic data, medications, and the plan for sedation  Risk benefit indication for cath poss interv discussed with pt mi death renal or vasc damage, cva emergent surgery poss dapt   All questions answered and he wishes to proceed    He has had cath before/cabg

## 2024-08-27 NOTE — ED NOTES
"[facility]  ED Nurse Handoff Report    ED Chief complaint: Chest Pain  . ED Diagnosis:   Final diagnoses:   NSTEMI (non-ST elevated myocardial infarction) (H)       Allergies:   Allergies   Allergen Reactions    Seasonal Allergies        Code Status: Full Code    Activity level - Baseline/Home:  independent.  Activity Level - Current:   independent.   Lift room needed: No.   Bariatric: No   Needed: No   Isolation: No.   Infection: Not Applicable.     Respiratory status: Room air    Vital Signs (within 30 minutes):   Vitals:    08/27/24 0030 08/27/24 0035 08/27/24 0045 08/27/24 0100   BP: (!) 142/92 (!) 149/94 (!) 142/88 137/89   Pulse: 78 83 85 82   Resp: 16 13 12    Temp:       TempSrc:       SpO2: 96% 96% 96% 96%   Weight:           Cardiac Rhythm:  ,   Cardiac  Cardiac Rhythm: Normal sinus rhythm  Pain level:    Patient confused: No.   Patient Falls Risk: nonskid shoes/slippers when out of bed.   Elimination Status: Has voided and pt has not voided      Patient Report - Initial Complaint: Chest pain.   Focused Assessment:  Mian Lozano is a 59 year old male on low dose aspirin with a history of MI and status post 5 vessel bypass in 2008 and thoracotomy who presents for chest pain that onset last night. Mian states that his chest pain is similar to his pain after his thoracotomy but is more intense. At one point since onset, his pain radiated \"all the way through\" his right side. His worst pain was rated an 8/10. In the ED, his pain is rated a 5/10 and localized to the left aspect of his chest. He denies his pain aggravated with exertion or by other factors. He denies other blood thinners. He denies any heart issues since his bypass surgery.     Abnormal Results:   Labs Ordered and Resulted from Time of ED Arrival to Time of ED Departure   BASIC METABOLIC PANEL - Abnormal       Result Value    Sodium 138      Potassium 4.4      Chloride 102      Carbon Dioxide (CO2) 21 (*)     " Anion Gap 15      Urea Nitrogen 8.8      Creatinine 0.87      GFR Estimate >90      Calcium 9.3      Glucose 116 (*)    TROPONIN T, HIGH SENSITIVITY - Abnormal    Troponin T, High Sensitivity 203 (*)    HEPATIC FUNCTION PANEL - Abnormal    Protein Total 7.0      Albumin 4.3      Bilirubin Total 0.6      Alkaline Phosphatase 115      AST 78 (*)     ALT 34      Bilirubin Direct <0.20     TROPONIN T, HIGH SENSITIVITY - Abnormal    Troponin T, High Sensitivity 620 (*)    LIPASE - Normal    Lipase 35     CBC WITH PLATELETS AND DIFFERENTIAL    WBC Count 11.0      RBC Count 5.24      Hemoglobin 16.1      Hematocrit 46.5      MCV 89      MCH 30.7      MCHC 34.6      RDW 12.3      Platelet Count 206      % Neutrophils 75      % Lymphocytes 16      % Monocytes 8      % Eosinophils 1      % Basophils 0      % Immature Granulocytes 0      NRBCs per 100 WBC 0      Absolute Neutrophils 8.2      Absolute Lymphocytes 1.8      Absolute Monocytes 0.8      Absolute Eosinophils 0.1      Absolute Basophils 0.0      Absolute Immature Granulocytes 0.0      Absolute NRBCs 0.0     GLUCOSE MONITOR NURSING POCT   MAGNESIUM        Chest XR,  PA & LAT   Final Result   IMPRESSION: No acute airspace consolidation. Multiple left-sided pleural plaques. No pleural effusion or pneumothorax.      Heart size is normal. Atherosclerotic calcifications of the thoracic aorta.      Median sternotomy and coronary artery bypass grafting.      Echocardiogram Complete    (Results Pending)       Treatments provided: blood work, IV, heparin, xray, nitroglycerin  Family Comments: NA  OBS brochure/video discussed/provided to patient:  No  ED Medications:   Medications   heparin 25,000 units in 0.45% NaCl 250 mL ANTICOAGULANT infusion (1,150 Units/hr Intravenous $New Bag 8/26/24 2515)   nitroGLYcerin 50 mg in D5W 250 mL PERIPHERAL IV infusion (20 mcg/min Intravenous Rate/Dose Change 8/27/24 0040)   atorvastatin (LIPITOR) tablet 80 mg (has no administration in time  range)   carvedilol (COREG) tablet 6.25 mg (has no administration in time range)   cetirizine (zyrTEC) tablet 10 mg (has no administration in time range)   citalopram (celeXA) tablet 20 mg (has no administration in time range)   pantoprazole (PROTONIX) EC tablet 40 mg (has no administration in time range)   medication instruction (has no administration in time range)   aspirin (ASA) chewable tablet 81 mg (has no administration in time range)   HOLD: nitroGLYcerin IF (has no administration in time range)   lidocaine 1 % 0.1-1 mL (has no administration in time range)   lidocaine (LMX4) cream (has no administration in time range)   sodium chloride (PF) 0.9% PF flush 3 mL (3 mLs Intracatheter Not Given 8/27/24 0111)   sodium chloride (PF) 0.9% PF flush 3 mL (has no administration in time range)   lidocaine 1 % 0.1-1 mL (has no administration in time range)   lidocaine (LMX4) cream (has no administration in time range)   sodium chloride (PF) 0.9% PF flush 3 mL (3 mLs Intracatheter Not Given 8/27/24 0112)   sodium chloride (PF) 0.9% PF flush 3 mL (has no administration in time range)   senna-docusate (SENOKOT-S/PERICOLACE) 8.6-50 MG per tablet 1 tablet (has no administration in time range)     Or   senna-docusate (SENOKOT-S/PERICOLACE) 8.6-50 MG per tablet 2 tablet (has no administration in time range)   calcium carbonate (TUMS) chewable tablet 1,000 mg (has no administration in time range)   Continuing beta blocker from home medication list OR beta blocker order already placed during this visit (has no administration in time range)   Reason ACE/ARB/ARNI order not selected (has no administration in time range)   Continuing statin from home medication list OR statin order already placed during this visit (has no administration in time range)   Patient is already receiving anticoagulation with heparin, enoxaparin (LOVENOX), warfarin (COUMADIN)  or other anticoagulant medication (has no administration in time range)    acetaminophen (TYLENOL) tablet 650 mg (has no administration in time range)     Or   acetaminophen (TYLENOL) Suppository 650 mg (has no administration in time range)   morphine (PF) injection 4 mg (has no administration in time range)   prochlorperazine (COMPAZINE) injection 10 mg (has no administration in time range)     Or   prochlorperazine (COMPAZINE) tablet 10 mg (has no administration in time range)     Or   prochlorperazine (COMPAZINE) suppository 25 mg (has no administration in time range)   ondansetron (ZOFRAN ODT) ODT tab 4 mg (has no administration in time range)     Or   ondansetron (ZOFRAN) injection 4 mg (has no administration in time range)   ondansetron (ZOFRAN ODT) ODT tab 4 mg (4 mg Oral $Given 8/26/24 1948)   aspirin (ASA) chewable tablet 324 mg (324 mg Oral $Given 8/26/24 2045)   nitroGLYcerin (NITROSTAT) sublingual tablet 0.4 mg (0.4 mg Sublingual $Given 8/26/24 2045)   morphine (PF) injection 4 mg (4 mg Intravenous $Given 8/26/24 2137)   heparin loading dose for LOW INTENSITY TREATMENT * Give BEFORE starting heparin infusion (5,850 Units Intravenous $Given 8/26/24 2133)   ondansetron (ZOFRAN) injection 4 mg (4 mg Intravenous Not Given 8/26/24 2241)       Drips infusing:  Yes  For the majority of the shift this patient was Green.   Interventions performed were NA.    Sepsis treatment initiated: No    Cares/treatment/interventions/medications to be completed following ED care: See MAR    ED Nurse Name: Xochitl Damico RN  1:16 AM

## 2024-08-27 NOTE — ED TRIAGE NOTES
Presents to triage with c/o generalized chest pain. Chest pain had been intermittent on the L side for about a week and a half but today it has been constant and has moved to the entire chest. Extensive cardiac hx.

## 2024-08-27 NOTE — PROGRESS NOTES
Patient seen and examined.  Chart reviewed.  Please see admission history and physical by Dr. Lord from earlier today for details.    Now status post coronary angiogram with need for intervention.  Appreciate continued cardiology input.

## 2024-08-28 ENCOUNTER — TELEPHONE (OUTPATIENT)
Dept: CARDIOLOGY | Facility: CLINIC | Age: 59
End: 2024-08-28
Payer: COMMERCIAL

## 2024-08-28 ENCOUNTER — PATIENT OUTREACH (OUTPATIENT)
Dept: NURSING | Facility: CLINIC | Age: 59
End: 2024-08-28
Payer: COMMERCIAL

## 2024-08-28 ENCOUNTER — APPOINTMENT (OUTPATIENT)
Dept: PHYSICAL THERAPY | Facility: CLINIC | Age: 59
DRG: 322 | End: 2024-08-28
Attending: INTERNAL MEDICINE
Payer: COMMERCIAL

## 2024-08-28 VITALS
BODY MASS INDEX: 31.78 KG/M2 | HEIGHT: 68 IN | TEMPERATURE: 96.8 F | HEART RATE: 95 BPM | WEIGHT: 209.66 LBS | RESPIRATION RATE: 11 BRPM | SYSTOLIC BLOOD PRESSURE: 129 MMHG | DIASTOLIC BLOOD PRESSURE: 67 MMHG | OXYGEN SATURATION: 95 %

## 2024-08-28 LAB
ANION GAP SERPL CALCULATED.3IONS-SCNC: 12 MMOL/L (ref 7–15)
BUN SERPL-MCNC: 7.1 MG/DL (ref 8–23)
CALCIUM SERPL-MCNC: 8.7 MG/DL (ref 8.8–10.4)
CHLORIDE SERPL-SCNC: 102 MMOL/L (ref 98–107)
CREAT SERPL-MCNC: 0.9 MG/DL (ref 0.67–1.17)
EGFRCR SERPLBLD CKD-EPI 2021: >90 ML/MIN/1.73M2
GLUCOSE BLDC GLUCOMTR-MCNC: 158 MG/DL (ref 70–99)
GLUCOSE SERPL-MCNC: 163 MG/DL (ref 70–99)
HCO3 SERPL-SCNC: 22 MMOL/L (ref 22–29)
POTASSIUM SERPL-SCNC: 3.8 MMOL/L (ref 3.4–5.3)
SODIUM SERPL-SCNC: 136 MMOL/L (ref 135–145)
TROPONIN T SERPL HS-MCNC: 1661 NG/L

## 2024-08-28 PROCEDURE — 36415 COLL VENOUS BLD VENIPUNCTURE: CPT | Performed by: INTERNAL MEDICINE

## 2024-08-28 PROCEDURE — 99233 SBSQ HOSP IP/OBS HIGH 50: CPT | Mod: FS | Performed by: NURSE PRACTITIONER

## 2024-08-28 PROCEDURE — 250N000013 HC RX MED GY IP 250 OP 250 PS 637: Performed by: INTERNAL MEDICINE

## 2024-08-28 PROCEDURE — 97530 THERAPEUTIC ACTIVITIES: CPT | Mod: GP

## 2024-08-28 PROCEDURE — 99239 HOSP IP/OBS DSCHRG MGMT >30: CPT | Performed by: INTERNAL MEDICINE

## 2024-08-28 PROCEDURE — 80048 BASIC METABOLIC PNL TOTAL CA: CPT | Performed by: INTERNAL MEDICINE

## 2024-08-28 PROCEDURE — 84484 ASSAY OF TROPONIN QUANT: CPT | Performed by: INTERNAL MEDICINE

## 2024-08-28 PROCEDURE — 97161 PT EVAL LOW COMPLEX 20 MIN: CPT | Mod: GP

## 2024-08-28 PROCEDURE — 93010 ELECTROCARDIOGRAM REPORT: CPT | Performed by: INTERNAL MEDICINE

## 2024-08-28 RX ORDER — LISINOPRIL 2.5 MG/1
2.5 TABLET ORAL DAILY
Qty: 30 TABLET | Refills: 0 | Status: SHIPPED | OUTPATIENT
Start: 2024-08-29 | End: 2024-09-04

## 2024-08-28 RX ORDER — LISINOPRIL 2.5 MG/1
2.5 TABLET ORAL DAILY
Status: DISCONTINUED | OUTPATIENT
Start: 2024-08-29 | End: 2024-08-28 | Stop reason: HOSPADM

## 2024-08-28 RX ORDER — ACETAMINOPHEN 325 MG/1
650 TABLET ORAL EVERY 4 HOURS PRN
COMMUNITY
Start: 2024-08-28

## 2024-08-28 RX ADMIN — ASPIRIN 81 MG CHEWABLE TABLET 81 MG: 81 TABLET CHEWABLE at 08:09

## 2024-08-28 RX ADMIN — CARVEDILOL 6.25 MG: 6.25 TABLET, FILM COATED ORAL at 08:09

## 2024-08-28 RX ADMIN — LISINOPRIL 10 MG: 10 TABLET ORAL at 08:09

## 2024-08-28 RX ADMIN — CITALOPRAM HYDROBROMIDE 20 MG: 20 TABLET ORAL at 08:09

## 2024-08-28 RX ADMIN — PANTOPRAZOLE SODIUM 40 MG: 40 TABLET, DELAYED RELEASE ORAL at 08:09

## 2024-08-28 RX ADMIN — ATORVASTATIN CALCIUM 80 MG: 40 TABLET, FILM COATED ORAL at 08:09

## 2024-08-28 RX ADMIN — TICAGRELOR 90 MG: 90 TABLET ORAL at 08:10

## 2024-08-28 ASSESSMENT — ACTIVITIES OF DAILY LIVING (ADL)
ADLS_ACUITY_SCORE: 29

## 2024-08-28 NOTE — CONSULTS
NUTRITION EDUCATION    REASON FOR ASSESSMENT:  Nutrition education on American Heart Association (AHA) Heart Healthy Diet.    CURRENT DIET ORDER:  Low saturated fat, Na 2400 mg    NUTRITION DIAGNOSIS:  Food- and nutrition-related knowledge deficit R/t HH diet as evidenced by need for HH diet education.     INTERVENTIONS:  Nutrition Prescription:  Recommended AHA Heart Healthy Diet    Implementation:   Nutrition Education (Content):  reviewed Heart Healthy Diet guidelines  provided heart healthy diet handout  Nutrition Education (Application):  Discussed current eating habits and recommended alternative food choices  Anticipate good compliance  Diet Education - refer to Education flowsheet    Goals:  Patient verbalizes understanding of diet  All of the above goals met during education session    Follow Up/Monitoring:  Provided RD contact information for future questions     Radha Gonzales, MS, RD, LD  Clinical Dietitian  3rd floor/ICU: [phone]  All other floors: [phone]  Weekend/holiday: [phone]  Office: [phone]

## 2024-08-28 NOTE — PROGRESS NOTES
Pt discharged via family transport. AVS reviewed and questions answered. All pt belongings sent with pt at discharge.

## 2024-08-28 NOTE — PROGRESS NOTES
08/28/24 3293   Appointment Info   Signing Clinician's Name / Credentials (PT) Nori Perdomo DPT   Living Environment   People in Home spouse   Current Living Arrangements house   Transportation Anticipated family or friend will provide   Living Environment Comments Pt lives in multi story home with wife.   Self-Care   Usual Activity Tolerance good   Current Activity Tolerance moderate   Regular Exercise No   Equipment Currently Used at Home none   Fall history within last six months no   Activity/Exercise/Self-Care Comment Pt is IND at baseline. Works desk job.   General Information   Onset of Illness/Injury or Date of Surgery 08/27/24   Referring Physician Matteo Christy MD   Patient/Family Therapy Goals Statement (PT) return home   Pertinent History of Current Problem (include personal factors and/or comorbidities that impact the POC) Patient had a 5-day CABG in 2008.  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  He gets sharp stabbing type pain on the left lateral chest a couple of times a week at baseline.  This was happening more frequently intermittently this past 1 week.  Today he had constant pain in this area and that radiated across his entire chest which was much different than his baseline.  He also had some shortness breath, nausea, and diaphoresis.  Initial EKG does show some ST elevation in lead III and to a lesser degree in aVF along with diffuse anterolateral depression did not meet STEMI criteria for ER provider/on-call cardiology.  He received sublingual nitroglycerin with repeat EKG showing improvement in the ST elevation in V3 and ST depression along with drop in his pain down to maybe 1 out of 10 similar to his baseline postthoracotomy pain.  Initial troponin T is 203 and repeat troponin T is elevated at 620.  Presentation consistent with acute coronary syndrome.  Cardiology recommended heparin drip initiation, aspirin, nitroglycerin drip which has been initiated and admission  here.  I recommended transfer to [facility] or other hospital that has 24-hour coronary angiography availability given his 5V CABG history, ongoing mild chest pain, and rising troponin however there is no beds in system and the patient refused transfer to [facility].  I did discuss also discussed these concerns with the patient, but he would prefer admission here and again declines transfer to [facility]. Underwent angio with stent placement on 8/27/2024.   Existing Precautions/Restrictions fall;cardiac   Cognition   Affect/Mental Status (Cognition) WFL   Follows Commands (Cognition) WFL   Pain Assessment   Patient Currently in Pain No   Integumentary/Edema   Integumentary/Edema no deficits were identifed   Integumentary/Edema Comments R groin incision intact   Posture    Posture Forward head position   Range of Motion (ROM)   Range of Motion ROM is WFL   Strength (Manual Muscle Testing)   Strength (Manual Muscle Testing) strength is WFL   Bed Mobility   Comment, (Bed Mobility) supine<>sit IND   Transfers   Comment, (Transfers) sit<>stand with SBA   Gait/Stairs (Locomotion)   Comment, (Gait/Stairs) amb with SBA   Balance   Balance Comments steady without AD   Clinical Impression   Criteria for Skilled Therapeutic Intervention Yes, treatment indicated   PT Diagnosis (PT) cardiac rehab   Influenced by the following impairments decreased activity tolerance   Functional limitations due to impairments decreased functional mobility   Clinical Presentation (PT Evaluation Complexity) stable   Clinical Presentation Rationale clinical judgement   Clinical Decision Making (Complexity) low complexity   Planned Therapy Interventions (PT) patient/family education;progressive activity/exercise;risk factor education;home program guidelines   Risk & Benefits of therapy have been explained evaluation/treatment results reviewed;care plan/treatment goals reviewed;risks/benefits  reviewed;current/potential barriers reviewed;participants voiced agreement with care plan;participants included;patient   PT Total Evaluation Time   PT Eval, Low Complexity Minutes (85269) 10   Physical Therapy Goals   PT Frequency One time eval and treatment only   PT Predicted Duration/Target Date for Goal Attainment 08/28/24   PT Goals Cardiac Phase 1   PT: Understanding of cardiac education to maximize quality of life, condition management, and health outcomes Patient;Verbalize;Goal Met   PT: Perform aerobic activity with stable cardiovascular response continuous;5 minutes;ambulation;Goal Met   PT: Functional/aerobic ambulation tolerance with stable cardiovascular response in order to return to home and community environment Independent;Greater than 300 feet;Goal Met   PT: Navigation of stairs simulating home set up with stable cardiovascular response in order to return to home and community environment Independent;Greater than 10 stairs;Rail on right;Goal Met   PT Discharge Planning   PT Plan PT: d/c   PT Discharge Recommendation (DC Rec) home with outpatient cardiac rehab   PT Rationale for DC Rec Pt IND with mobility, able to ambulate and complete stairs without symptoms, VSS. Anticipate safe for d/c home with assist as needed. Rec OP cardiac rehab to progress safe activity.   PT Brief overview of current status SBA   Total Session Time   Total Session Time (sum of timed and untimed services) 10

## 2024-08-28 NOTE — PLAN OF CARE
Cardiac Rehab Discharge Summary    Reason for therapy discharge:    All goals and outcomes met, no further needs identified.    Progress towards therapy goal(s). See goals on Care Plan in Epic electronic health record for goal details.  Goals met    Therapy recommendation(s):    Continued therapy is recommended.  Rationale/Recommendations:  Recommend follow up with OP cardiac rehab.

## 2024-08-28 NOTE — TELEPHONE ENCOUNTER
1st attempt- Left voicemail for the patient to call back and schedule the following:    Appointment type:  Return Cardiology  Provider:  any justin/np  Return date:  next available  Additional appointment(s) needed:  n/a  Additonal Notes:  n/a  Specialty phone number: [phone]

## 2024-08-28 NOTE — PROGRESS NOTES
[facility]  Cardiology Progress Note    Date of Service (when I saw the patient): 08/28/2024       Assessment & Plan   Mian Lozano is a 59 year old male who was admitted on 8/26/2024.     1.  : Acute coronary syndrome /  h/o CABG (2008) LIMA to LAD, SVG has a Y-shaped graft to diagonal and circumflex, SVG to PDA and SILVIA sequentially.    -troponin 1661  - Coronary angiogram showed severe native CAD, SVG to right PDA and RPL occluded near the origin. The entire VG was clotted. Several runs with Penumbra down the graft to anastamosis followed by 3.5 x 38 mm PETER. Grafted segment widely patent after stent. Chronic occlusion VG- Y to diag and OM. The Lcx is multilevel -rec: med RX. LIMA/LAD patent. Staged PCI of the ramus branch outpatient.   - Brilinta and aspirin uninterrupted x 12 months  -   2.  Ischemic cardiomyopathy  - Echocardiogram showed a reduced EF 40-45%, Left ventricular systolic  function is mildly reduced. Severe hypokinesis to akinesis of the mid to basal  inferior wall. Severe hypokinesis to akinesis of the mid to basal  inferolateral wall. Mild aortic regurgitation and mild pulmonic regurgitation.   - PTA carvedilol  - low dose lisinopril    3. Hyperlipidemia  - PTA atorvastatin     4.  Obesity     Plan   No recurrent chest pain s/p PCI to the SVG to right PDA and RPL occluded near the origin. Plan for staged PCI to the Ramus outpatient.   Brilinta and aspirin uninterrupted x 12 months.   EF 40-45%, per GDMT continue carvedilol and lisinopril. Uptitrate doses as BP allows.   Continue atorvastatin.   SL Nitroglycerin PRN  Cardiac rehab  Home today  Follow up with cardiology in 2-4 weeks, orders placed in Navigator.     RADHA Neal CNP  Text Page  (M-F, 7:30 am - 4:00 pm)    Interval History   No cardiac complaint, denies CP, shortness of breath, palpitations, orthopnea or edema. BP borderline soft, asymptomatic. Tele and labs stable. Cardiac rehab. Home today.  Outpatient staged PCI.     Physical Exam   Temp: 96.8  F (36  C) Temp src: Temporal BP: 107/74 Pulse: 87   Resp: 11 SpO2: 95 % O2 Device: None (Room air) Oxygen Delivery: 2 LPM  Vitals:    08/26/24 1947 08/27/24 1730 08/28/24 0500   Weight: 97.5 kg (215 lb) 94.6 kg (208 lb 8.9 oz) 95.1 kg (209 lb 10.5 oz)     Vital Signs with Ranges  Temp:  [96.8  F (36  C)-99.3  F (37.4  C)] 96.8  F (36  C)  Pulse:  [] 87  Resp:  [8-67] 11  BP: ()/(46-88) 107/74  SpO2:  [93 %-100 %] 95 %  I/O last 3 completed shifts:  In: 1467.5 [P.O.:480; I.V.:987.5]  Out: -     GEN:  In general, this is a obese male in no acute distress.  Patient ambulatory.  HEENT:  Pupils equal, round. Sclerae nonicteric. Clear oropharynx. Mucous membranes moist.  NECK: Supple, no masses appreciated. Trachea midline. No JVD with patient .  C/V:  Regular rate and rhythm, no murmur, rub or gallop. No S3 or RV heave.   RESP: Respirations are unlabored. No use of accessory muscles. Clear to auscultation bilaterally without wheezing, rales, or rhonchi.  GI: Abdomen soft, nontender, nondistended. No HSM appreciated.   EXTREM: No LE edema. No cyanosis or clubbing.  NEURO: Alert and oriented, cooperative. No obvious focal deficits.   PSYCH: Normal affect.  SKIN: Warm and dry. No rashes or petechiae appreciated.       Medications   Current Facility-Administered Medications   Medication Dose Route Frequency Provider Last Rate Last Admin    Continuing beta blocker from home medication list OR beta blocker order already placed during this visit   Does not apply DOES NOT GO TO Matteo Mena MD        Continuing statin from home medication list OR statin order already placed during this visit   Does not apply DOES NOT GO TO Matteo Mena MD        medication instruction   Does not apply Continuous PRN Huang Lord MD        Patient is already receiving anticoagulation with heparin, enoxaparin (LOVENOX), warfarin (COUMADIN)  or other  anticoagulant medication   Does not apply Continuous PRN Huang Lord MD        Percutaneous Coronary Intervention orders placed (this is information for BPA alerting)   Does not apply DOES NOT GO TO Matteo Mena MD        Reason ACE/ARB/ARNI order not selected   Other DOES NOT GO TO Huang Barahona MD         Current Facility-Administered Medications   Medication Dose Route Frequency Provider Last Rate Last Admin    aspirin (ASA) chewable tablet 81 mg  81 mg Oral Daily Huang Lord MD   81 mg at 08/28/24 0809    atorvastatin (LIPITOR) tablet 80 mg  80 mg Oral Daily Huang Lord MD   80 mg at 08/28/24 0809    carvedilol (COREG) tablet 6.25 mg  6.25 mg Oral BID w/meals Huang Lord MD   6.25 mg at 08/28/24 0809    cetirizine (zyrTEC) tablet 10 mg  10 mg Oral QPM Huang Lord MD   10 mg at 08/27/24 2209    citalopram (celeXA) tablet 20 mg  20 mg Oral Daily Huang Lord MD   20 mg at 08/28/24 0809    [START ON 8/29/2024] lisinopril (ZESTRIL) tablet 2.5 mg  2.5 mg Oral Daily Michael Kaba D,         pantoprazole (PROTONIX) EC tablet 40 mg  40 mg Oral QAM AC Huang Lord MD   40 mg at 08/28/24 0809    sodium chloride (PF) 0.9% PF flush 3 mL  3 mL Intracatheter Q8H Huang Lord MD   3 mL at 08/28/24 0907    sodium chloride (PF) 0.9% PF flush 3 mL  3 mL Intracatheter Q8H Huang Lord MD   3 mL at 08/28/24 0907    ticagrelor (BRILINTA) tablet 90 mg  90 mg Oral Q12H [facility] Matteo Christy MD   90 mg at 08/28/24 0810       Data   Reviewed       RADHA Neal CNP 8/28/2024

## 2024-08-28 NOTE — DISCHARGE SUMMARY
"[facility]  Hospitalist Discharge Summary      Date of Admission:  8/26/2024  Date of Discharge:  8/28/2024  Discharging Provider: Michael Kaba DO  Discharge Service: Hospitalist Service    Discharge Diagnoses   Acute coronary syndrome.    Coronary artery disease  Chronic postthoracotomy pain syndrome.  Ischemic cardiomyopathy.  Hypertension.    Hyperlipidemia.  Hypomagnesemia.  Psoriasis.    Psoriatic arthritis.  GERD.    Sullivan's esophagus.  Anxiety.    Asthma.  Prediabetes.            Clinically Significant Risk Factors     # Obesity: Estimated body mass index is 31.88 kg/m  as calculated from the following:    Height as of this encounter: 1.727 m (5' 8\").    Weight as of this encounter: 95.1 kg (209 lb 10.5 oz).       Follow-ups Needed After Discharge   Follow-up Appointments     Follow-up and recommended labs and tests       Follow up with primary care provider, Fady dAams, within 7 days for   hospital follow- up.  The following labs/tests are recommended: CBC and   BMP in 7 days.  Follow-up with cardiology within 4 weeks.            Discharge Disposition   Discharged to home  Condition at discharge: Stable    Hospital Course   Mian Lozano is a 59 year old male with PMH including CAD s/p 5V CABG in 2008, postthoracotomy pain syndrome, HTN, HLD, psoriasis/psoriatic arthritis on Enbrel, obesity, GERD/Sullivan's esophagus, asthma, prediabetes, latent TB, fatty liver, and anxiety who presents with chest pain.  Found to have NSTEMI and acute coronary syndrome.  Seen in consultation by cardiology.  Initially started on continuous IV heparin infusion.  Did require continuous IV nitroglycerin infusion initially.  Coronary angiogram on 8/27.  Did require PCI of previous coronary graft.  Has been started on dual antiplatelet therapy with aspirin and Brilinta.  Has also been started on lisinopril.  Initially started on 10 mg a day.  Blood pressure was a bit low after initial 10 mg dose.  " Discharged with lisinopril 2.5 mg a day for now.  Can titrate up in the outpatient setting as tolerated.  Continue carvedilol and atorvastatin.  Follow-up with cardiology within 4 weeks.  Follow-up with primary care provider within 1 week.    Consultations This Hospital Stay   PHARMACY IP CONSULT  CARDIAC REHAB IP CONSULT  CARDIOLOGY IP CONSULT  HOSPITALIST IP CONSULT  NUTRITION SERVICES ADULT IP CONSULT  CARDIAC REHAB IP CONSULT  SMOKING CESSATION PROGRAM IP CONSULT  SMOKING CESSATION PROGRAM IP CONSULT    Code Status   Full Code    Time Spent on this Encounter   I spent 40 minutes with Mr. Lozano and working on discharge on 8/28/2024.       [facility]  [address] [facility] [place] [address]  Phone: [phone]  Fax: [phone]  ______________________________________________________________________    Physical Exam   Vital Signs: Temp: 96.8  F (36  C) Temp src: Temporal BP: 129/67 Pulse: 95   Resp: 11 SpO2: 95 % O2 Device: None (Room air)    Weight: 209 lbs 10.52 oz  Gen:  NAD, A&Ox3.  Eyes:  PERRL, sclera anicteric.  OP:  MMM, no lesions.  Neck:  Supple.  CV:  Regular, no murmurs.  Lung:  CTA b/l, normal effort.  Ab:  +BS, soft.  Skin:  Warm, dry to touch.  No rash.  Ext:  No pitting edema LE b/l.         Primary Care Physician   Fady Adams    Discharge Orders      Cardiac Rehab  Referral      Cardiac Rehab  Referral      Follow-Up with Cardiology [facility]      Reason Lipid Lowering Medications not prescribed from this order set    Already on it     Reason for your hospital stay    Acute coronary syndrome     Follow-up and recommended labs and tests     Follow up with primary care provider, Fady Adams, within 7 days for hospital follow- up.  The following labs/tests are recommended: CBC and BMP in 7 days.  Follow-up with cardiology within 4 weeks.     Activity    Your activity upon discharge: no lifting or strenuous exercise for 2 weeks.     Diet     Follow this diet upon discharge: Cardiac           Discharge Medications   Discharge Medication List as of 8/28/2024 11:46 AM        START taking these medications    Details   acetaminophen (TYLENOL) 325 MG tablet Take 2 tablets (650 mg) by mouth every 4 hours as needed for mild pain or other (and adjunct with moderate or severe pain or per patient request)., OTC      aspirin 81 MG EC tablet Take 1 tablet (81 mg) by mouth daily. Start tomorrow. Do not start before August 28, 2024., Disp-30 tablet, R-3, Local Print      lisinopril (ZESTRIL) 2.5 MG tablet Take 1 tablet (2.5 mg) by mouth daily. Do not start before August 29, 2024., Disp-30 tablet, R-0, E-Prescribe      ticagrelor (BRILINTA) 90 MG tablet Take 1 tablet (90 mg) by mouth 2 times daily. Dose to start this evening., Disp-180 tablet, R-3, E-Prescribe           CONTINUE these medications which have NOT CHANGED    Details   albuterol (PROAIR HFA) 108 (90 Base) MCG/ACT inhaler Inhale 2 puffs into the lungs every 4 hours as needed for shortness of breath or wheezing, Disp-18 g, R-3, E-PrescribePharmacy may dispense brand covered by insurance (Proair, or proventil or ventolin or generic albuterol inhaler)      atorvastatin (LIPITOR) 80 MG tablet Take 1 tablet (80 mg) by mouth daily, Disp-90 tablet, R-3, E-Prescribe      azelastine (ASTELIN) 0.1 % nasal spray Spray 2 sprays into both nostrils 2 times daily, Disp-90 mL, R-3, E-Prescribe      carvedilol (COREG) 6.25 MG tablet Take 1 tablet (6.25 mg) by mouth 2 times daily (with meals), Disp-180 tablet, R-3, E-Prescribe      citalopram (CELEXA) 20 MG tablet Take 1 tablet (20 mg) by mouth daily, Disp-90 tablet, R-3, E-Prescribe      clobetasol propionate (TEMOVATE) 0.05 % external cream Apply topically 2 times dailyDisp-30 g, [id number]      fluticasone (FLONASE) 50 MCG/ACT nasal spray Spray 2 sprays into both nostrils daily, Disp-48 g, R-3, E-Prescribe      LANsoprazole (PREVACID) 30 MG DR capsule TAKE ONE  CAPSULE BY MOUTH DAILY. TAKE 30 TO 60 MINUTES BEFORE A MEAL., Disp-90 capsule, R-3, E-Prescribe      cetirizine (ZYRTEC) 10 MG tablet Take 1 tablet (10 mg) by mouth every evening, Disp-90 tablet, R-3, E-Prescribe      etanercept (ENBREL) 50 MG/ML injection Inject 0.98 mLs (50 mg) Subcutaneous once a week, HistoricalProfile only, Pt stated this is Prescribed by Dr. Mccall           STOP taking these medications       ASPIRIN 81 MG OR TABS Comments:   Reason for Stopping:         ibuprofen (ADVIL/MOTRIN) 200 MG tablet Comments:   Reason for Stopping:             Allergies   Allergies   Allergen Reactions    Seasonal Allergies

## 2024-08-28 NOTE — PLAN OF CARE
"ICU End of Shift Summary.  For vital signs and complete assessments, please see documentation flowsheets.      Pertinent assessments: Pt able to get some sleep tonight. Denies chest pain. Tele SR/ST. No shortness of breath when up to bathroom. LSs clear throughout. Satting high 90s on RA.   Major Shift Events: - Scheduled EKG completed.  Plan (Upcoming Events): Continue to monitor for any further cardiac complications. Cardiology following.  Discharge/Transfer Needs: TBD. Continue ICU cares at this time.     Bedside Shift Report Completed   Bedside Safety Check Completed    Goal Outcome Evaluation:      Plan of Care Reviewed With: patient    Overall Patient Progress: improvingOverall Patient Progress: improving    Outcome Evaluation: No chest pain throughout night. Tele SR/ST. Angioseal remains unchanged with a small amount of blood under dressing but no hematoma present.      Problem: Adult Inpatient Plan of Care  Goal: Plan of Care Review  Description: The Plan of Care Review/Shift note should be completed every shift.  The Outcome Evaluation is a brief statement about your assessment that the patient is improving, declining, or no change.  This information will be displayed automatically on your shift  note.  Outcome: Progressing  Flowsheets (Taken 8/28/2024 0514)  Outcome Evaluation: No chest pain throughout night. Tele SR/ST. Angioseal remains unchanged with a small amount of blood under dressing but no hematoma present.  Plan of Care Reviewed With: patient  Overall Patient Progress: improving  Goal: Patient-Specific Goal (Individualized)  Description: You can add care plan individualizations to a care plan. Examples of Individualization might be:  \"Parent requests to be called daily at 9am for status\", \"I have a hard time hearing out of my right ear\", or \"Do not touch me to wake me up as it startles  me\".  Outcome: Progressing  Goal: Absence of Hospital-Acquired Illness or Injury  Outcome: " Progressing  Intervention: Identify and Manage Fall Risk  Recent Flowsheet Documentation  Taken 8/28/2024 0500 by Lucille Rea RN  Safety Promotion/Fall Prevention:   activity supervised   clutter free environment maintained   increased rounding and observation   lighting adjusted   nonskid shoes/slippers when out of bed   room near nurse's station   safety round/check completed   treat reversible contributory factors   treat underlying cause  Taken 8/28/2024 0100 by Lucille Rea RN  Safety Promotion/Fall Prevention:   activity supervised   clutter free environment maintained   increased rounding and observation   lighting adjusted   nonskid shoes/slippers when out of bed   room near nurse's station   safety round/check completed   treat reversible contributory factors   treat underlying cause  Taken 8/27/2024 1930 by Lucille Rea RN  Safety Promotion/Fall Prevention:   activity supervised   clutter free environment maintained   increased rounding and observation   lighting adjusted   nonskid shoes/slippers when out of bed   room near nurse's station   safety round/check completed   treat reversible contributory factors   treat underlying cause  Intervention: Prevent Skin Injury  Recent Flowsheet Documentation  Taken 8/28/2024 0500 by Lucille Rea RN  Body Position: position changed independently  Skin Protection: adhesive use limited  Device Skin Pressure Protection:   adhesive use limited   tubing/devices free from skin contact  Taken 8/28/2024 0100 by Lucille Rea RN  Body Position: position changed independently  Taken 8/27/2024 2100 by Lucille Rea RN  Body Position:   right   side-lying  Taken 8/27/2024 1930 by Lucille Rea RN  Body Position: position changed independently  Intervention: Prevent and Manage VTE (Venous Thromboembolism) Risk  Recent Flowsheet Documentation  Taken 8/28/2024 0500 by Lucille Rea RN  VTE Prevention/Management: (Jose F and  ASA) SCDs off (sequential compression devices)  Taken 8/28/2024 0100 by Lucille Rea RN  VTE Prevention/Management: (Brilinta and ASA) SCDs off (sequential compression devices)  Taken 8/27/2024 1930 by Lucille Rea RN  VTE Prevention/Management: (Brilinta and ASA) SCDs off (sequential compression devices)  Intervention: Prevent Infection  Recent Flowsheet Documentation  Taken 8/28/2024 0500 by Lucille Rea RN  Infection Prevention:   environmental surveillance performed   equipment surfaces disinfected   hand hygiene promoted   personal protective equipment utilized   rest/sleep promoted   visitors restricted/screened   single patient room provided  Taken 8/28/2024 0100 by Lucille Rea RN  Infection Prevention:   environmental surveillance performed   equipment surfaces disinfected   hand hygiene promoted   personal protective equipment utilized   rest/sleep promoted   visitors restricted/screened   single patient room provided  Taken 8/27/2024 1930 by Lucille Rea RN  Infection Prevention:   environmental surveillance performed   equipment surfaces disinfected   hand hygiene promoted   personal protective equipment utilized   rest/sleep promoted   visitors restricted/screened   single patient room provided  Goal: Optimal Comfort and Wellbeing  Outcome: Progressing  Intervention: Provide Person-Centered Care  Recent Flowsheet Documentation  Taken 8/28/2024 0500 by Lucille Rea RN  Trust Relationship/Rapport:   care explained   choices provided   emotional support provided   empathic listening provided   questions answered   questions encouraged   reassurance provided   thoughts/feelings acknowledged  Taken 8/28/2024 0100 by Lucille Rea RN  Trust Relationship/Rapport:   care explained   choices provided   emotional support provided   empathic listening provided   questions answered   questions encouraged   reassurance provided   thoughts/feelings acknowledged  Taken  8/27/2024 1930 by Lucille Rea RN  Trust Relationship/Rapport:   care explained   choices provided   emotional support provided   empathic listening provided   questions answered   questions encouraged   reassurance provided   thoughts/feelings acknowledged  Goal: Readiness for Transition of Care  Outcome: Progressing     Problem: Cardiac Catheterization (Diagnostic/Interventional)  Goal: Absence of Bleeding  Outcome: Progressing  Goal: Absence of Contrast-Induced Injury  Outcome: Progressing  Goal: Stable Heart Rate and Rhythm  Outcome: Progressing  Goal: Absence of Embolism Signs and Symptoms  Outcome: Progressing  Intervention: Prevent or Manage Embolism  Recent Flowsheet Documentation  Taken 8/28/2024 0500 by Lucille Rea RN  VTE Prevention/Management: (Brilinta and ASA) SCDs off (sequential compression devices)  Taken 8/28/2024 0100 by Lucille Rea RN  VTE Prevention/Management: (Brilinta and ASA) SCDs off (sequential compression devices)  Taken 8/27/2024 1930 by Lucille Rea RN  VTE Prevention/Management: (Brilinta and ASA) SCDs off (sequential compression devices)  Goal: Anesthesia/Sedation Recovery  Outcome: Progressing  Intervention: Optimize Anesthesia Recovery  Recent Flowsheet Documentation  Taken 8/28/2024 0500 by Lucille Rea RN  Safety Promotion/Fall Prevention:   activity supervised   clutter free environment maintained   increased rounding and observation   lighting adjusted   nonskid shoes/slippers when out of bed   room near nurse's station   safety round/check completed   treat reversible contributory factors   treat underlying cause  Reorientation Measures: clock in view  Taken 8/28/2024 0100 by Lucille Rea RN  Safety Promotion/Fall Prevention:   activity supervised   clutter free environment maintained   increased rounding and observation   lighting adjusted   nonskid shoes/slippers when out of bed   room near nurse's station   safety round/check  completed   treat reversible contributory factors   treat underlying cause  Taken 8/27/2024 1930 by Lucille Rea RN  Safety Promotion/Fall Prevention:   activity supervised   clutter free environment maintained   increased rounding and observation   lighting adjusted   nonskid shoes/slippers when out of bed   room near nurse's station   safety round/check completed   treat reversible contributory factors   treat underlying cause  Goal: Optimal Pain Control and Function  Outcome: Progressing  Goal: Absence of Vascular Access Complication  Outcome: Progressing  Intervention: Prevent and Manage Access Complications  Recent Flowsheet Documentation  Taken 8/28/2024 0500 by Lucille Rea RN  Activity Management:   activity adjusted per tolerance   activity encouraged  Taken 8/28/2024 0100 by Lucille Rea RN  Activity Management:   activity adjusted per tolerance   activity encouraged  Taken 8/27/2024 1930 by Lucille Rea RN  Activity Management:   activity adjusted per tolerance   activity encouraged     Problem: Skin Injury Risk Increased  Goal: Skin Health and Integrity  Outcome: Progressing  Intervention: Plan: Nurse Driven Intervention: Friction and Shear  Recent Flowsheet Documentation  Taken 8/28/2024 0500 by Lucille Rea RN  Friction/Shear Interventions:   HOB 30 degrees or less   Assistive lifting device (portable/ceiling lift, etc.)   Repositioning device (TAP system, etc.)  Taken 8/28/2024 0100 by Lucille Rea RN  Friction/Shear Interventions:   HOB 30 degrees or less   Assistive lifting device (portable/ceiling lift, etc.)   Repositioning device (TAP system, etc.)  Taken 8/27/2024 1930 by Lucille Rea RN  Friction/Shear Interventions:   HOB 30 degrees or less   Assistive lifting device (portable/ceiling lift, etc.)   Repositioning device (TAP system, etc.)  Intervention: Optimize Skin Protection  Recent Flowsheet Documentation  Taken 8/28/2024 0500 by Álvaro  Lucille Mckeon, RN  Pressure Reduction Techniques: frequent weight shift encouraged  Pressure Reduction Devices: positioning supports utilized  Skin Protection: adhesive use limited  Activity Management:   activity adjusted per tolerance   activity encouraged  Taken 8/28/2024 0100 by Lucille Rea, RN  Activity Management:   activity adjusted per tolerance   activity encouraged  Taken 8/27/2024 1930 by Lucille Rea, RN  Activity Management:   activity adjusted per tolerance   activity encouraged

## 2024-08-29 LAB
ATRIAL RATE - MUSE: 81 BPM
DIASTOLIC BLOOD PRESSURE - MUSE: NORMAL MMHG
INTERPRETATION ECG - MUSE: NORMAL
P AXIS - MUSE: 33 DEGREES
PR INTERVAL - MUSE: 160 MS
QRS DURATION - MUSE: 84 MS
QT - MUSE: 380 MS
QTC - MUSE: 441 MS
R AXIS - MUSE: 13 DEGREES
SYSTOLIC BLOOD PRESSURE - MUSE: NORMAL MMHG
T AXIS - MUSE: 47 DEGREES
VENTRICULAR RATE- MUSE: 81 BPM

## 2024-08-29 NOTE — TELEPHONE ENCOUNTER
Transitions of Care Outreach  Chief Complaint   Patient presents with    Hospital F/U       Most Recent Admission Date: 8/26/2024   Most Recent Admission Diagnosis: ACS (acute coronary syndrome) (H) - I24.9  NSTEMI (non-ST elevated myocardial infarction) (H) - I21.4     Most Recent Discharge Date: 8/28/2024   Most Recent Discharge Diagnosis: NSTEMI (non-ST elevated myocardial infarction) (H) - I21.4  ACS (acute coronary syndrome) (H) - I24.9  Ischemic cardiomyopathy - I25.5  Hyperlipidemia LDL goal <70 - E78.5     Transitions of Care Assessment    Discharge Assessment  How are you doing now that you are home?: feeling good. little fuzzy from BP stuff  How are your symptoms? (Red Flag symptoms escalate to triage hotline per guidelines): Improved  Do you know how to contact your clinic care team if you have future questions or changes to your health status? : Yes  Does the patient have their discharge instructions? : Yes  Does the patient have questions regarding their discharge instructions? : No  Were you started on any new medications or were there changes to any of your previous medications? : Yes  Does the patient have all of their medications?: Yes  Do you have questions regarding any of your medications? : No  Do you have all of your needed medical supplies or equipment (DME)?  (i.e. oxygen tank, CPAP, cane, etc.): Yes    Follow up Plan     Discharge Follow-Up  Discharge follow up appointment scheduled in alignment with recommended follow up timeframe or Transitions of Risk Category? (Low = within 30 days; Moderate= within 14 days; High= within 7 days): Yes  Discharge Follow Up Appointment Date: 09/10/24  Discharge Follow Up Appointment Scheduled with?: Primary Care Provider    Future Appointments   Date Time Provider Department Center   9/9/2024  1:00 PM 3, [facility]   9/10/2024 10:00 AM Fady Adams MD [facility]       Outpatient Plan as outlined on AVS reviewed with patient.    For  any urgent concerns, please contact our 24 hour nurse triage line: [phone] ([phone])       SANTOSH COSME RN

## 2024-08-30 ENCOUNTER — TELEPHONE (OUTPATIENT)
Dept: CARDIOLOGY | Facility: CLINIC | Age: 59
End: 2024-08-30
Payer: COMMERCIAL

## 2024-08-30 DIAGNOSIS — I25.10 CAD (CORONARY ARTERY DISEASE): Primary | ICD-10-CM

## 2024-08-30 RX ORDER — NITROGLYCERIN 0.4 MG/1
TABLET SUBLINGUAL
Qty: 30 TABLET | Refills: 0 | Status: SHIPPED | OUTPATIENT
Start: 2024-08-30

## 2024-08-30 NOTE — TELEPHONE ENCOUNTER
Patient was admitted to [facility] on 8/26/24 with chest pain-NSTEMI.    PMH: CAD s/p 5V CABG in 2008, postthoracotomy pain syndrome, HTN, HLD, psoriasis/psoriatic arthritis on Enbrel, obesity, GERD/Sullivan's esophagus, asthma, prediabetes, latent TB, fatty liver, and anxiety.    8/27/24: Echo showed EF of 40-45%, Left ventricular systolic function is mildly reduced. Severe hypokinesis to akinesis of the mid to basal inferior wall. Severe hypokinesis to akinesis of the mid to basal inferolateral wall. Mild aortic regurgitation and mild pulmonic regurgitation.     8/27/24: Coronary angiogram via RFA resulted in:      Mid LM lesion is 10% stenosed.    Ramus lesion is 75% stenosed.    Mid Cx lesion is 100% stenosed.    2nd Mrg lesion is 100% stenosed.    Mid RCA lesion is 100% stenosed.    Dist Graft to Insertion lesion  before RPDA is 100% stenosed.    Origin to Prox Graft lesion  before 2nd Mrg is 100% stenosed.    Ost LAD lesion is 100% stenosed.    Dist LAD lesion is 65% stenosed.    IVUS was performed on the lesion.    IVUS was performed on the lesion.    Ultrasound (IVUS) was performed.    Ultrasound (IVUS) was performed.     The Seq VG to R-PDA and R PL is occluded near the origin.The entire VG was clotted. We passed several runs with Penumbra down the graft to anastamosis.  The allowed visualization of the distal VG disrupted plaque, calcium and clot. We performed IVUS which we used to size the vessel and it showed clot/calcium/atherosclerosis.  We used PTCB to open the VG and stented with 3.5x38 stent and post dilated 3.5 NC  Grafted segment widely patent after stent  Chronic occlusion VG- Y to diag and OM. The Lcx is multilevel -rec: med RX  LIMA to Lad patent with moderate distal Lad after LIMA insertion  Consider elective PCI of ramus branch in future.    Cardiology Plan of Care as below:    Plan   No recurrent chest pain s/p PCI to the SVG to right PDA and RPL occluded near the origin. Plan for staged PCI to  the [facility] outpatient.   Brilinta and aspirin uninterrupted x 12 months.   EF 40-45%, per GDMT continue carvedilol and lisinopril. Uptitrate doses as BP allows.   Continue atorvastatin.   SL Nitroglycerin PRN  Cardiac rehab  Home today  Follow up with cardiology in 2-4 weeks, orders placed in Navigator.      Jocelin Rubalcava, APRN CNP    Pt was started on ASA, Brilinta and Lisinopril at time of discharge.    Called patient to discuss any post hospital d/c questions, review medication changes, and confirm f/u appts. Patient denied any questions regarding new medications or changes to PTA medications.     RN confirmed with patient that he was d/c with an adequate supply of the antiplatelet Brilinta, and reminded of importance of taking without interruption.     Pt WAS NOT discharge with an Rx for PRN SL Nitroglycerin. Per chart review, no known allergy to nitrates. Pt denies any use of Phosphodiesterase Type 5 Inhibitors. Writer instructed pt on PRN SL Nitroglycerin, including indications, storage and expiration period once bottle opened, administration, expected side effects and when to call the clinic vs 911. Pt verbalized understanding and RX escribed to pt's [facility] per his request.     Patient denied any SOB, chest pain. States some mild light headedness. States SBP is 100 one hour after taking AM meds. Instructed pt to keep daily BP diary and to check BP prior to and 1-2 hrs after taking AM meds. If  or less prior to taking AM meds, hold Coreg and Lisinopril and call the clinic or after hours cardiologist for further direction. Fluids encouraged and instructed to make position changes slowly.    RFA cardiac cath site is without bleeding, swelling, redness or signs of infection.     RN confirmed with patient that he needs to be scheduled for a cardiology [facility] as ordered. Scheduling/after hrs cardiology and Dr. Welch's Team RN phone numbers provided.    Cardiac rehab is scheduled on 9/9/24 at  1245 in [place].    Patient advised to call clinic with any cardiac related questions or concerns prior to this justin't. Patient verbalized understanding and agreed with plan. AKANKSHA Silva RN.

## 2024-09-03 ENCOUNTER — TELEPHONE (OUTPATIENT)
Dept: CARDIOLOGY | Facility: CLINIC | Age: 59
End: 2024-09-03
Payer: COMMERCIAL

## 2024-09-03 NOTE — TELEPHONE ENCOUNTER
[facility] Call Center    Phone Message    May a detailed message be left on voicemail: yes     Reason for Call: Other: FYI, patient scheduled 9/4/24 for post hospital appt,      Action Taken: Other: cardiology    Travel Screening: Not Applicable  Thank you!  [facility]       Date of Service:

## 2024-09-04 ENCOUNTER — OFFICE VISIT (OUTPATIENT)
Dept: CARDIOLOGY | Facility: CLINIC | Age: 59
End: 2024-09-04
Attending: NURSE PRACTITIONER
Payer: COMMERCIAL

## 2024-09-04 VITALS
HEART RATE: 84 BPM | BODY MASS INDEX: 30.95 KG/M2 | SYSTOLIC BLOOD PRESSURE: 106 MMHG | DIASTOLIC BLOOD PRESSURE: 64 MMHG | WEIGHT: 204.2 LBS | HEIGHT: 68 IN

## 2024-09-04 DIAGNOSIS — I24.9 ACS (ACUTE CORONARY SYNDROME) (H): ICD-10-CM

## 2024-09-04 DIAGNOSIS — I25.5 ISCHEMIC CARDIOMYOPATHY: ICD-10-CM

## 2024-09-04 DIAGNOSIS — I21.4 NSTEMI (NON-ST ELEVATED MYOCARDIAL INFARCTION) (H): ICD-10-CM

## 2024-09-04 DIAGNOSIS — I25.10 CORONARY ARTERY DISEASE INVOLVING NATIVE CORONARY ARTERY OF NATIVE HEART WITHOUT ANGINA PECTORIS: Primary | ICD-10-CM

## 2024-09-04 DIAGNOSIS — E78.5 HYPERLIPIDEMIA LDL GOAL <70: ICD-10-CM

## 2024-09-04 PROCEDURE — 99214 OFFICE O/P EST MOD 30 MIN: CPT | Performed by: PHYSICIAN ASSISTANT

## 2024-09-04 RX ORDER — LISINOPRIL 2.5 MG/1
2.5 TABLET ORAL DAILY
Qty: 90 TABLET | Refills: 3 | Status: SHIPPED | OUTPATIENT
Start: 2024-09-04

## 2024-09-04 NOTE — LETTER
2024    Fady Adams MD  [address] [facility] [address]    RE: Mian Lozano       Dear Colleague,     I had the pleasure of seeing Mian Lozano in the [facility].      CARDIOLOGY CLINIC PROGRESS NOTE    DOS: 2024      Mian Lozano  : 1965, 59 year old  MRN: [id number]      Primary cardiologist: Dr. Welch       History:  Mian Lozano is a 59 year old male with history of CAD s/p CABG (CABG--Lima to Lad, Y graft to Diag, OM, Seq VG to R-PDA and PL) in . He also has history of postthoracotomy chest pain.     He was recently admitted with chest pain and NSTEMI. Upon presentation he was noted to have some subtle ST elevation in inferior leads which resolved and he also had ST depression in lateral leads.  He was started on heparin and nitroglycerin. Troponin blanco from 203 initially to 1661.   Echo shwoed EF 40-45% with WMAs.   Cardiac cath done and he underwent PETER to VG-RPDA.  Please see cath report for details.  He also had a 75% ramus lesion.         Interval History, reviewed:   He presents today for follow up and for H and P for staged PCI.   No chest pain.  Taking meds and tolerating.  Some LH with quick movements, sitting to standing.  No SOB.   No palpitations.   No edema.   No bleeding concerns.   No issues with groin site.   He has cardiac rehab intake next Monday.         ROS:  Skin:  not assessed     Eyes:  not assessed    ENT:  not assessed    Respiratory:  Negative    Cardiovascular:    Positive for;lightheadedness;fatigue  Gastroenterology: not assessed    Genitourinary:  not assessed    Musculoskeletal:  not assessed    Neurologic:  not assessed    Psychiatric:  not assessed    Heme/Lymph/Imm:  not assessed    Endocrine:  not assessed      PAST MEDICAL HISTORY:  Past Medical History:   Diagnosis Date     2019 novel coronavirus disease (COVID-19) 2021     Anxiety      Sullivan's esophagus 2011     Coronary artery disease 2008    cabg x 5      Environmental allergies     seasonal, hay fever     FAM HX-CARDIOVAS DIS NEC     dad at 45, cabg, stent     Family history of prostate cancer      Fatty liver      Gallstone      GERD (gastroesophageal reflux disease) 03/2011     History of blood transfusion      Hyperlipidemia LDL goal <70 2006     Hypertension goal BP (blood pressure) < 140/90 04/2008     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    Dr Saba, Hx histoplasmosis, treated     Mild intermittent asthma 07/2003     Nephrolithiasis 6/13, 7/16    calcium oxalate - 6/2013, 7/2016, 1/2021     Other atopic dermatitis and related conditions 1980     Other diseases of lung, not elsewhere classified 04/2008    dr steel - benign bx and ct - granuloma - no further w/u needed     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    dr hoover     Prediabetes 08/2008     Psoriasis     Dr Mccall     Psoriatic arthritis (H)     Dr Mccall     Pulmonary nodules 2009    stable in 2012     Seasonal allergies 1980    Allergic rhinitis Hayfever       PAST SURGICAL HISTORY:  Past Surgical History:   Procedure Laterality Date     COLONOSCOPY N/A 08/07/2015    normal - due 10 yrs     CV ANGIOGRAM CORONARY GRAFT N/A 8/27/2024    Procedure: Coronary Angiogram Graft;  Surgeon: Matteo Christy MD;  Location:  HEART CARDIAC CATH LAB     CV CORONARY ANGIOGRAM N/A 8/27/2024    Procedure: Coronary Angiogram;  Surgeon: Matteo Christy MD;  Location:  HEART CARDIAC CATH LAB     CV INTRAVASULAR ULTRASOUND N/A 8/27/2024    Procedure: Intravascular Ultrasound;  Surgeon: Matteo Christy MD;  Location: [facility] CARDIAC CATH LAB     CV PCI N/A 8/27/2024    Procedure: Percutaneous Coronary Intervention;  Surgeon: Matteo Christy MD;  Location:  HEART CARDIAC CATH LAB     CV PCI ASPIRATION THROMECTOMY N/A 8/27/2024    Procedure: Percutaneous Coronary Intervention Aspiration Thrombectomy;  Surgeon: Matteo Christy MD;  Location:  HEART CARDIAC CATH LAB     ESOPHAGOSCOPY, GASTROSCOPY, DUODENOSCOPY  (EGD), COMBINED  6/11, 5/13    [facility] - ? gastritis, fowler's - due 3 yr     ESOPHAGOSCOPY, GASTROSCOPY, DUODENOSCOPY (EGD), COMBINED  05/10/2013    COMBINED ESOPHAGOSCOPY, GASTROSCOPY, DUODENOSCOPY (EGD), BIOPSY SINGLE OR MULTIPLE;  ESOPHAGOSCOPY, GASTROSCOPY, DUODENOSCOPY (EGD)  with biopsy;  Surgeon: Angus Reynolds MD;  Location: [facility]     ESOPHAGOSCOPY, GASTROSCOPY, DUODENOSCOPY (EGD), COMBINED N/A 07/28/2017    Fowler's - recheck 3 yrs     HC VASECTOMY UNILAT/BILAT W POSTOP SEMEN  1995    Vasectomy     HERNIORRHAPHY UMBILICAL N/A 11/13/2014    Dr Barron     SURGICAL HISTORY OF -  Left 1980    lt patella fx     SURGICAL HISTORY OF -  Left 1985    lt thumb neuroma excised     SURGICAL HISTORY OF -   11/2009    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     ZZC CABG, ARTERY-VEIN, FIVE  04/2008    [facility] - lung bx benign     ZZC STRESS TEST - REGULAR ([place])  9/06, 4/11    negative stress thallium - [facility]       SOCIAL HISTORY:  Social History     Socioeconomic History     Marital status:      Spouse name: Imelda     Number of children: 0     Years of education: 14   Occupational History     Comment: [employer]   Tobacco Use     Smoking status: Former     Types: Cigars     Smokeless tobacco: Never     Tobacco comments:     In past rare cigar, last 2005   Vaping Use     Vaping status: Never Used   Substance and Sexual Activity     Alcohol use: Yes     Types: 6 Standard drinks or equivalent per week     Comment: 6 drinks per week     Drug use: No     Sexual activity: Yes     Partners: Female     Birth control/protection: Male Surgical   Other Topics Concern     Caffeine Concern Yes     Comment: 1 can qd     Exercise Yes     Comment: occas     Seat Belt Yes     Parent/sibling w/ CABG, MI or angioplasty before 65F 55M? Yes     Social Determinants of Health     Financial Resource Strain: Low Risk  (8/27/2024)    Financial Resource Strain      Within the past 12 months, have you or your family members you  live with been unable to get utilities (heat, electricity) when it was really needed?: No   Recent Concern: Financial Resource Strain - High Risk (6/24/2024)    Financial Resource Strain      Within the past 12 months, have you or your family members you live with been unable to get utilities (heat, electricity) when it was really needed?: Yes   Food Insecurity: Low Risk  (8/27/2024)    Food Insecurity      Within the past 12 months, did you worry that your food would run out before you got money to buy more?: No      Within the past 12 months, did the food you bought just not last and you didn t have money to get more?: No   Transportation Needs: Low Risk  (8/27/2024)    Transportation Needs      Within the past 12 months, has lack of transportation kept you from medical appointments, getting your medicines, non-medical meetings or appointments, work, or from getting things that you need?: No   Physical Activity: Insufficiently Active (6/24/2024)    Exercise Vital Sign      Days of Exercise per Week: 1 day      Minutes of Exercise per Session: 20 min   Stress: No Stress Concern Present (6/24/2024)    [place] [employer] - Occupational Stress Questionnaire      Feeling of Stress : Only a little   Social Connections: Unknown (6/24/2024)    Social Connection and Isolation Panel [NHANES]      Frequency of Social Gatherings with Friends and Family: Once a week   Interpersonal Safety: Low Risk  (8/27/2024)    Interpersonal Safety      Do you feel physically and emotionally safe where you currently live?: Yes      Within the past 12 months, have you been hit, slapped, kicked or otherwise physically hurt by someone?: No      Within the past 12 months, have you been humiliated or emotionally abused in other ways by your partner or ex-partner?: No   Housing Stability: Low Risk  (8/27/2024)    Housing Stability      Do you have housing? : Yes      Are you worried about losing your housing?: No        FAMILY HISTORY:  Family History   Problem Relation Age of Onset     Prostate Cancer Father 69     Coronary Artery Disease Father 38     Hypertension Father      Neurologic Disorder Sister         CVA/aneurysm at age 19, decreased memory, partial paralysis     Hypertension Paternal Grandfather      LAUROADONTA. Paternal Grandfather      Heart Failure Paternal Grandfather      Colon Cancer No family hx of        MEDS:   Current Outpatient Medications   Medication Sig Dispense Refill     acetaminophen (TYLENOL) 325 MG tablet Take 2 tablets (650 mg) by mouth every 4 hours as needed for mild pain or other (and adjunct with moderate or severe pain or per patient request).       albuterol (PROAIR HFA) 108 (90 Base) MCG/ACT inhaler Inhale 2 puffs into the lungs every 4 hours as needed for shortness of breath or wheezing 18 g 3     aspirin 81 MG EC tablet Take 1 tablet (81 mg) by mouth daily. Start tomorrow. Do not start before August 28, 2024. 30 tablet 3     atorvastatin (LIPITOR) 80 MG tablet Take 1 tablet (80 mg) by mouth daily 90 tablet 3     azelastine (ASTELIN) 0.1 % nasal spray Spray 2 sprays into both nostrils 2 times daily (Patient taking differently: Spray 2 sprays into both nostrils 2 times daily. PRN) 90 mL 3     carvedilol (COREG) 6.25 MG tablet Take 1 tablet (6.25 mg) by mouth 2 times daily (with meals) 180 tablet 3     cetirizine (ZYRTEC) 10 MG tablet Take 1 tablet (10 mg) by mouth every evening 90 tablet 3     citalopram (CELEXA) 20 MG tablet Take 1 tablet (20 mg) by mouth daily 90 tablet 3     clobetasol propionate (TEMOVATE) 0.05 % external cream Apply topically 2 times daily (Patient taking differently: Apply topically 2 times daily. PRN) 30 g 3     etanercept (ENBREL) 50 MG/ML injection Inject 0.98 mLs (50 mg) Subcutaneous once a week       fluticasone (FLONASE) 50 MCG/ACT nasal spray Spray 2 sprays into both nostrils daily (Patient taking differently: Spray 2 sprays into both nostrils daily. PRN) 48 g 3  "    LANsoprazole (PREVACID) 30 MG DR capsule TAKE ONE CAPSULE BY MOUTH DAILY. TAKE 30 TO 60 MINUTES BEFORE A MEAL. 90 capsule 3     lisinopril (ZESTRIL) 2.5 MG tablet Take 1 tablet (2.5 mg) by mouth daily. 90 tablet 3     nitroGLYcerin (NITROSTAT) 0.4 MG sublingual tablet For chest pain place 1 tablet under the tongue every 5 minutes for 3 doses. If symptoms persist 5 minutes after 2nd dose call 911. 30 tablet 0     ticagrelor (BRILINTA) 90 MG tablet Take 1 tablet (90 mg) by mouth 2 times daily. Dose to start this evening. 180 tablet 3     No current facility-administered medications for this visit.       ALLERGIES:   Allergies   Allergen Reactions     Seasonal Allergies        PHYSICAL EXAM:  Vitals: /64   Pulse 84   Ht 1.727 m (5' 8\")   Wt 92.6 kg (204 lb 3.2 oz)   BMI 31.05 kg/m    Constitutional:  cooperative, alert and oriented, well developed, well nourished, in no acute distress        Skin:  warm and dry to the touch, no apparent skin lesions or masses noted surgical scars well-healed      Head:  normocephalic, no masses or lesions        Eyes:  pupils equal and round;conjunctivae and lids unremarkable;sclera white        ENT:  no pallor or cyanosis        Neck:  JVP normal        Respiratory:  clear to auscultation;normal symmetry        Cardiac: regular rhythm, normal S1/S2, no S3 or S4, apical impulse not displaced, no murmurs, gallops or rubs                  GI:  abdomen soft;BS normoactive        Vascular: pulses full and equal                                      Extremities and Musculoskeletal:  no deformities, clubbing, cyanosis, erythema observed;no edema        Neurological:  no gross motor deficits;affect appropriate            LABS/DATA:  I reviewed the following:  Cardiac cath 8/27/24:  Conclusion     Mid LM lesion is 10% stenosed.     Ramus lesion is 75% stenosed.     Mid Cx lesion is 100% stenosed.     2nd Mrg lesion is 100% stenosed.     Mid RCA lesion is 100% stenosed.     Dist " Graft to Insertion lesion  before RPDA is 100% stenosed.     Origin to Prox Graft lesion  before 2nd Mrg is 100% stenosed.     Ost LAD lesion is 100% stenosed.     Dist LAD lesion is 65% stenosed.     IVUS was performed on the lesion.     IVUS was performed on the lesion.     Ultrasound (IVUS) was performed.     Ultrasound (IVUS) was performed.     The Seq VG to R-PDA and R PL is occluded near the origin.The entire VG was clotted. We passed several runs with Penumbra down the graft to anastamosis.  The allowed visualization of the distal VG disrupted plaque, calcium and clot. We performed IVUS which we used to size the vessel and it showed clot/calcium/atherosclerosis.  We used PTCB to open the VG and stented with 3.5x38 stent and post dilated 3.5 NC  Grafted segment widely patent after stent  Chronic occlusion VG- Y to diag and OM. The Lcx is multilevel -rec: med RX  LIMA to Lad patent with moderate distal Lad after LIMA insertion  Consider elective PCI of ramus branch in future.      Echo 8/27/24:  Interpretation Summary  The visual ejection fraction is 40-45%.  Left ventricular systolic function is mildly reduced.  There are regional wall motion abnormalities as specified.  There is mild (1+) aortic regurgitation.  The study was technically difficult. The study was technically limited.            ASSESSMENT/PLAN:  CAD.   H/o CABG (2008) LIMA to LAD, SVG has a Y-shaped graft to diagonal and circumflex, SVG to PDA and SILVIA sequentially.    Acute coronary syndrome .   - Coronary angiogram showed severe native CAD, SVG to right PDA and RPL occluded near the origin. The entire VG was clotted. Several runs with Penumbra down the graft to anastamosis followed by 3.5 x 38 mm PETER. Grafted segment widely patent after stent. Chronic occlusion VG- Y to diag and OM. The Lcx is multilevel -rec: med RX. LIMA/LAD patent. Ramus 75%.    Staged PCI of the ramus branch in about 3 weeks  - R and B discussed   Risks and benefits  of coronary angiogram discussed today including, bleeding, bruising, infection, allergic reaction, kidney damage (including need for dialysis), stroke, heart attack, vascular damage, emergency open heart surgery, up to and including death.  Patient indicates understanding and is agreeable to proceed.    Contrast allergy:no  Anticoagulation: no   Metformin: no  Oral DM meds: no  Insulin: no  GLP1: no  Diuretic: no  Use of phosphodiesterase type 5 inhibitor: no  Aspirin: yes, 81 mg daily which will be continued    Renal issues: no   Patient to be NPO for solid foods 8 hours prior to check in time, may have clear liquids up to 2 hours prior to check in time.   Pt needs transportation and 24 hours post procedure monitoring set up.   No driving for 24 hours post procedure.   - No chest pain   - Brilinta and aspirin uninterrupted x 12 months  - Atorvastatin 80 mg  - Cardiac rehab after staged procedure       Ischemic cardiomyopathy  HFmrEF  - Echocardiogram 8/27/24: EF 40-45%, Severe hypokinesis to akinesis of the mid to basal inferior wall. Severe hypokinesis to akinesis of the mid to basal  inferolateral wall. Mild aortic regurgitation and mild pulmonic regurgitation.    - Coreg 6.25 mg BID  - Low dose lisinopril 2.5 mg  - Appears euvolemic  - Will follow up on EF in a month or two       Hyperlipidemia  - PTA atorvastatin 80 mg  - LDL was 69 in June.  Consider adding Zetia, or changing to Crestor 40 mg.  But may ultimately benefit from PCSK9i therapy       Obesity          Follow up:  Staged PCI of ramus in 2-3 weeks  Follow up general [facility]ER[facility] after      Willa Cano PA-C          Thank you for allowing me to participate in the care of your patient.      Sincerely,     Willa Cano PA-C     [facility]  cc:   Jocelin Rubalcava, APRN CNP  [address] [place] [address]

## 2024-09-04 NOTE — PROGRESS NOTES
CARDIOLOGY CLINIC PROGRESS NOTE    DOS: 2024      Mian Lozano  : 1965, 59 year old  MRN: [id number]      Primary cardiologist: Dr. Welch       History:  Mian Lozano is a 59 year old male with history of CAD s/p CABG (CABG--Lima to Lad, Y graft to Diag, OM, Seq VG to R-PDA and PL) in . He also has history of postthoracotomy chest pain.     He was recently admitted with chest pain and NSTEMI. Upon presentation he was noted to have some subtle ST elevation in inferior leads which resolved and he also had ST depression in lateral leads.  He was started on heparin and nitroglycerin. Troponin blanco from 203 initially to 1661.   Echo shwoed EF 40-45% with WMAs.   Cardiac cath done and he underwent PETER to VG-RPDA.  Please see cath report for details.  He also had a 75% ramus lesion.         Interval History, reviewed:   He presents today for follow up and for H and P for staged PCI.   No chest pain.  Taking meds and tolerating.  Some LH with quick movements, sitting to standing.  No SOB.   No palpitations.   No edema.   No bleeding concerns.   No issues with groin site.   He has cardiac rehab intake next Monday.         ROS:  Skin:  not assessed     Eyes:  not assessed    ENT:  not assessed    Respiratory:  Negative    Cardiovascular:    Positive for;lightheadedness;fatigue  Gastroenterology: not assessed    Genitourinary:  not assessed    Musculoskeletal:  not assessed    Neurologic:  not assessed    Psychiatric:  not assessed    Heme/Lymph/Imm:  not assessed    Endocrine:  not assessed      PAST MEDICAL HISTORY:  Past Medical History:   Diagnosis Date     novel coronavirus disease (COVID-19) 2021    Anxiety     Sullivan's esophagus 2011    Coronary artery disease 2008    cabg x 5    Environmental allergies     seasonal, hay fever    FAM HX-CARDIOVAS DIS NEC     dad at 45, cabg, stent    Family history of prostate cancer     Fatty liver     Gallstone     GERD (gastroesophageal reflux  disease) 03/2011    History of blood transfusion     Hyperlipidemia LDL goal <70 2006    Hypertension goal BP (blood pressure) < 140/90 04/2008    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    Dr Saba, Hx histoplasmosis, treated    Mild intermittent asthma 07/2003    Nephrolithiasis 6/13, 7/16    calcium oxalate - 6/2013, 7/2016, 1/2021    Other atopic dermatitis and related conditions 1980    Other diseases of lung, not elsewhere classified 04/2008    dr steel - benign bx and ct - granuloma - no further w/u needed    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    dr hoover    Prediabetes 08/2008    Psoriasis     Dr Mccall    Psoriatic arthritis (H)     Dr Mccall    Pulmonary nodules 2009    stable in 2012    Seasonal allergies 1980    Allergic rhinitis Hayfever       PAST SURGICAL HISTORY:  Past Surgical History:   Procedure Laterality Date    COLONOSCOPY N/A 08/07/2015    normal - due 10 yrs    CV ANGIOGRAM CORONARY GRAFT N/A 8/27/2024    Procedure: Coronary Angiogram Graft;  Surgeon: Matteo Christy MD;  Location: [facility] CARDIAC CATH LAB    CV CORONARY ANGIOGRAM N/A 8/27/2024    Procedure: Coronary Angiogram;  Surgeon: Matteo Chrisyt MD;  Location: [facility] CARDIAC CATH LAB    CV INTRAVASULAR ULTRASOUND N/A 8/27/2024    Procedure: Intravascular Ultrasound;  Surgeon: Matteo Christy MD;  Location: [facility] CARDIAC CATH LAB    CV PCI N/A 8/27/2024    Procedure: Percutaneous Coronary Intervention;  Surgeon: Matteo Christy MD;  Location:  HEART CARDIAC CATH LAB    CV PCI ASPIRATION THROMECTOMY N/A 8/27/2024    Procedure: Percutaneous Coronary Intervention Aspiration Thrombectomy;  Surgeon: Matteo Christy MD;  Location:  HEART CARDIAC CATH LAB    ESOPHAGOSCOPY, GASTROSCOPY, DUODENOSCOPY (EGD), COMBINED  6/11, 5/13    [facility] - ? gastritis, fowler's - due 3 yr    ESOPHAGOSCOPY, GASTROSCOPY, DUODENOSCOPY (EGD), COMBINED  05/10/2013    COMBINED ESOPHAGOSCOPY, GASTROSCOPY, DUODENOSCOPY (EGD), BIOPSY SINGLE OR MULTIPLE;   ESOPHAGOSCOPY, GASTROSCOPY, DUODENOSCOPY (EGD)  with biopsy;  Surgeon: Angus Reynolds MD;  Location: [facility]    ESOPHAGOSCOPY, GASTROSCOPY, DUODENOSCOPY (EGD), COMBINED N/A 07/28/2017    Sullivan's - recheck 3 yrs    HC VASECTOMY UNILAT/BILAT W POSTOP SEMEN  1995    Vasectomy    HERNIORRHAPHY UMBILICAL N/A 11/13/2014    Dr Barron    SURGICAL HISTORY OF -  Left 1980    lt patella fx    SURGICAL HISTORY OF -  Left 1985    lt thumb neuroma excised    SURGICAL HISTORY OF -   11/2009    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    ZZC CABG, ARTERY-VEIN, FIVE  04/2008    [facility] - lung bx benign    ZZC STRESS TEST - REGULAR ([place])  9/06, 4/11    negative stress thallium - [facility]       SOCIAL HISTORY:  Social History     Socioeconomic History    Marital status:      Spouse name: Imelda    Number of children: 0    Years of education: 14   Occupational History     Comment: [employer]   Tobacco Use    Smoking status: Former     Types: Cigars    Smokeless tobacco: Never    Tobacco comments:     In past rare cigar, last 2005   Vaping Use    Vaping status: Never Used   Substance and Sexual Activity    Alcohol use: Yes     Types: 6 Standard drinks or equivalent per week     Comment: 6 drinks per week    Drug use: No    Sexual activity: Yes     Partners: Female     Birth control/protection: Male Surgical   Other Topics Concern    Caffeine Concern Yes     Comment: 1 can qd    Exercise Yes     Comment: occas    Seat Belt Yes    Parent/sibling w/ CABG, MI or angioplasty before 65F 55M? Yes     Social Determinants of Health     Financial Resource Strain: Low Risk  (8/27/2024)    Financial Resource Strain     Within the past 12 months, have you or your family members you live with been unable to get utilities (heat, electricity) when it was really needed?: No   Recent Concern: Financial Resource Strain - High Risk (6/24/2024)    Financial Resource Strain     Within the past 12 months, have you or your family members you live  with been unable to get utilities (heat, electricity) when it was really needed?: Yes   Food Insecurity: Low Risk  (8/27/2024)    Food Insecurity     Within the past 12 months, did you worry that your food would run out before you got money to buy more?: No     Within the past 12 months, did the food you bought just not last and you didn t have money to get more?: No   Transportation Needs: Low Risk  (8/27/2024)    Transportation Needs     Within the past 12 months, has lack of transportation kept you from medical appointments, getting your medicines, non-medical meetings or appointments, work, or from getting things that you need?: No   Physical Activity: Insufficiently Active (6/24/2024)    Exercise Vital Sign     Days of Exercise per Week: 1 day     Minutes of Exercise per Session: 20 min   Stress: No Stress Concern Present (6/24/2024)    [employer] - Occupational Stress Questionnaire     Feeling of Stress : Only a little   Social Connections: Unknown (6/24/2024)    Social Connection and Isolation Panel [NHANES]     Frequency of Social Gatherings with Friends and Family: Once a week   Interpersonal Safety: Low Risk  (8/27/2024)    Interpersonal Safety     Do you feel physically and emotionally safe where you currently live?: Yes     Within the past 12 months, have you been hit, slapped, kicked or otherwise physically hurt by someone?: No     Within the past 12 months, have you been humiliated or emotionally abused in other ways by your partner or ex-partner?: No   Housing Stability: Low Risk  (8/27/2024)    Housing Stability     Do you have housing? : Yes     Are you worried about losing your housing?: No       FAMILY HISTORY:  Family History   Problem Relation Age of Onset    Prostate Cancer Father 69    Coronary Artery Disease Father 38    Hypertension Father     Neurologic Disorder Sister         CVA/aneurysm at age 19, decreased memory, partial paralysis    Hypertension Paternal  Grandfather     ABDIRIZAK. Paternal Grandfather     Heart Failure Paternal Grandfather     Colon Cancer No family hx of        MEDS:   Current Outpatient Medications   Medication Sig Dispense Refill    acetaminophen (TYLENOL) 325 MG tablet Take 2 tablets (650 mg) by mouth every 4 hours as needed for mild pain or other (and adjunct with moderate or severe pain or per patient request).      albuterol (PROAIR HFA) 108 (90 Base) MCG/ACT inhaler Inhale 2 puffs into the lungs every 4 hours as needed for shortness of breath or wheezing 18 g 3    aspirin 81 MG EC tablet Take 1 tablet (81 mg) by mouth daily. Start tomorrow. Do not start before August 28, 2024. 30 tablet 3    atorvastatin (LIPITOR) 80 MG tablet Take 1 tablet (80 mg) by mouth daily 90 tablet 3    azelastine (ASTELIN) 0.1 % nasal spray Spray 2 sprays into both nostrils 2 times daily (Patient taking differently: Spray 2 sprays into both nostrils 2 times daily. PRN) 90 mL 3    carvedilol (COREG) 6.25 MG tablet Take 1 tablet (6.25 mg) by mouth 2 times daily (with meals) 180 tablet 3    cetirizine (ZYRTEC) 10 MG tablet Take 1 tablet (10 mg) by mouth every evening 90 tablet 3    citalopram (CELEXA) 20 MG tablet Take 1 tablet (20 mg) by mouth daily 90 tablet 3    clobetasol propionate (TEMOVATE) 0.05 % external cream Apply topically 2 times daily (Patient taking differently: Apply topically 2 times daily. PRN) 30 g 3    etanercept (ENBREL) 50 MG/ML injection Inject 0.98 mLs (50 mg) Subcutaneous once a week      fluticasone (FLONASE) 50 MCG/ACT nasal spray Spray 2 sprays into both nostrils daily (Patient taking differently: Spray 2 sprays into both nostrils daily. PRN) 48 g 3    LANsoprazole (PREVACID) 30 MG DR capsule TAKE ONE CAPSULE BY MOUTH DAILY. TAKE 30 TO 60 MINUTES BEFORE A MEAL. 90 capsule 3    lisinopril (ZESTRIL) 2.5 MG tablet Take 1 tablet (2.5 mg) by mouth daily. 90 tablet 3    nitroGLYcerin (NITROSTAT) 0.4 MG sublingual tablet For chest pain place 1 tablet  "under the tongue every 5 minutes for 3 doses. If symptoms persist 5 minutes after 2nd dose call 911. 30 tablet 0    ticagrelor (BRILINTA) 90 MG tablet Take 1 tablet (90 mg) by mouth 2 times daily. Dose to start this evening. 180 tablet 3     No current facility-administered medications for this visit.       ALLERGIES:   Allergies   Allergen Reactions    Seasonal Allergies        PHYSICAL EXAM:  Vitals: /64   Pulse 84   Ht 1.727 m (5' 8\")   Wt 92.6 kg (204 lb 3.2 oz)   BMI 31.05 kg/m    Constitutional:  cooperative, alert and oriented, well developed, well nourished, in no acute distress        Skin:  warm and dry to the touch, no apparent skin lesions or masses noted surgical scars well-healed      Head:  normocephalic, no masses or lesions        Eyes:  pupils equal and round;conjunctivae and lids unremarkable;sclera white        ENT:  no pallor or cyanosis        Neck:  JVP normal        Respiratory:  clear to auscultation;normal symmetry        Cardiac: regular rhythm, normal S1/S2, no S3 or S4, apical impulse not displaced, no murmurs, gallops or rubs                  GI:  abdomen soft;BS normoactive        Vascular: pulses full and equal                                      Extremities and Musculoskeletal:  no deformities, clubbing, cyanosis, erythema observed;no edema        Neurological:  no gross motor deficits;affect appropriate            LABS/DATA:  I reviewed the following:  Cardiac cath 8/27/24:  Conclusion    Mid LM lesion is 10% stenosed.    Ramus lesion is 75% stenosed.    Mid Cx lesion is 100% stenosed.    2nd Mrg lesion is 100% stenosed.    Mid RCA lesion is 100% stenosed.    Dist Graft to Insertion lesion  before RPDA is 100% stenosed.    Origin to Prox Graft lesion  before 2nd Mrg is 100% stenosed.    Ost LAD lesion is 100% stenosed.    Dist LAD lesion is 65% stenosed.    IVUS was performed on the lesion.    IVUS was performed on the lesion.    Ultrasound (IVUS) was performed.    " Ultrasound (IVUS) was performed.     The Seq VG to R-PDA and R PL is occluded near the origin.The entire VG was clotted. We passed several runs with Penumbra down the graft to anastamosis.  The allowed visualization of the distal VG disrupted plaque, calcium and clot. We performed IVUS which we used to size the vessel and it showed clot/calcium/atherosclerosis.  We used PTCB to open the VG and stented with 3.5x38 stent and post dilated 3.5 NC  Grafted segment widely patent after stent  Chronic occlusion VG- Y to diag and OM. The Lcx is multilevel -rec: med RX  LIMA to Lad patent with moderate distal Lad after LIMA insertion  Consider elective PCI of ramus branch in future.      Echo 8/27/24:  Interpretation Summary  The visual ejection fraction is 40-45%.  Left ventricular systolic function is mildly reduced.  There are regional wall motion abnormalities as specified.  There is mild (1+) aortic regurgitation.  The study was technically difficult. The study was technically limited.            ASSESSMENT/PLAN:  CAD.   H/o CABG (2008) LIMA to LAD, SVG has a Y-shaped graft to diagonal and circumflex, SVG to PDA and SILVIA sequentially.    Acute coronary syndrome .   - Coronary angiogram showed severe native CAD, SVG to right PDA and RPL occluded near the origin. The entire VG was clotted. Several runs with Penumbra down the graft to anastamosis followed by 3.5 x 38 mm PETER. Grafted segment widely patent after stent. Chronic occlusion VG- Y to diag and OM. The Lcx is multilevel -rec: med RX. LIMA/LAD patent. Ramus 75%.    Staged PCI of the ramus branch in about 3 weeks  - R and B discussed   Risks and benefits of coronary angiogram discussed today including, bleeding, bruising, infection, allergic reaction, kidney damage (including need for dialysis), stroke, heart attack, vascular damage, emergency open heart surgery, up to and including death.  Patient indicates understanding and is agreeable to proceed.    Contrast  allergy:no  Anticoagulation: no   Metformin: no  Oral DM meds: no  Insulin: no  GLP1: no  Diuretic: no  Use of phosphodiesterase type 5 inhibitor: no  Aspirin: yes, 81 mg daily which will be continued    Renal issues: no   Patient to be NPO for solid foods 8 hours prior to check in time, may have clear liquids up to 2 hours prior to check in time.   Pt needs transportation and 24 hours post procedure monitoring set up.   No driving for 24 hours post procedure.   - No chest pain   - Brilinta and aspirin uninterrupted x 12 months  - Atorvastatin 80 mg  - Cardiac rehab after staged procedure       Ischemic cardiomyopathy  HFmrEF  - Echocardiogram 8/27/24: EF 40-45%, Severe hypokinesis to akinesis of the mid to basal inferior wall. Severe hypokinesis to akinesis of the mid to basal  inferolateral wall. Mild aortic regurgitation and mild pulmonic regurgitation.    - Coreg 6.25 mg BID  - Low dose lisinopril 2.5 mg  - Appears euvolemic  - Will follow up on EF in a month or two       Hyperlipidemia  - PTA atorvastatin 80 mg  - LDL was 69 in June.  Consider adding Zetia, or changing to Crestor 40 mg.  But may ultimately benefit from PCSK9i therapy       Obesity          Follow up:  Staged PCI of ramus in 2-3 weeks  Follow up general [facility] after      Willa Cano PA-C

## 2024-09-09 ENCOUNTER — HOSPITAL ENCOUNTER (OUTPATIENT)
Dept: CARDIAC REHAB | Facility: CLINIC | Age: 59
Discharge: HOME | End: 2024-09-09
Attending: INTERNAL MEDICINE
Payer: COMMERCIAL

## 2024-09-09 DIAGNOSIS — I21.4 NSTEMI (NON-ST ELEVATED MYOCARDIAL INFARCTION) (H): ICD-10-CM

## 2024-09-09 DIAGNOSIS — I24.9 ACS (ACUTE CORONARY SYNDROME) (H): ICD-10-CM

## 2024-09-09 PROCEDURE — 93797 PHYS/QHP OP CAR RHAB WO ECG: CPT

## 2024-09-09 PROCEDURE — 93798 PHYS/QHP OP CAR RHAB W/ECG: CPT

## 2024-09-10 ENCOUNTER — OFFICE VISIT (OUTPATIENT)
Dept: FAMILY MEDICINE | Facility: CLINIC | Age: 59
End: 2024-09-10
Payer: COMMERCIAL

## 2024-09-10 VITALS
WEIGHT: 204 LBS | DIASTOLIC BLOOD PRESSURE: 72 MMHG | TEMPERATURE: 96.1 F | HEART RATE: 89 BPM | RESPIRATION RATE: 16 BRPM | BODY MASS INDEX: 30.92 KG/M2 | HEIGHT: 68 IN | SYSTOLIC BLOOD PRESSURE: 110 MMHG | OXYGEN SATURATION: 97 %

## 2024-09-10 DIAGNOSIS — R91.8 PULMONARY NODULES: ICD-10-CM

## 2024-09-10 DIAGNOSIS — I21.4 NSTEMI (NON-ST ELEVATED MYOCARDIAL INFARCTION) (H): Primary | ICD-10-CM

## 2024-09-10 DIAGNOSIS — K76.0 FATTY LIVER: ICD-10-CM

## 2024-09-10 DIAGNOSIS — Z23 NEED FOR INFLUENZA VACCINATION: ICD-10-CM

## 2024-09-10 DIAGNOSIS — N20.0 NEPHROLITHIASIS: ICD-10-CM

## 2024-09-10 DIAGNOSIS — R94.30 EJECTION FRACTION < 50%: ICD-10-CM

## 2024-09-10 DIAGNOSIS — J45.20 MILD INTERMITTENT ASTHMA WITHOUT COMPLICATION: ICD-10-CM

## 2024-09-10 DIAGNOSIS — I10 HYPERTENSION GOAL BP (BLOOD PRESSURE) < 140/90: ICD-10-CM

## 2024-09-10 DIAGNOSIS — G89.12 POST-THORACOTOMY PAIN SYNDROME: ICD-10-CM

## 2024-09-10 DIAGNOSIS — K21.9 GASTROESOPHAGEAL REFLUX DISEASE WITHOUT ESOPHAGITIS: ICD-10-CM

## 2024-09-10 DIAGNOSIS — L40.50 PSORIATIC ARTHRITIS (H): ICD-10-CM

## 2024-09-10 DIAGNOSIS — E78.5 HYPERLIPIDEMIA LDL GOAL <70: ICD-10-CM

## 2024-09-10 DIAGNOSIS — E66.811 CLASS 1 OBESITY WITH SERIOUS COMORBIDITY AND BODY MASS INDEX (BMI) OF 31.0 TO 31.9 IN ADULT, UNSPECIFIED OBESITY TYPE: ICD-10-CM

## 2024-09-10 DIAGNOSIS — I25.810 CORONARY ARTERY DISEASE INVOLVING CORONARY BYPASS GRAFT OF NATIVE HEART WITHOUT ANGINA PECTORIS: ICD-10-CM

## 2024-09-10 DIAGNOSIS — Z51.81 MEDICATION MONITORING ENCOUNTER: ICD-10-CM

## 2024-09-10 DIAGNOSIS — Z82.49 FAMILY HISTORY OF CORONARY ARTERY DISEASE: ICD-10-CM

## 2024-09-10 DIAGNOSIS — R73.03 PREDIABETES: ICD-10-CM

## 2024-09-10 DIAGNOSIS — L40.9 PSORIASIS: ICD-10-CM

## 2024-09-10 DIAGNOSIS — Z95.1 H/O CORONARY ARTERY BYPASS SURGERY: ICD-10-CM

## 2024-09-10 LAB
ALBUMIN SERPL BCG-MCNC: 4.1 G/DL (ref 3.5–5.2)
ALP SERPL-CCNC: 118 U/L (ref 40–150)
ALT SERPL W P-5'-P-CCNC: 38 U/L (ref 0–70)
ANION GAP SERPL CALCULATED.3IONS-SCNC: 7 MMOL/L (ref 7–15)
AST SERPL W P-5'-P-CCNC: 35 U/L (ref 0–45)
BILIRUB SERPL-MCNC: 0.6 MG/DL
BUN SERPL-MCNC: 9.2 MG/DL (ref 8–23)
CALCIUM SERPL-MCNC: 9.9 MG/DL (ref 8.8–10.4)
CHLORIDE SERPL-SCNC: 104 MMOL/L (ref 98–107)
CREAT SERPL-MCNC: 1.02 MG/DL (ref 0.67–1.17)
EGFRCR SERPLBLD CKD-EPI 2021: 85 ML/MIN/1.73M2
ERYTHROCYTE [DISTWIDTH] IN BLOOD BY AUTOMATED COUNT: 12.4 % (ref 10–15)
GLUCOSE SERPL-MCNC: 105 MG/DL (ref 70–99)
HCO3 SERPL-SCNC: 28 MMOL/L (ref 22–29)
HCT VFR BLD AUTO: 42 % (ref 40–53)
HGB BLD-MCNC: 14.3 G/DL (ref 13.3–17.7)
MCH RBC QN AUTO: 30.8 PG (ref 26.5–33)
MCHC RBC AUTO-ENTMCNC: 34 G/DL (ref 31.5–36.5)
MCV RBC AUTO: 90 FL (ref 78–100)
NT-PROBNP SERPL-MCNC: 500 PG/ML (ref 0–900)
PLATELET # BLD AUTO: 252 10E3/UL (ref 150–450)
POTASSIUM SERPL-SCNC: 5.1 MMOL/L (ref 3.4–5.3)
PROT SERPL-MCNC: 7 G/DL (ref 6.4–8.3)
RBC # BLD AUTO: 4.65 10E6/UL (ref 4.4–5.9)
SODIUM SERPL-SCNC: 139 MMOL/L (ref 135–145)
WBC # BLD AUTO: 7 10E3/UL (ref 4–11)

## 2024-09-10 PROCEDURE — 80053 COMPREHEN METABOLIC PANEL: CPT | Performed by: FAMILY MEDICINE

## 2024-09-10 PROCEDURE — 83880 ASSAY OF NATRIURETIC PEPTIDE: CPT | Performed by: FAMILY MEDICINE

## 2024-09-10 PROCEDURE — 90471 IMMUNIZATION ADMIN: CPT | Performed by: FAMILY MEDICINE

## 2024-09-10 PROCEDURE — 99495 TRANSJ CARE MGMT MOD F2F 14D: CPT | Performed by: FAMILY MEDICINE

## 2024-09-10 PROCEDURE — 90673 RIV3 VACCINE NO PRESERV IM: CPT | Performed by: FAMILY MEDICINE

## 2024-09-10 PROCEDURE — 36415 COLL VENOUS BLD VENIPUNCTURE: CPT | Performed by: FAMILY MEDICINE

## 2024-09-10 PROCEDURE — 85027 COMPLETE CBC AUTOMATED: CPT | Performed by: FAMILY MEDICINE

## 2024-09-10 NOTE — LETTER
[facility]  [address] [place] [address]  [phone]                    September 16, 2024    Mian Lozano  [address] [place] [address]      Dear Mian,    Here is a summary of your recent test results:  Labs are overall quite good     Prediabetes     We advise:     Continue current cares.   Balanced low cholesterol diet, Mediterranean diet   Regular exercise, 150 minutes per week.   Weight loss.     Your test results are enclosed.             Thank you very much for trusting [facility].     Healthy regards,          Fady Adams M.D.        Results for orders placed or performed in visit on 09/10/24   Comprehensive metabolic panel (BMP + Alb, Alk Phos, ALT, AST, Total. Bili, TP)     Status: Abnormal   Result Value Ref Range    Sodium 139 135 - 145 mmol/L    Potassium 5.1 3.4 - 5.3 mmol/L    Carbon Dioxide (CO2) 28 22 - 29 mmol/L    Anion Gap 7 7 - 15 mmol/L    Urea Nitrogen 9.2 8.0 - 23.0 mg/dL    Creatinine 1.02 0.67 - 1.17 mg/dL    GFR Estimate 85 >60 mL/min/1.73m2    Calcium 9.9 8.8 - 10.4 mg/dL    Chloride 104 98 - 107 mmol/L    Glucose 105 (H) 70 - 99 mg/dL    Alkaline Phosphatase 118 40 - 150 U/L    AST 35 0 - 45 U/L    ALT 38 0 - 70 U/L    Protein Total 7.0 6.4 - 8.3 g/dL    Albumin 4.1 3.5 - 5.2 g/dL    Bilirubin Total 0.6 <=1.2 mg/dL   CBC with platelets     Status: Normal   Result Value Ref Range    WBC Count 7.0 4.0 - 11.0 10e3/uL    RBC Count 4.65 4.40 - 5.90 10e6/uL    Hemoglobin 14.3 13.3 - 17.7 g/dL    Hematocrit 42.0 40.0 - 53.0 %    MCV 90 78 - 100 fL    MCH 30.8 26.5 - 33.0 pg    MCHC 34.0 31.5 - 36.5 g/dL    RDW 12.4 10.0 - 15.0 %    Platelet Count 252 150 - 450 10e3/uL   BNP-N terminal pro     Status: Normal   Result Value Ref Range    N Terminal Pro BNP Outpatient 500 0 - 900 pg/mL

## 2024-09-10 NOTE — PROGRESS NOTES
Assessment & Plan     NSTEMI (non-ST elevated myocardial infarction) (H)    - Comprehensive metabolic panel (BMP + Alb, Alk Phos, ALT, AST, Total. Bili, TP)  - CBC with platelets  - PRIMARY CARE FOLLOW-UP SCHEDULING  - BNP-N terminal pro    Coronary artery disease involving coronary bypass graft of native heart without angina pectoris    - Comprehensive metabolic panel (BMP + Alb, Alk Phos, ALT, AST, Total. Bili, TP)  - CBC with platelets  - PRIMARY CARE FOLLOW-UP SCHEDULING  - BNP-N terminal pro    Ejection fraction < 50%    - Comprehensive metabolic panel (BMP + Alb, Alk Phos, ALT, AST, Total. Bili, TP)  - CBC with platelets  - PRIMARY CARE FOLLOW-UP SCHEDULING  - BNP-N terminal pro    H/O coronary artery bypass surgery    - Comprehensive metabolic panel (BMP + Alb, Alk Phos, ALT, AST, Total. Bili, TP)  - CBC with platelets  - PRIMARY CARE FOLLOW-UP SCHEDULING  - BNP-N terminal pro    Family history of coronary artery disease    - PRIMARY CARE FOLLOW-UP SCHEDULING    Post-thoracotomy pain syndrome    - Comprehensive metabolic panel (BMP + Alb, Alk Phos, ALT, AST, Total. Bili, TP)  - CBC with platelets  - PRIMARY CARE FOLLOW-UP SCHEDULING    Prediabetes    - Comprehensive metabolic panel (BMP + Alb, Alk Phos, ALT, AST, Total. Bili, TP)  - PRIMARY CARE FOLLOW-UP SCHEDULING    Hypertension goal BP (blood pressure) < 140/90    - Comprehensive metabolic panel (BMP + Alb, Alk Phos, ALT, AST, Total. Bili, TP)  - PRIMARY CARE FOLLOW-UP SCHEDULING    Hyperlipidemia LDL goal <70    - Comprehensive metabolic panel (BMP + Alb, Alk Phos, ALT, AST, Total. Bili, TP)  - PRIMARY CARE FOLLOW-UP SCHEDULING    Pulmonary nodules    - PRIMARY CARE FOLLOW-UP SCHEDULING    Mild intermittent asthma without complication    - PRIMARY CARE FOLLOW-UP SCHEDULING    Psoriasis    - Comprehensive metabolic panel (BMP + Alb, Alk Phos, ALT, AST, Total. Bili, TP)  - CBC with platelets  - PRIMARY CARE FOLLOW-UP SCHEDULING    Psoriatic arthritis  (H)    - Comprehensive metabolic panel (BMP + Alb, Alk Phos, ALT, AST, Total. Bili, TP)  - CBC with platelets  - PRIMARY CARE FOLLOW-UP SCHEDULING    Fatty liver    - Comprehensive metabolic panel (BMP + Alb, Alk Phos, ALT, AST, Total. Bili, TP)  - PRIMARY CARE FOLLOW-UP SCHEDULING    Gastroesophageal reflux disease without esophagitis    - CBC with platelets  - PRIMARY CARE FOLLOW-UP SCHEDULING    Nephrolithiasis    - Comprehensive metabolic panel (BMP + Alb, Alk Phos, ALT, AST, Total. Bili, TP)  - PRIMARY CARE FOLLOW-UP SCHEDULING    Class 1 obesity with serious comorbidity and body mass index (BMI) of 31.0 to 31.9 in adult, unspecified obesity type    - PRIMARY CARE FOLLOW-UP SCHEDULING    Medication monitoring encounter    - Comprehensive metabolic panel (BMP + Alb, Alk Phos, ALT, AST, Total. Bili, TP)  - CBC with platelets  - PRIMARY CARE FOLLOW-UP SCHEDULING  - BNP-N terminal pro    Need for influenza vaccination    - INFLUENZA VACCINE TRIVALENT(FLUBLOK)  - PRIMARY CARE FOLLOW-UP SCHEDULING      MED REC REQUIRED  Post Medication Reconciliation Status: discharge medications reconciled, continue medications without change    Work on weight loss  Regular exercise    Plan:    1) Medications: reviewed    2) Labs: pending    3) Immunizations: flu vaccine, advise covid, RSV at 60    4) Imaging/Diagnostics: reviewed    5) Consults: Cardiology, cardiac rehab, EGD    32 minutes spent by myself on the date of the encounter doing chart review, history and exam, documentation and further activities per the note.    Charline Pappas is a 59 year old, presenting for the following health issues:  No chief complaint on file.        9/10/2024     9:45 AM   Additional Questions   Roomed by [facility] Follow-up Visit:    Hospital/Nursing Home/IP Rehab Facility: [facility]  Date of Admission: 08/26/2024  Date of Discharge: 08/28/2024  Reason(s) for Admission: NSTEMI  Was the patient in the ICU or  did the patient experience delirium during hospitalization?  yes  Do you have any other stressors you would like to discuss with your provider? No    Problems taking medications regularly:  None  Medication changes since discharge: None  Problems adhering to non-medication therapy:  None    Summary of hospitalization:  [facility] discharge summary reviewed  Diagnostic Tests/Treatments reviewed.  Follow up needed: Stent x 1 to be placed 9/23/2024  Other Healthcare Providers Involved in Patient s Care:          Cardiology  Update since discharge: improved.       Plan of care communicated with patient    Currently feeling good, no cp, notes long term post thoracotomy syndrome, reduced EF to 40-45%, no sob, no edema, RT groin doing well, no current medication issues, normal appetite/sleep/bowels/bladder, wounds healing, starting cardiac rehab    Next stent on 9/23/2024 - Dr Valadez    Overall feels like 90 % back to baseline    BP Readings from Last 3 Encounters:   09/10/24 110/72   09/04/24 106/64   08/28/24 129/67     Creatinine   Date Value Ref Range Status   08/28/2024 0.90 0.67 - 1.17 mg/dL Final   03/23/2021 0.830 0.500 - 1.300 mg/dL Final     GFR Estimate   Date Value Ref Range Status   08/28/2024 >90 >60 mL/min/1.73m2 Final     Comment:     eGFR calculated using 2021 CKD-EPI equation.   01/31/2021 63 >60 mL/min/[1.73_m2] Final     Recent Labs   Lab Test 06/25/24  1111 06/22/23  0734   CHOL 161 150   HDL 41 36*   LDL 69 70   TRIG 253* 222*     Lab Results   Component Value Date    A1C 5.4 06/25/2024    A1C 5.5 06/22/2023    A1C 5.4 06/16/2022    A1C 5.3 07/29/2021    A1C 5.4 06/05/2020    A1C 5.4 06/12/2019    A1C 5.6 05/16/2018    A1C 5.4 01/30/2018    A1C 5.6 02/14/2017     Hospitalist Discharge Summary       Date of Admission:  8/26/2024  Date of Discharge:  8/28/2024  Discharging Provider: Michael Kaba DO  Discharge Service: Hospitalist Service     Discharge Diagnoses  Acute coronary  "syndrome.    Coronary artery disease  Chronic postthoracotomy pain syndrome.  Ischemic cardiomyopathy.  Hypertension.    Hyperlipidemia.  Hypomagnesemia.  Psoriasis.    Psoriatic arthritis.  GERD.    Sullivan's esophagus.  Anxiety.    Asthma.  Prediabetes.     Clinically Significant Risk Factors     # Obesity: Estimated body mass index is 31.88 kg/m  as calculated from the following:    Height as of this encounter: 1.727 m (5' 8\").    Weight as of this encounter: 95.1 kg (209 lb 10.5 oz).       Follow-ups Needed After Discharge  Follow-up Appointments     Follow-up and recommended labs and tests       Follow up with primary care provider, Fady Adams, within 7 days for   hospital follow- up.  The following labs/tests are recommended: CBC and   BMP in 7 days.  Follow-up with cardiology within 4 weeks.       Discharge Disposition  Discharged to home  Condition at discharge: Stable     Hospital Course  Mian Lozano is a 59 year old male with PMH including CAD s/p 5V CABG in 2008, postthoracotomy pain syndrome, HTN, HLD, psoriasis/psoriatic arthritis on Enbrel, obesity, GERD/Sullivan's esophagus, asthma, prediabetes, latent TB, fatty liver, and anxiety who presents with chest pain.  Found to have NSTEMI and acute coronary syndrome.  Seen in consultation by cardiology.  Initially started on continuous IV heparin infusion.  Did require continuous IV nitroglycerin infusion initially.  Coronary angiogram on 8/27.  Did require PCI of previous coronary graft.  Has been started on dual antiplatelet therapy with aspirin and Brilinta.  Has also been started on lisinopril.  Initially started on 10 mg a day.  Blood pressure was a bit low after initial 10 mg dose.  Discharged with lisinopril 2.5 mg a day for now.  Can titrate up in the outpatient setting as tolerated.  Continue carvedilol and atorvastatin.  Follow-up with cardiology within 4 weeks.  Follow-up with primary care provider within 1 week.     9/4/2024 - [facility] " note    CAD.   H/o CABG (2008) LIMA to LAD, SVG has a Y-shaped graft to diagonal and circumflex, SVG to PDA and SILVIA sequentially.    Acute coronary syndrome .   - Coronary angiogram showed severe native CAD, SVG to right PDA and RPL occluded near the origin. The entire VG was clotted. Several runs with Penumbra down the graft to anastamosis followed by 3.5 x 38 mm PETER. Grafted segment widely patent after stent. Chronic occlusion VG- Y to diag and OM. The Lcx is multilevel -rec: med RX. LIMA/LAD patent. Ramus 75%.    Staged PCI of the ramus branch in about 3 weeks  - R and B discussed   Risks and benefits of coronary angiogram discussed today including, bleeding, bruising, infection, allergic reaction, kidney damage (including need for dialysis), stroke, heart attack, vascular damage, emergency open heart surgery, up to and including death.  Patient indicates understanding and is agreeable to proceed.    Contrast allergy:no  Anticoagulation: no   Metformin: no  Oral DM meds: no  Insulin: no  GLP1: no  Diuretic: no  Use of phosphodiesterase type 5 inhibitor: no  Aspirin: yes, 81 mg daily which will be continued    Renal issues: no   Patient to be NPO for solid foods 8 hours prior to check in time, may have clear liquids up to 2 hours prior to check in time.   Pt needs transportation and 24 hours post procedure monitoring set up.   No driving for 24 hours post procedure.   - No chest pain   - Brilinta and aspirin uninterrupted x 12 months  - Atorvastatin 80 mg  - Cardiac rehab after staged procedure      Ischemic cardiomyopathy  HFmrEF  - Echocardiogram 8/27/24: EF 40-45%, Severe hypokinesis to akinesis of the mid to basal inferior wall. Severe hypokinesis to akinesis of the mid to basal  inferolateral wall. Mild aortic regurgitation and mild pulmonic regurgitation.    - Coreg 6.25 mg BID  - Low dose lisinopril 2.5 mg  - Appears euvolemic  - Will follow up on EF in a month or two     Hyperlipidemia  - PTA  atorvastatin 80 mg  - LDL was 69 in June.  Consider adding Zetia, or changing to Crestor 40 mg.  But may ultimately benefit from PCSK9i therapy           Patient Active Problem List   Diagnosis    Other atopic dermatitis and related conditions    Mild intermittent asthma    Family history of coronary artery disease    Family history of prostate cancer    Prediabetes    GERD (gastroesophageal reflux disease)    Sullivan's esophagus    Anxiety    Hypertension goal BP (blood pressure) < 140/90    Post-thoracotomy pain syndrome    Nephrolithiasis    Coronary artery disease    Hyperlipidemia LDL goal <70    Environmental allergies    H/O coronary artery bypass surgery    Psoriatic arthritis (H)    Class 1 obesity with serious comorbidity and body mass index (BMI) of 31.0 to 31.9 in adult, unspecified obesity type    Latent tuberculosis    Pulmonary nodules    Fatty liver    Gallstone    Psoriasis    NSTEMI (non-ST elevated myocardial infarction) (H)    Ejection fraction < 50%       Past Medical History:   Diagnosis Date    2019 novel coronavirus disease (COVID-19) 02/2021    Anxiety     Sullivan's esophagus 07/2011    Coronary artery disease 04/2008    cabg x 5    Environmental allergies     seasonal, hay fever    FAM HX-CARDIOVAS DIS NEC     dad at 45, cabg, stent    Family history of prostate cancer     Fatty liver     Gallstone     GERD (gastroesophageal reflux disease) 03/2011    History of blood transfusion     Hyperlipidemia LDL goal <70 2006    Hypertension goal BP (blood pressure) < 140/90 04/2008    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    Dr Saba, Hx histoplasmosis, treated    Mild intermittent asthma 07/2003    Nephrolithiasis 6/13, 7/16    calcium oxalate - 6/2013, 7/2016, 1/2021    Other atopic dermatitis and related conditions 1980    Other diseases of lung, not elsewhere classified 04/2008    dr steel - benign bx and ct - granuloma - no further w/u needed    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    dr hoover     Prediabetes 08/2008    Psoriasis     Dr Mccall    Psoriatic arthritis (H)     Dr Mccall    Pulmonary nodules 2009    stable in 2012    Seasonal allergies 1980    Allergic rhinitis Hayfever       Past Surgical History:   Procedure Laterality Date    COLONOSCOPY N/A 08/07/2015    normal - due 10 yrs    CV ANGIOGRAM CORONARY GRAFT N/A 8/27/2024    Procedure: Coronary Angiogram Graft;  Surgeon: Matteo Christy MD;  Location:  HEART CARD[facility] C[facility] LAB    CV CORONARY ANGIOGRAM N/A 8/27/2024    Procedure: Coronary Angiogram;  Surgeon: Matteo Christy MD;  Location:  HE[facility]ARD[facility] CATH LAB    CV INTRAVASULAR ULTRASOUND N/A 8/27/2024    Procedure: Intravascular Ultrasound;  Surgeon: Matteo Christy MD;  Location:  HEART [facility]ARD[facility] CATH LAB    CV PCI N/A 8/27/2024    Procedure: Percutaneous Coronary Intervention;  Surgeon: Matteo Christy MD;  Location:  HEART [facility]ARD[facility] CATH LAB    CV PCI ASPIRATION THROMECTOMY N/A 8/27/2024    Procedure: Percutaneous Coronary Intervention Aspiration Thrombectomy;  Surgeon: Matteo Christy MD;  Location:  HEART CARDIAC CATH LAB    ESOPHAGOSCOPY, GASTROSCOPY, DUODENOSCOPY (EGD), COMBINED  6/11, 5/13    [facility] - ? gastritis, fowler's - due 3 yr    ESOPHAGOSCOPY, GASTROSCOPY, DUODENOSCOPY (EGD), COMBINED  05/10/2013    COMBINED ESOPHAGOSCOPY, GASTROSCOPY, DUODENOSCOPY (EGD), BIOPSY SINGLE OR MULTIPLE;  ESOPHAGOSCOPY, GASTROSCOPY, DUODENOSCOPY (EGD)  with biopsy;  Surgeon: Angus Reynolds MD;  Location:  GI    ESOPHAGOSCOPY, GASTROSCOPY, DUODENOSCOPY (EGD), COMBINED N/A 07/28/2017    Fowler's - recheck 3 yrs    HC VASECTOMY UNILAT/BILAT W POSTOP SEMEN  1995    Vasectomy    HERNIORRHAPHY UMBILICAL N/A 11/13/2014    Dr Barron    SURGICAL HISTORY OF -  Left 1980    lt patella fx    SURGICAL HISTORY OF -  Left 1985    lt thumb neuroma excised    SURGICAL HISTORY OF -   11/2009    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 Start tomorrow. Do not start before August 28, 2024. 30 tablet 3    atorvastatin (LIPITOR) 80 MG tablet Take 1 tablet (80 mg) by mouth daily 90 tablet 3    carvedilol (COREG) 6.25 MG tablet Take 1 tablet (6.25 mg) by mouth 2 times daily (with meals) 180 tablet 3    citalopram (CELEXA) 20 MG tablet Take 1 tablet (20 mg) by mouth daily 90 tablet 3    LANsoprazole (PREVACID) 30 MG DR capsule TAKE ONE CAPSULE BY MOUTH DAILY. TAKE 30 TO 60 MINUTES BEFORE A MEAL. 90 capsule 3    lisinopril (ZESTRIL) 2.5 MG tablet Take 1 tablet (2.5 mg) by mouth daily. 90 tablet 3    ticagrelor (BRILINTA) 90 MG tablet Take 1 tablet (90 mg) by mouth 2 times daily. Dose to start this evening. 180 tablet 3    acetaminophen (TYLENOL) 325 MG tablet Take 2 tablets (650 mg) by mouth every 4 hours as needed for mild pain or other (and adjunct with moderate or severe pain or per patient request).      albuterol (PROAIR HFA) 108 (90 Base) MCG/ACT inhaler Inhale 2 puffs into the lungs every 4 hours as needed for shortness of breath or wheezing 18 g 3    azelastine (ASTELIN) 0.1 % nasal spray Spray 2 sprays into both nostrils 2 times daily (Patient taking differently: Spray 2 sprays into both nostrils 2 times daily. PRN) 90 mL 3    cetirizine (ZYRTEC) 10 MG tablet Take 1 tablet (10 mg) by mouth every evening 90 tablet 3    clobetasol propionate (TEMOVATE) 0.05 % external cream Apply topically 2 times daily (Patient taking differently: Apply topically 2 times daily. PRN) 30 g 3    etanercept (ENBREL) 50 MG/ML injection Inject 0.98 mLs (50 mg) Subcutaneous once a week      fluticasone (FLONASE) 50 MCG/ACT nasal spray Spray 2 sprays into both nostrils daily (Patient taking differently: Spray 2 sprays into both nostrils daily.  PRN) 48 g 3    nitroGLYcerin (NITROSTAT) 0.4 MG sublingual tablet For chest pain place 1 tablet under the tongue every 5 minutes for 3 doses. If symptoms persist 5 minutes after 2nd dose call 911. 30 tablet 0       Allergies   Allergen Reactions    Seasonal Allergies        Family History   Problem Relation Age of Onset    Prostate Cancer Father 69    Coronary Artery Disease Father 38    Hypertension Father     Neurologic Disorder Sister         CVA/aneurysm at age 19, decreased memory, partial paralysis    Hypertension Paternal Grandfather     C.A.D. Paternal Grandfather     Heart Failure Paternal Grandfather     Colon Cancer No family hx of        Social History     Socioeconomic History    Marital status:      Spouse name: Imelda    Number of children: 0    Years of education: 14    Highest education level: None   Occupational History     Comment: [employer]   Tobacco Use    Smoking status: Former     Types: Cigars    Smokeless tobacco: Never    Tobacco comments:     In past rare cigar, last 2005   Vaping Use    Vaping status: Never Used   Substance and Sexual Activity    Alcohol use: Yes     Types: 6 Standard drinks or equivalent per week     Comment: 6 drinks per week    Drug use: No    Sexual activity: Yes     Partners: Female     Birth control/protection: Male Surgical   Other Topics Concern    Caffeine Concern Yes     Comment: 1 can qd    Exercise Yes     Comment: occas    Seat Belt Yes    Parent/sibling w/ CABG, MI or angioplasty before 65F 55M? Yes     Social Determinants of Health     Financial Resource Strain: Low Risk  (8/27/2024)    Financial Resource Strain     Within the past 12 months, have you or your family members you live with been unable to get utilities (heat, electricity) when it was really needed?: No   Recent Concern: Financial Resource Strain - High Risk (6/24/2024)    Financial Resource Strain     Within the past 12 months, have you or your family members you live with been  unable to get utilities (heat, electricity) when it was really needed?: Yes   Food Insecurity: Low Risk  (8/27/2024)    Food Insecurity     Within the past 12 months, did you worry that your food would run out before you got money to buy more?: No     Within the past 12 months, did the food you bought just not last and you didn t have money to get more?: No   Transportation Needs: Low Risk  (8/27/2024)    Transportation Needs     Within the past 12 months, has lack of transportation kept you from medical appointments, getting your medicines, non-medical meetings or appointments, work, or from getting things that you need?: No   Physical Activity: Insufficiently Active (6/24/2024)    Exercise Vital Sign     Days of Exercise per Week: 1 day     Minutes of Exercise per Session: 20 min   Stress: No Stress Concern Present (6/24/2024)    [employer] - Occupational Stress Questionnaire     Feeling of Stress : Only a little   Social Connections: Unknown (6/24/2024)    Social Connection and Isolation Panel [NHANES]     Frequency of Social Gatherings with Friends and Family: Once a week   Interpersonal Safety: Low Risk  (8/27/2024)    Interpersonal Safety     Do you feel physically and emotionally safe where you currently live?: Yes     Within the past 12 months, have you been hit, slapped, kicked or otherwise physically hurt by someone?: No     Within the past 12 months, have you been humiliated or emotionally abused in other ways by your partner or ex-partner?: No   Housing Stability: Low Risk  (8/27/2024)    Housing Stability     Do you have housing? : Yes     Are you worried about losing your housing?: No         Review of Systems  CONSTITUTIONAL: NEGATIVE for fever, chills, change in weight  INTEGUMENTARY/SKIN: NEGATIVE for worrisome rashes, moles or lesions  EYES: NEGATIVE for vision changes or irritation  ENT/MOUTH: NEGATIVE for ear, mouth and throat problems  RESP: NEGATIVE for significant  "cough or SOB  CV: NEGATIVE for chest pain, palpitations or peripheral edema  GI: NEGATIVE for nausea, abdominal pain, heartburn, or change in bowel habits  : NEGATIVE for frequency, dysuria, or hematuria  MUSCULOSKELETAL: NEGATIVE for significant arthralgias or myalgia  NEURO: NEGATIVE for weakness, dizziness or paresthesias  ENDOCRINE: NEGATIVE for temperature intolerance, skin/hair changes  HEME: NEGATIVE for bleeding problems  PSYCHIATRIC: NEGATIVE for changes in mood or affect      Objective    /72   Pulse 89   Temp (!) 96.1  F (35.6  C) (Tympanic)   Resp 16   Ht 1.727 m (5' 8\")   Wt 92.5 kg (204 lb)   SpO2 97%   BMI 31.02 kg/m    Body mass index is 31.02 kg/m .    Physical Exam   GENERAL: alert and no distress  EYES: Eyes grossly normal to inspection, PERRL and conjunctivae and sclerae normal  HENT: ear canals and TM's normal, nose and mouth without ulcers or lesions  NECK: no adenopathy, no asymmetry, masses, or scars  RESP: lungs clear to auscultation - no rales, rhonchi or wheezes  CV: regular rate and rhythm, normal S1 S2, no S3 or S4, no murmur, click or rub, no peripheral edema  ABDOMEN: soft, nontender, no hepatosplenomegaly, no masses and bowel sounds normal  MS: no gross musculoskeletal defects noted, no edema  SKIN: no suspicious lesions or rashes  NEURO: Normal strength and tone, mentation intact and speech normal  PSYCH: mentation appears normal, affect normal/bright    Reviewed labs/imaging, labs pending      Signed Electronically by:              Fady Adams MD, FAAFP     [facility]  [address] [place] [address] [id number] [place] [email or web address]   Office: [phone]  Fax: [phone]         "

## 2024-09-12 ENCOUNTER — HOSPITAL ENCOUNTER (OUTPATIENT)
Dept: CARDIAC REHAB | Facility: CLINIC | Age: 59
Discharge: HOME | End: 2024-09-12
Attending: INTERNAL MEDICINE
Payer: COMMERCIAL

## 2024-09-12 PROCEDURE — 93798 PHYS/QHP OP CAR RHAB W/ECG: CPT

## 2024-09-17 ENCOUNTER — HOSPITAL ENCOUNTER (OUTPATIENT)
Dept: CARDIAC REHAB | Facility: CLINIC | Age: 59
Discharge: HOME | End: 2024-09-17
Attending: INTERNAL MEDICINE
Payer: COMMERCIAL

## 2024-09-17 PROCEDURE — 93798 PHYS/QHP OP CAR RHAB W/ECG: CPT

## 2024-09-19 ENCOUNTER — HOSPITAL ENCOUNTER (OUTPATIENT)
Dept: CARDIAC REHAB | Facility: CLINIC | Age: 59
Discharge: HOME | End: 2024-09-19
Attending: INTERNAL MEDICINE
Payer: COMMERCIAL

## 2024-09-19 ENCOUNTER — TELEPHONE (OUTPATIENT)
Dept: CARDIOLOGY | Facility: CLINIC | Age: 59
End: 2024-09-19
Payer: COMMERCIAL

## 2024-09-19 DIAGNOSIS — I24.9 ACS (ACUTE CORONARY SYNDROME) (H): ICD-10-CM

## 2024-09-19 DIAGNOSIS — I21.4 NSTEMI (NON-ST ELEVATED MYOCARDIAL INFARCTION) (H): ICD-10-CM

## 2024-09-19 DIAGNOSIS — I25.10 CORONARY ARTERY DISEASE INVOLVING NATIVE CORONARY ARTERY OF NATIVE HEART WITHOUT ANGINA PECTORIS: Primary | ICD-10-CM

## 2024-09-19 DIAGNOSIS — I25.10 CAD (CORONARY ARTERY DISEASE): ICD-10-CM

## 2024-09-19 DIAGNOSIS — I25.5 ISCHEMIC CARDIOMYOPATHY: ICD-10-CM

## 2024-09-19 PROCEDURE — 93798 PHYS/QHP OP CAR RHAB W/ECG: CPT | Performed by: REHABILITATION PRACTITIONER

## 2024-09-19 RX ORDER — ASPIRIN 325 MG
325 TABLET ORAL ONCE
Status: CANCELLED | OUTPATIENT
Start: 2024-09-19 | End: 2024-09-19

## 2024-09-19 RX ORDER — POTASSIUM CHLORIDE 1500 MG/1
20 TABLET, EXTENDED RELEASE ORAL
Status: CANCELLED | OUTPATIENT
Start: 2024-09-19

## 2024-09-19 RX ORDER — LIDOCAINE 40 MG/G
CREAM TOPICAL
Status: CANCELLED | OUTPATIENT
Start: 2024-09-19

## 2024-09-19 RX ORDER — SODIUM CHLORIDE 9 MG/ML
INJECTION, SOLUTION INTRAVENOUS CONTINUOUS
Status: CANCELLED | OUTPATIENT
Start: 2024-09-19

## 2024-09-19 RX ORDER — ASPIRIN 81 MG/1
243 TABLET, CHEWABLE ORAL ONCE
Status: CANCELLED | OUTPATIENT
Start: 2024-09-19

## 2024-09-19 NOTE — TELEPHONE ENCOUNTER
Coronary angiogram/PCI/Right Heart Cath prep instructions.     Patient is scheduled for a Coronary Angio w/possible PCI at [facility] - [address] [place] [address] - Main Entrance of the Hospital on 9/23/24. Check in time is at 1000 and procedure to follow.    Patient instructed to remain NPO for solid foods 8 hours prior to arrival and may have clear liquids up to 2 hours prior to arrival.    Patient does not require extra fluids prior to procedure.    Patient is not diabetic.    Patient is not on anticoagulation.    Patient is not on diuretics.     Patient is taking ASA 81mg daily and will take 4 tabs (324mg) the morning of the procedure.    Pt is not on a SGLT2 inhibitor.    Pt is not on a GLP-1 Agonist    Patient advised to take their other daily medications the morning of the procedure with small sips of water.     Patient advised to shower the night before and morning of their procedure with regular soap.    Verified patient does not have a contrast allergy.    Verified patient has someone available to drive them home from the hospital and can stay with them for 24 hours after the procedure.     Patient advised they will have bedrest post procedure.  Length of time is 2-6 hours and dependent on access site used for procedure.  This bedrest is to allow proper clotting of the access site to prevent bleeding.    Patient advised to notify care team with any new COVID like symptoms prior to procedure. Day of procedure phone number: Bridget at [phone]    Patient is aware of visitor policy.    Patient expresses understanding of above instructions and denies further questions at this time.      Bud Verduzco RN  [facility]

## 2024-09-19 NOTE — TELEPHONE ENCOUNTER
----- Message from May Diamond sent at 9/4/2024  2:09 PM CDT -----  Regarding: Angio / 9/23/24 / Hay  Angiogram Orders    Location: [address] [place] [address] - Main Entrance of the [facility]    Procedure: Coronary Angiogram , Percutaneous Coronary Intervention     Diagnosis: Ischemic cardiomyopathy, Coronary artery disease involving native coronary artery of native heart without angina pectoris    Procedure Date: 09/23/2024    Patient Arrival Time: 10 am    Procedure Time: 1200 (pending emergency) [place]    Ordering Cardiologist: Dr. Yuri Cano ordered    Performing Cardiologist: Dr. Christy    Cardiac Assessment Completed: Yes  Date: 09/04/2024  Provider: Willa Cano    Pre-Procedure Labs completed: on admit    Post Procedure FEDERICO appointment scheduled: Yes  Date: 09/27/2024  Provider: Willa Cano    Patient Diabetic on Meds/Insulin:  No  If on Metformin/Synjardy, has 2 day post procedure BMP been scheduled: No  (Thursday and Friday procedures should have Monday BMP)  Patient on Coumadin/Warfarin:  No  Patient on Pradaxa/Xarelto/Eliquis:  No  Patient on Invokana/Farxiga/Inpefa/Jardiance/Steglatro/Synjardy:  No  Patient on Adlyxin/Bydureon/Byetta/Mounjaro/Ozempic/Rybelsus/Soliquo/Trulicity/Victoza/Wegovy/Zepbound: No    Does Patient have a history of bypass:  Yes    If yes, when was it done: April of 2008  If yes, where was it done:  [facility]    Pt advised to report any COVID like symptoms to care team prior to procedure.    Appointment was scheduled: Face to Face    Special instructions:

## 2024-09-20 ENCOUNTER — DOCUMENTATION ONLY (OUTPATIENT)
Dept: OTHER | Facility: CLINIC | Age: 59
End: 2024-09-20
Payer: COMMERCIAL

## 2024-09-23 ENCOUNTER — HOSPITAL ENCOUNTER (OUTPATIENT)
Facility: CLINIC | Age: 59
Discharge: HOME | End: 2024-09-23
Attending: INTERNAL MEDICINE | Admitting: INTERNAL MEDICINE
Payer: COMMERCIAL

## 2024-09-23 VITALS
SYSTOLIC BLOOD PRESSURE: 112 MMHG | HEART RATE: 61 BPM | RESPIRATION RATE: 12 BRPM | TEMPERATURE: 96.2 F | OXYGEN SATURATION: 99 % | DIASTOLIC BLOOD PRESSURE: 68 MMHG

## 2024-09-23 DIAGNOSIS — Z98.61 POSTSURGICAL PERCUTANEOUS TRANSLUMINAL CORONARY ANGIOPLASTY STATUS: Primary | ICD-10-CM

## 2024-09-23 DIAGNOSIS — I25.10 CORONARY ARTERY DISEASE INVOLVING NATIVE CORONARY ARTERY OF NATIVE HEART WITHOUT ANGINA PECTORIS: ICD-10-CM

## 2024-09-23 DIAGNOSIS — I21.4 NSTEMI (NON-ST ELEVATED MYOCARDIAL INFARCTION) (H): ICD-10-CM

## 2024-09-23 DIAGNOSIS — I25.10 CAD (CORONARY ARTERY DISEASE): ICD-10-CM

## 2024-09-23 DIAGNOSIS — I25.5 ISCHEMIC CARDIOMYOPATHY: ICD-10-CM

## 2024-09-23 DIAGNOSIS — I24.9 ACS (ACUTE CORONARY SYNDROME) (H): ICD-10-CM

## 2024-09-23 PROBLEM — Z98.890 STATUS POST CORONARY ANGIOGRAM: Status: ACTIVE | Noted: 2024-09-23

## 2024-09-23 LAB
ACT BLD: 354 SECONDS (ref 74–150)
ACT BLD: 383 SECONDS (ref 74–150)
ANION GAP SERPL CALCULATED.3IONS-SCNC: 8 MMOL/L (ref 7–15)
APTT PPP: 33 SECONDS (ref 22–38)
ATRIAL RATE - MUSE: 65 BPM
BUN SERPL-MCNC: 10.9 MG/DL (ref 8–23)
CALCIUM SERPL-MCNC: 9.5 MG/DL (ref 8.8–10.4)
CHLORIDE SERPL-SCNC: 106 MMOL/L (ref 98–107)
CHOLEST SERPL-MCNC: 105 MG/DL
CREAT SERPL-MCNC: 0.99 MG/DL (ref 0.67–1.17)
DIASTOLIC BLOOD PRESSURE - MUSE: NORMAL MMHG
EGFRCR SERPLBLD CKD-EPI 2021: 88 ML/MIN/1.73M2
ERYTHROCYTE [DISTWIDTH] IN BLOOD BY AUTOMATED COUNT: 12.1 % (ref 10–15)
GLUCOSE SERPL-MCNC: 113 MG/DL (ref 70–99)
HCO3 SERPL-SCNC: 25 MMOL/L (ref 22–29)
HCT VFR BLD AUTO: 40.2 % (ref 40–53)
HDLC SERPL-MCNC: 30 MG/DL
HGB BLD-MCNC: 13.3 G/DL (ref 13.3–17.7)
INR PPP: 1 (ref 0.85–1.15)
INTERPRETATION ECG - MUSE: NORMAL
LDLC SERPL CALC-MCNC: 51 MG/DL
MCH RBC QN AUTO: 30.2 PG (ref 26.5–33)
MCHC RBC AUTO-ENTMCNC: 33.1 G/DL (ref 31.5–36.5)
MCV RBC AUTO: 91 FL (ref 78–100)
NONHDLC SERPL-MCNC: 75 MG/DL
P AXIS - MUSE: 29 DEGREES
PLATELET # BLD AUTO: 159 10E3/UL (ref 150–450)
POTASSIUM SERPL-SCNC: 4.8 MMOL/L (ref 3.4–5.3)
PR INTERVAL - MUSE: 176 MS
QRS DURATION - MUSE: 82 MS
QT - MUSE: 412 MS
QTC - MUSE: 428 MS
R AXIS - MUSE: 2 DEGREES
RBC # BLD AUTO: 4.4 10E6/UL (ref 4.4–5.9)
SODIUM SERPL-SCNC: 139 MMOL/L (ref 135–145)
SYSTOLIC BLOOD PRESSURE - MUSE: NORMAL MMHG
T AXIS - MUSE: -37 DEGREES
TRIGL SERPL-MCNC: 119 MG/DL
VENTRICULAR RATE- MUSE: 65 BPM
WBC # BLD AUTO: 6.7 10E3/UL (ref 4–11)

## 2024-09-23 PROCEDURE — C1874 STENT, COATED/COV W/DEL SYS: HCPCS | Performed by: INTERNAL MEDICINE

## 2024-09-23 PROCEDURE — 85014 HEMATOCRIT: CPT | Performed by: INTERNAL MEDICINE

## 2024-09-23 PROCEDURE — 250N000011 HC RX IP 250 OP 636: Performed by: INTERNAL MEDICINE

## 2024-09-23 PROCEDURE — 85730 THROMBOPLASTIN TIME PARTIAL: CPT | Performed by: INTERNAL MEDICINE

## 2024-09-23 PROCEDURE — 92928 PRQ TCAT PLMT NTRAC ST 1 LES: CPT | Mod: RI | Performed by: INTERNAL MEDICINE

## 2024-09-23 PROCEDURE — 85610 PROTHROMBIN TIME: CPT | Performed by: INTERNAL MEDICINE

## 2024-09-23 PROCEDURE — 258N000003 HC RX IP 258 OP 636: Performed by: INTERNAL MEDICINE

## 2024-09-23 PROCEDURE — C1725 CATH, TRANSLUMIN NON-LASER: HCPCS | Performed by: INTERNAL MEDICINE

## 2024-09-23 PROCEDURE — 36415 COLL VENOUS BLD VENIPUNCTURE: CPT | Performed by: INTERNAL MEDICINE

## 2024-09-23 PROCEDURE — C1753 CATH, INTRAVAS ULTRASOUND: HCPCS | Performed by: INTERNAL MEDICINE

## 2024-09-23 PROCEDURE — C1887 CATHETER, GUIDING: HCPCS | Performed by: INTERNAL MEDICINE

## 2024-09-23 PROCEDURE — 93454 CORONARY ARTERY ANGIO S&I: CPT | Performed by: INTERNAL MEDICINE

## 2024-09-23 PROCEDURE — C9600 PERC DRUG-EL COR STENT SING: HCPCS | Performed by: INTERNAL MEDICINE

## 2024-09-23 PROCEDURE — C1769 GUIDE WIRE: HCPCS | Performed by: INTERNAL MEDICINE

## 2024-09-23 PROCEDURE — 99152 MOD SED SAME PHYS/QHP 5/>YRS: CPT | Performed by: INTERNAL MEDICINE

## 2024-09-23 PROCEDURE — 80048 BASIC METABOLIC PNL TOTAL CA: CPT | Performed by: INTERNAL MEDICINE

## 2024-09-23 PROCEDURE — 99153 MOD SED SAME PHYS/QHP EA: CPT | Performed by: INTERNAL MEDICINE

## 2024-09-23 PROCEDURE — 272N000001 HC OR GENERAL SUPPLY STERILE: Performed by: INTERNAL MEDICINE

## 2024-09-23 PROCEDURE — 85347 COAGULATION TIME ACTIVATED: CPT

## 2024-09-23 PROCEDURE — 80061 LIPID PANEL: CPT | Performed by: INTERNAL MEDICINE

## 2024-09-23 PROCEDURE — 92978 ENDOLUMINL IVUS OCT C 1ST: CPT | Performed by: INTERNAL MEDICINE

## 2024-09-23 PROCEDURE — C1760 CLOSURE DEV, VASC: HCPCS | Performed by: INTERNAL MEDICINE

## 2024-09-23 PROCEDURE — 250N000009 HC RX 250: Performed by: INTERNAL MEDICINE

## 2024-09-23 PROCEDURE — 92978 ENDOLUMINL IVUS OCT C 1ST: CPT | Mod: 26 | Performed by: INTERNAL MEDICINE

## 2024-09-23 DEVICE — CLOSURE ANGIOSEAL 6FR 610130: Type: IMPLANTABLE DEVICE | Status: FUNCTIONAL

## 2024-09-23 DEVICE — STENT CORONARY DES SYNERGY XD MR US 2.75X28MM H7493941828270: Type: IMPLANTABLE DEVICE | Status: FUNCTIONAL

## 2024-09-23 RX ORDER — IOPAMIDOL 755 MG/ML
INJECTION, SOLUTION INTRAVASCULAR
Status: DISCONTINUED | OUTPATIENT
Start: 2024-09-23 | End: 2024-09-23 | Stop reason: HOSPADM

## 2024-09-23 RX ORDER — NALOXONE HYDROCHLORIDE 0.4 MG/ML
0.4 INJECTION, SOLUTION INTRAMUSCULAR; INTRAVENOUS; SUBCUTANEOUS
Status: DISCONTINUED | OUTPATIENT
Start: 2024-09-23 | End: 2024-09-23 | Stop reason: HOSPADM

## 2024-09-23 RX ORDER — NITROGLYCERIN 5 MG/ML
VIAL (ML) INTRAVENOUS
Status: DISCONTINUED | OUTPATIENT
Start: 2024-09-23 | End: 2024-09-23 | Stop reason: HOSPADM

## 2024-09-23 RX ORDER — NALOXONE HYDROCHLORIDE 0.4 MG/ML
0.2 INJECTION, SOLUTION INTRAMUSCULAR; INTRAVENOUS; SUBCUTANEOUS
Status: DISCONTINUED | OUTPATIENT
Start: 2024-09-23 | End: 2024-09-23 | Stop reason: HOSPADM

## 2024-09-23 RX ORDER — ATROPINE SULFATE 0.1 MG/ML
0.5 INJECTION INTRAVENOUS
Status: DISCONTINUED | OUTPATIENT
Start: 2024-09-23 | End: 2024-09-23 | Stop reason: HOSPADM

## 2024-09-23 RX ORDER — ONDANSETRON 4 MG/1
4 TABLET, ORALLY DISINTEGRATING ORAL EVERY 6 HOURS PRN
Status: DISCONTINUED | OUTPATIENT
Start: 2024-09-23 | End: 2024-09-23 | Stop reason: HOSPADM

## 2024-09-23 RX ORDER — OXYCODONE HYDROCHLORIDE 5 MG/1
5 TABLET ORAL EVERY 4 HOURS PRN
Status: DISCONTINUED | OUTPATIENT
Start: 2024-09-23 | End: 2024-09-23 | Stop reason: HOSPADM

## 2024-09-23 RX ORDER — OXYCODONE HYDROCHLORIDE 10 MG/1
10 TABLET ORAL EVERY 4 HOURS PRN
Status: DISCONTINUED | OUTPATIENT
Start: 2024-09-23 | End: 2024-09-23 | Stop reason: HOSPADM

## 2024-09-23 RX ORDER — ASPIRIN 325 MG
325 TABLET ORAL ONCE
Status: COMPLETED | OUTPATIENT
Start: 2024-09-23 | End: 2024-09-23

## 2024-09-23 RX ORDER — ASPIRIN 81 MG/1
243 TABLET, CHEWABLE ORAL ONCE
Status: COMPLETED | OUTPATIENT
Start: 2024-09-23 | End: 2024-09-23

## 2024-09-23 RX ORDER — METOPROLOL TARTRATE 1 MG/ML
5 INJECTION, SOLUTION INTRAVENOUS
Status: DISCONTINUED | OUTPATIENT
Start: 2024-09-23 | End: 2024-09-23 | Stop reason: HOSPADM

## 2024-09-23 RX ORDER — SODIUM CHLORIDE 9 MG/ML
INJECTION, SOLUTION INTRAVENOUS CONTINUOUS
Status: DISCONTINUED | OUTPATIENT
Start: 2024-09-23 | End: 2024-09-23 | Stop reason: HOSPADM

## 2024-09-23 RX ORDER — POTASSIUM CHLORIDE 1500 MG/1
20 TABLET, EXTENDED RELEASE ORAL
Status: DISCONTINUED | OUTPATIENT
Start: 2024-09-23 | End: 2024-09-23 | Stop reason: HOSPADM

## 2024-09-23 RX ORDER — HYDRALAZINE HYDROCHLORIDE 20 MG/ML
10 INJECTION INTRAMUSCULAR; INTRAVENOUS EVERY 4 HOURS PRN
Status: DISCONTINUED | OUTPATIENT
Start: 2024-09-23 | End: 2024-09-23 | Stop reason: HOSPADM

## 2024-09-23 RX ORDER — LIDOCAINE 40 MG/G
CREAM TOPICAL
Status: DISCONTINUED | OUTPATIENT
Start: 2024-09-23 | End: 2024-09-23 | Stop reason: HOSPADM

## 2024-09-23 RX ORDER — HEPARIN SODIUM 1000 [USP'U]/ML
INJECTION, SOLUTION INTRAVENOUS; SUBCUTANEOUS
Status: DISCONTINUED | OUTPATIENT
Start: 2024-09-23 | End: 2024-09-23 | Stop reason: HOSPADM

## 2024-09-23 RX ORDER — FENTANYL CITRATE 50 UG/ML
25 INJECTION, SOLUTION INTRAMUSCULAR; INTRAVENOUS
Status: DISCONTINUED | OUTPATIENT
Start: 2024-09-23 | End: 2024-09-23 | Stop reason: HOSPADM

## 2024-09-23 RX ORDER — FLUMAZENIL 0.1 MG/ML
0.2 INJECTION, SOLUTION INTRAVENOUS
Status: DISCONTINUED | OUTPATIENT
Start: 2024-09-23 | End: 2024-09-23 | Stop reason: HOSPADM

## 2024-09-23 RX ORDER — FENTANYL CITRATE 50 UG/ML
INJECTION, SOLUTION INTRAMUSCULAR; INTRAVENOUS
Status: DISCONTINUED | OUTPATIENT
Start: 2024-09-23 | End: 2024-09-23 | Stop reason: HOSPADM

## 2024-09-23 RX ORDER — ASPIRIN 81 MG/1
81 TABLET ORAL DAILY
Status: DISCONTINUED | OUTPATIENT
Start: 2024-09-24 | End: 2024-09-23 | Stop reason: HOSPADM

## 2024-09-23 RX ORDER — ASPIRIN 81 MG/1
81 TABLET, CHEWABLE ORAL ONCE
Status: COMPLETED | OUTPATIENT
Start: 2024-09-23 | End: 2024-09-23

## 2024-09-23 RX ORDER — ONDANSETRON 2 MG/ML
4 INJECTION INTRAMUSCULAR; INTRAVENOUS EVERY 6 HOURS PRN
Status: DISCONTINUED | OUTPATIENT
Start: 2024-09-23 | End: 2024-09-23 | Stop reason: HOSPADM

## 2024-09-23 RX ORDER — NITROGLYCERIN 0.4 MG/1
0.4 TABLET SUBLINGUAL EVERY 5 MIN PRN
Status: DISCONTINUED | OUTPATIENT
Start: 2024-09-23 | End: 2024-09-23 | Stop reason: HOSPADM

## 2024-09-23 RX ADMIN — SODIUM CHLORIDE: 9 INJECTION, SOLUTION INTRAVENOUS at 10:18

## 2024-09-23 ASSESSMENT — ACTIVITIES OF DAILY LIVING (ADL)
ADLS_ACUITY_SCORE: 38

## 2024-09-23 NOTE — DISCHARGE INSTRUCTIONS
Coronary Angioplasty: What to Expect at Home  Your Recovery     Coronary angioplasty is a procedure that is used to open a narrowed or blocked coronary artery. It may also be called a percutaneous coronary intervention (PCI). The doctor opened your narrowed or blocked artery by putting a thin tube, called a catheter, into your heart through a blood vessel. The catheter was inserted into the blood vessel in your groin or wrist. The doctor may have placed a small tube, called a stent, in the artery.  Your groin or wrist may have a bruise and feel sore for a few days after the procedure. You can do light activities around the house. But don't do anything strenuous until your doctor says it is okay. This may be for several days.  This care sheet gives you a general idea about how long it will take for you to recover. But each person recovers at a different pace. Follow the steps below to get better as quickly as possible.  How can you care for yourself at home?  Activity    If the doctor gave you a sedative:  For 24 hours, don't do anything that requires attention to detail, such as going to work, making important decisions, or signing any legal documents. It takes time for the medicine's effects to completely wear off.  For your safety, do not drive or operate any machinery that could be dangerous. Wait until the medicine wears off and you can think clearly and react easily.     Do not do strenuous exercise and do not lift, pull, or push anything heavy until your doctor says it is okay. This may be for several days. You can walk around the house and do light activity, such as cooking.     If the catheter was placed in your groin, try not to walk up stairs for the first couple of days.     If the catheter was placed in your arm near your wrist, do not bend your wrist deeply for the first couple of days. Be careful using your hand to get into and out of a chair or bed.     Carry your stent identification card with you at  all times.     If your doctor recommends it, get more exercise. Walking is a good choice. Bit by bit, increase the amount you walk every day. Try for at least 30 minutes on most days of the week.     If you haven't been set up with a cardiac rehab program, talk to your doctor about whether rehab is right for you. Cardiac rehab includes supervised exercise. It also includes help with diet and lifestyle changes and emotional support.   Diet    Drink plenty of fluids to help your body flush out the dye. If you have kidney, heart, or liver disease and have to limit fluids, talk with your doctor before you increase the amount of fluids you drink.     Keep eating a heart-healthy diet that has lots of fruits, vegetables, and whole grains. If you have not been eating this way, talk to your doctor. You also may want to talk to a dietitian. This expert can help you to learn about healthy foods and plan meals.   Medicines    Your doctor will tell you if and when you can restart your medicines. Your doctor will also give you instructions about taking any new medicines.     If you stopped taking aspirin or some other blood thinner, your doctor will tell you when to start taking it again.     You will take medicine that prevents blood clots. You may take aspirin plus another antiplatelet. It is very important that you take these medicines exactly as directed. These medicines help keep the coronary artery open and reduce your risk of a heart attack.     Call your doctor if you think you are having a problem with your medicine.   Care of the catheter site    For 1 or 2 days, keep a bandage over the spot where the catheter was inserted. The bandage probably will fall off in this time.     Put ice or a cold pack on the area for 10 to 20 minutes at a time to help with soreness or swelling. Put a thin cloth between the ice and your skin.     You may shower 24 to 48 hours after the procedure, if your doctor okays it. Pat the incision  dry.     Do not soak the catheter site until it is healed. Don't take a bath for 1 week, or until your doctor tells you it is okay.     Watch for bleeding from the site. A small amount of blood (up to the size of a quarter) on the bandage can be normal.     If you are bleeding, lie down and press on the area for 15 minutes to try to make it stop. If the bleeding does not stop, call your doctor or seek immediate medical care.   Follow-up care is a key part of your treatment and safety. Be sure to make and go to all appointments, and call your doctor if you are having problems. It's also a good idea to know your test results and keep a list of the medicines you take.  When should you call for help?   Call 911 anytime you think you may need emergency care. For example, call if:    You passed out (lost consciousness).     You have severe trouble breathing.     You have sudden chest pain and shortness of breath, or you cough up blood.     You have symptoms of a heart attack, such as:  Chest pain or pressure.  Sweating.  Shortness of breath.  Nausea or vomiting.  Pain that spreads from the chest to the neck, jaw, or one or both shoulders or arms.  Dizziness or lightheadedness.  A fast or uneven pulse.  After calling 911, chew 1 adult-strength aspirin. Wait for an ambulance. Do not try to drive yourself.     You have been diagnosed with angina, and you have angina symptoms that do not go away with rest or are not getting better within 5 minutes after you take one dose of nitroglycerin.   Call your doctor now or seek immediate medical care if:    You are bleeding from the area where the catheter was put in your artery.     You have a fast-growing, painful lump at the catheter site.     You have signs of infection, such as:  Increased pain, swelling, warmth, or redness.  Red streaks leading from the catheter site.  Pus draining from the catheter site.  A fever.     Your leg or hand is painful, looks blue, or feels cold,  "numb, or tingly.   Watch closely for changes in your health, and be sure to contact your doctor if you have any problems.  Where can you learn more?  Go to https://www.[employer]net/patiented  Enter Q672 in the search box to learn more about \"Coronary Angioplasty: What to Expect at Home.\"  Current as of: June 24, 2023               Content Version: 14.0    6993-9829 [employer].   Care instructions adapted under license by your healthcare professional. If you have questions about a medical condition or this instruction, always ask your healthcare professional. [employer] disclaims any warranty or liability for your use of this information.      "

## 2024-09-23 NOTE — PRE-PROCEDURE
GENERAL PRE-PROCEDURE:   Procedure:  Heart catheterization possible intervention  Date/Time:  9/23/2024 12:19 PM    Written consent obtained?: Yes    Risks and benefits: Risks, benefits and alternatives were discussed    Consent given by:  Patient  Patient states understanding of procedure being performed: Yes    Patient's understanding of procedure matches consent: Yes    Procedure consent matches procedure scheduled: Yes    Expected level of sedation:  Moderate  Appropriately NPO:  Yes  Lungs:  Lungs clear with good breath sounds bilaterally  Heart:  Normal heart sounds and rate  History & Physical reviewed:  History and physical reviewed and no updates needed  Statement of review:  I have reviewed the lab findings, diagnostic data, medications, and the plan for sedation  Risk benefit indication for cath poss interv discussed with pt  Mi cva death vasc or renal damage emergent surgery  He is aware since recent pci  All questions answered and he wishes to proceed

## 2024-09-23 NOTE — Clinical Note
The first balloon was inserted into the ramus.Max pressure = 14 chavez. Total duration = 30 seconds.

## 2024-09-23 NOTE — Clinical Note
The first balloon was inserted into the ramus.Max pressure = 9 chavez. Total duration = 40 seconds.     Max pressure = 9 chavez. Total duration = 40 seconds.    Balloon reinflated a second time: Max pressure = 9 chavez. Total duration = 40 seconds.  Balloon reinflated a third time: Max pressure = 9 chavez. Total duration = 30 seconds.

## 2024-09-23 NOTE — PROGRESS NOTES
Coronary angiogram performed without complication.  Pt tolerated procedure well.  Angioseal deployed at right femoral access site.  Post procedure recovery without complication.  Discharge instructions reviewed with the pt.  Pt discharged home.

## 2024-09-23 NOTE — Clinical Note
The first balloon was inserted into the ramus.Max pressure = 16 chavez. Total duration = 30 seconds.     Max pressure = 18 chavez. Total duration = 30 seconds.    Balloon reinflated a second time: Max pressure = 18 chavez. Total duration = 30 seconds.  Balloon reinflated a third time: Max pressure = 18 chavez. Total duration = 30 seconds.  Balloon reinflated a fourth time: Max pressure = 18 chavez. Total duration = 15 seconds.

## 2024-09-24 ENCOUNTER — TELEPHONE (OUTPATIENT)
Dept: CARDIOLOGY | Facility: CLINIC | Age: 59
End: 2024-09-24
Payer: COMMERCIAL

## 2024-09-24 LAB
ATRIAL RATE - MUSE: 66 BPM
DIASTOLIC BLOOD PRESSURE - MUSE: NORMAL MMHG
INTERPRETATION ECG - MUSE: NORMAL
P AXIS - MUSE: 26 DEGREES
PR INTERVAL - MUSE: 186 MS
QRS DURATION - MUSE: 82 MS
QT - MUSE: 424 MS
QTC - MUSE: 444 MS
R AXIS - MUSE: -6 DEGREES
SYSTOLIC BLOOD PRESSURE - MUSE: NORMAL MMHG
T AXIS - MUSE: -39 DEGREES
VENTRICULAR RATE- MUSE: 66 BPM

## 2024-09-24 NOTE — TELEPHONE ENCOUNTER
"Patient was admitted to [facility] on 9/23/24 with remote CABG. See full cath 8/2024. Pt then presented with 100% thrombotic occlusion of VG to RCA which was treated with thrombectomy and stenting (also known limb of Y graft occluded and native Lcx with \"double\" . Pt returns for elective PCI of Ramus branch today.     9/23/24: Coronary angiogram via RFA:      Mid Cx lesion is 100% stenosed.    Ramus lesion is 80% stenosed.    IVUS was performed on the lesion.    IVUS was performed on the lesion.    Ultrasound (IVUS) was performed.    Ultrasound (IVUS) was performed.     Previous PCI/stent of VG to seq R PDA/R PL is widely patent (cath 8/2024).  PCI/stent of Ramus branch IVUS guided today taking 80% lesion to 0%.    Pt was continued on PTA ASA, Brilinta and NTG.    Called patient to discuss any post hospital d/c questions he may have, review medication changes, and confirm f/u appts. Patient denied any questions regarding new medications or changes with their PTA medications. Reminded to continuing taking Brilinta every 12 hrs without interruption.    Patient denied any SOB, chest pain or lightheadedness.     RFA cardiac cath site is without bleeding, swelling, redness or signs of infection.     RN confirmed with patient that he is scheduled on 9/27/24 for an OV at 0925 with JUSTIN Willa Cano at our [place] Office.    Continued cardiac rehab as PTA.    Patient advised to call clinic with any cardiac related questions or concerns prior to this justin't. Patient verbalized understanding and agreed with plan. AKANKSHA Silva RN.               "

## 2024-09-27 ENCOUNTER — OFFICE VISIT (OUTPATIENT)
Dept: CARDIOLOGY | Facility: CLINIC | Age: 59
End: 2024-09-27
Payer: COMMERCIAL

## 2024-09-27 VITALS
HEART RATE: 83 BPM | WEIGHT: 205.2 LBS | OXYGEN SATURATION: 98 % | BODY MASS INDEX: 31.1 KG/M2 | SYSTOLIC BLOOD PRESSURE: 112 MMHG | DIASTOLIC BLOOD PRESSURE: 70 MMHG | HEIGHT: 68 IN

## 2024-09-27 DIAGNOSIS — I25.10 CORONARY ARTERY DISEASE INVOLVING NATIVE CORONARY ARTERY OF NATIVE HEART WITHOUT ANGINA PECTORIS: ICD-10-CM

## 2024-09-27 PROCEDURE — 99214 OFFICE O/P EST MOD 30 MIN: CPT | Performed by: PHYSICIAN ASSISTANT

## 2024-09-27 PROCEDURE — G2211 COMPLEX E/M VISIT ADD ON: HCPCS | Performed by: PHYSICIAN ASSISTANT

## 2024-09-27 NOTE — PROGRESS NOTES
CARDIOLOGY CLINIC PROGRESS NOTE    DOS: 2024      Mian Lozano  : 1965, 59 year old  MRN: [id number]      Primary cardiologist: Dr. Welch       History:  Mian Lozano is a 59 year old male with history of CAD s/p CABG (CABG--Lima to Lad, Y graft to Diag, OM, Seq VG to R-PDA and PL) in . He also has history of postthoracotomy chest pain.     He was recently admitted with chest pain and NSTEMI. Upon presentation he was noted to have some subtle ST elevation in inferior leads which resolved and he also had ST depression in lateral leads.  He was started on heparin and nitroglycerin. Troponin blanco from 203 initially to 1661.   Echo shwoed EF 40-45% with WMAs.   Cardiac cath done and he underwent PETER to VG-RPDA.  Please see cath report for details.  He also had a 75% ramus lesion.    EKG post cath showed more pronounced ST changes.      Cardiac cath 24: Previous PCI/stent of VG to seq R PDA/R PL is widely patent.  PCI/stent of Ramus branch IVUS guided taking 80% lesion to 0%.     He presents today for follow up.  No chest pain.  Taking meds and tolerating.  Some LH with quick movements, sitting to standing.  No SOB.   No palpitations.   No edema.   No bleeding concerns.   He had a lot of tenderness at his groin site.   Cardiac rehab.         ROS:  Skin:        Eyes:       ENT:       Respiratory:  Negative    Cardiovascular:    Positive for;lightheadedness  Gastroenterology:      Genitourinary:       Musculoskeletal:       Neurologic:       Psychiatric:       Heme/Lymph/Imm:       Endocrine:         PAST MEDICAL HISTORY:  Past Medical History:   Diagnosis Date    2019 novel coronavirus disease (COVID-19) 2021    Anxiety     Sullivan's esophagus 2011    Coronary artery disease 2008    cabg x 5    Environmental allergies     seasonal, hay fever    FAM HX-CARDIOVAS DIS NEC     dad at 45, cabg, stent    Family history of prostate cancer     Fatty liver     Gallstone     GERD  (gastroesophageal reflux disease) 03/2011    History of blood transfusion     Hyperlipidemia LDL goal <70 2006    Hypertension goal BP (blood pressure) < 140/90 04/2008    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    Dr Saba, Hx histoplasmosis, treated    Mild intermittent asthma 07/2003    Nephrolithiasis 6/13, 7/16    calcium oxalate - 6/2013, 7/2016, 1/2021    Other atopic dermatitis and related conditions 1980    Other diseases of lung, not elsewhere classified 04/2008    dr steel - benign bx and ct - granuloma - no further w/u needed    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    dr hoover    Prediabetes 08/2008    Psoriasis     Dr Mccall    Psoriatic arthritis (H)     Dr Mccall    Pulmonary nodules 2009    stable in 2012    Seasonal allergies 1980    Allergic rhinitis Hayfever       PAST SURGICAL HISTORY:  Past Surgical History:   Procedure Laterality Date    COLONOSCOPY N/A 08/07/2015    normal - due 10 yrs    CV ANGIOGRAM CORONARY GRAFT N/A 8/27/2024    Procedure: Coronary Angiogram Graft;  Surgeon: Matteo Christy MD;  Location:  [facility]ART CARDI[facility] CATH LAB    CV CORONARY ANGIOGRAM N/A 8/27/2024    Procedure: Coronary Angiogram;  Surgeon: Matteo Christy MD;  Location: [facility] CARDIAC CATH LAB    CV CORONARY ANGIOGRAM N/A 9/23/2024    Procedure: Coronary Angiogram;  Surgeon: Matteo Christy MD;  Location:  HE[facility] CARDIAC CATH LAB    CV INTRAVASULAR ULTRASOUND N/A 8/27/2024    Procedure: Intravascular Ultrasound;  Surgeon: Matteo Christy MD;  Location:  HEART CARDIAC CATH LAB    CV INTRAVASULAR ULTRASOUND N/A 9/23/2024    Procedure: Intravascular Ultrasound;  Surgeon: Matteo Christy MD;  Location: [facility] CARDIAC CATH LAB    CV PCI N/A 8/27/2024    Procedure: Percutaneous Coronary Intervention;  Surgeon: Matteo Christy MD;  Location:  HEART CARDIAC CATH LAB    CV PCI N/A 9/23/2024    Procedure: Percutaneous Coronary Intervention;  Surgeon: Matteo Christy MD;  Location: [facility] LAB     CV PCI ASPIRATION THROMECTOMY N/A 8/27/2024    Procedure: Percutaneous Coronary Intervention Aspiration Thrombectomy;  Surgeon: Matteo Christy MD;  Location:  HEART CARDIAC CATH LAB    ESOPHAGOSCOPY, GASTROSCOPY, DUODENOSCOPY (EGD), COMBINED  6/11, 5/13    [facility] - ? gastritis, fowler's - due 3 yr    ESOPHAGOSCOPY, GASTROSCOPY, DUODENOSCOPY (EGD), COMBINED  05/10/2013    COMBINED ESOPHAGOSCOPY, GASTROSCOPY, DUODENOSCOPY (EGD), BIOPSY SINGLE OR MULTIPLE;  ESOPHAGOSCOPY, GASTROSCOPY, DUODENOSCOPY (EGD)  with biopsy;  Surgeon: Angus Reynolds MD;  Location:  GI    ESOPHAGOSCOPY, GASTROSCOPY, DUODENOSCOPY (EGD), COMBINED N/A 07/28/2017    Fowler's - recheck 3 yrs    HC VASECTOMY UNILAT/BILAT W POSTOP SEMEN  1995    Vasectomy    HERNIORRHAPHY UMBILICAL N/A 11/13/2014    Dr Barron    SURGICAL HISTORY OF -  Left 1980    lt patella fx    SURGICAL HISTORY OF -  Left 1985    lt thumb neuroma excised    SURGICAL HISTORY OF -   11/2009    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    ZZC CABG, ARTERY-VEIN, FIVE  04/2008    FSD - lung bx benign    ZZC STRESS TEST - REGULAR ([place])  9/06, 4/11    negative stress thallium - [facility]       SOCIAL HISTORY:  Social History     Socioeconomic History    Marital status:      Spouse name: Imelda    Number of children: 0    Years of education: 14   Occupational History     Comment: [employer]   Tobacco Use    Smoking status: Former     Types: Cigars    Smokeless tobacco: Never    Tobacco comments:     In past rare cigar, last 2005   Vaping Use    Vaping status: Never Used   Substance and Sexual Activity    Alcohol use: Yes     Types: 6 Standard drinks or equivalent per week     Comment: 6 drinks per week    Drug use: No    Sexual activity: Yes     Partners: Female     Birth control/protection: Male Surgical   Other Topics Concern    Caffeine Concern Yes     Comment: 1 can qd    Exercise Yes     Comment: occas    Seat Belt Yes    Parent/sibling w/ CABG, MI or  angioplasty before 65F 55M? Yes     Social Determinants of Health     Financial Resource Strain: Low Risk  (8/27/2024)    Financial Resource Strain     Within the past 12 months, have you or your family members you live with been unable to get utilities (heat, electricity) when it was really needed?: No   Recent Concern: Financial Resource Strain - High Risk (6/24/2024)    Financial Resource Strain     Within the past 12 months, have you or your family members you live with been unable to get utilities (heat, electricity) when it was really needed?: Yes   Food Insecurity: Low Risk  (8/27/2024)    Food Insecurity     Within the past 12 months, did you worry that your food would run out before you got money to buy more?: No     Within the past 12 months, did the food you bought just not last and you didn t have money to get more?: No   Transportation Needs: Low Risk  (8/27/2024)    Transportation Needs     Within the past 12 months, has lack of transportation kept you from medical appointments, getting your medicines, non-medical meetings or appointments, work, or from getting things that you need?: No   Physical Activity: Insufficiently Active (6/24/2024)    Exercise Vital Sign     Days of Exercise per Week: 1 day     Minutes of Exercise per Session: 20 min   Stress: No Stress Concern Present (6/24/2024)    [place] [employer] - Occupational Stress Questionnaire     Feeling of Stress : Only a little   Social Connections: Unknown (6/24/2024)    Social Connection and Isolation Panel [NHANES]     Frequency of Social Gatherings with Friends and Family: Once a week   Interpersonal Safety: Low Risk  (8/27/2024)    Interpersonal Safety     Do you feel physically and emotionally safe where you currently live?: Yes     Within the past 12 months, have you been hit, slapped, kicked or otherwise physically hurt by someone?: No     Within the past 12 months, have you been humiliated or emotionally abused in  other ways by your partner or ex-partner?: No   Housing Stability: Low Risk  (8/27/2024)    Housing Stability     Do you have housing? : Yes     Are you worried about losing your housing?: No       FAMILY HISTORY:  Family History   Problem Relation Age of Onset    Prostate Cancer Father 69    Coronary Artery Disease Father 38    Hypertension Father     Neurologic Disorder Sister         CVA/aneurysm at age 19, decreased memory, partial paralysis    Hypertension Paternal Grandfather     C.A.D. Paternal Grandfather     Heart Failure Paternal Grandfather     Colon Cancer No family hx of        MEDS:   Current Outpatient Medications   Medication Sig Dispense Refill    acetaminophen (TYLENOL) 325 MG tablet Take 2 tablets (650 mg) by mouth every 4 hours as needed for mild pain or other (and adjunct with moderate or severe pain or per patient request).      albuterol (PROAIR HFA) 108 (90 Base) MCG/ACT inhaler Inhale 2 puffs into the lungs every 4 hours as needed for shortness of breath or wheezing 18 g 3    aspirin 81 MG EC tablet Take 1 tablet (81 mg) by mouth daily. Start tomorrow. Do not start before August 28, 2024. 30 tablet 3    atorvastatin (LIPITOR) 80 MG tablet Take 1 tablet (80 mg) by mouth daily 90 tablet 3    azelastine (ASTELIN) 0.1 % nasal spray Spray 2 sprays into both nostrils 2 times daily 90 mL 3    carvedilol (COREG) 6.25 MG tablet Take 1 tablet (6.25 mg) by mouth 2 times daily (with meals) 180 tablet 3    cetirizine (ZYRTEC) 10 MG tablet Take 1 tablet (10 mg) by mouth every evening 90 tablet 3    citalopram (CELEXA) 20 MG tablet Take 1 tablet (20 mg) by mouth daily 90 tablet 3    clobetasol propionate (TEMOVATE) 0.05 % external cream Apply topically 2 times daily (Patient taking differently: Apply topically 2 times daily. PRN) 30 g 3    etanercept (ENBREL) 50 MG/ML injection Inject 0.98 mLs (50 mg) Subcutaneous once a week      fluticasone (FLONASE) 50 MCG/ACT nasal spray Spray 2 sprays into both  "nostrils daily (Patient taking differently: Spray 2 sprays into both nostrils daily. PRN) 48 g 3    LANsoprazole (PREVACID) 30 MG DR capsule TAKE ONE CAPSULE BY MOUTH DAILY. TAKE 30 TO 60 MINUTES BEFORE A MEAL. 90 capsule 3    lisinopril (ZESTRIL) 2.5 MG tablet Take 1 tablet (2.5 mg) by mouth daily. 90 tablet 3    nitroGLYcerin (NITROSTAT) 0.4 MG sublingual tablet For chest pain place 1 tablet under the tongue every 5 minutes for 3 doses. If symptoms persist 5 minutes after 2nd dose call 911. 30 tablet 0    ticagrelor (BRILINTA) 90 MG tablet Take 1 tablet (90 mg) by mouth 2 times daily. Dose to start this evening. 180 tablet 3     No current facility-administered medications for this visit.       ALLERGIES:   Allergies   Allergen Reactions    Seasonal Allergies        PHYSICAL EXAM:  Vitals: /70 (BP Location: Right arm, Patient Position: Sitting, Cuff Size: Adult Large)   Pulse 83   Ht 1.727 m (5' 8\")   Wt 93.1 kg (205 lb 3.2 oz)   SpO2 98%   BMI 31.20 kg/m    Constitutional:  cooperative, alert and oriented, well developed, well nourished, in no acute distress        Skin:  warm and dry to the touch, no apparent skin lesions or masses noted surgical scars well-healed      Head:  normocephalic, no masses or lesions        Eyes:  pupils equal and round;conjunctivae and lids unremarkable;sclera white        ENT:  no pallor or cyanosis        Neck:  JVP normal        Respiratory:  clear to auscultation;normal symmetry        Cardiac: regular rhythm, normal S1/S2, no S3 or S4, apical impulse not displaced, no murmurs, gallops or rubs                  GI:  abdomen soft;BS normoactive        Vascular: pulses full and equal                                 RFA site: mild ecchymosis, no hematoma, no bruit    Extremities and Musculoskeletal:  no deformities, clubbing, cyanosis, erythema observed;no edema        Neurological:  no gross motor deficits;affect appropriate            LABS/DATA:  I reviewed the " following:  Cardiac cath 8/27/24:  Conclusion    Mid LM lesion is 10% stenosed.    Ramus lesion is 75% stenosed.    Mid Cx lesion is 100% stenosed.    2nd Mrg lesion is 100% stenosed.    Mid RCA lesion is 100% stenosed.    Dist Graft to Insertion lesion  before RPDA is 100% stenosed.    Origin to Prox Graft lesion  before 2nd Mrg is 100% stenosed.    Ost LAD lesion is 100% stenosed.    Dist LAD lesion is 65% stenosed.    IVUS was performed on the lesion.    IVUS was performed on the lesion.    Ultrasound (IVUS) was performed.    Ultrasound (IVUS) was performed.     The Seq VG to R-PDA and R PL is occluded near the origin.The entire VG was clotted. We passed several runs with Penumbra down the graft to anastamosis.  The allowed visualization of the distal VG disrupted plaque, calcium and clot. We performed IVUS which we used to size the vessel and it showed clot/calcium/atherosclerosis.  We used PTCB to open the VG and stented with 3.5x38 stent and post dilated 3.5 NC  Grafted segment widely patent after stent  Chronic occlusion VG- Y to diag and OM. The Lcx is multilevel -rec: med RX  LIMA to Lad patent with moderate distal Lad after LIMA insertion  Consider elective PCI of ramus branch in future.      Echo 8/27/24:  Interpretation Summary  The visual ejection fraction is 40-45%.  Left ventricular systolic function is mildly reduced.  There are regional wall motion abnormalities as specified.  There is mild (1+) aortic regurgitation.  The study was technically difficult. The study was technically limited.        Cardiac cath 9/23/24:  Conclusion    Mid Cx lesion is 100% stenosed.    Ramus lesion is 80% stenosed.    IVUS was performed on the lesion.    IVUS was performed on the lesion.    Ultrasound (IVUS) was performed.    Ultrasound (IVUS) was performed.     Previous PCI/stent of VG to seq R PDA/R PL is widely patent (cath 8/2024)  PCI/stent of Ramus branch IVUS guided today taking 80% lesion to 0%  See full  cath from 8/27/2024 for full results         Component      Latest Ref Rng 9/10/2024  11:35 AM 9/23/2024  10:07 AM   Sodium      135 - 145 mmol/L  139    Potassium      3.4 - 5.3 mmol/L  4.8    Chloride      98 - 107 mmol/L  106    Carbon Dioxide (CO2)      22 - 29 mmol/L  25    Anion Gap      7 - 15 mmol/L  8    Urea Nitrogen      8.0 - 23.0 mg/dL  10.9    Creatinine      0.67 - 1.17 mg/dL  0.99    GFR Estimate      >60 mL/min/1.73m2  88    Calcium      8.8 - 10.4 mg/dL  9.5    Glucose      70 - 99 mg/dL  113 (H)    WBC      4.0 - 11.0 10e3/uL  6.7    RBC Count      4.40 - 5.90 10e6/uL  4.40    Hemoglobin      13.3 - 17.7 g/dL  13.3    Hematocrit      40.0 - 53.0 %  40.2    MCV      78 - 100 fL  91    MCH      26.5 - 33.0 pg  30.2    MCHC      31.5 - 36.5 g/dL  33.1    RDW      10.0 - 15.0 %  12.1    Platelet Count      150 - 450 10e3/uL  159    Cholesterol      <200 mg/dL  105    Triglycerides      <150 mg/dL  119    HDL Cholesterol      >=40 mg/dL  30 (L)    LDL Cholesterol Calculated      <100 mg/dL  51    Non HDL Cholesterol      <130 mg/dL  75    N-Terminal Pro Bnp      0 - 900 pg/mL 500              ASSESSMENT/PLAN:  CAD.   H/o CABG (2008) LIMA to LAD, SVG has a Y-shaped graft to diagonal and circumflex, SVG to PDA and SILVIA sequentially.    - Coronary angiogram 8/2024 showed severe native CAD, SVG to right PDA and RPL occluded near the origin. The entire VG was clotted. Several runs with Penumbra down the graft to anastamosis followed by 3.5 x 38 mm PETER. Grafted segment widely patent after stent. Chronic occlusion VG- Y to diag and OM. The Lcx is multilevel -rec: med RX. LIMA/LAD patent. Ramus 75%.    Staged PCI of the ramus branch in about 3 weeks  - Cardiac cath 9/23/24: Previous PCI/stent of VG to seq R PDA/R PL is widely patent.  PCI/stent of Ramus branch IVUS guided taking 80% lesion to 0%   - No chest pain   - RFA site has been tender but is improving, no hematoma or bruit on exam   - Brilinta and  aspirin uninterrupted x 12 months  - Atorvastatin 80 mg  - Coreg, lisinopril  - Cardiac rehab       Ischemic cardiomyopathy  HFmrEF  - Echocardiogram 8/27/24: EF 40-45%, Severe hypokinesis to akinesis of the mid to basal inferior wall. Severe hypokinesis to akinesis of the mid to basal  inferolateral wall. Mild aortic regurgitation and mild pulmonic regurgitation.    - Coreg 6.25 mg BID  - Low dose lisinopril 2.5 mg  - Appears euvolemic  - repeat echo in 3 months        Hyperlipidemia, controlled  - Atorvastatin 80 mg  - LDL 51 9/2024       Obesity          Follow up:  Dr. Welch with limited echo in 3 months    The longitudinal plan of care for the diagnosis(es)/condition(s) as documented were addressed during this visit. Due to the added complexity in care, I will continue to support Mian in the subsequent management and with ongoing continuity of care.    Willa Cano PA-C

## 2024-09-27 NOTE — LETTER
2024    Fady Adams MD  [address] [facility] [address]    RE: Mian Lozano       Dear Colleague,     I had the pleasure of seeing Mian Lozano in the [facility].      CARDIOLOGY CLINIC PROGRESS NOTE    DOS: 2024      Mian Lozano  : 1965, 59 year old  MRN: [id number]      Primary cardiologist: Dr. Welch       History:  Mian Lozano is a 59 year old male with history of CAD s/p CABG (CABG--Lima to Lad, Y graft to Diag, OM, Seq VG to R-PDA and PL) in . He also has history of postthoracotomy chest pain.     He was recently admitted with chest pain and NSTEMI. Upon presentation he was noted to have some subtle ST elevation in inferior leads which resolved and he also had ST depression in lateral leads.  He was started on heparin and nitroglycerin. Troponin blanco from 203 initially to 1661.   Echo shwoed EF 40-45% with WMAs.   Cardiac cath done and he underwent PETER to VG-RPDA.  Please see cath report for details.  He also had a 75% ramus lesion.    EKG post cath showed more pronounced ST changes.      Cardiac cath 24: Previous PCI/stent of VG to seq R PDA/R PL is widely patent.  PCI/stent of Ramus branch IVUS guided taking 80% lesion to 0%.     He presents today for follow up.  No chest pain.  Taking meds and tolerating.  Some LH with quick movements, sitting to standing.  No SOB.   No palpitations.   No edema.   No bleeding concerns.   He had a lot of tenderness at his groin site.   Cardiac rehab.         ROS:  Skin:        Eyes:       ENT:       Respiratory:  Negative    Cardiovascular:    Positive for;lightheadedness  Gastroenterology:      Genitourinary:       Musculoskeletal:       Neurologic:       Psychiatric:       Heme/Lymph/Imm:       Endocrine:         PAST MEDICAL HISTORY:  Past Medical History:   Diagnosis Date     2019 novel coronavirus disease (COVID-19) 2021     Anxiety      Sullivan's esophagus 2011     Coronary artery disease 2008     cabg x 5     Environmental allergies     seasonal, hay fever     FAM HX-CARDIOVAS DIS NEC     dad at 45, cabg, stent     Family history of prostate cancer      Fatty liver      Gallstone      GERD (gastroesophageal reflux disease) 03/2011     History of blood transfusion      Hyperlipidemia LDL goal <70 2006     Hypertension goal BP (blood pressure) < 140/90 04/2008     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    Dr Saba, Hx histoplasmosis, treated     Mild intermittent asthma 07/2003     Nephrolithiasis 6/13, 7/16    calcium oxalate - 6/2013, 7/2016, 1/2021     Other atopic dermatitis and related conditions 1980     Other diseases of lung, not elsewhere classified 04/2008    dr steel - benign bx and ct - granuloma - no further w/u needed     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    dr hoover     Prediabetes 08/2008     Psoriasis     Dr Mccall     Psoriatic arthritis (H)     Dr Mccall     Pulmonary nodules 2009    stable in 2012     Seasonal allergies 1980    Allergic rhinitis Hayfever       PAST SURGICAL HISTORY:  Past Surgical History:   Procedure Laterality Date     COLONOSCOPY N/A 08/07/2015    normal - due 10 yrs     CV ANGIOGRAM CORONARY GRAFT N/A 8/27/2024    Procedure: Coronary Angiogram Graft;  Surgeon: Matteo Christy MD;  Location:  HEART CARDIAC CATH LAB     CV CORONARY ANGIOGRAM N/A 8/27/2024    Procedure: Coronary Angiogram;  Surgeon: Matteo Christy MD;  Location: [facility]     CV CORONARY ANGIOGRAM N/A 9/23/2024    Procedure: Coronary Angiogram;  Surgeon: Matteo Christy MD;  Location:  HEART CARDIAC CATH LAB     CV INTRAVASULAR ULTRASOUND N/A 8/27/2024    Procedure: Intravascular Ultrasound;  Surgeon: Matteo Christy MD;  Location:  HEART CARDIAC CATH LAB     CV INTRAVASULAR ULTRASOUND N/A 9/23/2024    Procedure: Intravascular Ultrasound;  Surgeon: Matteo Christy MD;  Location:  HEART CARDIAC CATH LAB     CV PCI N/A 8/27/2024    Procedure: Percutaneous Coronary  Intervention;  Surgeon: Matteo Christy MD;  Location:  HEART CARDIAC CATH LAB     CV PCI N/A 9/23/2024    Procedure: Percutaneous Coronary Intervention;  Surgeon: Matteo Christy MD;  Location: [facility] CARDIAC [facility]     CV PCI ASPIRATION THROMECTOMY N/A 8/27/2024    Procedure: Percutaneous Coronary Intervention Aspiration Thrombectomy;  Surgeon: Matteo Christy MD;  Location:  HEART CARDIAC CATH LAB     ESOPHAGOSCOPY, GASTROSCOPY, DUODENOSCOPY (EGD), COMBINED  6/11, 5/13    [facility] - ? gastritis, fowler's - due 3 yr     ESOPHAGOSCOPY, GASTROSCOPY, DUODENOSCOPY (EGD), COMBINED  05/10/2013    COMBINED ESOPHAGOSCOPY, GASTROSCOPY, DUODENOSCOPY (EGD), BIOPSY SINGLE OR MULTIPLE;  ESOPHAGOSCOPY, GASTROSCOPY, DUODENOSCOPY (EGD)  with biopsy;  Surgeon: Angus Reynolds MD;  Location:  GI     ESOPHAGOSCOPY, GASTROSCOPY, DUODENOSCOPY (EGD), COMBINED N/A 07/28/2017    Fowler's - recheck 3 yrs     HC VASECTOMY UNILAT/BILAT W POSTOP SEMEN  1995    Vasectomy     HERNIORRHAPHY UMBILICAL N/A 11/13/2014    Dr Barron     SURGICAL HISTORY OF -  Left 1980    lt patella fx     SURGICAL HISTORY OF -  Left 1985    lt thumb neuroma excised     SURGICAL HISTORY OF -   11/2009    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     ZZC CABG, ARTERY-VEIN, FIVE  04/2008    FSD - lung bx benign     ZZC STRESS TEST - REGULAR ([place])  9/06, 4/11    negative stress thallium - FSD       SOCIAL HISTORY:  Social History     Socioeconomic History     Marital status:      Spouse name: Imelda     Number of children: 0     Years of education: 14   Occupational History     Comment: [employer]   Tobacco Use     Smoking status: Former     Types: Cigars     Smokeless tobacco: Never     Tobacco comments:     In past rare cigar, last 2005   Vaping Use     Vaping status: Never Used   Substance and Sexual Activity     Alcohol use: Yes     Types: 6 Standard drinks or equivalent per week     Comment: 6 drinks per week     Drug use: No      Sexual activity: Yes     Partners: Female     Birth control/protection: Male Surgical   Other Topics Concern     Caffeine Concern Yes     Comment: 1 can qd     Exercise Yes     Comment: occas     Seat Belt Yes     Parent/sibling w/ CABG, MI or angioplasty before 65F 55M? Yes     Social Determinants of Health     Financial Resource Strain: Low Risk  (8/27/2024)    Financial Resource Strain      Within the past 12 months, have you or your family members you live with been unable to get utilities (heat, electricity) when it was really needed?: No   Recent Concern: Financial Resource Strain - High Risk (6/24/2024)    Financial Resource Strain      Within the past 12 months, have you or your family members you live with been unable to get utilities (heat, electricity) when it was really needed?: Yes   Food Insecurity: Low Risk  (8/27/2024)    Food Insecurity      Within the past 12 months, did you worry that your food would run out before you got money to buy more?: No      Within the past 12 months, did the food you bought just not last and you didn t have money to get more?: No   Transportation Needs: Low Risk  (8/27/2024)    Transportation Needs      Within the past 12 months, has lack of transportation kept you from medical appointments, getting your medicines, non-medical meetings or appointments, work, or from getting things that you need?: No   Physical Activity: Insufficiently Active (6/24/2024)    Exercise Vital Sign      Days of Exercise per Week: 1 day      Minutes of Exercise per Session: 20 min   Stress: No Stress Concern Present (6/24/2024)    [place] [employer] - Occupational Stress Questionnaire      Feeling of Stress : Only a little   Social Connections: Unknown (6/24/2024)    Social Connection and Isolation Panel [NHANES]      Frequency of Social Gatherings with Friends and Family: Once a week   Interpersonal Safety: Low Risk  (8/27/2024)    Interpersonal Safety      Do you  feel physically and emotionally safe where you currently live?: Yes      Within the past 12 months, have you been hit, slapped, kicked or otherwise physically hurt by someone?: No      Within the past 12 months, have you been humiliated or emotionally abused in other ways by your partner or ex-partner?: No   Housing Stability: Low Risk  (8/27/2024)    Housing Stability      Do you have housing? : Yes      Are you worried about losing your housing?: No       FAMILY HISTORY:  Family History   Problem Relation Age of Onset     Prostate Cancer Father 69     Coronary Artery Disease Father 38     Hypertension Father      Neurologic Disorder Sister         CVA/aneurysm at age 19, decreased memory, partial paralysis     Hypertension Paternal Grandfather      C.A.D. Paternal Grandfather      Heart Failure Paternal Grandfather      Colon Cancer No family hx of        MEDS:   Current Outpatient Medications   Medication Sig Dispense Refill     acetaminophen (TYLENOL) 325 MG tablet Take 2 tablets (650 mg) by mouth every 4 hours as needed for mild pain or other (and adjunct with moderate or severe pain or per patient request).       albuterol (PROAIR HFA) 108 (90 Base) MCG/ACT inhaler Inhale 2 puffs into the lungs every 4 hours as needed for shortness of breath or wheezing 18 g 3     aspirin 81 MG EC tablet Take 1 tablet (81 mg) by mouth daily. Start tomorrow. Do not start before August 28, 2024. 30 tablet 3     atorvastatin (LIPITOR) 80 MG tablet Take 1 tablet (80 mg) by mouth daily 90 tablet 3     azelastine (ASTELIN) 0.1 % nasal spray Spray 2 sprays into both nostrils 2 times daily 90 mL 3     carvedilol (COREG) 6.25 MG tablet Take 1 tablet (6.25 mg) by mouth 2 times daily (with meals) 180 tablet 3     cetirizine (ZYRTEC) 10 MG tablet Take 1 tablet (10 mg) by mouth every evening 90 tablet 3     citalopram (CELEXA) 20 MG tablet Take 1 tablet (20 mg) by mouth daily 90 tablet 3     clobetasol propionate (TEMOVATE) 0.05 % external  "cream Apply topically 2 times daily (Patient taking differently: Apply topically 2 times daily. PRN) 30 g 3     etanercept (ENBREL) 50 MG/ML injection Inject 0.98 mLs (50 mg) Subcutaneous once a week       fluticasone (FLONASE) 50 MCG/ACT nasal spray Spray 2 sprays into both nostrils daily (Patient taking differently: Spray 2 sprays into both nostrils daily. PRN) 48 g 3     LANsoprazole (PREVACID) 30 MG DR capsule TAKE ONE CAPSULE BY MOUTH DAILY. TAKE 30 TO 60 MINUTES BEFORE A MEAL. 90 capsule 3     lisinopril (ZESTRIL) 2.5 MG tablet Take 1 tablet (2.5 mg) by mouth daily. 90 tablet 3     nitroGLYcerin (NITROSTAT) 0.4 MG sublingual tablet For chest pain place 1 tablet under the tongue every 5 minutes for 3 doses. If symptoms persist 5 minutes after 2nd dose call 911. 30 tablet 0     ticagrelor (BRILINTA) 90 MG tablet Take 1 tablet (90 mg) by mouth 2 times daily. Dose to start this evening. 180 tablet 3     No current facility-administered medications for this visit.       ALLERGIES:   Allergies   Allergen Reactions     Seasonal Allergies        PHYSICAL EXAM:  Vitals: /70 (BP Location: Right arm, Patient Position: Sitting, Cuff Size: Adult Large)   Pulse 83   Ht 1.727 m (5' 8\")   Wt 93.1 kg (205 lb 3.2 oz)   SpO2 98%   BMI 31.20 kg/m    Constitutional:  cooperative, alert and oriented, well developed, well nourished, in no acute distress        Skin:  warm and dry to the touch, no apparent skin lesions or masses noted surgical scars well-healed      Head:  normocephalic, no masses or lesions        Eyes:  pupils equal and round;conjunctivae and lids unremarkable;sclera white        ENT:  no pallor or cyanosis        Neck:  JVP normal        Respiratory:  clear to auscultation;normal symmetry        Cardiac: regular rhythm, normal S1/S2, no S3 or S4, apical impulse not displaced, no murmurs, gallops or rubs                  GI:  abdomen soft;BS normoactive        Vascular: pulses full and equal              "                    RFA site: mild ecchymosis, no hematoma, no bruit    Extremities and Musculoskeletal:  no deformities, clubbing, cyanosis, erythema observed;no edema        Neurological:  no gross motor deficits;affect appropriate            LABS/DATA:  I reviewed the following:  Cardiac cath 8/27/24:  Conclusion     Mid LM lesion is 10% stenosed.     Ramus lesion is 75% stenosed.     Mid Cx lesion is 100% stenosed.     2nd Mrg lesion is 100% stenosed.     Mid RCA lesion is 100% stenosed.     Dist Graft to Insertion lesion  before RPDA is 100% stenosed.     Origin to Prox Graft lesion  before 2nd Mrg is 100% stenosed.     Ost LAD lesion is 100% stenosed.     Dist LAD lesion is 65% stenosed.     IVUS was performed on the lesion.     IVUS was performed on the lesion.     Ultrasound (IVUS) was performed.     Ultrasound (IVUS) was performed.     The Seq VG to R-PDA and R PL is occluded near the origin.The entire VG was clotted. We passed several runs with Penumbra down the graft to anastamosis.  The allowed visualization of the distal VG disrupted plaque, calcium and clot. We performed IVUS which we used to size the vessel and it showed clot/calcium/atherosclerosis.  We used PTCB to open the VG and stented with 3.5x38 stent and post dilated 3.5 NC  Grafted segment widely patent after stent  Chronic occlusion VG- Y to diag and OM. The Lcx is multilevel -rec: med RX  LIMA to Lad patent with moderate distal Lad after LIMA insertion  Consider elective PCI of ramus branch in future.      Echo 8/27/24:  Interpretation Summary  The visual ejection fraction is 40-45%.  Left ventricular systolic function is mildly reduced.  There are regional wall motion abnormalities as specified.  There is mild (1+) aortic regurgitation.  The study was technically difficult. The study was technically limited.        Cardiac cath 9/23/24:  Conclusion     Mid Cx lesion is 100% stenosed.     Ramus lesion is 80% stenosed.     IVUS was  performed on the lesion.     IVUS was performed on the lesion.     Ultrasound (IVUS) was performed.     Ultrasound (IVUS) was performed.     Previous PCI/stent of VG to seq R PDA/R PL is widely patent (cath 8/2024)  PCI/stent of Ramus branch IVUS guided today taking 80% lesion to 0%  See full cath from 8/27/2024 for full results         Component      Latest Ref Rng 9/10/2024  11:35 AM 9/23/2024  10:07 AM   Sodium      135 - 145 mmol/L  139    Potassium      3.4 - 5.3 mmol/L  4.8    Chloride      98 - 107 mmol/L  106    Carbon Dioxide (CO2)      22 - 29 mmol/L  25    Anion Gap      7 - 15 mmol/L  8    Urea Nitrogen      8.0 - 23.0 mg/dL  10.9    Creatinine      0.67 - 1.17 mg/dL  0.99    GFR Estimate      >60 mL/min/1.73m2  88    Calcium      8.8 - 10.4 mg/dL  9.5    Glucose      70 - 99 mg/dL  113 (H)    WBC      4.0 - 11.0 10e3/uL  6.7    RBC Count      4.40 - 5.90 10e6/uL  4.40    Hemoglobin      13.3 - 17.7 g/dL  13.3    Hematocrit      40.0 - 53.0 %  40.2    MCV      78 - 100 fL  91    MCH      26.5 - 33.0 pg  30.2    MCHC      31.5 - 36.5 g/dL  33.1    RDW      10.0 - 15.0 %  12.1    Platelet Count      150 - 450 10e3/uL  159    Cholesterol      <200 mg/dL  105    Triglycerides      <150 mg/dL  119    HDL Cholesterol      >=40 mg/dL  30 (L)    LDL Cholesterol Calculated      <100 mg/dL  51    Non HDL Cholesterol      <130 mg/dL  75    N-Terminal Pro Bnp      0 - 900 pg/mL 500              ASSESSMENT/PLAN:  CAD.   H/o CABG (2008) LIMA to LAD, SVG has a Y-shaped graft to diagonal and circumflex, SVG to PDA and SILVIA sequentially.    - Coronary angiogram 8/2024 showed severe native CAD, SVG to right PDA and RPL occluded near the origin. The entire VG was clotted. Several runs with Penumbra down the graft to anastamosis followed by 3.5 x 38 mm PETER. Grafted segment widely patent after stent. Chronic occlusion VG- Y to diag and OM. The Lcx is multilevel -rec: med RX. LIMA/LAD patent. Ramus 75%.    Staged PCI of the  ramus branch in about 3 weeks  - Cardiac cath 9/23/24: Previous PCI/stent of VG to seq R PDA/R PL is widely patent.  PCI/stent of Ramus branch IVUS guided taking 80% lesion to 0%   - No chest pain   - RFA site has been tender but is improving, no hematoma or bruit on exam   - Brilinta and aspirin uninterrupted x 12 months  - Atorvastatin 80 mg  - Coreg, lisinopril  - Cardiac rehab       Ischemic cardiomyopathy  HFmrEF  - Echocardiogram 8/27/24: EF 40-45%, Severe hypokinesis to akinesis of the mid to basal inferior wall. Severe hypokinesis to akinesis of the mid to basal  inferolateral wall. Mild aortic regurgitation and mild pulmonic regurgitation.    - Coreg 6.25 mg BID  - Low dose lisinopril 2.5 mg  - Appears euvolemic  - repeat echo in 3 months        Hyperlipidemia, controlled  - Atorvastatin 80 mg  - LDL 51 9/2024       Obesity          Follow up:  Dr. Welch with limited echo in 3 months    The longitudinal plan of care for the diagnosis(es)/condition(s) as documented were addressed during this visit. Due to the added complexity in care, I will continue to support Mian in the subsequent management and with ongoing continuity of care.    Willa Cano PA-C          Thank you for allowing me to participate in the care of your patient.      Sincerely,     Willa Cano PA-C     [facility]  cc:   Fady Adams MD  [address] [place] [address]

## 2024-10-01 ENCOUNTER — HOSPITAL ENCOUNTER (OUTPATIENT)
Dept: CARDIAC REHAB | Facility: CLINIC | Age: 59
Discharge: HOME | End: 2024-10-01
Attending: INTERNAL MEDICINE
Payer: COMMERCIAL

## 2024-10-01 PROCEDURE — 93798 PHYS/QHP OP CAR RHAB W/ECG: CPT

## 2024-10-02 ENCOUNTER — APPOINTMENT (OUTPATIENT)
Dept: GENERAL RADIOLOGY | Facility: CLINIC | Age: 59
End: 2024-10-02
Attending: STUDENT IN AN ORGANIZED HEALTH CARE EDUCATION/TRAINING PROGRAM
Payer: COMMERCIAL

## 2024-10-02 ENCOUNTER — HOSPITAL ENCOUNTER (EMERGENCY)
Facility: CLINIC | Age: 59
Discharge: HOME | End: 2024-10-02
Attending: STUDENT IN AN ORGANIZED HEALTH CARE EDUCATION/TRAINING PROGRAM | Admitting: STUDENT IN AN ORGANIZED HEALTH CARE EDUCATION/TRAINING PROGRAM
Payer: COMMERCIAL

## 2024-10-02 VITALS
HEART RATE: 78 BPM | DIASTOLIC BLOOD PRESSURE: 88 MMHG | HEIGHT: 68 IN | WEIGHT: 208.34 LBS | RESPIRATION RATE: 20 BRPM | TEMPERATURE: 97.6 F | OXYGEN SATURATION: 96 % | SYSTOLIC BLOOD PRESSURE: 142 MMHG | BODY MASS INDEX: 31.57 KG/M2

## 2024-10-02 DIAGNOSIS — Z98.890 STATUS POST CORONARY ANGIOGRAM: ICD-10-CM

## 2024-10-02 DIAGNOSIS — R06.02 SHORTNESS OF BREATH: Primary | ICD-10-CM

## 2024-10-02 DIAGNOSIS — J45.20 MILD INTERMITTENT ASTHMA WITHOUT COMPLICATION: ICD-10-CM

## 2024-10-02 DIAGNOSIS — Z95.1 H/O CORONARY ARTERY BYPASS SURGERY: ICD-10-CM

## 2024-10-02 DIAGNOSIS — I25.10 CORONARY ARTERY DISEASE INVOLVING NATIVE CORONARY ARTERY OF NATIVE HEART WITHOUT ANGINA PECTORIS: ICD-10-CM

## 2024-10-02 LAB
ANION GAP SERPL CALCULATED.3IONS-SCNC: 15 MMOL/L (ref 7–15)
ATRIAL RATE - MUSE: 79 BPM
BASOPHILS # BLD AUTO: 0.1 10E3/UL (ref 0–0.2)
BASOPHILS NFR BLD AUTO: 1 %
BUN SERPL-MCNC: 10.2 MG/DL (ref 8–23)
CALCIUM SERPL-MCNC: 9.5 MG/DL (ref 8.8–10.4)
CHLORIDE SERPL-SCNC: 100 MMOL/L (ref 98–107)
CREAT SERPL-MCNC: 0.87 MG/DL (ref 0.67–1.17)
DIASTOLIC BLOOD PRESSURE - MUSE: NORMAL MMHG
EGFRCR SERPLBLD CKD-EPI 2021: >90 ML/MIN/1.73M2
EOSINOPHIL # BLD AUTO: 0.2 10E3/UL (ref 0–0.7)
EOSINOPHIL NFR BLD AUTO: 3 %
ERYTHROCYTE [DISTWIDTH] IN BLOOD BY AUTOMATED COUNT: 12.4 % (ref 10–15)
GLUCOSE SERPL-MCNC: 103 MG/DL (ref 70–99)
HCO3 SERPL-SCNC: 22 MMOL/L (ref 22–29)
HCT VFR BLD AUTO: 43.2 % (ref 40–53)
HGB BLD-MCNC: 14.2 G/DL (ref 13.3–17.7)
HOLD SPECIMEN: NORMAL
HOLD SPECIMEN: NORMAL
IMM GRANULOCYTES # BLD: 0 10E3/UL
IMM GRANULOCYTES NFR BLD: 1 %
INTERPRETATION ECG - MUSE: NORMAL
LYMPHOCYTES # BLD AUTO: 1.5 10E3/UL (ref 0.8–5.3)
LYMPHOCYTES NFR BLD AUTO: 23 %
MCH RBC QN AUTO: 30.7 PG (ref 26.5–33)
MCHC RBC AUTO-ENTMCNC: 32.9 G/DL (ref 31.5–36.5)
MCV RBC AUTO: 93 FL (ref 78–100)
MONOCYTES # BLD AUTO: 0.6 10E3/UL (ref 0–1.3)
MONOCYTES NFR BLD AUTO: 10 %
NEUTROPHILS # BLD AUTO: 4.1 10E3/UL (ref 1.6–8.3)
NEUTROPHILS NFR BLD AUTO: 63 %
NRBC # BLD AUTO: 0 10E3/UL
NRBC BLD AUTO-RTO: 0 /100
NT-PROBNP SERPL-MCNC: 400 PG/ML (ref 0–900)
P AXIS - MUSE: 51 DEGREES
PLATELET # BLD AUTO: 198 10E3/UL (ref 150–450)
POTASSIUM SERPL-SCNC: 4.2 MMOL/L (ref 3.4–5.3)
PR INTERVAL - MUSE: 174 MS
QRS DURATION - MUSE: 72 MS
QT - MUSE: 382 MS
QTC - MUSE: 438 MS
R AXIS - MUSE: -2 DEGREES
RBC # BLD AUTO: 4.63 10E6/UL (ref 4.4–5.9)
SARS-COV-2 RNA RESP QL NAA+PROBE: NEGATIVE
SODIUM SERPL-SCNC: 137 MMOL/L (ref 135–145)
SYSTOLIC BLOOD PRESSURE - MUSE: NORMAL MMHG
T AXIS - MUSE: -16 DEGREES
TROPONIN T SERPL HS-MCNC: <6 NG/L
VENTRICULAR RATE- MUSE: 79 BPM
WBC # BLD AUTO: 6.5 10E3/UL (ref 4–11)

## 2024-10-02 PROCEDURE — 85025 COMPLETE CBC W/AUTO DIFF WBC: CPT | Performed by: STUDENT IN AN ORGANIZED HEALTH CARE EDUCATION/TRAINING PROGRAM

## 2024-10-02 PROCEDURE — 99285 EMERGENCY DEPT VISIT HI MDM: CPT | Mod: 25

## 2024-10-02 PROCEDURE — 83880 ASSAY OF NATRIURETIC PEPTIDE: CPT | Performed by: STUDENT IN AN ORGANIZED HEALTH CARE EDUCATION/TRAINING PROGRAM

## 2024-10-02 PROCEDURE — 80048 BASIC METABOLIC PNL TOTAL CA: CPT | Performed by: STUDENT IN AN ORGANIZED HEALTH CARE EDUCATION/TRAINING PROGRAM

## 2024-10-02 PROCEDURE — 93005 ELECTROCARDIOGRAM TRACING: CPT

## 2024-10-02 PROCEDURE — 71046 X-RAY EXAM CHEST 2 VIEWS: CPT

## 2024-10-02 PROCEDURE — 87635 SARS-COV-2 COVID-19 AMP PRB: CPT | Performed by: STUDENT IN AN ORGANIZED HEALTH CARE EDUCATION/TRAINING PROGRAM

## 2024-10-02 PROCEDURE — 36415 COLL VENOUS BLD VENIPUNCTURE: CPT | Performed by: STUDENT IN AN ORGANIZED HEALTH CARE EDUCATION/TRAINING PROGRAM

## 2024-10-02 PROCEDURE — 84484 ASSAY OF TROPONIN QUANT: CPT | Performed by: EMERGENCY MEDICINE

## 2024-10-02 ASSESSMENT — COLUMBIA-SUICIDE SEVERITY RATING SCALE - C-SSRS
6. HAVE YOU EVER DONE ANYTHING, STARTED TO DO ANYTHING, OR PREPARED TO DO ANYTHING TO END YOUR LIFE?: NO
2. HAVE YOU ACTUALLY HAD ANY THOUGHTS OF KILLING YOURSELF IN THE PAST MONTH?: NO
1. IN THE PAST MONTH, HAVE YOU WISHED YOU WERE DEAD OR WISHED YOU COULD GO TO SLEEP AND NOT WAKE UP?: NO

## 2024-10-02 ASSESSMENT — ACTIVITIES OF DAILY LIVING (ADL)
ADLS_ACUITY_SCORE: 38
ADLS_ACUITY_SCORE: 38
ADLS_ACUITY_SCORE: 36

## 2024-10-02 NOTE — ED PROVIDER NOTES
"  Emergency Department Note      History of Present Illness     Chief Complaint   Shortness of Breath      HPI   Mian Lozano is a very pleasant 59 year old male with history of coronary artery disease, status post recent stenting, no I was working during the first psoriasis, ischemic cardiomyopathy, status post CABG, presenting with shortness of breath.  Patient has noted worsening shortness of breath the last 4 to 5 days.  This seems worse when lying down.  He also has increased congestion and sputum.  He has no chest pain.  Patient states he has noted a weight gain over the last 2 weeks, 5 pounds.  He is taking his medications as prescribed.  No back pain or abdominal pain.  No cough or fever or chills.    Independent Historian   None    Review of External Notes   I personally reviewed notes from the patient's progress note dated  9/27/2024 . This provided me with information regarding patient's recent clinical course.         I personally reviewed the patient's chart, including available medication list and available past medical history, past surgical history, family history, and social history.    Physical Exam     Patient Vitals for the past 24 hrs:   BP Temp Temp src Pulse Resp SpO2 Height Weight   10/02/24 1859 (!) 142/88 -- -- 78 20 96 % -- --   10/02/24 1453 (!) 152/87 97.6  F (36.4  C) Temporal 84 14 99 % 1.727 m (5' 8\") 94.5 kg (208 lb 5.4 oz)     Physical Exam  Vitals and nursing note reviewed.   Constitutional:       Appearance: He is well-developed. He is not ill-appearing or diaphoretic.   HENT:      Mouth/Throat:      Mouth: Mucous membranes are moist.   Cardiovascular:      Rate and Rhythm: Normal rate and regular rhythm.      Heart sounds: Normal heart sounds.   Pulmonary:      Effort: Pulmonary effort is normal.      Breath sounds: Normal breath sounds.   Musculoskeletal:      Right lower leg: No edema.      Left lower leg: No edema.   Skin:     General: Skin is warm and dry.   Neurological: "      Mental Status: He is alert and oriented to person, place, and time.           Diagnostics     Lab Results   Labs Ordered and Resulted from Time of ED Arrival to Time of ED Departure   BASIC METABOLIC PANEL - Abnormal       Result Value    Sodium 137      Potassium 4.2      Chloride 100      Carbon Dioxide (CO2) 22      Anion Gap 15      Urea Nitrogen 10.2      Creatinine 0.87      GFR Estimate >90      Calcium 9.5      Glucose 103 (*)    TROPONIN T, HIGH SENSITIVITY - Normal    Troponin T, High Sensitivity <6     COVID-19 VIRUS (CORONAVIRUS) BY PCR - Normal    SARS CoV2 PCR Negative     NT PROBNP INPATIENT - Normal    N terminal Pro BNP Inpatient 400     CBC WITH PLATELETS AND DIFFERENTIAL    WBC Count 6.5      RBC Count 4.63      Hemoglobin 14.2      Hematocrit 43.2      MCV 93      MCH 30.7      MCHC 32.9      RDW 12.4      Platelet Count 198      % Neutrophils 63      % Lymphocytes 23      % Monocytes 10      % Eosinophils 3      % Basophils 1      % Immature Granulocytes 1      NRBCs per 100 WBC 0      Absolute Neutrophils 4.1      Absolute Lymphocytes 1.5      Absolute Monocytes 0.6      Absolute Eosinophils 0.2      Absolute Basophils 0.1      Absolute Immature Granulocytes 0.0      Absolute NRBCs 0.0         Imaging   XR Chest 2 Views   Final Result   IMPRESSION: Calcified left pleural plaques suggesting prior asbestos exposure. No new airspace opacities. Calcified mediastinal lymph nodes. Sternotomy with CABG. Normal heart size and pulmonary vasculature. No pneumothorax or pleural effusion.    Degenerative changes in the thoracic spine. No significant interval change.          EKG   ECG results from 10/02/24   EKG 12-lead, tracing only     Value    Systolic Blood Pressure     Diastolic Blood Pressure     Ventricular Rate 79    Atrial Rate 79    IN Interval 174    QRS Duration 72        QTc 438    P Axis 51    R AXIS -2    T Axis -16    Interpretation ECG      Sinus rhythm  Inferior-posterior  infarct (cited on or before 27-Aug-2024)  Abnormal ECG  When compared with ECG of 23-Sep-2024 16:30,  No significant change was found           Independent Interpretation - See ED Course Below    ED Course      Medications Administered   Medications - No data to display    Procedures   Procedures   None performed    Discussion of Management - See ED Course Below    ED Course   Independent Interpretation / Discussion of Management / Repeat Assessments  ED Course as of 10/02/24 2206   Wed Oct 02, 2024   1524 Today 94.5 kg up from 93.1 kg on 9/27/24       Additional Documentation  None    Medical Decision Making / Diagnosis     CMS Diagnoses: None    MIPS       None    MDM   Patient presenting with shortness of breath.  Vital signs are reassuring.  Exam is nonfocal.  With patient's recent PCI, consider differential including in-stent thrombosis, CHF, acute coronary syndrome, COVID, pneumonia, among others.  Workup is reassuring.  I reviewed the patient's weights and it is actually 1 pound lower than when he was discharged from the hospital recently.  He does not present as hypervolemic or in heart failure.  EKG has no acute appearing ischemic changes and troponin is undetectable, very reassuring.  Chest x-ray is also reassuring and at patient's baseline.  Do not feel further workup is indicated at this time.  Will plan for patient follow-up with his primary care clinician for reevaluation.    Disposition   The patient was discharged.     Diagnosis     ICD-10-CM    1. Shortness of breath  R06.02       2. Mild intermittent asthma without complication  J45.20       3. H/O coronary artery bypass surgery  Z95.1       4. Coronary artery disease involving native coronary artery of native heart without angina pectoris  I25.10       5. Status post coronary angiogram  Z98.890            Discharge Medications   Discharge Medication List as of 10/2/2024  6:53 PM           Kennedy Hyde MD  10/02/24 2208

## 2024-10-02 NOTE — ED TRIAGE NOTES
Pt here for SOB since Friday worse when laying down. Had 2 stents placed last Monday. Denies pain. Breathing is easy and unlabored in triage. Taking his medications as prescribed. Reports a 5lb weight gain in the last 2 weeks.

## 2024-10-08 ENCOUNTER — HOSPITAL ENCOUNTER (OUTPATIENT)
Dept: CARDIAC REHAB | Facility: CLINIC | Age: 59
Discharge: HOME | End: 2024-10-08
Attending: INTERNAL MEDICINE
Payer: COMMERCIAL

## 2024-10-08 PROCEDURE — 93798 PHYS/QHP OP CAR RHAB W/ECG: CPT

## 2024-10-09 ENCOUNTER — TRANSFERRED RECORDS (OUTPATIENT)
Dept: HEALTH INFORMATION MANAGEMENT | Facility: CLINIC | Age: 59
End: 2024-10-09
Payer: COMMERCIAL

## 2024-10-09 LAB
ALT SERPL-CCNC: 25 IU/L (ref 9–46)
AST SERPL-CCNC: 27 U/L (ref 10–40)
CREATININE (EXTERNAL): 0.8 MG/DL (ref 0.6–1.35)

## 2024-10-10 ENCOUNTER — HOSPITAL ENCOUNTER (OUTPATIENT)
Dept: CARDIAC REHAB | Facility: CLINIC | Age: 59
Discharge: HOME | End: 2024-10-10
Attending: INTERNAL MEDICINE
Payer: COMMERCIAL

## 2024-10-10 PROCEDURE — 93798 PHYS/QHP OP CAR RHAB W/ECG: CPT

## 2024-10-15 ENCOUNTER — HOSPITAL ENCOUNTER (OUTPATIENT)
Dept: CARDIAC REHAB | Facility: CLINIC | Age: 59
Discharge: HOME | End: 2024-10-15
Attending: INTERNAL MEDICINE
Payer: COMMERCIAL

## 2024-10-15 PROCEDURE — 93798 PHYS/QHP OP CAR RHAB W/ECG: CPT

## 2024-10-22 ENCOUNTER — HOSPITAL ENCOUNTER (OUTPATIENT)
Dept: CARDIAC REHAB | Facility: CLINIC | Age: 59
Discharge: HOME | End: 2024-10-22
Attending: INTERNAL MEDICINE
Payer: COMMERCIAL

## 2024-10-22 PROCEDURE — 93798 PHYS/QHP OP CAR RHAB W/ECG: CPT

## 2024-10-24 ENCOUNTER — HOSPITAL ENCOUNTER (OUTPATIENT)
Dept: CARDIAC REHAB | Facility: CLINIC | Age: 59
Discharge: HOME | End: 2024-10-24
Attending: INTERNAL MEDICINE
Payer: COMMERCIAL

## 2024-10-24 PROCEDURE — 93798 PHYS/QHP OP CAR RHAB W/ECG: CPT

## 2024-10-29 ENCOUNTER — HOSPITAL ENCOUNTER (OUTPATIENT)
Dept: CARDIAC REHAB | Facility: CLINIC | Age: 59
Discharge: HOME | End: 2024-10-29
Attending: INTERNAL MEDICINE
Payer: COMMERCIAL

## 2024-10-29 PROCEDURE — 93798 PHYS/QHP OP CAR RHAB W/ECG: CPT

## 2024-10-31 ENCOUNTER — HOSPITAL ENCOUNTER (OUTPATIENT)
Dept: CARDIAC REHAB | Facility: CLINIC | Age: 59
Discharge: HOME | End: 2024-10-31
Attending: INTERNAL MEDICINE
Payer: COMMERCIAL

## 2024-10-31 PROCEDURE — 93798 PHYS/QHP OP CAR RHAB W/ECG: CPT

## 2024-11-05 ENCOUNTER — HOSPITAL ENCOUNTER (OUTPATIENT)
Dept: CARDIAC REHAB | Facility: CLINIC | Age: 59
Discharge: HOME | End: 2024-11-05
Attending: INTERNAL MEDICINE
Payer: COMMERCIAL

## 2024-11-05 PROCEDURE — 93798 PHYS/QHP OP CAR RHAB W/ECG: CPT

## 2024-11-12 ENCOUNTER — HOSPITAL ENCOUNTER (OUTPATIENT)
Dept: CARDIAC REHAB | Facility: CLINIC | Age: 59
Discharge: HOME | End: 2024-11-12
Attending: INTERNAL MEDICINE
Payer: COMMERCIAL

## 2024-11-12 PROCEDURE — 93798 PHYS/QHP OP CAR RHAB W/ECG: CPT

## 2024-11-14 ENCOUNTER — HOSPITAL ENCOUNTER (OUTPATIENT)
Dept: CARDIAC REHAB | Facility: CLINIC | Age: 59
Discharge: HOME | End: 2024-11-14
Attending: INTERNAL MEDICINE
Payer: COMMERCIAL

## 2024-11-14 PROCEDURE — 93798 PHYS/QHP OP CAR RHAB W/ECG: CPT

## 2024-11-21 ENCOUNTER — HOSPITAL ENCOUNTER (OUTPATIENT)
Dept: CARDIAC REHAB | Facility: CLINIC | Age: 59
Discharge: HOME | End: 2024-11-21
Attending: INTERNAL MEDICINE
Payer: COMMERCIAL

## 2024-11-21 PROCEDURE — 93798 PHYS/QHP OP CAR RHAB W/ECG: CPT

## 2024-12-10 ENCOUNTER — HOSPITAL ENCOUNTER (OUTPATIENT)
Dept: CARDIOLOGY | Facility: CLINIC | Age: 59
Discharge: HOME | End: 2024-12-10
Attending: PHYSICIAN ASSISTANT
Payer: COMMERCIAL

## 2024-12-10 DIAGNOSIS — I25.10 CORONARY ARTERY DISEASE INVOLVING NATIVE CORONARY ARTERY OF NATIVE HEART WITHOUT ANGINA PECTORIS: ICD-10-CM

## 2024-12-10 LAB — LVEF ECHO: NORMAL

## 2024-12-10 PROCEDURE — 93325 DOPPLER ECHO COLOR FLOW MAPG: CPT

## 2024-12-10 PROCEDURE — 255N000002 HC RX 255 OP 636: Performed by: PHYSICIAN ASSISTANT

## 2024-12-10 RX ADMIN — HUMAN ALBUMIN MICROSPHERES AND PERFLUTREN 2 ML: 10; .22 INJECTION, SOLUTION INTRAVENOUS at 11:24

## 2024-12-16 ENCOUNTER — OFFICE VISIT (OUTPATIENT)
Dept: FAMILY MEDICINE | Facility: CLINIC | Age: 59
End: 2024-12-16
Payer: COMMERCIAL

## 2024-12-16 VITALS
RESPIRATION RATE: 16 BRPM | DIASTOLIC BLOOD PRESSURE: 70 MMHG | SYSTOLIC BLOOD PRESSURE: 120 MMHG | WEIGHT: 203.7 LBS | HEIGHT: 68 IN | OXYGEN SATURATION: 96 % | BODY MASS INDEX: 30.87 KG/M2 | TEMPERATURE: 97 F | HEART RATE: 72 BPM

## 2024-12-16 DIAGNOSIS — Z51.81 MEDICATION MONITORING ENCOUNTER: ICD-10-CM

## 2024-12-16 DIAGNOSIS — J45.20 MILD INTERMITTENT ASTHMA WITHOUT COMPLICATION: ICD-10-CM

## 2024-12-16 DIAGNOSIS — K76.0 FATTY LIVER: ICD-10-CM

## 2024-12-16 DIAGNOSIS — I25.5 ISCHEMIC CARDIOMYOPATHY: ICD-10-CM

## 2024-12-16 DIAGNOSIS — L40.50 PSORIATIC ARTHRITIS (H): ICD-10-CM

## 2024-12-16 DIAGNOSIS — R94.30 EJECTION FRACTION < 50%: ICD-10-CM

## 2024-12-16 DIAGNOSIS — N20.0 NEPHROLITHIASIS: ICD-10-CM

## 2024-12-16 DIAGNOSIS — K21.9 GASTROESOPHAGEAL REFLUX DISEASE WITHOUT ESOPHAGITIS: ICD-10-CM

## 2024-12-16 DIAGNOSIS — E78.5 HYPERLIPIDEMIA LDL GOAL <70: ICD-10-CM

## 2024-12-16 DIAGNOSIS — G89.12 POST-THORACOTOMY PAIN SYNDROME: ICD-10-CM

## 2024-12-16 DIAGNOSIS — R73.03 PREDIABETES: ICD-10-CM

## 2024-12-16 DIAGNOSIS — L40.9 PSORIASIS: ICD-10-CM

## 2024-12-16 DIAGNOSIS — K22.70 BARRETT'S ESOPHAGUS WITHOUT DYSPLASIA: ICD-10-CM

## 2024-12-16 DIAGNOSIS — Z82.49 FAMILY HISTORY OF CORONARY ARTERY DISEASE: ICD-10-CM

## 2024-12-16 DIAGNOSIS — Z95.1 H/O CORONARY ARTERY BYPASS SURGERY: ICD-10-CM

## 2024-12-16 DIAGNOSIS — I10 HYPERTENSION GOAL BP (BLOOD PRESSURE) < 140/90: ICD-10-CM

## 2024-12-16 DIAGNOSIS — R91.8 PULMONARY NODULES: ICD-10-CM

## 2024-12-16 DIAGNOSIS — I25.810 CORONARY ARTERY DISEASE INVOLVING CORONARY BYPASS GRAFT OF NATIVE HEART WITHOUT ANGINA PECTORIS: Primary | ICD-10-CM

## 2024-12-16 DIAGNOSIS — F41.9 ANXIETY: ICD-10-CM

## 2024-12-16 DIAGNOSIS — E66.811 CLASS 1 OBESITY WITH SERIOUS COMORBIDITY AND BODY MASS INDEX (BMI) OF 30.0 TO 30.9 IN ADULT, UNSPECIFIED OBESITY TYPE: ICD-10-CM

## 2024-12-16 DIAGNOSIS — Z80.42 FAMILY HISTORY OF PROSTATE CANCER: ICD-10-CM

## 2024-12-16 LAB
ALBUMIN SERPL BCG-MCNC: 3.7 G/DL (ref 3.5–5.2)
ALBUMIN UR-MCNC: NEGATIVE MG/DL
ALP SERPL-CCNC: 118 U/L (ref 40–150)
ALT SERPL W P-5'-P-CCNC: 18 U/L (ref 0–70)
ANION GAP SERPL CALCULATED.3IONS-SCNC: 9 MMOL/L (ref 7–15)
APPEARANCE UR: CLEAR
AST SERPL W P-5'-P-CCNC: 28 U/L (ref 0–45)
BACTERIA #/AREA URNS HPF: ABNORMAL /HPF
BILIRUB SERPL-MCNC: 0.5 MG/DL
BILIRUB UR QL STRIP: NEGATIVE
BUN SERPL-MCNC: 9.6 MG/DL (ref 8–23)
CALCIUM SERPL-MCNC: 9.6 MG/DL (ref 8.8–10.4)
CHLORIDE SERPL-SCNC: 105 MMOL/L (ref 98–107)
CHOLEST SERPL-MCNC: 118 MG/DL
CK SERPL-CCNC: 43 U/L (ref 39–308)
COLOR UR AUTO: YELLOW
CREAT SERPL-MCNC: 0.93 MG/DL (ref 0.67–1.17)
CREAT UR-MCNC: 264 MG/DL
EGFRCR SERPLBLD CKD-EPI 2021: >90 ML/MIN/1.73M2
ERYTHROCYTE [DISTWIDTH] IN BLOOD BY AUTOMATED COUNT: 12.5 % (ref 10–15)
EST. AVERAGE GLUCOSE BLD GHB EST-MCNC: 108 MG/DL
FASTING STATUS PATIENT QL REPORTED: YES
FASTING STATUS PATIENT QL REPORTED: YES
GLUCOSE SERPL-MCNC: 117 MG/DL (ref 70–99)
GLUCOSE UR STRIP-MCNC: NEGATIVE MG/DL
HBA1C MFR BLD: 5.4 % (ref 0–5.6)
HCO3 SERPL-SCNC: 25 MMOL/L (ref 22–29)
HCT VFR BLD AUTO: 38.7 % (ref 40–53)
HDLC SERPL-MCNC: 30 MG/DL
HGB BLD-MCNC: 13.1 G/DL (ref 13.3–17.7)
HGB UR QL STRIP: NEGATIVE
KETONES UR STRIP-MCNC: NEGATIVE MG/DL
LDLC SERPL CALC-MCNC: 60 MG/DL
LEUKOCYTE ESTERASE UR QL STRIP: ABNORMAL
MCH RBC QN AUTO: 29.9 PG (ref 26.5–33)
MCHC RBC AUTO-ENTMCNC: 33.9 G/DL (ref 31.5–36.5)
MCV RBC AUTO: 88 FL (ref 78–100)
MICROALBUMIN UR-MCNC: 18.5 MG/L
MICROALBUMIN/CREAT UR: 7.01 MG/G CR (ref 0–17)
MUCOUS THREADS #/AREA URNS LPF: PRESENT /LPF
NITRATE UR QL: NEGATIVE
NONHDLC SERPL-MCNC: 88 MG/DL
NT-PROBNP SERPL-MCNC: 129 PG/ML (ref 0–900)
PH UR STRIP: 5.5 [PH] (ref 5–7)
PLATELET # BLD AUTO: 205 10E3/UL (ref 150–450)
POTASSIUM SERPL-SCNC: 4.5 MMOL/L (ref 3.4–5.3)
PROT SERPL-MCNC: 6.8 G/DL (ref 6.4–8.3)
RBC # BLD AUTO: 4.38 10E6/UL (ref 4.4–5.9)
RBC #/AREA URNS AUTO: ABNORMAL /HPF
SODIUM SERPL-SCNC: 139 MMOL/L (ref 135–145)
SP GR UR STRIP: >=1.03 (ref 1–1.03)
SQUAMOUS #/AREA URNS AUTO: ABNORMAL /LPF
TRIGL SERPL-MCNC: 140 MG/DL
UROBILINOGEN UR STRIP-ACNC: 1 E.U./DL
WBC # BLD AUTO: 6.2 10E3/UL (ref 4–11)
WBC #/AREA URNS AUTO: ABNORMAL /HPF

## 2024-12-16 PROCEDURE — 83036 HEMOGLOBIN GLYCOSYLATED A1C: CPT | Performed by: FAMILY MEDICINE

## 2024-12-16 PROCEDURE — 82043 UR ALBUMIN QUANTITATIVE: CPT | Performed by: FAMILY MEDICINE

## 2024-12-16 PROCEDURE — 82570 ASSAY OF URINE CREATININE: CPT | Performed by: FAMILY MEDICINE

## 2024-12-16 PROCEDURE — 80053 COMPREHEN METABOLIC PANEL: CPT | Performed by: FAMILY MEDICINE

## 2024-12-16 PROCEDURE — G2211 COMPLEX E/M VISIT ADD ON: HCPCS | Performed by: FAMILY MEDICINE

## 2024-12-16 PROCEDURE — 85027 COMPLETE CBC AUTOMATED: CPT | Performed by: FAMILY MEDICINE

## 2024-12-16 PROCEDURE — 82550 ASSAY OF CK (CPK): CPT | Performed by: FAMILY MEDICINE

## 2024-12-16 PROCEDURE — 83880 ASSAY OF NATRIURETIC PEPTIDE: CPT | Performed by: FAMILY MEDICINE

## 2024-12-16 PROCEDURE — 80061 LIPID PANEL: CPT | Performed by: FAMILY MEDICINE

## 2024-12-16 PROCEDURE — 36415 COLL VENOUS BLD VENIPUNCTURE: CPT | Performed by: FAMILY MEDICINE

## 2024-12-16 PROCEDURE — 99214 OFFICE O/P EST MOD 30 MIN: CPT | Performed by: FAMILY MEDICINE

## 2024-12-16 PROCEDURE — 81001 URINALYSIS AUTO W/SCOPE: CPT | Performed by: FAMILY MEDICINE

## 2024-12-16 ASSESSMENT — ASTHMA QUESTIONNAIRES
QUESTION_1 LAST FOUR WEEKS HOW MUCH OF THE TIME DID YOUR ASTHMA KEEP YOU FROM GETTING AS MUCH DONE AT WORK, SCHOOL OR AT HOME: NONE OF THE TIME
QUESTION_4 LAST FOUR WEEKS HOW OFTEN HAVE YOU USED YOUR RESCUE INHALER OR NEBULIZER MEDICATION (SUCH AS ALBUTEROL): NOT AT ALL
ACT_TOTALSCORE: 25
ACT_TOTALSCORE: 25
QUESTION_5 LAST FOUR WEEKS HOW WOULD YOU RATE YOUR ASTHMA CONTROL: COMPLETELY CONTROLLED
QUESTION_2 LAST FOUR WEEKS HOW OFTEN HAVE YOU HAD SHORTNESS OF BREATH: NOT AT ALL
QUESTION_3 LAST FOUR WEEKS HOW OFTEN DID YOUR ASTHMA SYMPTOMS (WHEEZING, COUGHING, SHORTNESS OF BREATH, CHEST TIGHTNESS OR PAIN) WAKE YOU UP AT NIGHT OR EARLIER THAN USUAL IN THE MORNING: NOT AT ALL

## 2024-12-16 NOTE — PROGRESS NOTES
Assessment & Plan     Coronary artery disease involving coronary bypass graft of native heart without angina pectoris    - Comprehensive metabolic panel  - Lipid panel reflex to direct LDL Fasting  - BNP-N terminal pro  - Hemoglobin A1c  - PRIMARY CARE FOLLOW-UP SCHEDULING    Ischemic cardiomyopathy    - Comprehensive metabolic panel  - Lipid panel reflex to direct LDL Fasting  - BNP-N terminal pro  - Hemoglobin A1c  - PRIMARY CARE FOLLOW-UP SCHEDULING    Ejection fraction < 50%    - Comprehensive metabolic panel  - Lipid panel reflex to direct LDL Fasting  - BNP-N terminal pro  - Hemoglobin A1c  - PRIMARY CARE FOLLOW-UP SCHEDULING    H/O coronary artery bypass surgery    - Comprehensive metabolic panel  - Lipid panel reflex to direct LDL Fasting  - BNP-N terminal pro  - Hemoglobin A1c  - PRIMARY CARE FOLLOW-UP SCHEDULING    Family history of coronary artery disease    - Comprehensive metabolic panel  - Lipid panel reflex to direct LDL Fasting  - BNP-N terminal pro  - Hemoglobin A1c  - PRIMARY CARE FOLLOW-UP SCHEDULING    Post-thoracotomy pain syndrome    - PRIMARY CARE FOLLOW-UP SCHEDULING    Prediabetes    - Comprehensive metabolic panel  - CBC with platelets  - UA Macroscopic with reflex to Microscopic and Culture  - Albumin Random Urine Quantitative with Creat Ratio  - Hemoglobin A1c  - PRIMARY CARE FOLLOW-UP SCHEDULING    Hypertension goal BP (blood pressure) < 140/90    - Comprehensive metabolic panel  - UA Macroscopic with reflex to Microscopic and Culture  - Albumin Random Urine Quantitative with Creat Ratio  - PRIMARY CARE FOLLOW-UP SCHEDULING    Hyperlipidemia LDL goal <70    - Comprehensive metabolic panel  - Lipid panel reflex to direct LDL Fasting  - CK total  - PRIMARY CARE FOLLOW-UP SCHEDULING    Pulmonary nodules    - PRIMARY CARE FOLLOW-UP SCHEDULING    Mild intermittent asthma without complication    - PRIMARY CARE FOLLOW-UP SCHEDULING    Psoriasis    - Comprehensive metabolic panel  - CBC with  "platelets  - PRIMARY CARE FOLLOW-UP SCHEDULING    Psoriatic arthritis (H)    - Comprehensive metabolic panel  - CBC with platelets  - PRIMARY CARE FOLLOW-UP SCHEDULING    Fatty liver    - Comprehensive metabolic panel  - PRIMARY CARE FOLLOW-UP SCHEDULING    Sullivan's esophagus without dysplasia    - Comprehensive metabolic panel  - CBC with platelets  - PRIMARY CARE FOLLOW-UP SCHEDULING    Gastroesophageal reflux disease without esophagitis    - Comprehensive metabolic panel  - CBC with platelets  - PRIMARY CARE FOLLOW-UP SCHEDULING    Nephrolithiasis    - UA Macroscopic with reflex to Microscopic and Culture  - PRIMARY CARE FOLLOW-UP SCHEDULING    Anxiety    - PRIMARY CARE FOLLOW-UP SCHEDULING    Family history of prostate cancer    - PRIMARY CARE FOLLOW-UP SCHEDULING    Class 1 obesity with serious comorbidity and body mass index (BMI) of 30.0 to 30.9 in adult, unspecified obesity type    - PRIMARY CARE FOLLOW-UP SCHEDULING    Medication monitoring encounter    - Comprehensive metabolic panel  - Lipid panel reflex to direct LDL Fasting  - CBC with platelets  - CK total  - UA Macroscopic with reflex to Microscopic and Culture  - Albumin Random Urine Quantitative with Creat Ratio  - BNP-N terminal pro  - Hemoglobin A1c  - PRIMARY CARE FOLLOW-UP SCHEDULING    BMI  Estimated body mass index is 30.97 kg/m  as calculated from the following:    Height as of this encounter: 1.727 m (5' 8\").    Weight as of this encounter: 92.4 kg (203 lb 11.2 oz).   Weight management plan: diet and exercise    Regular exercise    Plan:    1) Medications: reviewed, refills prn    2) Labs: pending    3) Immunizations: reviewed, consider Twinrix, RSV after 2/14/2025    4) Imaging/Diagnostics: ECHO pending    5) Consults: Dr Welch tomorrow, Dr Mccall    Return in about 6 months (around 6/26/2025) for Complete Physical, Medication Recheck Visit, Follow Up Chronic.    30 minutes spent by myself on the date of the encounter doing chart review, " history and exam, documentation and further activities per the note.    The longitudinal plan of care for the diagnosis(es)/condition(s) as documented were addressed during this visit. Due to the added complexity in care, I will continue to support Pat in the subsequent management and with ongoing continuity of care.    Charline Pappas is a 59 year old, presenting for the following health issues:  Follow Up        12/16/2024     8:17 AM   Additional Questions   Roomed by Barbara MULLINS CMA     History of Present Illness       Reason for visit:  Post heart attack follow up    He eats 4 or more servings of fruits and vegetables daily.He consumes 2 sweetened beverage(s) daily.He exercises with enough effort to increase his heart rate 9 or less minutes per day.  He exercises with enough effort to increase his heart rate 3 or less days per week.   He is taking medications regularly.     CAD / Ischemic cardiomyopathy / Heart Failure / S/P CABG & Stents Follow-up  - Dr Welch     Are you experiencing any shortness of breath? No  Are you experiencing any swelling in your legs or feet?  No  Are you using more pillows than usual? No  Do you cough at night?  No  Do you check your weight daily?  Yes  Have you had a weight change recently?  No  Are you having any of the following side effects from your medications? (Select all that apply)  The patient does not report symptoms of dizziness, fatigue, cough, swelling, or slow heart beat.  Since your last visit, how many times have you gone to the cardiologist, urgent care, emergency room, or hospital because of your heart failure?   1 time to ED.    Last Echo:   Echo result w/o MOPS: Interpretation Summary The visual ejection fraction is 50-55%.There is borderline global hypokinesia of the left ventricle.There is mild (1+) aortic regurgitation.    Post thoracotomy syndrome - stable - on / off    Prediabetes    Lab Results   Component Value Date    A1C 5.4 06/25/2024    A1C 5.5 06/22/2023     A1C 5.4 06/16/2022    A1C 5.3 07/29/2021    A1C 5.4 06/05/2020    A1C 5.4 06/12/2019    A1C 5.6 05/16/2018    A1C 5.4 01/30/2018    A1C 5.6 02/14/2017     Htn    BP Readings from Last 3 Encounters:   12/16/24 120/70   10/02/24 (!) 142/88   09/27/24 112/70     Creatinine   Date Value Ref Range Status   10/02/2024 0.87 0.67 - 1.17 mg/dL Final   03/23/2021 0.830 0.500 - 1.300 mg/dL Final     GFR Estimate   Date Value Ref Range Status   10/02/2024 >90 >60 mL/min/1.73m2 Final     Comment:     eGFR calculated using 2021 CKD-EPI equation.   01/31/2021 63 >60 mL/min/[1.73_m2] Final     Lipids    Recent Labs   Lab Test 09/23/24  1007 06/25/24  1111   CHOL 105 161   HDL 30* 41   LDL 51 69   TRIG 119 253*     Pulmonary nodules - Asthma - no issues        6/21/2023    10:16 AM 6/24/2024    10:17 AM 12/16/2024     8:20 AM   ACT Total Scores   ACT TOTAL SCORE (Goal Greater than or Equal to 20) 22 25 25   In the past 12 months, how many times did you visit the emergency room for your asthma without being admitted to the hospital? 0  0  0   In the past 12 months, how many times were you hospitalized overnight because of your asthma? 0  0  0       Patient-reported     Psoriatic arthritis / Psoriasis - Dr Mccall - off methotrexate - doing ok    Fatty Liver    GERD / Sullivan's - due for EGD - no issues    Nephrolithiasis - no recent issues    Anxiety - doing well    Wt Readings from Last 4 Encounters:   12/16/24 92.4 kg (203 lb 11.2 oz)   10/02/24 94.5 kg (208 lb 5.4 oz)   09/27/24 93.1 kg (205 lb 3.2 oz)   09/10/24 92.5 kg (204 lb)     Patient Active Problem List   Diagnosis    Other atopic dermatitis and related conditions    Mild intermittent asthma    Family history of coronary artery disease    Family history of prostate cancer    Prediabetes    GERD (gastroesophageal reflux disease)    Sullivan's esophagus    Anxiety    Hypertension goal BP (blood pressure) < 140/90    Post-thoracotomy pain syndrome    Nephrolithiasis    Coronary  artery disease    Hyperlipidemia LDL goal <70    Environmental allergies    H/O coronary artery bypass surgery    Psoriatic arthritis (H)    Class 1 obesity with serious comorbidity and body mass index (BMI) of 30.0 to 30.9 in adult, unspecified obesity type    Latent tuberculosis    Pulmonary nodules    Fatty liver    Gallstone    Psoriasis    NSTEMI (non-ST elevated myocardial infarction) (H)    Ejection fraction < 50%    CAD (coronary artery disease)    Ischemic cardiomyopathy    ACS (acute coronary syndrome) (H)    Status post coronary angiogram    Coronary artery disease involving native coronary artery of native heart without angina pectoris       Past Medical History:   Diagnosis Date    2019 novel coronavirus disease (COVID-19) 02/2021    Anxiety     Sullivan's esophagus 07/2011    Coronary artery disease 04/2008    cabg x 5    Environmental allergies     seasonal, hay fever    FAM HX-CARDIOVAS DIS NEC     dad at 45, cabg, stent    Family history of prostate cancer     Fatty liver     Gallstone     GERD (gastroesophageal reflux disease) 03/2011    History of blood transfusion     Hyperlipidemia LDL goal <70 2006    Hypertension goal BP (blood pressure) < 140/90 04/2008    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    Dr Saba, Hx histoplasmosis, treated    Mild intermittent asthma 07/2003    Nephrolithiasis 6/13, 7/16    calcium oxalate - 6/2013, 7/2016, 1/2021    Other atopic dermatitis and related conditions 1980    Other diseases of lung, not elsewhere classified 04/2008    dr steel - benign bx and ct - granuloma - no further w/u needed    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    dr hoover    Prediabetes 08/2008    Psoriasis     Dr Mccall    Psoriatic arthritis (H)     Dr Mccall    Pulmonary nodules 2009    stable in 2012    Seasonal allergies 1980    Allergic rhinitis Hayfever       Past Surgical History:   Procedure Laterality Date    COLONOSCOPY N/A 08/07/2015    normal - due 10 yrs    CV ANGIOGRAM CORONARY GRAFT N/A  8/27/2024    Procedure: Coronary Angiogram Graft;  Surgeon: Matteo Christy MD;  Location: [facility]    CV CORONARY ANGIOGRAM N/A 8/27/2024    Procedure: Coronary Angiogram;  Surgeon: Matteo Christy MD;  Location: [facility]    CV CORONARY ANGIOGRAM N/A 9/23/2024    Procedure: Coronary Angiogram;  Surgeon: Matteo Christy MD;  Location: [facility]    CV INTRAVASULAR ULTRASOUND N/A 8/27/2024    Procedure: Intravascular Ultrasound;  Surgeon: Matteo Christy MD;  Location: [facility]    CV INTRAVASULAR ULTRASOUND N/A 9/23/2024    Procedure: Intravascular Ultrasound;  Surgeon: Matteo Christy MD;  [facility]    CV PCI N/A 8/27/2024    Procedure: Percutaneous Coronary Intervention;  Surgeon: Matteo Christy MD;  [facility]    CV PCI N/A 9/23/2024    Procedure: Percutaneous Coronary Intervention;  Surgeon: Matteo Christy MD;  [facility]    CV PCI ASPIRATION THROMECTOMY N/A 8/27/2024    Procedure: Percutaneous Coronary Intervention Aspiration Thrombectomy;  Surgeon: Matteo Christy MD;  [facility]    ESOPHAGOSCOPY, GASTROSCOPY, DUODENOSCOPY (EGD), COMBINED  6/11, 5/13    [facility] - ? gastritis, fowler's - due 3 yr    ESOPHAGOSCOPY, GASTROSCOPY, DUODENOSCOPY (EGD), COMBINED  05/10/2013    COMBINED ESOPHAGOSCOPY, GASTROSCOPY, DUODENOSCOPY (EGD), BIOPSY SINGLE OR MULTIPLE;  ESOPHAGOSCOPY, GASTROSCOPY, DUODENOSCOPY (EGD)  with biopsy;  Surgeon: Angus Reynolds MD;  Location:  GI    ESOPHAGOSCOPY, GASTROSCOPY, DUODENOSCOPY (EGD), COMBINED N/A 07/28/2017    Fowler's - recheck 3 yrs    HC VASECTOMY UNILAT/BILAT W POSTOP SEMEN  1995    Vasectomy    HERNIORRHAPHY UMBILICAL N/A 11/13/2014    Dr Barron    SURGICAL HISTORY OF -  Left 1980    lt patella fx    SURGICAL HISTORY OF -  Left 1985    lt thumb neuroma excised    SURGICAL HISTORY OF -    11/2009    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      albuterol (PROAIR HFA) 108 (90 Base) MCG/ACT inhaler Inhale 2 puffs into the lungs every 4 hours as needed for shortness of breath or wheezing 18 g 3    aspirin 81 MG EC tablet Take 1 tablet (81 mg) by mouth daily. Start tomorrow. Do not start before August 28, 2024. 30 tablet 3    atorvastatin (LIPITOR) 80 MG tablet Take 1 tablet (80 mg) by mouth daily 90 tablet 3    azelastine (ASTELIN) 0.1 % nasal spray Spray 2 sprays into both nostrils 2 times daily 90 mL 3    carvedilol (COREG) 6.25 MG tablet Take 1 tablet (6.25 mg) by mouth 2 times daily (with meals) 180 tablet 3    cetirizine (ZYRTEC) 10 MG tablet Take 1 tablet (10 mg) by mouth every evening 90 tablet 3    citalopram (CELEXA) 20 MG tablet Take 1 tablet (20 mg) by mouth daily 90 tablet 3    clobetasol propionate (TEMOVATE) 0.05 % external cream Apply topically 2 times daily 30 g 3    etanercept (ENBREL) 50 MG/ML injection Inject 0.98 mLs (50 mg) Subcutaneous once a week      fluticasone (FLONASE) 50 MCG/ACT nasal spray Spray 2 sprays into both nostrils daily 48 g 3    LANsoprazole (PREVACID) 30 MG DR capsule TAKE ONE CAPSULE BY MOUTH DAILY. TAKE 30 TO 60 MINUTES BEFORE A MEAL. 90 capsule 3    lisinopril (ZESTRIL) 2.5 MG tablet Take 1 tablet (2.5 mg) by mouth daily. 90 tablet 3    nitroGLYcerin (NITROSTAT) 0.4 MG sublingual tablet For chest pain place 1 tablet under the tongue every 5 minutes for 3 doses. If symptoms persist 5 minutes after 2nd dose call 911. 30 tablet 0    ticagrelor (BRILINTA) 90 MG  tablet Take 1 tablet (90 mg) by mouth 2 times daily. Dose to start this evening. 180 tablet 3       Allergies   Allergen Reactions    Seasonal Allergies        Family History   Problem Relation Age of Onset    Prostate Cancer Father 69    Coronary Artery Disease Father 38    Hypertension Father     Neurologic Disorder Sister         CVA/aneurysm at age 19, decreased memory, partial paralysis    Hypertension Paternal Grandfather     C.A.D. Paternal Grandfather     Heart Failure Paternal Grandfather     Colon Cancer No family hx of        Social History     Socioeconomic History    Marital status:      Spouse name: Imelda    Number of children: 0    Years of education: 14    Highest education level: None   Occupational History     Comment: [employer]   Tobacco Use    Smoking status: Former     Types: Cigars    Smokeless tobacco: Never    Tobacco comments:     In past rare cigar, last 2005   Vaping Use    Vaping status: Never Used   Substance and Sexual Activity    Alcohol use: Yes     Types: 6 Standard drinks or equivalent per week     Comment: 6 drinks per week    Drug use: No    Sexual activity: Yes     Partners: Female     Birth control/protection: Male Surgical   Other Topics Concern    Caffeine Concern Yes     Comment: 1 can qd    Exercise Yes     Comment: occas    Seat Belt Yes    Parent/sibling w/ CABG, MI or angioplasty before 65F 55M? Yes     Social Drivers of Health     Financial Resource Strain: Low Risk  (8/27/2024)    Financial Resource Strain     Within the past 12 months, have you or your family members you live with been unable to get utilities (heat, electricity) when it was really needed?: No   Recent Concern: Financial Resource Strain - High Risk (6/24/2024)    Financial Resource Strain     Within the past 12 months, have you or your family members you live with been unable to get utilities (heat, electricity) when it was really needed?: Yes   Food Insecurity: Low Risk  (8/27/2024)    Food  Insecurity     Within the past 12 months, did you worry that your food would run out before you got money to buy more?: No     Within the past 12 months, did the food you bought just not last and you didn t have money to get more?: No   Transportation Needs: Low Risk  (8/27/2024)    Transportation Needs     Within the past 12 months, has lack of transportation kept you from medical appointments, getting your medicines, non-medical meetings or appointments, work, or from getting things that you need?: No   Physical Activity: Insufficiently Active (6/24/2024)    Exercise Vital Sign     Days of Exercise per Week: 1 day     Minutes of Exercise per Session: 20 min   Stress: No Stress Concern Present (6/24/2024)    [employer] - Occupational Stress Questionnaire     Feeling of Stress : Only a little   Social Connections: Unknown (6/24/2024)    Social Connection and Isolation Panel [NHANES]     Frequency of Social Gatherings with Friends and Family: Once a week   Interpersonal Safety: Low Risk  (8/27/2024)    Interpersonal Safety     Do you feel physically and emotionally safe where you currently live?: Yes     Within the past 12 months, have you been hit, slapped, kicked or otherwise physically hurt by someone?: No     Within the past 12 months, have you been humiliated or emotionally abused in other ways by your partner or ex-partner?: No   Housing Stability: Low Risk  (8/27/2024)    Housing Stability     Do you have housing? : Yes     Are you worried about losing your housing?: No         Review of Systems  CONSTITUTIONAL: NEGATIVE for fever, chills, change in weight  INTEGUMENTARY/SKIN: NEGATIVE for worrisome rashes, moles or lesions  EYES: NEGATIVE for vision changes or irritation  ENT/MOUTH: NEGATIVE for ear, mouth and throat problems  RESP: NEGATIVE for significant cough or SOB  CV: NEGATIVE for chest pain, palpitations or peripheral edema  GI: NEGATIVE for nausea, abdominal pain,  "heartburn, or change in bowel habits  : NEGATIVE for frequency, dysuria, or hematuria  MUSCULOSKELETAL: NEGATIVE for significant arthralgias or myalgia  NEURO: NEGATIVE for weakness, dizziness or paresthesias  ENDOCRINE: NEGATIVE for temperature intolerance, skin/hair changes  HEME: NEGATIVE for bleeding problems  PSYCHIATRIC: NEGATIVE for changes in mood or affect      Objective    /70   Pulse 72   Temp 97  F (36.1  C) (Tympanic)   Resp 16   Ht 1.727 m (5' 8\")   Wt 92.4 kg (203 lb 11.2 oz)   SpO2 96%   BMI 30.97 kg/m    Body mass index is 30.97 kg/m .    Physical Exam   GENERAL: alert and no distress  EYES: Eyes grossly normal to inspection, PERRL and conjunctivae and sclerae normal  HENT: ear canals and TM's normal, nose and mouth without ulcers or lesions  NECK: no adenopathy, no asymmetry, masses, or scars  RESP: lungs clear to auscultation - no rales, rhonchi or wheezes  CV: regular rate and rhythm, normal S1 S2, no S3 or S4, no murmur, click or rub, no peripheral edema  ABDOMEN: soft, nontender, no hepatosplenomegaly, no masses and bowel sounds normal  MS: no gross musculoskeletal defects noted, no edema  SKIN: no suspicious lesions or rashes  NEURO: Normal strength and tone, mentation intact and speech normal  PSYCH: mentation appears normal, affect normal/bright    Labs reviewed / pending        Signed Electronically by: Fady Adams MD    "

## 2024-12-17 ENCOUNTER — OFFICE VISIT (OUTPATIENT)
Dept: CARDIOLOGY | Facility: CLINIC | Age: 59
End: 2024-12-17
Attending: PHYSICIAN ASSISTANT
Payer: COMMERCIAL

## 2024-12-17 VITALS
HEART RATE: 71 BPM | WEIGHT: 203.5 LBS | OXYGEN SATURATION: 98 % | BODY MASS INDEX: 30.84 KG/M2 | DIASTOLIC BLOOD PRESSURE: 72 MMHG | SYSTOLIC BLOOD PRESSURE: 130 MMHG | HEIGHT: 68 IN

## 2024-12-17 DIAGNOSIS — I25.10 CORONARY ARTERY DISEASE INVOLVING NATIVE CORONARY ARTERY OF NATIVE HEART WITHOUT ANGINA PECTORIS: ICD-10-CM

## 2024-12-17 NOTE — LETTER
12/17/2024    Fady Adams MD  [address] [facility] [address]    RE: Mian JAELYN Lozano       Dear Colleague,     I had the pleasure of seeing Mian G Blake in the [facility].  HPI and Plan:   59-year-old gentleman returning for follow-up of chronic coronary artery disease.    On 04/29/2008, he underwent a 5-vessel coronary artery bypass surgery off-pump with endoscopic vein harvest to the diagonal and circumflex as a Y graft, saphenous vein graft for the PDA and SILVIA sequentially and the left internal mammary artery to the LAD.  A wedge biopsy of a left upper lobe lung nodule was also performed.  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  Unfortunately, the patient developed recurrent left pleural effusion and required pleurodesis.      He tells me that post procedure he developed myofascial syndrome with chronic left-sided and left back pain.    August 2024, he presented with acute coronary syndrome.  He had ST segment depression on his EKG in the lateral leads and troponin peak preprocedure was 792.  He had repeat coronary angiography and significant lesions were seen in the vein graft to the right PDA as well as the ramus intermedius artery.  He had the former revascularized and he returned several weeks later to have elective revascularization of the ramus.    Since then he has done well.  He has not had any symptoms of angina.    Cardiac examination today is notable only for the presence of well-healed surgical scars.  Blood pressures under adequate control.    On 80 mg of atorvastatin his LDL is between 50-60 and that should be adequate.    I advised him to continue to dual antiplatelet therapy with aspirin and Brilinta for 1 year given his presentation.    Follow-up in 6 months time.               Orders Placed This Encounter   Procedures     Follow-Up with Cardiology       No orders of the defined types were placed in this encounter.      Encounter Diagnosis   Name Primary?      Coronary artery disease involving native coronary artery of native heart without angina pectoris        CURRENT MEDICATIONS:  Current Outpatient Medications   Medication Sig Dispense Refill     acetaminophen (TYLENOL) 325 MG tablet Take 2 tablets (650 mg) by mouth every 4 hours as needed for mild pain or other (and adjunct with moderate or severe pain or per patient request).       albuterol (PROAIR HFA) 108 (90 Base) MCG/ACT inhaler Inhale 2 puffs into the lungs every 4 hours as needed for shortness of breath or wheezing 18 g 3     aspirin 81 MG EC tablet Take 1 tablet (81 mg) by mouth daily. Start tomorrow. Do not start before August 28, 2024. 30 tablet 3     atorvastatin (LIPITOR) 80 MG tablet Take 1 tablet (80 mg) by mouth daily 90 tablet 3     azelastine (ASTELIN) 0.1 % nasal spray Spray 2 sprays into both nostrils 2 times daily 90 mL 3     carvedilol (COREG) 6.25 MG tablet Take 1 tablet (6.25 mg) by mouth 2 times daily (with meals) 180 tablet 3     cetirizine (ZYRTEC) 10 MG tablet Take 1 tablet (10 mg) by mouth every evening 90 tablet 3     citalopram (CELEXA) 20 MG tablet Take 1 tablet (20 mg) by mouth daily 90 tablet 3     clobetasol propionate (TEMOVATE) 0.05 % external cream Apply topically 2 times daily 30 g 3     etanercept (ENBREL) 50 MG/ML injection Inject 0.98 mLs (50 mg) Subcutaneous once a week       fluticasone (FLONASE) 50 MCG/ACT nasal spray Spray 2 sprays into both nostrils daily 48 g 3     LANsoprazole (PREVACID) 30 MG DR capsule TAKE ONE CAPSULE BY MOUTH DAILY. TAKE 30 TO 60 MINUTES BEFORE A MEAL. 90 capsule 3     lisinopril (ZESTRIL) 2.5 MG tablet Take 1 tablet (2.5 mg) by mouth daily. 90 tablet 3     nitroGLYcerin (NITROSTAT) 0.4 MG sublingual tablet For chest pain place 1 tablet under the tongue every 5 minutes for 3 doses. If symptoms persist 5 minutes after 2nd dose call 911. 30 tablet 0     ticagrelor (BRILINTA) 90 MG tablet Take 1 tablet (90 mg) by mouth 2 times daily. Dose to start  this evening. 180 tablet 3       ALLERGIES     Allergies   Allergen Reactions     Seasonal Allergies        PAST MEDICAL HISTORY:  Past Medical History:   Diagnosis Date     2019 novel coronavirus disease (COVID-19) 02/2021     Anxiety      Sullivan's esophagus 07/2011     Coronary artery disease 04/2008    cabg x 5     Environmental allergies     seasonal, hay fever     FAM HX-CARDIOVAS DIS NEC     dad at 45, cabg, stent     Family history of prostate cancer      Fatty liver      Gallstone      GERD (gastroesophageal reflux disease) 03/2011     History of blood transfusion      Hyperlipidemia LDL goal <70 2006     Hypertension goal BP (blood pressure) < 140/90 04/2008     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    Dr Saba, Hx histoplasmosis, treated     Mild intermittent asthma 07/2003     Nephrolithiasis 6/13, 7/16    calcium oxalate - 6/2013, 7/2016, 1/2021     Other atopic dermatitis and related conditions 1980     Other diseases of lung, not elsewhere classified 04/2008    dr steel - benign bx and ct - granuloma - no further w/u needed     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    dr hoover     Prediabetes 08/2008     Psoriasis     Dr Mccall     Psoriatic arthritis (H)     Dr Mccall     Pulmonary nodules 2009    stable in 2012     Seasonal allergies 1980    Allergic rhinitis Hayfever       PAST SURGICAL HISTORY:  Past Surgical History:   Procedure Laterality Date     COLONOSCOPY N/A 08/07/2015    normal - due 10 yrs     CV ANGIOGRAM CORONARY GRAFT N/A 8/27/2024    Procedure: Coronary Angiogram Graft;  Surgeon: Matteo Christy MD;  Location: [facility] CARDIAC CATH LAB     CV CORONARY ANGIOGRAM N/A 8/27/2024    Procedure: Coronary Angiogram;  Surgeon: Matteo Christy MD;  Location: [facility] CARDIAC CATH LAB     CV CORONARY ANGIOGRAM N/A 9/23/2024    Procedure: Coronary Angiogram;  Surgeon: Matteo Christy MD;  Location: [facility] CARDIAC CATH LAB     CV INTRAVASULAR ULTRASOUND N/A 8/27/2024    Procedure: Intravascular  Ultrasound;  Surgeon: Matteo Christy MD;  Location:  HEART CARDIAC CATH LAB     CV INTRAVASULAR ULTRASOUND N/A 9/23/2024    Procedure: Intravascular Ultrasound;  Surgeon: Matteo Christy MD;  Location:  HEART CARDIAC CATH LAB     CV PCI N/A 8/27/2024    Procedure: Percutaneous Coronary Intervention;  Surgeon: Matteo Christy MD;  Location:  HEART CARDIAC CATH LAB     CV PCI N/A 9/23/2024    Procedure: Percutaneous Coronary Intervention;  Surgeon: Matteo Christy MD;  Location:  HEART CARDIAC CATH LAB     CV PCI ASPIRATION THROMECTOMY N/A 8/27/2024    Procedure: Percutaneous Coronary Intervention Aspiration Thrombectomy;  Surgeon: Matteo Christy MD;  Location: [facility] CARDIAC CATH LAB     ESOPHAGOSCOPY, GASTROSCOPY, DUODENOSCOPY (EGD), COMBINED  6/11, 5/13    [facility] - ? gastritis, fowler's - due 3 yr     ESOPHAGOSCOPY, GASTROSCOPY, DUODENOSCOPY (EGD), COMBINED  05/10/2013    COMBINED ESOPHAGOSCOPY, GASTROSCOPY, DUODENOSCOPY (EGD), BIOPSY SINGLE OR MULTIPLE;  ESOPHAGOSCOPY, GASTROSCOPY, DUODENOSCOPY (EGD)  with biopsy;  Surgeon: Angus Reynolds MD;  Location:  GI     ESOPHAGOSCOPY, GASTROSCOPY, DUODENOSCOPY (EGD), COMBINED N/A 07/28/2017    Fowler's - recheck 3 yrs     HC VASECTOMY UNILAT/BILAT W POSTOP SEMEN  1995    Vasectomy     HERNIORRHAPHY UMBILICAL N/A 11/13/2014    Dr Barron     SURGICAL HISTORY OF -  Left 1980    lt patella fx     SURGICAL HISTORY OF -  Left 1985    lt thumb neuroma excised     SURGICAL HISTORY OF -   11/2009    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 Paternal Grandfather      Heart Failure Paternal Grandfather      Colon Cancer No family hx of        SOCIAL HISTORY:  Social History     Socioeconomic History     Marital status:      Spouse name: Imelda     Number of children: 0     Years of education: 14     Highest education level: None   Occupational History     Comment: [employer]   Tobacco Use     Smoking status: Former     Types: Cigars     Smokeless tobacco: Never     Tobacco comments:     In past rare cigar, last 2005   Vaping Use     Vaping status: Never Used   Substance and Sexual Activity     Alcohol use: Yes     Types: 6 Standard drinks or equivalent per week     Comment: 6 drinks per week     Drug use: No     Sexual activity: Yes     Partners: Female     Birth control/protection: Male Surgical   Other Topics Concern     Caffeine Concern Yes     Comment: 1 can qd     Exercise Yes     Comment: occas     Seat Belt Yes     Parent/sibling w/ CABG, MI or angioplasty before 65F 55M? Yes     Social Drivers of Health     Financial Resource Strain: Low Risk  (8/27/2024)    Financial Resource Strain      Within the past 12 months, have you or your family members you live with been unable to get utilities (heat, electricity) when it was really needed?: No   Recent Concern: Financial Resource Strain - High Risk (6/24/2024)    Financial Resource Strain      Within the past 12 months, have you or your family members you live with been unable to get utilities (heat, electricity) when it was really needed?: Yes   Food Insecurity: Low Risk  (8/27/2024)    Food Insecurity      Within the past 12 months, did you worry that your food would run out before you got money to buy more?: No      Within the past 12 months, did the food you bought just not last and you didn t have money to get more?: No   Transportation Needs: Low Risk  (8/27/2024)    Transportation Needs      Within the past 12 months, has lack of  "transportation kept you from medical appointments, getting your medicines, non-medical meetings or appointments, work, or from getting things that you need?: No   Physical Activity: Insufficiently Active (6/24/2024)    Exercise Vital Sign      Days of Exercise per Week: 1 day      Minutes of Exercise per Session: 20 min   Stress: No Stress Concern Present (6/24/2024)    [place] [employer] - Occupational Stress Questionnaire      Feeling of Stress : Only a little   Social Connections: Unknown (6/24/2024)    Social Connection and Isolation Panel [NHANES]      Frequency of Social Gatherings with Friends and Family: Once a week   Interpersonal Safety: Low Risk  (8/27/2024)    Interpersonal Safety      Do you feel physically and emotionally safe where you currently live?: Yes      Within the past 12 months, have you been hit, slapped, kicked or otherwise physically hurt by someone?: No      Within the past 12 months, have you been humiliated or emotionally abused in other ways by your partner or ex-partner?: No   Housing Stability: Low Risk  (8/27/2024)    Housing Stability      Do you have housing? : Yes      Are you worried about losing your housing?: No       Review of Systems:  Skin:        Eyes:       ENT:       Respiratory:  Negative    Cardiovascular:    lightheadedness, Positive for  Gastroenterology:      Genitourinary:       Musculoskeletal:       Neurologic:       Psychiatric:       Heme/Lymph/Imm:       Endocrine:         Physical Exam:  Vitals: /72 (BP Location: Right arm, Patient Position: Sitting, Cuff Size: Adult Regular)   Pulse 71   Ht 1.727 m (5' 8\")   Wt 92.3 kg (203 lb 8 oz)   SpO2 98%   BMI 30.94 kg/m      Constitutional:           Skin:             Head:           Eyes:           Lymph:      ENT:           Neck:           Respiratory:            Cardiac:                                                           GI:           Extremities and Muscular Skeletal:        "          Neurological:           Psych:           Recent Lab Results:  LIPID RESULTS:  Lab Results   Component Value Date    CHOL 118 12/16/2024    CHOL 112 06/05/2020    HDL 30 (L) 12/16/2024    HDL 30 (L) 06/05/2020    LDL 60 12/16/2024    LDL 57 06/05/2020    TRIG 140 12/16/2024    TRIG 125 06/05/2020    CHOLHDLRATIO 3.9 07/08/2015       LIVER ENZYME RESULTS:  Lab Results   Component Value Date    AST 28 12/16/2024    AST 41 (A) 03/23/2021    ALT 18 12/16/2024    ALT 48 (A) 03/23/2021       CBC RESULTS:  Lab Results   Component Value Date    WBC 6.2 12/16/2024    WBC 12.2 (H) 01/31/2021    RBC 4.38 (L) 12/16/2024    RBC 5.11 01/31/2021    HGB 13.1 (L) 12/16/2024    HGB 16.1 01/31/2021    HCT 38.7 (L) 12/16/2024    HCT 47.5 01/31/2021    MCV 88 12/16/2024    MCV 93 01/31/2021    MCH 29.9 12/16/2024    MCH 31.5 01/31/2021    MCHC 33.9 12/16/2024    MCHC 33.9 01/31/2021    RDW 12.5 12/16/2024    RDW 12.5 01/31/2021     12/16/2024     01/31/2021       BMP RESULTS:  Lab Results   Component Value Date     12/16/2024     01/31/2021    POTASSIUM 4.5 12/16/2024    POTASSIUM 4.9 06/16/2022    POTASSIUM 4.0 01/31/2021    CHLORIDE 105 12/16/2024    CHLORIDE 108 06/16/2022    CHLORIDE 106 01/31/2021    CO2 25 12/16/2024    CO2 30 06/16/2022    CO2 29 01/31/2021    ANIONGAP 9 12/16/2024    ANIONGAP 1 (L) 06/16/2022    ANIONGAP 3 01/31/2021     (H) 12/16/2024     (H) 08/27/2024     (H) 06/16/2022     (H) 01/31/2021    BUN 9.6 12/16/2024    BUN 11 06/16/2022    BUN 14 01/31/2021    CR 0.93 12/16/2024    CR 0.830 03/23/2021    GFRESTIMATED >90 12/16/2024    GFRESTIMATED 63 01/31/2021    GFRESTBLACK 76 01/31/2021    CYNDI 9.6 12/16/2024    CYNDI 9.1 01/31/2021        A1C RESULTS:  Lab Results   Component Value Date    A1C 5.4 12/16/2024    A1C 5.4 06/05/2020       INR RESULTS:  Lab Results   Component Value Date    INR 1.00 09/23/2024    INR 0.97 11/23/2009    INR 0.98 11/02/2009            CC  Willa Cano PA-C  [address] [facility] [place] [address]                     Thank you for allowing me to participate in the care of your patient.      Sincerely,     DR DIAZ MENDOZA MD     [facility]  cc:   Willa Cano PA-C  [address] [place] [place] [address]

## 2024-12-17 NOTE — PROGRESS NOTES
HPI and Plan:   59-year-old gentleman returning for follow-up of chronic coronary artery disease.    On 04/29/2008, he underwent a 5-vessel coronary artery bypass surgery off-pump with endoscopic vein harvest to the diagonal and circumflex as a Y graft, saphenous vein graft for the PDA and SILVIA sequentially and the left internal mammary artery to the LAD.  A wedge biopsy of a left upper lobe lung nodule was also performed.  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  Unfortunately, the patient developed recurrent left pleural effusion and required pleurodesis.      He tells me that post procedure he developed myofascial syndrome with chronic left-sided and left back pain.    August 2024, he presented with acute coronary syndrome.  He had ST segment depression on his EKG in the lateral leads and troponin peak preprocedure was 792.  He had repeat coronary angiography and significant lesions were seen in the vein graft to the right PDA as well as the ramus intermedius artery.  He had the former revascularized and he returned several weeks later to have elective revascularization of the ramus.    Since then he has done well.  He has not had any symptoms of angina.    Cardiac examination today is notable only for the presence of well-healed surgical scars.  Blood pressures under adequate control.    On 80 mg of atorvastatin his LDL is between 50-60 and that should be adequate.    I advised him to continue to dual antiplatelet therapy with aspirin and Brilinta for 1 year given his presentation.    Follow-up in 6 months time.               Orders Placed This Encounter   Procedures    Follow-Up with Cardiology       No orders of the defined types were placed in this encounter.      Encounter Diagnosis   Name Primary?    Coronary artery disease involving native coronary artery of native heart without angina pectoris        CURRENT MEDICATIONS:  Current Outpatient Medications   Medication Sig Dispense Refill    acetaminophen  (TYLENOL) 325 MG tablet Take 2 tablets (650 mg) by mouth every 4 hours as needed for mild pain or other (and adjunct with moderate or severe pain or per patient request).      albuterol (PROAIR HFA) 108 (90 Base) MCG/ACT inhaler Inhale 2 puffs into the lungs every 4 hours as needed for shortness of breath or wheezing 18 g 3    aspirin 81 MG EC tablet Take 1 tablet (81 mg) by mouth daily. Start tomorrow. Do not start before August 28, 2024. 30 tablet 3    atorvastatin (LIPITOR) 80 MG tablet Take 1 tablet (80 mg) by mouth daily 90 tablet 3    azelastine (ASTELIN) 0.1 % nasal spray Spray 2 sprays into both nostrils 2 times daily 90 mL 3    carvedilol (COREG) 6.25 MG tablet Take 1 tablet (6.25 mg) by mouth 2 times daily (with meals) 180 tablet 3    cetirizine (ZYRTEC) 10 MG tablet Take 1 tablet (10 mg) by mouth every evening 90 tablet 3    citalopram (CELEXA) 20 MG tablet Take 1 tablet (20 mg) by mouth daily 90 tablet 3    clobetasol propionate (TEMOVATE) 0.05 % external cream Apply topically 2 times daily 30 g 3    etanercept (ENBREL) 50 MG/ML injection Inject 0.98 mLs (50 mg) Subcutaneous once a week      fluticasone (FLONASE) 50 MCG/ACT nasal spray Spray 2 sprays into both nostrils daily 48 g 3    LANsoprazole (PREVACID) 30 MG DR capsule TAKE ONE CAPSULE BY MOUTH DAILY. TAKE 30 TO 60 MINUTES BEFORE A MEAL. 90 capsule 3    lisinopril (ZESTRIL) 2.5 MG tablet Take 1 tablet (2.5 mg) by mouth daily. 90 tablet 3    nitroGLYcerin (NITROSTAT) 0.4 MG sublingual tablet For chest pain place 1 tablet under the tongue every 5 minutes for 3 doses. If symptoms persist 5 minutes after 2nd dose call 911. 30 tablet 0    ticagrelor (BRILINTA) 90 MG tablet Take 1 tablet (90 mg) by mouth 2 times daily. Dose to start this evening. 180 tablet 3       ALLERGIES     Allergies   Allergen Reactions    Seasonal Allergies        PAST MEDICAL HISTORY:  Past Medical History:   Diagnosis Date    2019 novel coronavirus disease (COVID-19) 02/2021     Anxiety     Sullivan's esophagus 07/2011    Coronary artery disease 04/2008    cabg x 5    Environmental allergies     seasonal, hay fever    FAM HX-CARDIOVAS DIS NEC     dad at 45, cabg, stent    Family history of prostate cancer     Fatty liver     Gallstone     GERD (gastroesophageal reflux disease) 03/2011    History of blood transfusion     Hyperlipidemia LDL goal <70 2006    Hypertension goal BP (blood pressure) < 140/90 04/2008    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    Dr Saba, Hx histoplasmosis, treated    Mild intermittent asthma 07/2003    Nephrolithiasis 6/13, 7/16    calcium oxalate - 6/2013, 7/2016, 1/2021    Other atopic dermatitis and related conditions 1980    Other diseases of lung, not elsewhere classified 04/2008    dr steel - benign bx and ct - granuloma - no further w/u needed    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    dr hoover    Prediabetes 08/2008    Psoriasis     Dr Mccall    Psoriatic arthritis (H)     Dr Mccall    Pulmonary nodules 2009    stable in 2012    Seasonal allergies 1980    Allergic rhinitis Hayfever       PAST SURGICAL HISTORY:  Past Surgical History:   Procedure Laterality Date    COLONOSCOPY N/A 08/07/2015    normal - due 10 yrs    CV ANGIOGRAM CORONARY GRAFT N/A 8/27/2024    Procedure: Coronary Angiogram Graft;  Surgeon: Matteo Christy MD;  [facility]    CV CORONARY ANGIOGRAM N/A 8/27/2024    Procedure: Coronary Angiogram;  Surgeon: Matteo Christy MD;  Location:  [facility]    CV CORONARY ANGIOGRAM N/A 9/23/2024    Procedure: Coronary Angiogram;  Surgeon: Matteo Christy MD;  [facility]    CV INTRAVASULAR ULTRASOUND N/A 8/27/2024    Procedure: Intravascular Ultrasound;  Surgeon: Matteo Christy MD;  Location:  [facility]    CV INTRAVASULAR ULTRASOUND N/A 9/23/2024    Procedure: Intravascular Ultrasound;  Surgeon: Matteo Christy MD;  [facility]    CV PCI N/A  8/27/2024    Procedure: Percutaneous Coronary Intervention;  Surgeon: Matteo Christy MD;  Location:  [facility]    CV PCI N/A 9/23/2024    Procedure: Percutaneous Coronary Intervention;  Surgeon: Matteo Christy MD;  Location: [facility]    CV PCI ASPIRATION THROMECTOMY N/A 8/27/2024    Procedure: Percutaneous Coronary Intervention Aspiration Thrombectomy;  Surgeon: Matteo Christy MD;  Location:  [facility]    ESOPHAGOSCOPY, GASTROSCOPY, DUODENOSCOPY (EGD), COMBINED  6/11, 5/13    [facility] - ? gastritis, fowler's - due 3 yr    ESOPHAGOSCOPY, GASTROSCOPY, DUODENOSCOPY (EGD), COMBINED  05/10/2013    COMBINED ESOPHAGOSCOPY, GASTROSCOPY, DUODENOSCOPY (EGD), BIOPSY SINGLE OR MULTIPLE;  ESOPHAGOSCOPY, GASTROSCOPY, DUODENOSCOPY (EGD)  with biopsy;  Surgeon: Angus Reynolds MD;  Location:  GI    ESOPHAGOSCOPY, GASTROSCOPY, DUODENOSCOPY (EGD), COMBINED N/A 07/28/2017    Fowler's - recheck 3 yrs    HC VASECTOMY UNILAT/BILAT W POSTOP SEMEN  1995    Vasectomy    HERNIORRHAPHY UMBILICAL N/A 11/13/2014    Dr Barron    SURGICAL HISTORY OF -  Left 1980    lt patella fx    SURGICAL HISTORY OF -  Left 1985    lt thumb neuroma excised    SURGICAL HISTORY OF -   11/2009    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 Paternal Grandfather     Heart Failure Paternal Grandfather     Colon Cancer No family hx of        SOCIAL HISTORY:  Social History     Socioeconomic History    Marital status:      Spouse name: Imelda Salinas  of children: 0    Years of education: 14    Highest education level: None   Occupational History     Comment: [employer]   Tobacco Use    Smoking status: Former     Types: Cigars    Smokeless tobacco: Never    Tobacco comments:     In past rare cigar, last 2005   Vaping Use    Vaping status: Never Used   Substance and Sexual Activity    Alcohol use: Yes     Types: 6 Standard drinks or equivalent per week     Comment: 6 drinks per week    Drug use: No    Sexual activity: Yes     Partners: Female     Birth control/protection: Male Surgical   Other Topics Concern    Caffeine Concern Yes     Comment: 1 can qd    Exercise Yes     Comment: occas    Seat Belt Yes    Parent/sibling w/ CABG, MI or angioplasty before 65F 55M? Yes     Social Drivers of Health     Financial Resource Strain: Low Risk  (8/27/2024)    Financial Resource Strain     Within the past 12 months, have you or your family members you live with been unable to get utilities (heat, electricity) when it was really needed?: No   Recent Concern: Financial Resource Strain - High Risk (6/24/2024)    Financial Resource Strain     Within the past 12 months, have you or your family members you live with been unable to get utilities (heat, electricity) when it was really needed?: Yes   Food Insecurity: Low Risk  (8/27/2024)    Food Insecurity     Within the past 12 months, did you worry that your food would run out before you got money to buy more?: No     Within the past 12 months, did the food you bought just not last and you didn t have money to get more?: No   Transportation Needs: Low Risk  (8/27/2024)    Transportation Needs     Within the past 12 months, has lack of transportation kept you from medical appointments, getting your medicines, non-medical meetings or appointments, work, or from getting things that you need?: No   Physical Activity: Insufficiently Active (6/24/2024)    Exercise Vital Sign     Days of Exercise per Week: 1 day     Minutes of  "Exercise per Session: 20 min   Stress: No Stress Concern Present (6/24/2024)    [employer] - Occupational Stress Questionnaire     Feeling of Stress : Only a little   Social Connections: Unknown (6/24/2024)    Social Connection and Isolation Panel [NHANES]     Frequency of Social Gatherings with Friends and Family: Once a week   Interpersonal Safety: Low Risk  (8/27/2024)    Interpersonal Safety     Do you feel physically and emotionally safe where you currently live?: Yes     Within the past 12 months, have you been hit, slapped, kicked or otherwise physically hurt by someone?: No     Within the past 12 months, have you been humiliated or emotionally abused in other ways by your partner or ex-partner?: No   Housing Stability: Low Risk  (8/27/2024)    Housing Stability     Do you have housing? : Yes     Are you worried about losing your housing?: No       Review of Systems:  Skin:        Eyes:       ENT:       Respiratory:  Negative    Cardiovascular:    lightheadedness, Positive for  Gastroenterology:      Genitourinary:       Musculoskeletal:       Neurologic:       Psychiatric:       Heme/Lymph/Imm:       Endocrine:         Physical Exam:  Vitals: /72 (BP Location: Right arm, Patient Position: Sitting, Cuff Size: Adult Regular)   Pulse 71   Ht 1.727 m (5' 8\")   Wt 92.3 kg (203 lb 8 oz)   SpO2 98%   BMI 30.94 kg/m      Constitutional:           Skin:             Head:           Eyes:           Lymph:      ENT:           Neck:           Respiratory:            Cardiac:                                                           GI:           Extremities and Muscular Skeletal:                 Neurological:           Psych:           Recent Lab Results:  LIPID RESULTS:  Lab Results   Component Value Date    CHOL 118 12/16/2024    CHOL 112 06/05/2020    HDL 30 (L) 12/16/2024    HDL 30 (L) 06/05/2020    LDL 60 12/16/2024    LDL 57 06/05/2020    TRIG 140 12/16/2024    TRIG 125 " 06/05/2020    CHOLHDLRATIO 3.9 07/08/2015       LIVER ENZYME RESULTS:  Lab Results   Component Value Date    AST 28 12/16/2024    AST 41 (A) 03/23/2021    ALT 18 12/16/2024    ALT 48 (A) 03/23/2021       CBC RESULTS:  Lab Results   Component Value Date    WBC 6.2 12/16/2024    WBC 12.2 (H) 01/31/2021    RBC 4.38 (L) 12/16/2024    RBC 5.11 01/31/2021    HGB 13.1 (L) 12/16/2024    HGB 16.1 01/31/2021    HCT 38.7 (L) 12/16/2024    HCT 47.5 01/31/2021    MCV 88 12/16/2024    MCV 93 01/31/2021    MCH 29.9 12/16/2024    MCH 31.5 01/31/2021    MCHC 33.9 12/16/2024    MCHC 33.9 01/31/2021    RDW 12.5 12/16/2024    RDW 12.5 01/31/2021     12/16/2024     01/31/2021       BMP RESULTS:  Lab Results   Component Value Date     12/16/2024     01/31/2021    POTASSIUM 4.5 12/16/2024    POTASSIUM 4.9 06/16/2022    POTASSIUM 4.0 01/31/2021    CHLORIDE 105 12/16/2024    CHLORIDE 108 06/16/2022    CHLORIDE 106 01/31/2021    CO2 25 12/16/2024    CO2 30 06/16/2022    CO2 29 01/31/2021    ANIONGAP 9 12/16/2024    ANIONGAP 1 (L) 06/16/2022    ANIONGAP 3 01/31/2021     (H) 12/16/2024     (H) 08/27/2024     (H) 06/16/2022     (H) 01/31/2021    BUN 9.6 12/16/2024    BUN 11 06/16/2022    BUN 14 01/31/2021    CR 0.93 12/16/2024    CR 0.830 03/23/2021    GFRESTIMATED >90 12/16/2024    GFRESTIMATED 63 01/31/2021    GFRESTBLACK 76 01/31/2021    CYNDI 9.6 12/16/2024    CYNDI 9.1 01/31/2021        A1C RESULTS:  Lab Results   Component Value Date    A1C 5.4 12/16/2024    A1C 5.4 06/05/2020       INR RESULTS:  Lab Results   Component Value Date    INR 1.00 09/23/2024    INR 0.97 11/23/2009    INR 0.98 11/02/2009           CC  Willa Cano, PA-C  [address] [place] [address]

## 2025-03-22 ENCOUNTER — HEALTH MAINTENANCE LETTER (OUTPATIENT)
Age: 60
End: 2025-03-22

## 2025-04-01 ENCOUNTER — TRANSFERRED RECORDS (OUTPATIENT)
Dept: HEALTH INFORMATION MANAGEMENT | Facility: CLINIC | Age: 60
End: 2025-04-01
Payer: COMMERCIAL

## 2025-04-01 PROBLEM — R94.30 ABNORMAL RESULT OF CARDIOVASCULAR FUNCTION STUDY, UNSPECIFIED: Status: ACTIVE | Noted: 2024-09-10

## 2025-04-01 LAB
ALT SERPL-CCNC: 21 IU/L (ref 9–46)
AST SERPL-CCNC: 24 U/L (ref 10–40)
CREATININE (EXTERNAL): 0.9 MG/DL (ref 0.6–1.35)

## 2025-04-09 ENCOUNTER — OFFICE VISIT (OUTPATIENT)
Dept: FAMILY MEDICINE | Facility: CLINIC | Age: 60
End: 2025-04-09
Payer: COMMERCIAL

## 2025-04-09 VITALS
TEMPERATURE: 97.6 F | OXYGEN SATURATION: 97 % | WEIGHT: 200.7 LBS | SYSTOLIC BLOOD PRESSURE: 132 MMHG | HEART RATE: 71 BPM | BODY MASS INDEX: 30.42 KG/M2 | HEIGHT: 68 IN | RESPIRATION RATE: 18 BRPM | DIASTOLIC BLOOD PRESSURE: 76 MMHG

## 2025-04-09 DIAGNOSIS — J45.20 MILD INTERMITTENT ASTHMA WITHOUT COMPLICATION: ICD-10-CM

## 2025-04-09 DIAGNOSIS — G89.12 POST-THORACOTOMY PAIN SYNDROME: ICD-10-CM

## 2025-04-09 DIAGNOSIS — I25.5 ISCHEMIC CARDIOMYOPATHY: ICD-10-CM

## 2025-04-09 DIAGNOSIS — L40.50 PSORIATIC ARTHRITIS (H): ICD-10-CM

## 2025-04-09 DIAGNOSIS — Z82.49 FAMILY HISTORY OF CORONARY ARTERY DISEASE: ICD-10-CM

## 2025-04-09 DIAGNOSIS — I10 HYPERTENSION GOAL BP (BLOOD PRESSURE) < 140/90: ICD-10-CM

## 2025-04-09 DIAGNOSIS — B35.6 TINEA CRURIS: ICD-10-CM

## 2025-04-09 DIAGNOSIS — E78.5 HYPERLIPIDEMIA LDL GOAL <70: ICD-10-CM

## 2025-04-09 DIAGNOSIS — Z95.1 H/O CORONARY ARTERY BYPASS SURGERY: ICD-10-CM

## 2025-04-09 DIAGNOSIS — R73.03 PREDIABETES: ICD-10-CM

## 2025-04-09 DIAGNOSIS — I25.810 CORONARY ARTERY DISEASE INVOLVING CORONARY BYPASS GRAFT OF NATIVE HEART WITHOUT ANGINA PECTORIS: ICD-10-CM

## 2025-04-09 DIAGNOSIS — R30.0 DYSURIA: Primary | ICD-10-CM

## 2025-04-09 DIAGNOSIS — L40.9 PSORIASIS: ICD-10-CM

## 2025-04-09 DIAGNOSIS — Z51.81 MEDICATION MONITORING ENCOUNTER: ICD-10-CM

## 2025-04-09 LAB
ALBUMIN UR-MCNC: NEGATIVE MG/DL
APPEARANCE UR: CLEAR
BILIRUB UR QL STRIP: NEGATIVE
COLOR UR AUTO: YELLOW
GLUCOSE UR STRIP-MCNC: NEGATIVE MG/DL
HGB UR QL STRIP: NEGATIVE
KETONES UR STRIP-MCNC: NEGATIVE MG/DL
LEUKOCYTE ESTERASE UR QL STRIP: NEGATIVE
NITRATE UR QL: NEGATIVE
PH UR STRIP: 6.5 [PH] (ref 5–7)
SP GR UR STRIP: 1.01 (ref 1–1.03)
UROBILINOGEN UR STRIP-ACNC: 0.2 E.U./DL

## 2025-04-09 PROCEDURE — 3078F DIAST BP <80 MM HG: CPT | Performed by: FAMILY MEDICINE

## 2025-04-09 PROCEDURE — 81003 URINALYSIS AUTO W/O SCOPE: CPT | Performed by: FAMILY MEDICINE

## 2025-04-09 PROCEDURE — 3075F SYST BP GE 130 - 139MM HG: CPT | Performed by: FAMILY MEDICINE

## 2025-04-09 PROCEDURE — 99214 OFFICE O/P EST MOD 30 MIN: CPT | Performed by: FAMILY MEDICINE

## 2025-04-09 PROCEDURE — G2211 COMPLEX E/M VISIT ADD ON: HCPCS | Performed by: FAMILY MEDICINE

## 2025-04-09 RX ORDER — TRIAMCINOLONE ACETONIDE 1 MG/G
CREAM TOPICAL 2 TIMES DAILY
Qty: 80 G | Refills: 1 | Status: SHIPPED | OUTPATIENT
Start: 2025-04-09

## 2025-04-09 NOTE — PROGRESS NOTES
Assessment & Plan     Dysuria    - REVIEW OF HEALTH MAINTENANCE PROTOCOL ORDERS  - UA Macroscopic with reflex to Microscopic and Culture - Lab Collect  - UA Macroscopic with reflex to Microscopic and Culture - Lab Collect  - Urine Culture Aerobic Bacterial - lab collect    Tinea cruris    - REVIEW OF HEALTH MAINTENANCE PROTOCOL ORDERS  - triamcinolone (KENALOG) 0.1 % external cream  Dispense: 80 g; Refill: 1    Coronary artery disease involving coronary bypass graft of native heart without angina pectoris    - REVIEW OF HEALTH MAINTENANCE PROTOCOL ORDERS    Ischemic cardiomyopathy    - REVIEW OF HEALTH MAINTENANCE PROTOCOL ORDERS    H/O coronary artery bypass surgery    - REVIEW OF HEALTH MAINTENANCE PROTOCOL ORDERS    Family history of coronary artery disease    - REVIEW OF HEALTH MAINTENANCE PROTOCOL ORDERS    Post-thoracotomy pain syndrome    - REVIEW OF HEALTH MAINTENANCE PROTOCOL ORDERS    Prediabetes    - REVIEW OF HEALTH MAINTENANCE PROTOCOL ORDERS    Hypertension goal BP (blood pressure) < 140/90    - REVIEW OF HEALTH MAINTENANCE PROTOCOL ORDERS    Hyperlipidemia LDL goal <70    - REVIEW OF HEALTH MAINTENANCE PROTOCOL ORDERS    Psoriasis    - REVIEW OF HEALTH MAINTENANCE PROTOCOL ORDERS    Psoriatic arthritis (H)    - REVIEW OF HEALTH MAINTENANCE PROTOCOL ORDERS    Mild intermittent asthma without complication    - REVIEW OF HEALTH MAINTENANCE PROTOCOL ORDERS    Medication monitoring encounter    - REVIEW OF HEALTH MAINTENANCE PROTOCOL ORDERS      Work on weight loss  Regular exercise    Plan:    1) Medications: add triamcinolone to lotrimin ultra twice daily    2) Labs: reviewed    3) Immunizations: reviewed, advised RSV / Twinrix, COVID/Flu in the fall    4) Imaging/Diagnostics: NA    5) Consults: cardiology, rheumatology    Return in about 3 months (around 7/9/2025) for Complete Physical, Medication Recheck Visit, Follow Up Chronic.    30 minutes spent by myself on the date of the encounter doing chart  review, history and exam, documentation and further activities per the note.    The longitudinal plan of care for the diagnosis(es)/condition(s) as documented were addressed during this visit. Due to the added complexity in care, I will continue to support Pat in the subsequent management and with ongoing continuity of care.    Shared Decision making completed.    Charline Pappas is a 60 year old, presenting for the following health issues:  Derm Problem        4/9/2025     2:41 PM   Additional Questions   Roomed by Barbara SPEARS CMA     History of Present Illness       Reason for visit:  Groin rash and check bladder and UT infection  Symptom onset:  More than a month  Symptoms include:  Rash, itch and smell in groin area,  discomfort urinating at times  Symptom intensity:  Moderate  Symptom progression:  Staying the same  Had these symptoms before:  Yes  Has tried/received treatment for these symptoms:  Yes  Previous treatment was successful:  No  What makes it worse:  No  What makes it better:  No   He is taking medications regularly.        Rash - itches / burns / inguinal - penis - with odor - sloughing / scaling of skin - started lotrimin ultra last week, less odor & itching since, some crawling sensation    Genitourinary - Male  Onset/Duration: on / off for months - no prior hx - no freq - no urg - burning/tim at times - no f/c/s - no discharge - no suprapubic / CVA tenderness  Description:   Dysuria (painful urination): discomfort  Hematuria (blood in urine): No  Frequency: No  Waking at night to urinate: No  Hesitancy (delay in urine): No  Retention (unable to empty): No  Decrease in urinary flow: No  Incontinence: No  Progression of Symptoms:  waxing and waning  Accompanying Signs & Symptoms:  Fever: No  Back/Flank pain: No  Urethral discharge: No  Testicle lumps/masses/pain: No  Nausea and/or vomiting: No  Abdominal pain: No  History:   History of frequent UTI s: No  History of kidney stones: YES  History of  hernias: No  Personal or Family history of Prostate problems: YES- father  Sexually active: YES  Precipitating or alleviating factors: None  Therapies tried and outcome: none    CAD / Ischemic cardiomyopathy / Hx of CABG / Stents / Prediabetes / Htn / Lipids - doing well - no cp - no sob - no edema    Psoriatic arthritis / psoriasis - Dr Mccall - stable    Asthma - no issues        6/21/2023    10:16 AM 6/24/2024    10:17 AM 12/16/2024     8:20 AM   ACT Total Scores   ACT TOTAL SCORE (Goal Greater than or Equal to 20) 22 25 25   In the past 12 months, how many times did you visit the emergency room for your asthma without being admitted to the hospital? 0 0 0   In the past 12 months, how many times were you hospitalized overnight because of your asthma? 0 0 0         Weight decreased    Wt Readings from Last 4 Encounters:   04/09/25 91 kg (200 lb 11.2 oz)   12/17/24 92.3 kg (203 lb 8 oz)   12/16/24 92.4 kg (203 lb 11.2 oz)   10/02/24 94.5 kg (208 lb 5.4 oz)     Lab Results   Component Value Date    A1C 5.4 12/16/2024    A1C 5.4 06/25/2024    A1C 5.5 06/22/2023    A1C 5.4 06/16/2022    A1C 5.3 07/29/2021    A1C 5.4 06/05/2020    A1C 5.4 06/12/2019    A1C 5.6 05/16/2018    A1C 5.4 01/30/2018    A1C 5.6 02/14/2017     BP Readings from Last 3 Encounters:   04/09/25 132/76   12/17/24 130/72   12/16/24 120/70     Creatinine   Date Value Ref Range Status   12/16/2024 0.93 0.67 - 1.17 mg/dL Final   03/23/2021 0.830 0.500 - 1.300 mg/dL Final     GFR Estimate   Date Value Ref Range Status   12/16/2024 >90 >60 mL/min/1.73m2 Final     Comment:     eGFR calculated using 2021 CKD-EPI equation.   01/31/2021 63 >60 mL/min/[1.73_m2] Final     Recent Labs   Lab Test 12/16/24  0937 09/23/24  1007   CHOL 118 105   HDL 30* 30*   LDL 60 51   TRIG 140 119     Patient Active Problem List   Diagnosis    Other atopic dermatitis and related conditions    Mild intermittent asthma    Family history of coronary artery disease    Family history of  prostate cancer    Prediabetes    GERD (gastroesophageal reflux disease)    Sullivan's esophagus    Anxiety    Hypertension goal BP (blood pressure) < 140/90    Post-thoracotomy pain syndrome    Nephrolithiasis    Coronary artery disease    Hyperlipidemia LDL goal <70    Environmental allergies    H/O coronary artery bypass surgery    Psoriatic arthritis (H)    Class 1 obesity with serious comorbidity and body mass index (BMI) of 30.0 to 30.9 in adult, unspecified obesity type    Latent tuberculosis    Pulmonary nodules    Fatty liver    Gallstone    Psoriasis    NSTEMI (non-ST elevated myocardial infarction) (H)    Abnormal result of cardiovascular function study, unspecified    CAD (coronary artery disease)    Ischemic cardiomyopathy    ACS (acute coronary syndrome) (H)    Status post coronary angiogram    Coronary artery disease involving native coronary artery of native heart without angina pectoris       Past Medical History:   Diagnosis Date    2019 novel coronavirus disease (COVID-19) 02/2021    Anxiety     Sullivan's esophagus 07/2011    Coronary artery disease 04/2008    cabg x 5    Environmental allergies     seasonal, hay fever    FAM HX-CARDIOVAS DIS NEC     dad at 45, cabg, stent    Family history of prostate cancer     Fatty liver     Gallstone     GERD (gastroesophageal reflux disease) 03/2011    History of blood transfusion     Hyperlipidemia LDL goal <70 2006    Hypertension goal BP (blood pressure) < 140/90 04/2008    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    Dr Saba, Hx histoplasmosis, treated    Mild intermittent asthma 07/2003    Nephrolithiasis 6/13, 7/16    calcium oxalate - 6/2013, 7/2016, 1/2021    Other atopic dermatitis and related conditions 1980    Other diseases of lung, not elsewhere classified 04/2008    dr steel - benign bx and ct - granuloma - no further w/u needed    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    dr hoover    Prediabetes 08/2008    Psoriasis     Dr cMcall    Psoriatic arthritis (H)      Dr Mccall    Pulmonary nodules 2009    stable in 2012    Seasonal allergies 1980    Allergic rhinitis Hayfever       Past Surgical History:   Procedure Laterality Date    COLONOSCOPY N/A 08/07/2015    normal - due 10 yrs    CV ANGIOGRAM CORONARY GRAFT N/A 8/27/2024    Procedure: Coronary Angiogram Graft;  Surgeon: Matteo Christy MD;  Location:  [facility]    CV CORONARY ANGIOGRAM N/A 8/27/2024    Procedure: Coronary Angiogram;  Surgeon: Matteo Christy MD;  Location:  [facility]    CV CORONARY ANGIOGRAM N/A 9/23/2024    Procedure: Coronary Angiogram;  Surgeon: Matteo Christy MD;  Location:  [facility]    CV INTRAVASULAR ULTRASOUND N/A 8/27/2024    Procedure: Intravascular Ultrasound;  Surgeon: Matteo Christy MD;  Location: [facility]    CV INTRAVASULAR ULTRASOUND N/A 9/23/2024    Procedure: Intravascular Ultrasound;  Surgeon: Matteo Christy MD;  Location:  [facility]    CV PCI N/A 8/27/2024    Procedure: Percutaneous Coronary Intervention;  Surgeon: Matteo Christy MD;  Location:  [facility]    CV PCI N/A 9/23/2024    Procedure: Percutaneous Coronary Intervention;  Surgeon: Matteo Christy MD;  Location: [facility]    CV PCI ASPIRATION THROMECTOMY N/A 8/27/2024    Procedure: Percutaneous Coronary Intervention Aspiration Thrombectomy;  Surgeon: Matteo Christy MD;  Location:  [facility]    ESOPHAGOSCOPY, GASTROSCOPY, DUODENOSCOPY (EGD), COMBINED  6/11, 5/13    [facility] - ? gastritis, fowler's - due 3 yr    ESOPHAGOSCOPY, GASTROSCOPY, DUODENOSCOPY (EGD), COMBINED  05/10/2013    COMBINED ESOPHAGOSCOPY, GASTROSCOPY, DUODENOSCOPY (EGD), BIOPSY SINGLE OR MULTIPLE;  ESOPHAGOSCOPY, GASTROSCOPY, DUODENOSCOPY (EGD)  with biopsy;  Surgeon: Angus Reynolds MD;  Location:  GI    ESOPHAGOSCOPY, GASTROSCOPY, DUODENOSCOPY (EGD), COMBINED N/A 07/28/2017    Fowler's - recheck 3 yrs    HC  VASECTOMY UNILAT/BILAT W POSTOP SEMEN  1995    Vasectomy    HERNIORRHAPHY UMBILICAL N/A 11/13/2014    Dr Barron    SURGICAL HISTORY OF -  Left 1980    lt patella fx    SURGICAL HISTORY OF -  Left 1985    lt thumb neuroma excised    SURGICAL HISTORY OF -   11/2009    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      albuterol (PROAIR HFA) 108 (90 Base) MCG/ACT inhaler Inhale 2 puffs into the lungs every 4 hours as needed for shortness of breath or wheezing 18 g 3    aspirin 81 MG EC tablet Take 1 tablet (81 mg) by mouth daily. Start tomorrow. Do not start before August 28, 2024. 30 tablet 3    atorvastatin (LIPITOR) 80 MG tablet Take 1 tablet (80 mg) by mouth daily 90 tablet 3    azelastine (ASTELIN) 0.1 % nasal spray Spray 2 sprays into both nostrils 2 times daily 90 mL 3    carvedilol (COREG) 6.25 MG tablet Take 1 tablet (6.25 mg) by mouth 2 times daily (with meals) 180 tablet 3    cetirizine (ZYRTEC) 10 MG tablet Take 1 tablet (10 mg) by mouth every evening 90 tablet 3    citalopram (CELEXA) 20 MG tablet Take 1 tablet (20 mg) by mouth daily 90 tablet 3    clobetasol propionate (TEMOVATE) 0.05 % external cream Apply topically 2 times daily 30 g 3    etanercept (ENBREL) 50 MG/ML injection Inject 0.98 mLs (50 mg) Subcutaneous once a week      fluticasone (FLONASE) 50 MCG/ACT nasal spray Spray 2 sprays into both nostrils daily 48 g 3    LANsoprazole (PREVACID) 30 MG DR capsule TAKE ONE CAPSULE BY MOUTH DAILY. TAKE 30 TO 60 MINUTES BEFORE A MEAL. 90 capsule 3    lisinopril (ZESTRIL) 2.5 MG tablet Take 1 tablet (2.5 mg)  by mouth daily. 90 tablet 3    nitroGLYcerin (NITROSTAT) 0.4 MG sublingual tablet For chest pain place 1 tablet under the tongue every 5 minutes for 3 doses. If symptoms persist 5 minutes after 2nd dose call 911. 30 tablet 0    ticagrelor (BRILINTA) 90 MG tablet Take 1 tablet (90 mg) by mouth 2 times daily. Dose to start this evening. 180 tablet 3    triamcinolone (KENALOG) 0.1 % external cream Apply topically 2 times daily. 80 g 1       Allergies   Allergen Reactions    Seasonal Allergies        Family History   Problem Relation Age of Onset    Prostate Cancer Father 69    Coronary Artery Disease Father 38    Hypertension Father     Neurologic Disorder Sister         CVA/aneurysm at age 19, decreased memory, partial paralysis    Hypertension Paternal Grandfather     C.A.D. Paternal Grandfather     Heart Failure Paternal Grandfather     Colon Cancer No family hx of        Social History     Socioeconomic History    Marital status:      Spouse name: Imelda    Number of children: 0    Years of education: 14    Highest education level: None   Occupational History     Comment: [employer]   Tobacco Use    Smoking status: Former     Types: Cigars    Smokeless tobacco: Never    Tobacco comments:     In past rare cigar, last 2005   Vaping Use    Vaping status: Never Used   Substance and Sexual Activity    Alcohol use: Yes     Types: 6 Standard drinks or equivalent per week     Comment: 6 drinks per week    Drug use: No    Sexual activity: Yes     Partners: Female     Birth control/protection: Male Surgical   Other Topics Concern    Caffeine Concern Yes     Comment: 1 can qd    Exercise Yes     Comment: occas    Seat Belt Yes    Parent/sibling w/ CABG, MI or angioplasty before 65F 55M? Yes     Social Drivers of Health     Financial Resource Strain: Low Risk  (8/27/2024)    Financial Resource Strain     Within the past 12 months, have you or your family members you live with been unable to get utilities (heat,  electricity) when it was really needed?: No   Recent Concern: Financial Resource Strain - High Risk (6/24/2024)    Financial Resource Strain     Within the past 12 months, have you or your family members you live with been unable to get utilities (heat, electricity) when it was really needed?: Yes   Food Insecurity: Low Risk  (8/27/2024)    Food Insecurity     Within the past 12 months, did you worry that your food would run out before you got money to buy more?: No     Within the past 12 months, did the food you bought just not last and you didn t have money to get more?: No   Transportation Needs: Low Risk  (8/27/2024)    Transportation Needs     Within the past 12 months, has lack of transportation kept you from medical appointments, getting your medicines, non-medical meetings or appointments, work, or from getting things that you need?: No   Physical Activity: Insufficiently Active (6/24/2024)    Exercise Vital Sign     Days of Exercise per Week: 1 day     Minutes of Exercise per Session: 20 min   Stress: No Stress Concern Present (6/24/2024)    [employer] [place] [employer] - Occupational Stress Questionnaire     Feeling of Stress : Only a little   Social Connections: Unknown (6/24/2024)    Social Connection and Isolation Panel [NHANES]     Frequency of Social Gatherings with Friends and Family: Once a week   Interpersonal Safety: Low Risk  (4/9/2025)    Interpersonal Safety     Do you feel physically and emotionally safe where you currently live?: Yes     Within the past 12 months, have you been hit, slapped, kicked or otherwise physically hurt by someone?: No     Within the past 12 months, have you been humiliated or emotionally abused in other ways by your partner or ex-partner?: No   Housing Stability: Low Risk  (8/27/2024)    Housing Stability     Do you have housing? : Yes     Are you worried about losing your housing?: No         Review of Systems  CONSTITUTIONAL: NEGATIVE for fever, chills,  "change in weight  INTEGUMENTARY/SKIN: NEGATIVE for worrisome rashes, moles or lesions  EYES: NEGATIVE for vision changes or irritation  ENT/MOUTH: NEGATIVE for ear, mouth and throat problems  RESP: NEGATIVE for significant cough or SOB  CV: NEGATIVE for chest pain, palpitations or peripheral edema  GI: NEGATIVE for nausea, abdominal pain, heartburn, or change in bowel habits  : NEGATIVE for frequency, dysuria, or hematuria  MUSCULOSKELETAL: NEGATIVE for significant arthralgias or myalgia  NEURO: NEGATIVE for weakness, dizziness or paresthesias  ENDOCRINE: NEGATIVE for temperature intolerance, skin/hair changes  HEME: NEGATIVE for bleeding problems  PSYCHIATRIC: NEGATIVE for changes in mood or affect      Objective    /76   Pulse 71   Temp 97.6  F (36.4  C) (Tympanic)   Resp 18   Ht 1.727 m (5' 8\")   Wt 91 kg (200 lb 11.2 oz)   SpO2 97%   BMI 30.52 kg/m    Body mass index is 30.52 kg/m .    Physical Exam   GENERAL: alert and no distress  EYES: Eyes grossly normal to inspection, PERRL and conjunctivae and sclerae normal  HENT: ear canals and TM's normal, nose and mouth without ulcers or lesions  NECK: no adenopathy, no asymmetry, masses, or scars  RESP: lungs clear to auscultation - no rales, rhonchi or wheezes  CV: regular rate and rhythm, normal S1 S2, no S3 or S4, no murmur, click or rub, no peripheral edema  ABDOMEN: soft, nontender, no hepatosplenomegaly, no masses and bowel sounds normal  MS: no gross musculoskeletal defects noted, no edema  SKIN: no suspicious lesions or rashes -  area - mild redness, inguinal, scrotum, suprapubic, penis shaft  NEURO: Normal strength and tone, mentation intact and speech normal  PSYCH: mentation appears normal, affect normal/bright    UA negative      Signed Electronically by:            Fady Adams MD, FAAFP, DABEssentia Health  [facility]  [facility]  [address] [place] [address] " [id number]  [email or web address]  [email or web address]   Office: [phone]  Fax: [phone]

## 2025-04-13 ENCOUNTER — MYC MEDICAL ADVICE (OUTPATIENT)
Dept: FAMILY MEDICINE | Facility: CLINIC | Age: 60
End: 2025-04-13
Payer: COMMERCIAL

## 2025-06-25 DIAGNOSIS — F41.9 ANXIETY: ICD-10-CM

## 2025-06-25 RX ORDER — CITALOPRAM HYDROBROMIDE 20 MG/1
20 TABLET ORAL DAILY
Qty: 90 TABLET | Refills: 3 | Status: SHIPPED | OUTPATIENT
Start: 2025-06-25

## 2025-07-08 ENCOUNTER — PATIENT OUTREACH (OUTPATIENT)
Dept: CARE COORDINATION | Facility: CLINIC | Age: 60
End: 2025-07-08
Payer: COMMERCIAL

## 2025-07-09 SDOH — HEALTH STABILITY: PHYSICAL HEALTH: ON AVERAGE, HOW MANY MINUTES DO YOU ENGAGE IN EXERCISE AT THIS LEVEL?: 20 MIN

## 2025-07-09 SDOH — HEALTH STABILITY: PHYSICAL HEALTH: ON AVERAGE, HOW MANY DAYS PER WEEK DO YOU ENGAGE IN MODERATE TO STRENUOUS EXERCISE (LIKE A BRISK WALK)?: 1 DAY

## 2025-07-09 ASSESSMENT — ASTHMA QUESTIONNAIRES
QUESTION_5 LAST FOUR WEEKS HOW WOULD YOU RATE YOUR ASTHMA CONTROL: COMPLETELY CONTROLLED
QUESTION_1 LAST FOUR WEEKS HOW MUCH OF THE TIME DID YOUR ASTHMA KEEP YOU FROM GETTING AS MUCH DONE AT WORK, SCHOOL OR AT HOME: NONE OF THE TIME
QUESTION_3 LAST FOUR WEEKS HOW OFTEN DID YOUR ASTHMA SYMPTOMS (WHEEZING, COUGHING, SHORTNESS OF BREATH, CHEST TIGHTNESS OR PAIN) WAKE YOU UP AT NIGHT OR EARLIER THAN USUAL IN THE MORNING: NOT AT ALL
QUESTION_2 LAST FOUR WEEKS HOW OFTEN HAVE YOU HAD SHORTNESS OF BREATH: ONCE OR TWICE A WEEK
ACT_TOTALSCORE: 23
QUESTION_4 LAST FOUR WEEKS HOW OFTEN HAVE YOU USED YOUR RESCUE INHALER OR NEBULIZER MEDICATION (SUCH AS ALBUTEROL): ONCE A WEEK OR LESS

## 2025-07-09 ASSESSMENT — ANXIETY QUESTIONNAIRES
GAD7 TOTAL SCORE: 0
6. BECOMING EASILY ANNOYED OR IRRITABLE: NOT AT ALL
5. BEING SO RESTLESS THAT IT IS HARD TO SIT STILL: NOT AT ALL
GAD7 TOTAL SCORE: 0
1. FEELING NERVOUS, ANXIOUS, OR ON EDGE: NOT AT ALL
3. WORRYING TOO MUCH ABOUT DIFFERENT THINGS: NOT AT ALL
2. NOT BEING ABLE TO STOP OR CONTROL WORRYING: NOT AT ALL
8. IF YOU CHECKED OFF ANY PROBLEMS, HOW DIFFICULT HAVE THESE MADE IT FOR YOU TO DO YOUR WORK, TAKE CARE OF THINGS AT HOME, OR GET ALONG WITH OTHER PEOPLE?: NOT DIFFICULT AT ALL
7. FEELING AFRAID AS IF SOMETHING AWFUL MIGHT HAPPEN: NOT AT ALL
7. FEELING AFRAID AS IF SOMETHING AWFUL MIGHT HAPPEN: NOT AT ALL
IF YOU CHECKED OFF ANY PROBLEMS ON THIS QUESTIONNAIRE, HOW DIFFICULT HAVE THESE PROBLEMS MADE IT FOR YOU TO DO YOUR WORK, TAKE CARE OF THINGS AT HOME, OR GET ALONG WITH OTHER PEOPLE: NOT DIFFICULT AT ALL
4. TROUBLE RELAXING: NOT AT ALL
GAD7 TOTAL SCORE: 0

## 2025-07-09 ASSESSMENT — SOCIAL DETERMINANTS OF HEALTH (SDOH): HOW OFTEN DO YOU GET TOGETHER WITH FRIENDS OR RELATIVES?: ONCE A WEEK

## 2025-07-14 ENCOUNTER — OFFICE VISIT (OUTPATIENT)
Dept: FAMILY MEDICINE | Facility: CLINIC | Age: 60
End: 2025-07-14
Attending: FAMILY MEDICINE
Payer: COMMERCIAL

## 2025-07-14 VITALS
RESPIRATION RATE: 16 BRPM | WEIGHT: 195.9 LBS | TEMPERATURE: 98.2 F | SYSTOLIC BLOOD PRESSURE: 126 MMHG | DIASTOLIC BLOOD PRESSURE: 62 MMHG | BODY MASS INDEX: 29.69 KG/M2 | HEIGHT: 68 IN | HEART RATE: 82 BPM | OXYGEN SATURATION: 97 %

## 2025-07-14 DIAGNOSIS — R30.0 DYSURIA: ICD-10-CM

## 2025-07-14 DIAGNOSIS — Z95.1 H/O CORONARY ARTERY BYPASS SURGERY: ICD-10-CM

## 2025-07-14 DIAGNOSIS — Z82.49 FAMILY HISTORY OF CORONARY ARTERY DISEASE: ICD-10-CM

## 2025-07-14 DIAGNOSIS — B35.6 TINEA CRURIS: ICD-10-CM

## 2025-07-14 DIAGNOSIS — Z00.00 ROUTINE GENERAL MEDICAL EXAMINATION AT A HEALTH CARE FACILITY: Primary | ICD-10-CM

## 2025-07-14 DIAGNOSIS — E78.5 HYPERLIPIDEMIA LDL GOAL <70: ICD-10-CM

## 2025-07-14 DIAGNOSIS — J45.20 MILD INTERMITTENT ASTHMA WITHOUT COMPLICATION: ICD-10-CM

## 2025-07-14 DIAGNOSIS — Z51.81 MEDICATION MONITORING ENCOUNTER: ICD-10-CM

## 2025-07-14 DIAGNOSIS — K76.0 FATTY LIVER: ICD-10-CM

## 2025-07-14 DIAGNOSIS — Z12.5 SCREENING FOR PROSTATE CANCER: ICD-10-CM

## 2025-07-14 DIAGNOSIS — G89.12 POST-THORACOTOMY PAIN SYNDROME: ICD-10-CM

## 2025-07-14 DIAGNOSIS — N20.0 NEPHROLITHIASIS: ICD-10-CM

## 2025-07-14 DIAGNOSIS — I25.810 CORONARY ARTERY DISEASE INVOLVING CORONARY BYPASS GRAFT OF NATIVE HEART WITHOUT ANGINA PECTORIS: ICD-10-CM

## 2025-07-14 DIAGNOSIS — F41.9 ANXIETY: ICD-10-CM

## 2025-07-14 DIAGNOSIS — L40.9 PSORIASIS: ICD-10-CM

## 2025-07-14 DIAGNOSIS — Z80.42 FAMILY HISTORY OF PROSTATE CANCER: ICD-10-CM

## 2025-07-14 DIAGNOSIS — R73.03 PREDIABETES: ICD-10-CM

## 2025-07-14 DIAGNOSIS — K22.70 BARRETT'S ESOPHAGUS WITHOUT DYSPLASIA: ICD-10-CM

## 2025-07-14 DIAGNOSIS — Z22.7 LATENT TUBERCULOSIS: ICD-10-CM

## 2025-07-14 DIAGNOSIS — I25.5 ISCHEMIC CARDIOMYOPATHY: ICD-10-CM

## 2025-07-14 DIAGNOSIS — I10 HYPERTENSION GOAL BP (BLOOD PRESSURE) < 140/90: ICD-10-CM

## 2025-07-14 DIAGNOSIS — K21.9 GASTROESOPHAGEAL REFLUX DISEASE WITHOUT ESOPHAGITIS: ICD-10-CM

## 2025-07-14 DIAGNOSIS — L40.50 PSORIATIC ARTHRITIS (H): ICD-10-CM

## 2025-07-14 DIAGNOSIS — Z12.11 SCREEN FOR COLON CANCER: ICD-10-CM

## 2025-07-14 DIAGNOSIS — R91.8 PULMONARY NODULES: ICD-10-CM

## 2025-07-14 PROCEDURE — 99213 OFFICE O/P EST LOW 20 MIN: CPT | Mod: 25 | Performed by: FAMILY MEDICINE

## 2025-07-14 PROCEDURE — 99396 PREV VISIT EST AGE 40-64: CPT | Performed by: FAMILY MEDICINE

## 2025-07-14 PROCEDURE — 3074F SYST BP LT 130 MM HG: CPT | Performed by: FAMILY MEDICINE

## 2025-07-14 PROCEDURE — G2211 COMPLEX E/M VISIT ADD ON: HCPCS | Performed by: FAMILY MEDICINE

## 2025-07-14 PROCEDURE — 3078F DIAST BP <80 MM HG: CPT | Performed by: FAMILY MEDICINE

## 2025-07-14 NOTE — PROGRESS NOTES
Preventive Care Visit  [facility] [place]  Fady Adams MD, Family Medicine  Jul 14, 2025      Assessment & Plan     Routine general medical examination at a health care facility    - Comprehensive metabolic panel  - Lipid panel reflex to direct LDL Fasting  - CBC with platelets  - CK total  - UA Macroscopic with reflex to Microscopic and Culture  - Albumin Random Urine Quantitative with Creat Ratio  - TSH with free T4 reflex  - Prostate Specific Antigen Screen  - Hemoglobin A1c  - NT-proBNP    Coronary artery disease involving coronary bypass graft of native heart without angina pectoris    - Comprehensive metabolic panel  - Lipid panel reflex to direct LDL Fasting  - UA Macroscopic with reflex to Microscopic and Culture  - Albumin Random Urine Quantitative with Creat Ratio  - Hemoglobin A1c  - PRIMARY CARE FOLLOW-UP SCHEDULING  - PRIMARY CARE FOLLOW-UP SCHEDULING    Ischemic cardiomyopathy    - Comprehensive metabolic panel  - NT-proBNP  - PRIMARY CARE FOLLOW-UP SCHEDULING  - PRIMARY CARE FOLLOW-UP SCHEDULING    H/O coronary artery bypass surgery    - PRIMARY CARE FOLLOW-UP SCHEDULING  - PRIMARY CARE FOLLOW-UP SCHEDULING    Prediabetes    - Comprehensive metabolic panel  - Lipid panel reflex to direct LDL Fasting  - UA Macroscopic with reflex to Microscopic and Culture  - Albumin Random Urine Quantitative with Creat Ratio  - Hemoglobin A1c  - PRIMARY CARE FOLLOW-UP SCHEDULING  - PRIMARY CARE FOLLOW-UP SCHEDULING    Hypertension goal BP (blood pressure) < 140/90    - Comprehensive metabolic panel  - UA Macroscopic with reflex to Microscopic and Culture  - Albumin Random Urine Quantitative with Creat Ratio  - PRIMARY CARE FOLLOW-UP SCHEDULING  - PRIMARY CARE FOLLOW-UP SCHEDULING    Hyperlipidemia LDL goal <70    - Comprehensive metabolic panel  - Lipid panel reflex to direct LDL Fasting  - CK total  - PRIMARY CARE FOLLOW-UP SCHEDULING  - PRIMARY CARE FOLLOW-UP SCHEDULING    Mild intermittent asthma  without complication    - PRIMARY CARE FOLLOW-UP SCHEDULING  - PRIMARY CARE FOLLOW-UP SCHEDULING    Psoriatic arthritis (H)    - Comprehensive metabolic panel  - CBC with platelets  - UA Macroscopic with reflex to Microscopic and Culture  - Albumin Random Urine Quantitative with Creat Ratio  - PRIMARY CARE FOLLOW-UP SCHEDULING  - PRIMARY CARE FOLLOW-UP SCHEDULING    Psoriasis    - Comprehensive metabolic panel  - CBC with platelets  - UA Macroscopic with reflex to Microscopic and Culture  - Albumin Random Urine Quantitative with Creat Ratio  - PRIMARY CARE FOLLOW-UP SCHEDULING  - PRIMARY CARE FOLLOW-UP SCHEDULING    Pulmonary nodules    - Comprehensive metabolic panel  - CBC with platelets  - UA Macroscopic with reflex to Microscopic and Culture  - Albumin Random Urine Quantitative with Creat Ratio  - PRIMARY CARE FOLLOW-UP SCHEDULING    Post-thoracotomy pain syndrome    - PRIMARY CARE FOLLOW-UP SCHEDULING  - PRIMARY CARE FOLLOW-UP SCHEDULING    Latent tuberculosis    - PRIMARY CARE FOLLOW-UP SCHEDULING    Sullivan's esophagus without dysplasia    - CBC with platelets  - Adult GI  Referral - Procedure Only  - PRIMARY CARE FOLLOW-UP SCHEDULING    Gastroesophageal reflux disease without esophagitis    - CBC with platelets  - Adult GI  Referral - Procedure Only  - PRIMARY CARE FOLLOW-UP SCHEDULING    Nephrolithiasis    - UA Macroscopic with reflex to Microscopic and Culture  - Albumin Random Urine Quantitative with Creat Ratio  - PRIMARY CARE FOLLOW-UP SCHEDULING    Anxiety    - TSH with free T4 reflex  - PRIMARY CARE FOLLOW-UP SCHEDULING    Fatty liver    - Comprehensive metabolic panel  - CBC with platelets  - PRIMARY CARE FOLLOW-UP SCHEDULING    Tinea cruris    - PRIMARY CARE FOLLOW-UP SCHEDULING  - PRIMARY CARE FOLLOW-UP SCHEDULING    Dysuria    - PRIMARY CARE FOLLOW-UP SCHEDULING  - PRIMARY CARE FOLLOW-UP SCHEDULING    Family history of prostate cancer    - Prostate Specific Antigen Screen  -  "PRIMARY CARE FOLLOW-UP SCHEDULING    Family history of coronary artery disease    - Lipid panel reflex to direct LDL Fasting  - PRIMARY CARE FOLLOW-UP SCHEDULING  - PRIMARY CARE FOLLOW-UP SCHEDULING    Screening for prostate cancer    - Prostate Specific Antigen Screen  - PRIMARY CARE FOLLOW-UP SCHEDULING    Screen for colon cancer    - Adult GI  Referral - Procedure Only  - PRIMARY CARE FOLLOW-UP SCHEDULING    Medication monitoring encounter    - Comprehensive metabolic panel  - Lipid panel reflex to direct LDL Fasting  - CBC with platelets  - CK total  - UA Macroscopic with reflex to Microscopic and Culture  - Albumin Random Urine Quantitative with Creat Ratio  - TSH with free T4 reflex  - Prostate Specific Antigen Screen  - Hemoglobin A1c  - NT-proBNP  - PRIMARY CARE FOLLOW-UP SCHEDULING  - PRIMARY CARE FOLLOW-UP SCHEDULING    Patient has been advised of split billing requirements and indicates understanding: Yes    BMI  Estimated body mass index is 29.79 kg/m  as calculated from the following:    Height as of this encounter: 1.727 m (5' 8\").    Weight as of this encounter: 88.9 kg (195 lb 14.4 oz).     Weight management plan: diet ands exercise    Counseling  Appropriate preventive services were addressed with this patient via screening, questionnaire, or discussion as appropriate for fall prevention, nutrition, physical activity, Tobacco-use cessation, social engagement, weight loss and cognition.  Checklist reviewing preventive services available has been given to the patient.  Reviewed patient's diet, addressing concerns and/or questions.   He is at risk for lack of exercise and has been provided with information to increase physical activity for the benefit of his well-being.   The patient reports drinking more than 3 alcoholic drinks per day and/or more than 7 drhnks per week. The patient was counseled and given information about possible harmful effects of excessive alcohol intake.Reviewed " preventive health counseling, as reflected in patient instructions    Plan:    1) Medications: reviewed, refilled    2) Labs: fasting pending    3) Immunizations: advise covid / flu / rsv / Tdap    4) Imaging/Diagnostics: EGD / Colonoscopy    5) Consults: Cardiology, Rheumatology    Return in about 1 year (around 7/14/2026) for Complete Physical, Medication Recheck Visit, Follow Up Chronic.    33 minutes spent by myself on the date of the encounter doing chart review, history and exam, documentation and further activities per the note.    The longitudinal plan of care for the diagnosis(es)/condition(s) as documented were addressed during this visit. Due to the added complexity in care, I will continue to support Pat in the subsequent management and with ongoing continuity of care.    Shared Decision making completed.    Charline Pappas is a 60 year old, presenting for the following:  Physical        7/14/2025     2:12 PM   Additional Questions   Roomed by Barbara GARCIA CMA          HPI  Patient is not fasting.       CAD / Ischemic Cardiomyopathy / CABG x 5 - no cp - no sob - no edema    Prediabetes    Lab Results   Component Value Date    A1C 5.4 12/16/2024    A1C 5.4 06/25/2024    A1C 5.5 06/22/2023    A1C 5.4 06/16/2022    A1C 5.3 07/29/2021    A1C 5.4 06/05/2020    A1C 5.4 06/12/2019    A1C 5.6 05/16/2018    A1C 5.4 01/30/2018    A1C 5.6 02/14/2017     Hyperlipidemia Follow-Up    Are you regularly taking any medication or supplement to lower your cholesterol?   Yes- atorvastatin  Are you having muscle aches or other side effects that you think could be caused by your cholesterol lowering medication?  No    Recent Labs   Lab Test 12/16/24  0937 09/23/24  1007   CHOL 118 105   HDL 30* 30*   LDL 60 51   TRIG 140 119     Hypertension Follow-up    Do you check your blood pressure regularly outside of the clinic? No   Are you following a low salt diet? No  Are your blood pressures ever more than 140 on the top number  "(systolic) OR more   than 90 on the bottom number (diastolic), for example 140/90? No    BP Readings from Last 3 Encounters:   07/14/25 126/62   04/09/25 132/76   12/17/24 130/72     Creatinine   Date Value Ref Range Status   12/16/2024 0.93 0.67 - 1.17 mg/dL Final   03/23/2021 0.830 0.500 - 1.300 mg/dL Final     GFR Estimate   Date Value Ref Range Status   12/16/2024 >90 >60 mL/min/1.73m2 Final     Comment:     eGFR calculated using 2021 CKD-EPI equation.   01/31/2021 63 >60 mL/min/[1.73_m2] Final     Asthma - overall quite good        6/24/2024    10:17 AM 12/16/2024     8:20 AM 7/9/2025     8:37 AM   ACT Total Scores   ACT TOTAL SCORE (Goal Greater than or Equal to 20) 25 25 23    In the past 12 months, how many times did you visit the emergency room for your asthma without being admitted to the hospital? 0 0 0   In the past 12 months, how many times were you hospitalized overnight because of your asthma? 0 0 0       Patient-reported     Psoriasis / Psoriatic arthritis - stable - recent treatment with UV light resulted in burn on 7/11/2025    Pulmonary nodules / Latent TB (treated x 1 yr)    Sullivan's / GERD - no issues    Nephrolithiasis - no issues    Fatty liver - no issues    Anxiety - \"been good\"      Advance Care Planning    Discussed advance care planning with patient; however, patient declined at this time.        7/9/2025   General Health   How would you rate your overall physical health? (!) FAIR   Feel stress (tense, anxious, or unable to sleep) Not at all         7/9/2025   Nutrition   Three or more servings of calcium each day? (!) NO   Diet: Regular (no restrictions)   How many servings of fruit and vegetables per day? (!) 0-1   How many sweetened beverages each day? (!) 2         7/9/2025   Exercise   Days per week of moderate/strenous exercise 1 day   Average minutes spent exercising at this level 20 min   (!) EXERCISE CONCERN      7/9/2025   Social Factors   Frequency of gathering with friends or " relatives Once a week   Worry food won't last until get money to buy more No   Food not last or not have enough money for food? No   Do you have housing? (Housing is defined as stable permanent housing and does not include staying outside in a car, in a tent, in an abandoned building, in an overnight shelter, or couch-surfing.) Yes   Are you worried about losing your housing? No   Lack of transportation? No   Unable to get utilities (heat,electricity)? No         7/9/2025   Fall Risk   Fallen 2 or more times in the past year? No   Trouble with walking or balance? No          7/9/2025   Dental   Dentist two times every year? Yes       Today's PHQ-9 Score:       7/9/2025     8:28 AM   PHQ-9 SCORE   PHQ-9 Total Score MyChart 0   PHQ-9 Total Score 0        Patient-reported         7/9/2025   Substance Use   Alcohol more than 3/day or more than 7/wk Yes   How often do you have a drink containing alcohol 2 to 4 times a month   How many alcohol drinks on typical day 1 or 2   How often do you have 5+ drinks at one occasion Less than monthly   Audit 2/3 Score 1   How often not able to stop drinking once started Never   How often failed to do what normally expected Never   How often needed first drink in am after a heavy drinking session Never   How often feeling of guilt or remorse after drinking Never   How often unable to remember what happened the night before Never   Have you or someone else been injured because of your drinking No   Has anyone been concerned or suggested you cut down on drinking No   TOTAL SCORE - AUDIT 3   Do you use any other substances recreationally? (!) CANNABIS PRODUCTS     Social History     Tobacco Use    Smoking status: Former     Types: Cigars    Smokeless tobacco: Never    Tobacco comments:     In past rare cigar, last 2005   Vaping Use    Vaping status: Never Used   Substance Use Topics    Alcohol use: Yes     Types: 6 Standard drinks or equivalent per week     Comment: 6 drinks per week     Drug use: No           2025   STI Screening   New sexual partner(s) since last STI/HIV test? No   Last PSA:   PSA   Date Value Ref Range Status   10/08/2020 1.59 0 - 4 ug/L Final     Comment:     Assay Method:  Chemiluminescence using Siemens Vista analyzer     Prostate Specific Antigen Screen   Date Value Ref Range Status   2024 0.73 0.00 - 3.50 ng/mL Final   2022 1.09 0.00 - 4.00 ug/L Final     ASCVD Risk   The ASCVD Risk score (Sarah DELGADO, et al., 2019) failed to calculate for the following reasons:    Risk score cannot be calculated because patient has a medical history suggesting prior/existing ASCVD    Fracture Risk Assessment Tool  Link to Frax Calculator  Use the information below to complete the Frax calculator  : 1965  Sex: male  Weight (kg): 88.9 kg (actual weight)  Height (cm): 172.7 cm  Previous Fragility Fracture:  No  History of parent with fractured hip:  No  Current Smoking:  No  Patient has been on glucocorticoids for more than 3 months (5mg/day or more): Yes, psoriatic arthritis  Rheumatoid Arthritis on Problem List:  No  Secondary Osteoporosis on Problem List:  No  Consumes 3 or more units of alcohol per day: No  Femoral Neck BMD (g/cm2)           Reviewed and updated as needed this visit by Provider                  Patient Active Problem List   Diagnosis    Other atopic dermatitis and related conditions    Mild intermittent asthma    Family history of coronary artery disease    Family history of prostate cancer    Prediabetes    GERD (gastroesophageal reflux disease)    Sullivan's esophagus    Anxiety    Hypertension goal BP (blood pressure) < 140/90    Post-thoracotomy pain syndrome    Nephrolithiasis    Coronary artery disease    Hyperlipidemia LDL goal <70    Environmental allergies    H/O coronary artery bypass surgery    Psoriatic arthritis (H)    Class 1 obesity with serious comorbidity and body mass index (BMI) of 30.0 to 30.9 in adult, unspecified obesity  type    Latent tuberculosis    Pulmonary nodules    Fatty liver    Gallstone    Psoriasis    NSTEMI (non-ST elevated myocardial infarction) (H)    Abnormal result of cardiovascular function study, unspecified    CAD (coronary artery disease)    Ischemic cardiomyopathy    ACS (acute coronary syndrome) (H)    Status post coronary angiogram    Coronary artery disease involving native coronary artery of native heart without angina pectoris       Past Medical History:   Diagnosis Date    2019 novel coronavirus disease (COVID-19) 02/2021    Anxiety     Sullivan's esophagus 07/2011    Coronary artery disease 04/2008    cabg x 5    Environmental allergies     seasonal, hay fever    FAM HX-CARDIOVAS DIS NEC     dad at 45, cabg, stent    Family history of prostate cancer     Fatty liver     Gallstone     GERD (gastroesophageal reflux disease) 03/2011    History of blood transfusion     Hyperlipidemia LDL goal <70 2006    Hypertension goal BP (blood pressure) < 140/90 04/2008    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    Dr Saba, Hx histoplasmosis, treated    Mild intermittent asthma 07/2003    Nephrolithiasis 6/13, 7/16    calcium oxalate - 6/2013, 7/2016, 1/2021    Other atopic dermatitis and related conditions 1980    Other diseases of lung, not elsewhere classified 04/2008    dr steel - benign bx and ct - granuloma - no further w/u needed    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    dr hoover    Prediabetes 08/2008    Psoriasis     Dr Mccall    Psoriatic arthritis (H)     Dr Mccall    Pulmonary nodules 2009    stable in 2012    Seasonal allergies 1980    Allergic rhinitis Hayfever    Tuberculosis 12/2018       Past Surgical History:   Procedure Laterality Date    COLONOSCOPY N/A 08/07/2015    normal - due 10 yrs    CV ANGIOGRAM CORONARY GRAFT N/A 08/27/2024    Procedure: Coronary Angiogram Graft;  Surgeon: Matteo Christy MD;  Location:  [facility]    CV CORONARY ANGIOGRAM N/A 08/27/2024    Procedure: Coronary  Angiogram;  Surgeon: Matteo Christy MD;  Location:  [facility]    CV CORONARY ANGIOGRAM N/A 09/23/2024    Procedure: Coronary Angiogram;  Surgeon: Matteo Christy MD;  Location:  [facility]    CV INTRAVASULAR ULTRASOUND N/A 08/27/2024    Procedure: Intravascular Ultrasound;  Surgeon: Matteo Christy MD;  Location:  [facility]    CV INTRAVASULAR ULTRASOUND N/A 09/23/2024    Procedure: Intravascular Ultrasound;  Surgeon: Matteo Christy MD;  Location:  [facility]    CV PCI N/A 08/27/2024    Procedure: Percutaneous Coronary Intervention;  Surgeon: Matteo Christy MD;  Location:  [facility]    CV PCI N/A 09/23/2024    Procedure: Percutaneous Coronary Intervention;  Surgeon: Matteo Christy MD;  Location:  [facility]    CV PCI ASPIRATION THROMECTOMY N/A 08/27/2024    Procedure: Percutaneous Coronary Intervention Aspiration Thrombectomy;  Surgeon: Matteo Christy MD;  Location: [facility] CARDIAC CATH LAB    ESOPHAGOSCOPY, GASTROSCOPY, DUODENOSCOPY (EGD), COMBINED  6/11, 5/13    [facility] - ? gastritis, fowler's - due 3 yr    ESOPHAGOSCOPY, GASTROSCOPY, DUODENOSCOPY (EGD), COMBINED  05/10/2013    COMBINED ESOPHAGOSCOPY, GASTROSCOPY, DUODENOSCOPY (EGD), BIOPSY SINGLE OR MULTIPLE;  ESOPHAGOSCOPY, GASTROSCOPY, DUODENOSCOPY (EGD)  with biopsy;  Surgeon: Angus Reynolds MD;  Location:  GI    ESOPHAGOSCOPY, GASTROSCOPY, DUODENOSCOPY (EGD), COMBINED N/A 07/28/2017    Fowler's - recheck 3 yrs    HC VASECTOMY UNILAT/BILAT W POSTOP SEMEN  1995    Vasectomy    HERNIORRHAPHY UMBILICAL N/A 11/13/2014    Dr Barron    SURGICAL HISTORY OF -  Left 1980    lt patella fx    SURGICAL HISTORY OF -  Left 1985    lt thumb neuroma excised    SURGICAL HISTORY OF -   11/2009    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      albuterol (PROAIR HFA) 108 (90 Base) MCG/ACT inhaler Inhale 2 puffs into the lungs every 4 hours as needed for shortness of breath or wheezing 18 g 3    aspirin 81 MG EC tablet Take 1 tablet (81 mg) by mouth daily. Start tomorrow. Do not start before August 28, 2024. 30 tablet 3    atorvastatin (LIPITOR) 80 MG tablet Take 1 tablet (80 mg) by mouth daily 90 tablet 3    azelastine (ASTELIN) 0.1 % nasal spray Spray 2 sprays into both nostrils 2 times daily 90 mL 3    carvedilol (COREG) 6.25 MG tablet Take 1 tablet (6.25 mg) by mouth 2 times daily (with meals) 180 tablet 3    cetirizine (ZYRTEC) 10 MG tablet Take 1 tablet (10 mg) by mouth every evening 90 tablet 3    citalopram (CELEXA) 20 MG tablet TAKE 1 TABLET(20 MG) BY MOUTH DAILY 90 tablet 3    clobetasol propionate (TEMOVATE) 0.05 % external cream Apply topically 2 times daily 30 g 3    etanercept (ENBREL) 50 MG/ML injection Inject 0.98 mLs (50 mg) Subcutaneous once a week      fluticasone (FLONASE) 50 MCG/ACT nasal spray Spray 2 sprays into both nostrils daily 48 g 3    LANsoprazole (PREVACID) 30 MG DR capsule TAKE ONE CAPSULE BY MOUTH DAILY. TAKE 30 TO 60 MINUTES BEFORE A MEAL. 90 capsule 3    lisinopril (ZESTRIL) 2.5 MG tablet Take 1 tablet (2.5 mg) by mouth daily. 90 tablet 3    ticagrelor (BRILINTA) 90 MG tablet Take 1 tablet (90 mg) by mouth 2 times daily. Dose to start this evening. 180 tablet 3    triamcinolone (KENALOG) 0.1 % external cream Apply topically 2 times daily. 80 g 1    nitroGLYcerin (NITROSTAT) 0.4 MG sublingual tablet For chest pain place 1 tablet under the tongue every 5 minutes for 3 doses. If symptoms persist 5 minutes after 2nd dose  call 911. 30 tablet 0       Allergies   Allergen Reactions    Seasonal Allergies        Family History   Problem Relation Age of Onset    Prostate Cancer Father 69    Coronary Artery Disease Father 38    Hypertension Father     C.A.D. Father     Heart Disease Father     Neurologic Disorder Sister         CVA/aneurysm at age 19, decreased memory, partial paralysis    Hypertension Paternal Grandfather     C.A.D. Paternal Grandfather     Heart Failure Paternal Grandfather     Colon Cancer No family hx of        Social History     Socioeconomic History    Marital status:      Spouse name: Imelda    Number of children: 0    Years of education: 14    Highest education level: None   Occupational History     Comment: [employer]   Tobacco Use    Smoking status: Former     Types: Cigars    Smokeless tobacco: Never    Tobacco comments:     In past rare cigar, last 2005   Vaping Use    Vaping status: Never Used   Substance and Sexual Activity    Alcohol use: Yes     Types: 6 Standard drinks or equivalent per week     Comment: 6 drinks per week    Drug use: No    Sexual activity: Yes     Partners: Female     Birth control/protection: Male Surgical   Other Topics Concern    Caffeine Concern Yes     Comment: 1 can qd    Exercise Yes     Comment: occas    Seat Belt Yes    Parent/sibling w/ CABG, MI or angioplasty before 65F 55M? Yes     Social Drivers of Health     Financial Resource Strain: Low Risk  (7/9/2025)    Financial Resource Strain     Within the past 12 months, have you or your family members you live with been unable to get utilities (heat, electricity) when it was really needed?: No   Food Insecurity: Low Risk  (7/9/2025)    Food Insecurity     Within the past 12 months, did you worry that your food would run out before you got money to buy more?: No     Within the past 12 months, did the food you bought just not last and you didn t have money to get more?: No   Transportation Needs: Low Risk  (7/9/2025)     Transportation Needs     Within the past 12 months, has lack of transportation kept you from medical appointments, getting your medicines, non-medical meetings or appointments, work, or from getting things that you need?: No   Physical Activity: Insufficiently Active (7/9/2025)    Exercise Vital Sign     Days of Exercise per Week: 1 day     Minutes of Exercise per Session: 20 min   Stress: No Stress Concern Present (7/9/2025)    [employer] - Occupational Stress Questionnaire     Feeling of Stress : Not at all   Social Connections: Unknown (7/9/2025)    Social Connection and Isolation Panel [NHANES]     Frequency of Social Gatherings with Friends and Family: Once a week   Interpersonal Safety: Low Risk  (7/14/2025)    Interpersonal Safety     Do you feel physically and emotionally safe where you currently live?: Yes     Within the past 12 months, have you been hit, slapped, kicked or otherwise physically hurt by someone?: No     Within the past 12 months, have you been humiliated or emotionally abused in other ways by your partner or ex-partner?: No   Housing Stability: Low Risk  (7/9/2025)    Housing Stability     Do you have housing? : Yes     Are you worried about losing your housing?: No       Colonoscopy:  Due  FIT / Cologuard: NA  PSA: pending      Review of Systems  CONSTITUTIONAL: NEGATIVE for fever, chills, change in weight  INTEGUMENTARY/SKIN: NEGATIVE for worrisome rashes, moles or lesions  EYES: NEGATIVE for vision changes or irritation  ENT/MOUTH: NEGATIVE for ear, mouth and throat problems  RESP: NEGATIVE for significant cough or SOB  CV: NEGATIVE for chest pain, palpitations or peripheral edema  GI: NEGATIVE for nausea, abdominal pain, heartburn, or change in bowel habits  : NEGATIVE for frequency, dysuria, or hematuria  MUSCULOSKELETAL: NEGATIVE for significant arthralgias or myalgia  NEURO: NEGATIVE for weakness, dizziness or paresthesias  ENDOCRINE: NEGATIVE for  "temperature intolerance, skin/hair changes  HEME: NEGATIVE for bleeding problems  PSYCHIATRIC: NEGATIVE for changes in mood or affect     Objective    Exam  /62   Pulse 82   Temp 98.2  F (36.8  C) (Tympanic)   Resp 16   Ht 1.727 m (5' 8\")   Wt 88.9 kg (195 lb 14.4 oz)   SpO2 97%   BMI 29.79 kg/m     Estimated body mass index is 29.79 kg/m  as calculated from the following:    Height as of this encounter: 1.727 m (5' 8\").    Weight as of this encounter: 88.9 kg (195 lb 14.4 oz).    Physical Exam  GENERAL: alert and no distress  EYES: Eyes grossly normal to inspection, PERRL and conjunctivae and sclerae normal  HENT: ear canals and TM's normal, nose and mouth without ulcers or lesions  NECK: no adenopathy, no asymmetry, masses, or scars  RESP: lungs clear to auscultation - no rales, rhonchi or wheezes  CV: regular rate and rhythm, normal S1 S2, no S3 or S4, no murmur, click or rub, no peripheral edema  ABDOMEN: soft, nontender, no hepatosplenomegaly, no masses and bowel sounds normal   (male): pt declines  RECTAL: PSA / colonoscopy  MS: no gross musculoskeletal defects noted, no edema  SKIN: no suspicious lesions or rashes  NEURO: Normal strength and tone, mentation intact and speech normal  PSYCH: mentation appears normal, affect normal/bright    Signed Electronically by:            Fady Adams MD, FAAFP, DABFM     [facility]  [address] [place] [id number]  [email or web address]   Office: [phone]  Fax: [phone]       Answers submitted by the patient for this visit:  Patient Health Questionnaire (Submitted on 7/9/2025)  If you checked off any problems, how difficult have these problems made it for you to do your work, take care of things at home, or get along with other people?: Not difficult at all  PHQ9 TOTAL SCORE: 0  Patient Health Questionnaire (G7) (Submitted on 7/9/2025)  LONNY 7 TOTAL SCORE: 0    "

## 2025-07-17 ENCOUNTER — TELEPHONE (OUTPATIENT)
Dept: GASTROENTEROLOGY | Facility: CLINIC | Age: 60
End: 2025-07-17
Payer: COMMERCIAL

## 2025-07-17 NOTE — TELEPHONE ENCOUNTER
"Endoscopy Scheduling Screen    Caller: patient    Have you had any respiratory illness or flu-like symptoms in the last 10 days?  No    Patient is ACTIVE on [employer].  Inform patient that all appointment instructions will be sent via [employer].    Review patient's insurance for any non participating payor.    Ordering/Referring Provider: DR. PATRICK ELMORE   (If ordering provider performs procedure, schedule with ordering provider unless otherwise instructed. )    BMI: Estimated body mass index is 29.79 kg/m  as calculated from the following:    Height as of 7/14/25: 1.727 m (5' 8\").    Weight as of 7/14/25: 88.9 kg (195 lb 14.4 oz).     Sedation Ordered  moderate sedation.   If patient BMI > 50 do not schedule in [facility].    If patient BMI > 45 do not schedule at [facility].    Are you taking methadone or Suboxone?  NO, No RN review required.    Have you been diagnosed and are being treated for severe PTSD or severe anxiety?  NO, No RN review required.    Are you taking any prescription medications for pain 3 or more times per week?   NO, No RN review required.    Do you have a history of malignant hyperthermia?  No    (Females) Are you currently pregnant?   No     Have you been diagnosed or told you have pulmonary hypertension?   No    Do you have an LVAD?  No    Have you been told you have moderate to severe sleep apnea?  No.    Have you been told you have COPD, asthma, or any other lung disease?  Yes     What breathing problems do you have?  Asthma     Do you use home oxygen?  No    Have your breathing problems required an ED visit or hospitalization in the last year?  No.    Has your doctor ordered any cardiac tests like echo, angiogram, stress test, ablation, or EKG, that you have not completed yet?  No    Do you  have a history of any heart conditions?  Yes     Have you had any hospitalizations  in the last year for heart related issues, for example a stent placement, heart attack, or cardiomyopathy?  Yes (RN Review required " "for scheduling.)    Do you have any implantable devices in your body (pacemaker, ICD)?  No    Do you take nitroglycerine?  No    Have you ever had or are you waiting for an organ transplant?  No. Continue scheduling, no site restrictions.    Have you had a stroke or transient ischemic attack (TIA aka \"mini stroke\") in the last 2 years?   No.    Have you been diagnosed with or been told you have cirrhosis of the liver?   No.    Are you currently on dialysis?   No    Do you need assistance transferring?   No    BMI: Estimated body mass index is 29.79 kg/m  as calculated from the following:    Height as of 7/14/25: 1.727 m (5' 8\").    Weight as of 7/14/25: 88.9 kg (195 lb 14.4 oz).     Is patients BMI > 40 and scheduling location UPU?  No    Do you take an injectable or oral medication for weight loss or diabetes (excluding insulin)?  No    Do you take the medication Naltrexone?  No    Do you take blood thinners?  Yes, you must contact your prescribing provider for directions on holding or bridging with a different medication.       Prep   Are you currently on dialysis or do you have chronic kidney disease?  No    Do you have a diagnosis of diabetes?  No    Do you have a diagnosis of cystic fibrosis (CF)?  No    On a regular basis do you go 3 -5 days between bowel movements?  No    BMI > 40?  No    Preferred Pharmacy:    [id number] [address]  AT [place] [address] & [address] [address]  Phone: [phone] Fax: [phone]        Final Scheduling Details     Procedure scheduled  Colonoscopy / Upper endoscopy (EGD)    Surgeon:  DR. DOLL     Date of procedure:  09/25/2025     Pre-OP / PAC:   No - Not required for this site.    Location  [facility] - Patient preference. OK'D WITH ENDOSCOPY RN'S    Sedation   Moderate Sedation - Per order.      Patient Reminders:   You will receive a call from a Nurse to review instructions and health history.  This " assessment must be completed prior to your procedure.  Failure to complete the Nurse assessment may result in the procedure being cancelled.      On the day of your procedure, please designate an adult(s) who can drive you home stay with you for the next 24 hours. The medicines used in the exam will make you sleepy. You will not be able to drive.      You cannot take public transportation, ride share services, or non-medical taxi service without a responsible caregiver.  Medical transport services are allowed with the requirement that a responsible caregiver will receive you at your destination.  We require that drivers and caregivers are confirmed prior to your procedure.

## 2025-07-17 NOTE — TELEPHONE ENCOUNTER
Pre Assessment RN Review    Focused Assessments    Cardiac Review      Has the patient had a stent placed in the last year?  YES, SCREENING COLONOSCOPY: Delay procedure for 1 year post stent placement.        Scheduling Status & Recommendations    Ok at  after 9/23/25 d/t to stent placement 9/23/24.    Referral recommends [facility] or  d/t stent placement.    Corinne Kliber, RN  Endoscopy Procedure Pre Assessment RN  [phone] option 3

## 2025-07-21 ENCOUNTER — LAB (OUTPATIENT)
Dept: LAB | Facility: CLINIC | Age: 60
End: 2025-07-21
Payer: COMMERCIAL

## 2025-07-21 DIAGNOSIS — I25.810 CORONARY ARTERY DISEASE INVOLVING CORONARY BYPASS GRAFT OF NATIVE HEART WITHOUT ANGINA PECTORIS: ICD-10-CM

## 2025-07-21 DIAGNOSIS — R73.03 PREDIABETES: ICD-10-CM

## 2025-07-21 DIAGNOSIS — K21.9 GASTROESOPHAGEAL REFLUX DISEASE WITHOUT ESOPHAGITIS: ICD-10-CM

## 2025-07-21 DIAGNOSIS — Z12.5 SCREENING FOR PROSTATE CANCER: ICD-10-CM

## 2025-07-21 DIAGNOSIS — Z00.00 ROUTINE GENERAL MEDICAL EXAMINATION AT A HEALTH CARE FACILITY: ICD-10-CM

## 2025-07-21 DIAGNOSIS — Z82.49 FAMILY HISTORY OF CORONARY ARTERY DISEASE: ICD-10-CM

## 2025-07-21 DIAGNOSIS — I25.5 ISCHEMIC CARDIOMYOPATHY: ICD-10-CM

## 2025-07-21 DIAGNOSIS — L40.9 PSORIASIS: ICD-10-CM

## 2025-07-21 DIAGNOSIS — E78.5 HYPERLIPIDEMIA LDL GOAL <70: ICD-10-CM

## 2025-07-21 DIAGNOSIS — Z80.42 FAMILY HISTORY OF PROSTATE CANCER: ICD-10-CM

## 2025-07-21 DIAGNOSIS — R91.8 PULMONARY NODULES: ICD-10-CM

## 2025-07-21 DIAGNOSIS — I10 HYPERTENSION GOAL BP (BLOOD PRESSURE) < 140/90: ICD-10-CM

## 2025-07-21 DIAGNOSIS — K76.0 FATTY LIVER: ICD-10-CM

## 2025-07-21 DIAGNOSIS — K22.70 BARRETT'S ESOPHAGUS WITHOUT DYSPLASIA: ICD-10-CM

## 2025-07-21 DIAGNOSIS — F41.9 ANXIETY: ICD-10-CM

## 2025-07-21 DIAGNOSIS — Z51.81 MEDICATION MONITORING ENCOUNTER: ICD-10-CM

## 2025-07-21 DIAGNOSIS — N20.0 NEPHROLITHIASIS: ICD-10-CM

## 2025-07-21 DIAGNOSIS — L40.50 PSORIATIC ARTHRITIS (H): ICD-10-CM

## 2025-07-21 LAB
ALBUMIN SERPL BCG-MCNC: 3.8 G/DL (ref 3.5–5.2)
ALBUMIN UR-MCNC: NEGATIVE MG/DL
ALP SERPL-CCNC: 107 U/L (ref 40–150)
ALT SERPL W P-5'-P-CCNC: 21 U/L (ref 0–70)
ANION GAP SERPL CALCULATED.3IONS-SCNC: 10 MMOL/L (ref 7–15)
APPEARANCE UR: CLEAR
AST SERPL W P-5'-P-CCNC: 28 U/L (ref 0–45)
BILIRUB SERPL-MCNC: 0.5 MG/DL
BILIRUB UR QL STRIP: NEGATIVE
BUN SERPL-MCNC: 11.9 MG/DL (ref 8–23)
CALCIUM SERPL-MCNC: 9.7 MG/DL (ref 8.8–10.4)
CHLORIDE SERPL-SCNC: 102 MMOL/L (ref 98–107)
CHOLEST SERPL-MCNC: 122 MG/DL
CK SERPL-CCNC: 31 U/L (ref 39–308)
COLOR UR AUTO: YELLOW
CREAT SERPL-MCNC: 0.98 MG/DL (ref 0.67–1.17)
CREAT UR-MCNC: 136 MG/DL
EGFRCR SERPLBLD CKD-EPI 2021: 88 ML/MIN/1.73M2
ERYTHROCYTE [DISTWIDTH] IN BLOOD BY AUTOMATED COUNT: 12.8 % (ref 10–15)
EST. AVERAGE GLUCOSE BLD GHB EST-MCNC: 103 MG/DL
FASTING STATUS PATIENT QL REPORTED: YES
FASTING STATUS PATIENT QL REPORTED: YES
GLUCOSE SERPL-MCNC: 114 MG/DL (ref 70–99)
GLUCOSE UR STRIP-MCNC: NEGATIVE MG/DL
HBA1C MFR BLD: 5.2 % (ref 0–5.6)
HCO3 SERPL-SCNC: 26 MMOL/L (ref 22–29)
HCT VFR BLD AUTO: 39.2 % (ref 40–53)
HDLC SERPL-MCNC: 34 MG/DL
HGB BLD-MCNC: 13.7 G/DL (ref 13.3–17.7)
HGB UR QL STRIP: NEGATIVE
KETONES UR STRIP-MCNC: NEGATIVE MG/DL
LDLC SERPL CALC-MCNC: 43 MG/DL
LEUKOCYTE ESTERASE UR QL STRIP: NEGATIVE
MCH RBC QN AUTO: 31 PG (ref 26.5–33)
MCHC RBC AUTO-ENTMCNC: 34.9 G/DL (ref 31.5–36.5)
MCV RBC AUTO: 89 FL (ref 78–100)
MICROALBUMIN UR-MCNC: <12 MG/L
MICROALBUMIN/CREAT UR: NORMAL MG/G{CREAT}
NITRATE UR QL: NEGATIVE
NONHDLC SERPL-MCNC: 88 MG/DL
NT-PROBNP SERPL-MCNC: 55 PG/ML (ref 0–177)
PH UR STRIP: 6 [PH] (ref 5–7)
PLATELET # BLD AUTO: 186 10E3/UL (ref 150–450)
POTASSIUM SERPL-SCNC: 4.2 MMOL/L (ref 3.4–5.3)
PROT SERPL-MCNC: 6.5 G/DL (ref 6.4–8.3)
PSA SERPL DL<=0.01 NG/ML-MCNC: 0.64 NG/ML (ref 0–4.5)
RBC # BLD AUTO: 4.42 10E6/UL (ref 4.4–5.9)
SODIUM SERPL-SCNC: 138 MMOL/L (ref 135–145)
SP GR UR STRIP: 1.01 (ref 1–1.03)
TRIGL SERPL-MCNC: 223 MG/DL
TSH SERPL DL<=0.005 MIU/L-ACNC: 1.43 UIU/ML (ref 0.3–4.2)
UROBILINOGEN UR STRIP-ACNC: 0.2 E.U./DL
WBC # BLD AUTO: 6.6 10E3/UL (ref 4–11)

## 2025-07-21 PROCEDURE — 82550 ASSAY OF CK (CPK): CPT

## 2025-07-21 PROCEDURE — 80053 COMPREHEN METABOLIC PANEL: CPT

## 2025-07-21 PROCEDURE — 84443 ASSAY THYROID STIM HORMONE: CPT

## 2025-07-21 PROCEDURE — 82570 ASSAY OF URINE CREATININE: CPT

## 2025-07-21 PROCEDURE — 81003 URINALYSIS AUTO W/O SCOPE: CPT

## 2025-07-21 PROCEDURE — 85027 COMPLETE CBC AUTOMATED: CPT

## 2025-07-21 PROCEDURE — 82043 UR ALBUMIN QUANTITATIVE: CPT

## 2025-07-21 PROCEDURE — 80061 LIPID PANEL: CPT

## 2025-07-21 PROCEDURE — 83880 ASSAY OF NATRIURETIC PEPTIDE: CPT

## 2025-07-21 PROCEDURE — 36415 COLL VENOUS BLD VENIPUNCTURE: CPT

## 2025-07-21 PROCEDURE — G0103 PSA SCREENING: HCPCS

## 2025-07-26 ENCOUNTER — RESULTS FOLLOW-UP (OUTPATIENT)
Dept: FAMILY MEDICINE | Facility: CLINIC | Age: 60
End: 2025-07-26
Payer: COMMERCIAL

## 2025-07-26 NOTE — LETTER
[facility]  [address] [facility] [place] [address]  [phone]                    July 28, 2025    Mian Lozano  [address] [place] [address]      Dear Mian,    Here is a summary of your recent test results:    Labs are overall quite good, except  Prediabetes  Mildly elevated triglycerides  Low HDL (good cholesterol)      We advise:  Continue current cares.  Balanced low cholesterol diet, Mediterranean diet  Regular exercise, 150 minutes per week.         Thank you very much for trusting [facility].     Healthy regards,          Fady Adams M.D.        Results for orders placed or performed in visit on 07/21/25   Comprehensive metabolic panel     Status: Abnormal   Result Value Ref Range    Sodium 138 135 - 145 mmol/L    Potassium 4.2 3.4 - 5.3 mmol/L    Carbon Dioxide (CO2) 26 22 - 29 mmol/L    Anion Gap 10 7 - 15 mmol/L    Urea Nitrogen 11.9 8.0 - 23.0 mg/dL    Creatinine 0.98 0.67 - 1.17 mg/dL    GFR Estimate 88 >60 mL/min/1.73m2    Calcium 9.7 8.8 - 10.4 mg/dL    Chloride 102 98 - 107 mmol/L    Glucose 114 (H) 70 - 99 mg/dL    Alkaline Phosphatase 107 40 - 150 U/L    AST 28 0 - 45 U/L    ALT 21 0 - 70 U/L    Protein Total 6.5 6.4 - 8.3 g/dL    Albumin 3.8 3.5 - 5.2 g/dL    Bilirubin Total 0.5 <=1.2 mg/dL    Patient Fasting > 8hrs? Yes    Lipid panel reflex to direct LDL Fasting     Status: Abnormal   Result Value Ref Range    Cholesterol 122 <200 mg/dL    Triglycerides 223 (H) <150 mg/dL    Direct Measure HDL 34 (L) >=40 mg/dL    LDL Cholesterol Calculated 43 <100 mg/dL    Non HDL Cholesterol 88 <130 mg/dL    Patient Fasting > 8hrs? Yes     Narrative    Cholesterol  Desirable: < 200 mg/dL  Borderline High: 200 - 239 mg/dL  High: >= 240 mg/dL    Triglycerides  Normal: < 150 mg/dL  Borderline High: 150 - 199 mg/dL  High: 200-499 mg/dL  Very High: >= 500 mg/dL    Direct Measure HDL  Female: >= 50 mg/dL   Male: >= 40 mg/dL    LDL  Cholesterol  Desirable: < 100 mg/dL  Above Desirable: 100 - 129 mg/dL   Borderline High: 130 - 159 mg/dL   High:  160 - 189 mg/dL   Very High: >= 190 mg/dL    Non HDL Cholesterol  Desirable: < 130 mg/dL  Above Desirable: 130 - 159 mg/dL  Borderline High: 160 - 189 mg/dL  High: 190 - 219 mg/dL  Very High: >= 220 mg/dL   CBC with platelets     Status: Abnormal   Result Value Ref Range    WBC Count 6.6 4.0 - 11.0 10e3/uL    RBC Count 4.42 4.40 - 5.90 10e6/uL    Hemoglobin 13.7 13.3 - 17.7 g/dL    Hematocrit 39.2 (L) 40.0 - 53.0 %    MCV 89 78 - 100 fL    MCH 31.0 26.5 - 33.0 pg    MCHC 34.9 31.5 - 36.5 g/dL    RDW 12.8 10.0 - 15.0 %    Platelet Count 186 150 - 450 10e3/uL   CK total     Status: Abnormal   Result Value Ref Range    CK 31 (L) 39 - 308 U/L   UA Macroscopic with reflex to Microscopic and Culture     Status: Normal    Specimen: Urine, Midstream   Result Value Ref Range    Color Urine Yellow Colorless, Straw, Light Yellow, Yellow    Appearance Urine Clear Clear    Glucose Urine Negative Negative mg/dL    Bilirubin Urine Negative Negative    Ketones Urine Negative Negative mg/dL    Specific Gravity Urine 1.015 1.003 - 1.035    Blood Urine Negative Negative    pH Urine 6.0 5.0 - 7.0    Protein Albumin Urine Negative Negative mg/dL    Urobilinogen Urine 0.2 0.2, 1.0 E.U./dL    Nitrite Urine Negative Negative    Leukocyte Esterase Urine Negative Negative    Narrative    Microscopic not indicated   Albumin Random Urine Quantitative with Creat Ratio     Status: None   Result Value Ref Range    Creatinine Urine mg/dL 136.0 mg/dL    Albumin Urine mg/L <12.0 mg/L    Albumin Urine mg/g Cr     TSH with free T4 reflex     Status: Normal   Result Value Ref Range    TSH 1.43 0.30 - 4.20 uIU/mL   Prostate Specific Antigen Screen     Status: Normal   Result Value Ref Range    Prostate Specific Antigen Screen 0.64 0.00 - 4.50 ng/mL    Narrative    This result is obtained using the Roche Elecsys total PSA method on the gianfranco  e801 immunoassay analyzer, which is an ultrasensitive method. Results obtained with different assay methods or kits cannot be used interchangeably.  This test is intended for initial prostate cancer screening. PSA values exceeding the age-specific limits are suspicious for prostate disease, but additional testing, such as prostate biopsy, is needed to diagnose prostate pathology. The American Cancer Society recommends annual examination with digital rectal examination and serum PSA beginning at age 50 and for men with a life expectancy of at least 10 years after detection of prostate cancer. For men in high-risk groups, such as  Americans or men with a first-degree relative diagnosed at a younger age, testing should begin at a younger age. It is generally recommended that information be provided to patients about the benefits and limitations of testing and treatment so they can make informed decisions.   NT-proBNP     Status: Normal   Result Value Ref Range    NT-proBNP 55 0 - 177 pg/mL

## 2025-07-28 DIAGNOSIS — J45.20 MILD INTERMITTENT ASTHMA WITHOUT COMPLICATION: ICD-10-CM

## 2025-07-28 DIAGNOSIS — Z91.09 ENVIRONMENTAL ALLERGIES: ICD-10-CM

## 2025-07-28 DIAGNOSIS — K21.9 GASTROESOPHAGEAL REFLUX DISEASE WITHOUT ESOPHAGITIS: ICD-10-CM

## 2025-07-28 RX ORDER — ALBUTEROL SULFATE 90 UG/1
2 INHALANT RESPIRATORY (INHALATION) EVERY 4 HOURS PRN
Qty: 18 G | Refills: 3 | Status: SHIPPED | OUTPATIENT
Start: 2025-07-28

## 2025-07-28 RX ORDER — LANSOPRAZOLE 30 MG/1
30 CAPSULE, DELAYED RELEASE ORAL DAILY
Qty: 90 CAPSULE | Refills: 2 | Status: SHIPPED | OUTPATIENT
Start: 2025-07-28

## 2025-07-29 ENCOUNTER — TELEPHONE (OUTPATIENT)
Dept: FAMILY MEDICINE | Facility: CLINIC | Age: 60
End: 2025-07-29
Payer: COMMERCIAL

## 2025-07-29 NOTE — TELEPHONE ENCOUNTER
Prior Authorization Retail Medication Request    Medication/Dose: Albuterol HFA  Puffs 18GM  Diagnosis and ICD code (if different than what is on RX):  NA  New/renewal/insurance change PA/secondary ins. PA:  Previously Tried and Failed:  NA  Rationale:  NA    Insurance   Primary: [employer]   Insurance ID:  [id number]         Pharmacy Information (if different than what is on RX)  Name:  [facility]  Phone:  [phone]  Fax:[phone]

## 2025-07-31 NOTE — TELEPHONE ENCOUNTER
Retail Pharmacy Prior Authorization Team   Phone: [phone]    Prior Authorization Not Needed per Insurance    Medication: ALBUTEROL SULFATE  (90 BASE) MCG/ACT IN AERS  Insurance Company: [employer] - Phone [phone] Fax [phone]  Expected CoPay: $    [facility] the Rx[id number] [place] [id number] [facility] [address] [facility]  Pharmacy Notified: YES  Patient Notified: **Instructed pharmacy to notify patient when script is ready to /ship.**    I called the pharmacy to verify if they really needed a PA, and Technician told me no PA needed.   They were able to switch to the preferred albuterol , Albuterol  (90 BASE) MCG/ACT, CIPLA , 6.7GM size.

## 2025-08-22 ENCOUNTER — APPOINTMENT (OUTPATIENT)
Dept: CT IMAGING | Facility: CLINIC | Age: 60
End: 2025-08-22
Attending: EMERGENCY MEDICINE
Payer: COMMERCIAL

## 2025-08-22 ENCOUNTER — HOSPITAL ENCOUNTER (EMERGENCY)
Facility: CLINIC | Age: 60
Discharge: HOME | End: 2025-08-22
Attending: EMERGENCY MEDICINE | Admitting: EMERGENCY MEDICINE
Payer: COMMERCIAL

## 2025-08-22 VITALS
SYSTOLIC BLOOD PRESSURE: 111 MMHG | TEMPERATURE: 98 F | HEART RATE: 80 BPM | DIASTOLIC BLOOD PRESSURE: 56 MMHG | RESPIRATION RATE: 18 BRPM | OXYGEN SATURATION: 97 %

## 2025-08-22 DIAGNOSIS — N20.0 KIDNEY STONE: Primary | ICD-10-CM

## 2025-08-22 DIAGNOSIS — N28.89 LEFT RENAL MASS: ICD-10-CM

## 2025-08-22 LAB
ALBUMIN SERPL BCG-MCNC: 4 G/DL (ref 3.5–5.2)
ALBUMIN UR-MCNC: NEGATIVE MG/DL
ALP SERPL-CCNC: 121 U/L (ref 40–150)
ALT SERPL W P-5'-P-CCNC: 22 U/L (ref 0–70)
ANION GAP SERPL CALCULATED.3IONS-SCNC: 12 MMOL/L (ref 7–15)
APPEARANCE UR: CLEAR
AST SERPL W P-5'-P-CCNC: 26 U/L (ref 0–45)
BILIRUB SERPL-MCNC: 0.7 MG/DL
BILIRUB UR QL STRIP: NEGATIVE
BUN SERPL-MCNC: 12.1 MG/DL (ref 8–23)
CALCIUM SERPL-MCNC: 9.9 MG/DL (ref 8.8–10.4)
CHLORIDE SERPL-SCNC: 101 MMOL/L (ref 98–107)
COLOR UR AUTO: ABNORMAL
CREAT SERPL-MCNC: 1.01 MG/DL (ref 0.67–1.17)
EGFRCR SERPLBLD CKD-EPI 2021: 85 ML/MIN/1.73M2
ERYTHROCYTE [DISTWIDTH] IN BLOOD BY AUTOMATED COUNT: 12.1 % (ref 10–15)
GLUCOSE SERPL-MCNC: 154 MG/DL (ref 70–99)
GLUCOSE UR STRIP-MCNC: NEGATIVE MG/DL
HCO3 SERPL-SCNC: 21 MMOL/L (ref 22–29)
HCT VFR BLD AUTO: 42.3 % (ref 40–53)
HGB BLD-MCNC: 15 G/DL (ref 13.3–17.7)
HGB UR QL STRIP: ABNORMAL
HOLD SPECIMEN: NORMAL
HOLD SPECIMEN: NORMAL
KETONES UR STRIP-MCNC: NEGATIVE MG/DL
LEUKOCYTE ESTERASE UR QL STRIP: NEGATIVE
MCH RBC QN AUTO: 30.2 PG (ref 26.5–33)
MCHC RBC AUTO-ENTMCNC: 35.5 G/DL (ref 31.5–36.5)
MCV RBC AUTO: 85.3 FL (ref 78–100)
MUCOUS THREADS #/AREA URNS LPF: PRESENT /LPF
NITRATE UR QL: NEGATIVE
PH UR STRIP: 5.5 [PH] (ref 5–7)
PLATELET # BLD AUTO: 239 10E3/UL (ref 150–450)
POTASSIUM SERPL-SCNC: 4.6 MMOL/L (ref 3.4–5.3)
PROT SERPL-MCNC: 7.6 G/DL (ref 6.4–8.3)
RBC # BLD AUTO: 4.96 10E6/UL (ref 4.4–5.9)
RBC URINE: 33 /HPF
SODIUM SERPL-SCNC: 134 MMOL/L (ref 135–145)
SP GR UR STRIP: 1.01 (ref 1–1.03)
SQUAMOUS EPITHELIAL: <1 /HPF
UROBILINOGEN UR STRIP-MCNC: NORMAL MG/DL
WBC # BLD AUTO: 11.43 10E3/UL (ref 4–11)
WBC URINE: 3 /HPF

## 2025-08-22 PROCEDURE — 250N000009 HC RX 250: Performed by: EMERGENCY MEDICINE

## 2025-08-22 PROCEDURE — 99285 EMERGENCY DEPT VISIT HI MDM: CPT | Mod: 25 | Performed by: EMERGENCY MEDICINE

## 2025-08-22 PROCEDURE — 96375 TX/PRO/DX INJ NEW DRUG ADDON: CPT | Mod: 59 | Performed by: EMERGENCY MEDICINE

## 2025-08-22 PROCEDURE — 255N000002 HC RX 255 OP 636: Performed by: EMERGENCY MEDICINE

## 2025-08-22 PROCEDURE — 85014 HEMATOCRIT: CPT | Performed by: EMERGENCY MEDICINE

## 2025-08-22 PROCEDURE — 250N000011 HC RX IP 250 OP 636: Performed by: EMERGENCY MEDICINE

## 2025-08-22 PROCEDURE — 81003 URINALYSIS AUTO W/O SCOPE: CPT | Performed by: EMERGENCY MEDICINE

## 2025-08-22 PROCEDURE — 36415 COLL VENOUS BLD VENIPUNCTURE: CPT | Performed by: EMERGENCY MEDICINE

## 2025-08-22 PROCEDURE — 74177 CT ABD & PELVIS W/CONTRAST: CPT

## 2025-08-22 PROCEDURE — 96374 THER/PROPH/DIAG INJ IV PUSH: CPT | Mod: 59 | Performed by: EMERGENCY MEDICINE

## 2025-08-22 PROCEDURE — 84155 ASSAY OF PROTEIN SERUM: CPT | Performed by: EMERGENCY MEDICINE

## 2025-08-22 RX ORDER — MORPHINE SULFATE 4 MG/ML
4 INJECTION, SOLUTION INTRAMUSCULAR; INTRAVENOUS
Refills: 0 | Status: DISCONTINUED | OUTPATIENT
Start: 2025-08-22 | End: 2025-08-22 | Stop reason: HOSPADM

## 2025-08-22 RX ORDER — OXYCODONE HYDROCHLORIDE 5 MG/1
5 TABLET ORAL EVERY 6 HOURS PRN
Qty: 12 TABLET | Refills: 0 | Status: SHIPPED | OUTPATIENT
Start: 2025-08-22 | End: 2025-08-25

## 2025-08-22 RX ORDER — TAMSULOSIN HYDROCHLORIDE 0.4 MG/1
0.4 CAPSULE ORAL DAILY
Qty: 10 CAPSULE | Refills: 0 | Status: SHIPPED | OUTPATIENT
Start: 2025-08-22

## 2025-08-22 RX ORDER — ONDANSETRON 4 MG/1
4 TABLET, ORALLY DISINTEGRATING ORAL EVERY 8 HOURS PRN
Qty: 10 TABLET | Refills: 0 | Status: SHIPPED | OUTPATIENT
Start: 2025-08-22 | End: 2025-08-25

## 2025-08-22 RX ORDER — ONDANSETRON 2 MG/ML
4 INJECTION INTRAMUSCULAR; INTRAVENOUS EVERY 30 MIN PRN
Status: DISCONTINUED | OUTPATIENT
Start: 2025-08-22 | End: 2025-08-22 | Stop reason: HOSPADM

## 2025-08-22 RX ADMIN — IOHEXOL 100 ML: 350 INJECTION, SOLUTION INTRAVENOUS at 04:27

## 2025-08-22 RX ADMIN — MORPHINE SULFATE 4 MG: 4 INJECTION, SOLUTION INTRAMUSCULAR; INTRAVENOUS at 04:41

## 2025-08-22 RX ADMIN — ONDANSETRON 4 MG: 2 INJECTION, SOLUTION INTRAMUSCULAR; INTRAVENOUS at 04:40

## 2025-08-22 RX ADMIN — SODIUM CHLORIDE 65 ML: 9 INJECTION, SOLUTION INTRAVENOUS at 04:28

## 2025-08-22 ASSESSMENT — COLUMBIA-SUICIDE SEVERITY RATING SCALE - C-SSRS
1. IN THE PAST MONTH, HAVE YOU WISHED YOU WERE DEAD OR WISHED YOU COULD GO TO SLEEP AND NOT WAKE UP?: NO
2. HAVE YOU ACTUALLY HAD ANY THOUGHTS OF KILLING YOURSELF IN THE PAST MONTH?: NO
6. HAVE YOU EVER DONE ANYTHING, STARTED TO DO ANYTHING, OR PREPARED TO DO ANYTHING TO END YOUR LIFE?: NO

## 2025-08-22 ASSESSMENT — ACTIVITIES OF DAILY LIVING (ADL): ADLS_ACUITY_SCORE: 53

## 2025-08-27 ENCOUNTER — OFFICE VISIT (OUTPATIENT)
Dept: UROLOGY | Facility: CLINIC | Age: 60
End: 2025-08-27
Payer: COMMERCIAL

## 2025-08-27 VITALS
WEIGHT: 185 LBS | HEIGHT: 68 IN | HEART RATE: 69 BPM | BODY MASS INDEX: 28.04 KG/M2 | DIASTOLIC BLOOD PRESSURE: 82 MMHG | SYSTOLIC BLOOD PRESSURE: 134 MMHG

## 2025-08-27 DIAGNOSIS — N28.89 RENAL MASS: ICD-10-CM

## 2025-08-27 DIAGNOSIS — N20.0 KIDNEY STONE: Primary | ICD-10-CM

## 2025-08-27 LAB
ALBUMIN UR-MCNC: NEGATIVE MG/DL
APPEARANCE UR: CLEAR
BILIRUB UR QL STRIP: NEGATIVE
COLOR UR AUTO: YELLOW
GLUCOSE UR STRIP-MCNC: NEGATIVE MG/DL
HGB UR QL STRIP: NEGATIVE
KETONES UR STRIP-MCNC: NEGATIVE MG/DL
LEUKOCYTE ESTERASE UR QL STRIP: NEGATIVE
NITRATE UR QL: NEGATIVE
PH UR STRIP: 6 [PH] (ref 5–7)
SP GR UR STRIP: 1.01 (ref 1–1.03)
UROBILINOGEN UR STRIP-ACNC: 0.2 E.U./DL

## 2025-08-27 PROCEDURE — 81003 URINALYSIS AUTO W/O SCOPE: CPT | Mod: QW | Performed by: UROLOGY

## 2025-08-27 PROCEDURE — 3079F DIAST BP 80-89 MM HG: CPT | Performed by: UROLOGY

## 2025-08-27 PROCEDURE — 1126F AMNT PAIN NOTED NONE PRSNT: CPT | Performed by: UROLOGY

## 2025-08-27 PROCEDURE — 3075F SYST BP GE 130 - 139MM HG: CPT | Performed by: UROLOGY

## 2025-08-27 PROCEDURE — 99204 OFFICE O/P NEW MOD 45 MIN: CPT | Performed by: UROLOGY

## 2025-08-27 ASSESSMENT — PAIN SCALES - GENERAL: PAINLEVEL_OUTOF10: NO PAIN (0)

## (undated) DEVICE — MANIFOLD KIT ANGIO AUTOMATED 014613

## (undated) DEVICE — RAD INFLATOR BASIC COMPAK  IN4130

## (undated) DEVICE — WIRE GLIDE 0.035"X150CM VASC GR3506

## (undated) DEVICE — CATH US OD 5FR OD SEC 2.9FR EAGLE EYE PLATINUM 0.014 85900P

## (undated) DEVICE — KIT CATHETER INDIGO RX 140CM WITH LG LUMEN ASP TUBING

## (undated) DEVICE — KIT HAND CONTROL ANGIOTOUCH ACIST 65CM AT-P65

## (undated) DEVICE — CATH ANGIO INFINITI MPA2 4FRX100CM 2 SH 538442

## (undated) DEVICE — CATH LAUNCHER 6FR LA6EBU375

## (undated) DEVICE — INTRO SHEATH 4FRX10CM PINNACLE RSS402

## (undated) DEVICE — CATH ANGIO INFINITI 3DRC 4FRX100CM 538476

## (undated) DEVICE — GUIDEWIRE VASC 0.035INX150CM INQWIRE J TIP IQ35F150J3F/A

## (undated) DEVICE — INTRODUCER SHEATH GREEN 6.5FRX11CM .038IN PSI-6F-11-038ACT

## (undated) DEVICE — GUIDEWIRE VASC 0.014INX190CM J TIP CGRXT190HJ

## (undated) DEVICE — WIRE GUIDE 0.035"X260CM SAFE-T-J EXCHANGE G00517

## (undated) DEVICE — DEFIB PRO-PADZ LVP LQD GEL ADULT 8900-2105-01

## (undated) DEVICE — CATH ANGIO INFINITI IM 4FRX100CM 538460

## (undated) DEVICE — KIT ENDO TURNOVER/PROCEDURE W/CLEAN A SCOPE LINERS 103888

## (undated) DEVICE — CATH BALLOON NC EMERGE 3.00X12MM H7493926712300

## (undated) DEVICE — CATH LAUNCHER 6FR MB 1.0 LA6MB1

## (undated) DEVICE — CATH BALLOON NC EMERGE 3.50X15MM H7493926715350

## (undated) DEVICE — KIT LG BORE TOUHY ACCESS PLUS MAP152

## (undated) DEVICE — Device

## (undated) DEVICE — CATH DIAG 4FR JL 4.5 538417

## (undated) DEVICE — CANISTER ENGINE FOR INDIGO SYSTEM

## (undated) DEVICE — CATH BALLOON CUTTING WOLVERINE 2.75X15MH74939401152750

## (undated) DEVICE — ENDO FORCEP ENDOJAW BIOPSY 2.8MMX160CM FB-220K

## (undated) RX ORDER — LIDOCAINE HYDROCHLORIDE 10 MG/ML
INJECTION, SOLUTION EPIDURAL; INFILTRATION; INTRACAUDAL; PERINEURAL
Status: DISPENSED
Start: 2024-08-27

## (undated) RX ORDER — FENTANYL CITRATE 50 UG/ML
INJECTION, SOLUTION INTRAMUSCULAR; INTRAVENOUS
Status: DISPENSED
Start: 2017-07-28

## (undated) RX ORDER — NITROGLYCERIN 5 MG/ML
VIAL (ML) INTRAVENOUS
Status: DISPENSED
Start: 2024-08-27

## (undated) RX ORDER — HEPARIN SODIUM 1000 [USP'U]/ML
INJECTION, SOLUTION INTRAVENOUS; SUBCUTANEOUS
Status: DISPENSED
Start: 2024-08-27

## (undated) RX ORDER — LIDOCAINE HYDROCHLORIDE 10 MG/ML
INJECTION, SOLUTION EPIDURAL; INFILTRATION; INTRACAUDAL; PERINEURAL
Status: DISPENSED
Start: 2024-09-23

## (undated) RX ORDER — NITROGLYCERIN 5 MG/ML
VIAL (ML) INTRAVENOUS
Status: DISPENSED
Start: 2024-09-23

## (undated) RX ORDER — HEPARIN SODIUM 1000 [USP'U]/ML
INJECTION, SOLUTION INTRAVENOUS; SUBCUTANEOUS
Status: DISPENSED
Start: 2024-09-23

## (undated) RX ORDER — ATROPINE SULFATE 0.1 MG/ML
INJECTION INTRAVENOUS
Status: DISPENSED
Start: 2024-08-27

## (undated) RX ORDER — REGADENOSON 0.08 MG/ML
INJECTION, SOLUTION INTRAVENOUS
Status: DISPENSED
Start: 2018-03-29

## (undated) RX ORDER — ADENOSINE 3 MG/ML
INJECTION, SOLUTION INTRAVENOUS
Status: DISPENSED
Start: 2024-08-27

## (undated) RX ORDER — ASPIRIN 325 MG
TABLET ORAL
Status: DISPENSED
Start: 2024-08-27

## (undated) RX ORDER — FENTANYL CITRATE 50 UG/ML
INJECTION, SOLUTION INTRAMUSCULAR; INTRAVENOUS
Status: DISPENSED
Start: 2024-08-27

## (undated) RX ORDER — FENTANYL CITRATE 50 UG/ML
INJECTION, SOLUTION INTRAMUSCULAR; INTRAVENOUS
Status: DISPENSED
Start: 2024-09-23